# Patient Record
Sex: MALE | Race: WHITE | NOT HISPANIC OR LATINO | Employment: OTHER | URBAN - METROPOLITAN AREA
[De-identification: names, ages, dates, MRNs, and addresses within clinical notes are randomized per-mention and may not be internally consistent; named-entity substitution may affect disease eponyms.]

---

## 2017-03-15 ENCOUNTER — TELEPHONE (OUTPATIENT)
Dept: FAMILY MEDICINE | Facility: CLINIC | Age: 62
End: 2017-03-15

## 2017-03-15 DIAGNOSIS — J45.30 MILD PERSISTENT ASTHMA WITHOUT COMPLICATION: ICD-10-CM

## 2017-03-15 RX ORDER — ALBUTEROL SULFATE 90 UG/1
2 AEROSOL, METERED RESPIRATORY (INHALATION) EVERY 6 HOURS PRN
Qty: 1 INHALER | Refills: 0 | Status: SHIPPED | OUTPATIENT
Start: 2017-03-15 | End: 2017-08-14

## 2017-03-15 NOTE — TELEPHONE ENCOUNTER
Reason for Call:  Medication or medication refill:    Do you use a [facility]?  Name of the pharmacy and phone number for the current request:  [facility] [place] [phone]    Name of the medication requested: Albuterol inhaler - Pt asking for 1 inhaler refill to last him until he can get in for appt. soon.  No need to call patient back, unless there are questions or problems.    Albuterol Inhaler       Last Written Prescription Date: 03/10/16  Last Fill Quantity: 1, # refills: 11    Last Office Visit with [facility], [facility] or [facility] prescribing provider:  03/10/16   Future Office Visit:       Date of Last Asthma Action Plan Letter:   Asthma Action Plan Q1 Year    Topic Date Due     Asthma Action Plan - yearly  03/10/2017      Asthma Control Test:   ACT Total Scores 3/10/2016   ACT TOTAL SCORE -   ASTHMA ER VISITS -   ASTHMA HOSPITALIZATIONS -   ACT TOTAL SCORE (Goal Greater than or Equal to 20) 17   In the past 12 months, how many times did you visit the emergency room for your asthma without being admitted to the hospital? 0   In the past 12 months, how many times were you hospitalized overnight because of your asthma? 0       Date of Last Spirometry Test:   No results found for this or any previous visit.      Other request:     Can we leave a detailed message on this number? YES    Phone number patient can be reached at: Home number on file [phone] (home)    Best Time: any    Call taken on 3/15/2017 at 9:48 AM by Marci Schroeder    Reason for Call:  Medication or medication refill:

## 2017-03-15 NOTE — TELEPHONE ENCOUNTER
Prescription approved per [facility] Refill Protocol.  Patient notified.    Alicia Marcus RN

## 2017-04-27 DIAGNOSIS — J45.30 MILD PERSISTENT ASTHMA WITHOUT COMPLICATION: ICD-10-CM

## 2017-04-27 NOTE — TELEPHONE ENCOUNTER
VENTOLIN HFA INH W/DOS CTR [id number]      Last Written Prescription Date: 3/15/2017  Last Fill Quantity: 1 inhaler, # refills: 0    Last Office Visit with [facility], [facility] or [facility] prescribing provider:  3/10/2016   Future Office Visit:       Date of Last Asthma Action Plan Letter:   Asthma Action Plan Q1 Year    Topic Date Due     Asthma Action Plan - yearly  03/10/2017      Asthma Control Test:   ACT Total Scores 3/10/2016   ACT TOTAL SCORE -   ASTHMA ER VISITS -   ASTHMA HOSPITALIZATIONS -   ACT TOTAL SCORE (Goal Greater than or Equal to 20) 17   In the past 12 months, how many times did you visit the emergency room for your asthma without being admitted to the hospital? 0   In the past 12 months, how many times were you hospitalized overnight because of your asthma? 0       Date of Last Spirometry Test:   No results found for this or any previous visit.

## 2017-05-02 ENCOUNTER — ALLIED HEALTH/NURSE VISIT (OUTPATIENT)
Dept: FAMILY MEDICINE | Facility: CLINIC | Age: 62
End: 2017-05-02
Payer: COMMERCIAL

## 2017-05-02 DIAGNOSIS — J45.30 MILD PERSISTENT ASTHMA WITHOUT COMPLICATION: Primary | ICD-10-CM

## 2017-05-02 PROCEDURE — 99207 ZZC NO CHARGE NURSE ONLY: CPT

## 2017-05-02 RX ORDER — ALBUTEROL SULFATE 90 UG/1
2 AEROSOL, METERED RESPIRATORY (INHALATION) EVERY 4 HOURS PRN
Qty: 18 G | Refills: 0 | Status: SHIPPED | OUTPATIENT
Start: 2017-05-02 | End: 2017-08-14

## 2017-05-02 NOTE — TELEPHONE ENCOUNTER
"Patient stopped in today and was advised it is more than a year since last seen and he needs an appt.   \"well, I am going into to treatment in [place] this week and I need one. I used primatene mist until they took it off the market. \"    No current breathing problem.     He agreed to be seen today and was scheduled for later in the day today with Dr Ware, I am going to send this note to PCP, ADRIAN only,   He has an appt at 2 pm today with you,   Cuca Billingsley RNC    "

## 2017-05-02 NOTE — MR AVS SNAPSHOT
"              After Visit Summary   5/2/2017    Luigi Vieyra    MRN: [id number]           Patient Information     Date Of Birth          1955        Visit Information        Provider Department      5/2/2017 8:45 AM KEVIN PALOMINO FP/IM RN [facility]        Today's Diagnoses     Mild persistent asthma without complication    -  1       Follow-ups after your visit        Your next 10 appointments already scheduled     May 02, 2017  8:45 AM CDT   Nurse [facility] with [facility]    [address] [address]   [phone]            May 02, 2017  2:00 PM CDT   [facility]   [facility]    [address] [address] [phone]              Who to contact     If you have questions or need follow up information about today's clinic visit or your schedule please contact [facility] directly at [phone].  Normal or non-critical lab and imaging results will be communicated to you by Semadichart, letter or phone within 4 business days after the clinic has received the results. If you do not hear from us within 7 days, please contact the clinic through Semadichart or phone. If you have a critical or abnormal lab result, we will notify you by phone as soon as possible.  Submit refill requests through [employer] or call your pharmacy and they will forward the refill request to us. Please allow 3 business days for your refill to be completed.          Additional Information About Your Visit        MyChart Information     ActiveCloud lets you send messages to your doctor, view your test results, renew your prescriptions, schedule appointments and more. To sign up, go to [email or web address] . Click on \"Log in\" on the left side of the screen, which will take you to the Welcome page. Then click on \"Sign up Now\" on the right side of the page.     You " will be asked to enter the access code listed below, as well as some personal information. Please follow the directions to create your username and password.     Your access code is: [email or web address]  Expires: 2017  8:18 AM     Your access code will  in 90 days. If you need help or a new code, please call your [facility] or [phone].        Care EveryWhere ID     This is your Care EveryWhere ID. This could be used by other organizations to access your [place] medical records  [id number]         Blood Pressure from Last 3 Encounters:   16 131/78   03/10/16 155/90   14 152/90    Weight from Last 3 Encounters:   03/10/16 173 lb (78.5 kg)   14 175 lb (79.4 kg)   12 175 lb 3.2 oz (79.5 kg)              Today, you had the following     No orders found for display       Primary Care Provider Office Phone # Fax #    Gatitoderek Ware [phone]       [facility]        [address] [address]        Thank you!     Thank you for choosing [facility]  for your care. Our goal is always to provide you with excellent care. Hearing back from our patients is one way we can continue to improve our services. Please take a few minutes to complete the written survey that you may receive in the mail after your visit with us. Thank you!             Your Updated Medication List - Protect others around you: Learn how to safely use, store and throw away your medicines at www.disposemymeds.org.          This list is accurate as of: 17  8:18 AM.  Always use your most recent med list.                   Brand Name Dispense Instructions for use    albuterol 108 (90 BASE) MCG/ACT Inhaler    PROAIR HFA/PROVENTIL HFA/VENTOLIN HFA    1 Inhaler    Inhale 2 puffs into the lungs every 6 hours as needed for shortness of breath / dyspnea or wheezing       fluticasone 110 MCG/ACT Inhaler    FLOVENT HFA    1 Inhaler    Inhale 2  puffs into the lungs 2 times daily 2 puffs

## 2017-05-02 NOTE — NURSING NOTE
Here to request an inhaler, was advised he needs an appt and agreed, will come back in at 2 p today to see PCP.   ,Cuca Billingsley RNC

## 2017-08-14 ENCOUNTER — OFFICE VISIT (OUTPATIENT)
Dept: FAMILY MEDICINE | Facility: CLINIC | Age: 62
End: 2017-08-14
Payer: COMMERCIAL

## 2017-08-14 ENCOUNTER — TELEPHONE (OUTPATIENT)
Dept: FAMILY MEDICINE | Facility: CLINIC | Age: 62
End: 2017-08-14

## 2017-08-14 VITALS
SYSTOLIC BLOOD PRESSURE: 114 MMHG | DIASTOLIC BLOOD PRESSURE: 71 MMHG | HEART RATE: 68 BPM | WEIGHT: 176 LBS | BODY MASS INDEX: 25.2 KG/M2 | TEMPERATURE: 98.4 F | HEIGHT: 70 IN

## 2017-08-14 DIAGNOSIS — F10.21 ALCOHOL DEPENDENCE IN REMISSION (H): ICD-10-CM

## 2017-08-14 DIAGNOSIS — G47.00 INSOMNIA, UNSPECIFIED TYPE: ICD-10-CM

## 2017-08-14 DIAGNOSIS — Z13.6 CARDIOVASCULAR SCREENING; LDL GOAL LESS THAN 130: ICD-10-CM

## 2017-08-14 DIAGNOSIS — Z72.0 TOBACCO ABUSE: ICD-10-CM

## 2017-08-14 DIAGNOSIS — Z11.59 NEED FOR HEPATITIS C SCREENING TEST: ICD-10-CM

## 2017-08-14 DIAGNOSIS — Z12.11 SCREENING FOR MALIGNANT NEOPLASM OF COLON: ICD-10-CM

## 2017-08-14 DIAGNOSIS — J45.30 MILD PERSISTENT ASTHMA WITHOUT COMPLICATION: Primary | ICD-10-CM

## 2017-08-14 PROBLEM — J30.2 SEASONAL ALLERGIC RHINITIS: Status: ACTIVE | Noted: 2017-06-13

## 2017-08-14 PROCEDURE — 99214 OFFICE O/P EST MOD 30 MIN: CPT | Performed by: FAMILY MEDICINE

## 2017-08-14 RX ORDER — FLUTICASONE PROPIONATE 110 UG/1
1 AEROSOL, METERED RESPIRATORY (INHALATION) 2 TIMES DAILY
Qty: 1 INHALER | Refills: 11 | Status: SHIPPED | OUTPATIENT
Start: 2017-08-14 | End: 2018-12-27 | Stop reason: DRUGHIGH

## 2017-08-14 RX ORDER — ALBUTEROL SULFATE 90 UG/1
2 AEROSOL, METERED RESPIRATORY (INHALATION) EVERY 4 HOURS PRN
Qty: 18 G | Refills: 11 | Status: SHIPPED | OUTPATIENT
Start: 2017-08-14 | End: 2018-11-23

## 2017-08-14 NOTE — MR AVS SNAPSHOT
After Visit Summary   8/14/2017    Luigi Vieyra    MRN: [id number]           Patient Information     Date Of Birth          1955        Visit Information        Provider Department      8/14/2017 9:40 AM Gatito Ware MD [facility]        Today's Diagnoses     Mild persistent asthma without complication    -  1    Insomnia, unspecified type        CARDIOVASCULAR SCREENING; LDL GOAL LESS THAN 130        Need for hepatitis C screening test        Screening for malignant neoplasm of colon          Care Instructions    Call to schedule your lab tests; make sure you are fasting for more than 12 hrs.    Schedule colonoscopy for screening for colon cancer at your soonest convenience.    Restart Flovent inhaler as prescribed - use this every day.  This is your controller medication for asthma.  Albuterol inhaler 2 puffs every 4 hrs as needed for wheezing or coughing spells or tightness in breathing while active. Goal is to minimize use of this medication.  Get a flu shot in the fall.    Sleep difficulty can be from various causes.  No caffeine after 3 pm - earlier if able.  Sleep hygiene: do not watch TV, use anything with electronic screen 1-2 hrs before bedtime; do not exercise or do strenuous activities 1-2 hrs prior to set bedtime; dim all the lights in room; cover windows with heavy drapes to block sunlight; take sleep aide as directed only; upon waking up turn on all lights.   If with persistent insomnia or daytime sleepiness in spite of the above after 2 months, see provider again.  Controlling Your Asthma  You can do a lot to manage your asthma and improve your quality of life. You will need to work with your healthcare provider to develop a plan. But it s up to you to put this plan into action.  Why you need to take control  You need to control the inflammation in your lungs. Take all medicine as directed, especially controller medicines, even if you feel that your  asthma is under good control. You also need to relieve symptoms when you have them. These are long-term tasks. But the more you stay in control, the better you ll feel. If you don t stay in control:    Asthma symptoms may cause you to miss school, work, or activities that you enjoy.    Asthma flare-ups can be dangerous, even deadly.    Uncontrolled asthma makes it more likely that you will need emergency department and in-hospital care.    Uncontrolled asthma may cause permanent damage to your lungs.    Peak flow monitoring helps measure how open your airways are.   Taking medicine helps you control your asthma and relieve symptoms when they occur.     Using an Asthma Action Plan will help you keep track of and respond to asthma symptoms.   Avoiding triggers--the things that inflame your airways--will help prevent symptoms and flare-ups.   Your action plan  Your healthcare provider will help you prepare, and when needed, update your personal Asthma Action Plan. Your plan tells you what to do based on your current symptoms. If you don't have an Asthma Action Plan, or if yours isn't up-to-date, make sure you talk with your healthcare provider.  Date Last Reviewed: 1/1/2017 2000-2017 [employer]. [address] [place] [address]. All rights reserved. This information is not intended as a substitute for professional medical care. Always follow your healthcare professional's instructions.        Insomnia  Insomnia is repeated difficulty going to sleep or staying asleep, or both. Whether you have insomnia is not defined by a specific amount of sleep. Different people need different amounts of sleep, and you may need more or less sleep at different times of your life.  There are 3 major types of insomnia:  short-term, chronic, and  other.   Short-term, or acute insomnia lasts less than 3 months.  The symptoms are temporary and can be linked directly to a stressor, such as the death of a loved one,  financial problems, or a new physical problem.  Short-term insomnia stops when the stressor resolves or the person adapts to its presence.  Chronic insomnia occurs at least 3 times a week and lasts longer than 3 months.  Chronic insomnia can occur when either the cause of the sleeping problem is not clear, or the insomnia does not get better when the stressor is resolved. A number of other criteria are also used to make the diagnosis of chronic insomnia.    Other insomnia  is the third type of insomnia-related sleep disorders.  This description applies to people who have problems getting to sleep or staying asleep, but do not meet all of the factors that describe either short-term or chronic insomnia.    Many things cause insomnia. Different people may have different causes. It can be from an underlying medical or psychological condition, or lifestyle. It can also be primary insomnia, which means no cause can be found.  Causes of insomnia include:    Chronic medical problems- heart disease, gastrointestinal problems, hormonal changes, breathing problems    Anxiety    Stress    Depression    Pain    Work schedule    Sleep apnea    Illegal drugs    Certain medicines  Many different medidcines can affect your sleep, such as stimulants, caffeine, alcohol, some decongestants, and diet pills. Other medicines may include some types of blood pressure pills, steroids, asthma medicines, antihistamines, antidepressants, seizure medicines and statins. Not all of these will affect your sleep, and they shouldn t be stopped without talking to your doctor.  Symptoms of insomnia can include:    Lying awake for long periods at night before falling asleep    Waking up several times during the night    Waking up early in the morning and not being able to get back to sleep    Feeling tired and not refreshed by sleep    Not being able to function properly during the day and finding it hard to concentrate    Irritability    Tiredness and  fatigue during the day  Home care  1. Review your medicines with your doctor or pharmacist to find out if they can cause insomnia. Not all medicines will affect your sleep, but they shouldn't be stopped without reviewing them with your doctor. There may be serious side effects and consequences from suddenly stopping your medicines. Not taking them may cause strokes, heart attacks, and many other problems.  2. Caffeine, smoking and alcohol also affect sleep. Limit your daily use and do not use these before bedtime. Alcohol may make you sleepy at first, but as its effects wear off, you may awaken a few hours later and have trouble returning to sleep.  3. Do not exercise, eat or drink large amounts of liquid within 2 hours of your bedtime.  4. Improve your sleep habits. Have a fixed bed and wake-up time. Try to keep noise, light and heat in your bedroom at a comfortable level. Try using earplugs or eyeshades if needed.   5. Avoid watching TV in bed.  6. If you do not fall asleep within 30 minutes, try to relax by reading or listening to soft music.  7. Limit daytime napping to one 30 minute period, early in the day.  8. Get regular exercise. Find other ways to lessen your stress level.  9. If a medicine was prescribed to help reset your sleep patterns, take it as directed. Sleeping pills are intended for short-term use, only. If taken for too long, the effect wears off while the risk of physical addiction and psychological dependence increases.  Sleep diary  If the cause isn t obvious and it is not improving, try keeping a  sleep diary  for a couple of weeks. Include in it:    The time you go to bed    How long it takes to fall asleep    How many times you wake up    What time you wake up    Your meal times and what you eat    What time you drink alcohol    Your exercise habits and times  Follow-up care  Follow up with your healthcare provider, or as advised. If X-rays or CT scans were done, you will be notified if  there is a change in the reading, especially if it affects treatment.  Call 911  Call 911 if any of these occur:    Trouble breathing    Confusion or trouble waking    Fainting or loss of consciousness    Rapid heart rate    New chest, arm, shoulder, neck or upper back pain    Trouble with speech or vision, weakness of an arm or leg    Trouble walking or talking, loss of balance, numbness or weakness in one side of your body, facial droop  When to seek medical advice  Call your healthcare provider right away if any of these occur:    Extreme restlessness or irritability    Confusion or hallucinations (seeing or hearing things that are not there)    Anxiety, depression    Several days without sleeping  Date Last Reviewed: 11/19/2015 2000-2017 [employer]. [address] [place] [address], [place] [address]. All rights reserved. This information is not intended as a substitute for professional medical care. Always follow your healthcare professional's instructions.                Follow-ups after your visit        Additional Services     GASTROENTEROLOGY ADULT REF PROCEDURE ONLY       Last Lab Result: No results found for: CR  Body mass index is 25.25 kg/(m^2).     Needed:  No  Language:  English    Patient will be contacted to schedule procedure.     Please be aware that coverage of these services is subject to the terms and limitations of your health insurance plan.  Call member services at your health plan with any benefit or coverage questions.  Any procedures must be performed at a [place] facility OR coordinated by your clinic's referral office.    Please bring the following with you to your appointment:    (1) Any X-Rays, CTs or MRIs which have been performed.  Contact the facility where they were done to arrange for  prior to your scheduled appointment.    (2) List of current medications   (3) This referral request   (4) Any documents/labs given to you for this referral                 "  Future tests that were ordered for you today     Open Future Orders        Priority Expected Expires Ordered    **Lipid panel reflex to direct LDL FUTURE anytime Routine 2017    **Hepatitis C Screen Reflex to RNA FUTURE anytime Routine 2017            Who to contact     If you have questions or need follow up information about today's clinic visit or your schedule please contact [facility] directly at [phone].  Normal or non-critical lab and imaging results will be communicated to you by MyChart, letter or phone within 4 business days after the clinic has received the results. If you do not hear from us within 7 days, please contact the clinic through WinProbehart or phone. If you have a critical or abnormal lab result, we will notify you by phone as soon as possible.  Submit refill requests through [employer] or call your pharmacy and they will forward the refill request to us. Please allow 3 business days for your refill to be completed.          Additional Information About Your Visit        WinProbeharGymRealm Information     [employer] lets you send messages to your doctor, view your test results, renew your prescriptions, schedule appointments and more. To sign up, go to [email or web address] . Click on \"Log in\" on the left side of the screen, which will take you to the Welcome page. Then click on \"Sign up Now\" on the right side of the page.     You will be asked to enter the access code listed below, as well as some personal information. Please follow the directions to create your username and password.     Your access code is: [email or web address]  Expires: 2017 11:04 AM     Your access code will  in 90 days. If you need help or a new code, please call your [place] clinic or [phone].        Care EveryWhere ID     This is your Care EveryWhere ID. This could be used by other organizations to access your [place] medical records  [id number]      " "  Your Vitals Were     Pulse Temperature Height BMI (Body Mass Index)          68 98.4  F (36.9  C) (Tympanic) 5' 10\" (1.778 m) 25.25 kg/m2         Blood Pressure from Last 3 Encounters:   08/14/17 114/71   04/14/16 131/78   03/10/16 155/90    Weight from Last 3 Encounters:   08/14/17 176 lb (79.8 kg)   03/10/16 173 lb (78.5 kg)   03/25/14 175 lb (79.4 kg)              We Performed the Following     Asthma Action Plan (AAP)     GASTROENTEROLOGY ADULT REF PROCEDURE ONLY          Today's Medication Changes          These changes are accurate as of: 8/14/17 11:04 AM.  If you have any questions, ask your nurse or doctor.               These medicines have changed or have updated prescriptions.        Dose/Directions    fluticasone 110 MCG/ACT Inhaler   Commonly known as:  FLOVENT HFA   This may have changed:  how much to take   Used for:  Mild persistent asthma without complication   Changed by:  Gatito Ware MD        Dose:  1 puff   Inhale 1 puff into the lungs 2 times daily 2 puffs   Quantity:  1 Inhaler   Refills:  11            Where to get your medicines      These medications were sent to [facility] [address] [place] [address] AT [address] [address]     Phone:  [phone]     albuterol 108 (90 BASE) MCG/ACT Inhaler    fluticasone 110 MCG/ACT Inhaler                Primary Care Provider Office Phone # Fax #    Gatito Ware [phone] [address] [facility] [address]        Equal Access to Services     ALONSO ROBLEDO : Valarie Hines, mia cobian, maria elena ng. So [facility] [phone].    ATENCIÓN: Si habla español, tiene a cassidy disposición servicios gratuitos de asistencia lingüística. Llame al [phone].    We comply with applicable federal civil rights laws and [place] laws. We do not " discriminate on the basis of race, color, national origin, age, disability sex, sexual orientation or gender identity.            Thank you!     Thank you for choosing [facility]  for your care. Our goal is always to provide you with excellent care. Hearing back from our patients is one way we can continue to improve our services. Please take a few minutes to complete the written survey that you may receive in the mail after your visit with us. Thank you!             Your Updated Medication List - Protect others around you: Learn how to safely use, store and throw away your medicines at www.disposemymeds.org.          This list is accurate as of: 8/14/17 11:04 AM.  Always use your most recent med list.                   Brand Name Dispense Instructions for use Diagnosis    albuterol 108 (90 BASE) MCG/ACT Inhaler    VENTOLIN HFA    18 g    Inhale 2 puffs into the lungs every 4 hours as needed for shortness of breath / dyspnea or wheezing    Mild persistent asthma without complication       fluticasone 110 MCG/ACT Inhaler    FLOVENT HFA    1 Inhaler    Inhale 1 puff into the lungs 2 times daily 2 puffs    Mild persistent asthma without complication

## 2017-08-14 NOTE — NURSING NOTE
"Chief Complaint   Patient presents with     Asthma     Pt here for a f/u on asthma and needs med refilled.       Initial /71  Pulse 68  Temp 98.4  F (36.9  C) (Tympanic)  Ht 5' 10\" (1.778 m)  Wt 176 lb (79.8 kg)  BMI 25.25 kg/m2 Estimated body mass index is 25.25 kg/(m^2) as calculated from the following:    Height as of this encounter: 5' 10\" (1.778 m).    Weight as of this encounter: 176 lb (79.8 kg).  Medication Reconciliation: complete  Shantelle Ruiz CMA    "

## 2017-08-14 NOTE — LETTER
My Asthma Action Plan  Name: Luigi Vieyra   YOB: 1955  Date: 8/14/2017   My doctor: Gatito Ware MD   My clinic: [facility]        My Control Medicine: Fluticasone propionate (Flovent) -   mcg 1 puff inhaled twice a day  My Rescue Medicine: Albuterol (Proair/Ventolin/Proventil) inhaler 2 puffs inhaled every 4 hrs as needed for wheezing   My Asthma Severity: mild persistent  Avoid your asthma triggers: smoke and pollens  smoke  allergies            GREEN ZONE   Good Control    I feel good    No cough or wheeze    Can work, sleep and play without asthma symptoms       Take your asthma control medicine every day.     1. If exercise triggers your asthma, take your rescue medication    15 minutes before exercise or sports, and    During exercise if you have asthma symptoms  2. Spacer to use with inhaler: If you have a spacer, make sure to use it with your inhaler             YELLOW ZONE Getting Worse  I have ANY of these:    I do not feel good    Cough or wheeze    Chest feels tight    Wake up at night   1. Keep taking your Green Zone medications  2. Start taking your rescue medicine:    every 20 minutes for up to 1 hour. Then every 4 hours for 24-48 hours.  3. If you stay in the Yellow Zone for more than 12-24 hours, contact your doctor.  4. If you do not return to the Green Zone in 12-24 hours or you get worse, start taking your oral steroid medicine if prescribed by your provider.           RED ZONE Medical Alert - Get Help  I have ANY of these:    I feel awful    Medicine is not helping    Breathing getting harder    Trouble walking or talking    Nose opens wide to breathe       1. Take your rescue medicine NOW  2. If your provider has prescribed an oral steroid medicine, start taking it NOW  3. Call your doctor NOW  4. If you are still in the Red Zone after 20 minutes and you have not reached your doctor:    Take your rescue medicine again and    Call 911 or go to the  emergency room right away    See your regular doctor within 2 weeks of an Emergency Room or Urgent Care visit for follow-up treatment.        Electronically signed by: Gatito Ware, August 14, 2017    Annual Reminders:  Meet with Asthma Educator,  Flu Shot in the Fall, consider Pneumonia Vaccination for patients with asthma (aged 19 and older).    Pharmacy: [facility] [address] [place] [address] AVE AT [facility] & [address]                    Asthma Triggers  How To Control Things That Make Your Asthma Worse    Triggers are things that make your asthma worse.  Look at the list below to help you find your triggers and what you can do about them.  You can help prevent asthma flare-ups by staying away from your triggers.      Trigger                                                          What you can do   Cigarette Smoke  Tobacco smoke can make asthma worse. Do not allow smoking in your home, car or around you.  Be sure no one smokes at a child s day care or school.  If you smoke, ask your health care provider for ways to help you quit.  Ask family members to quit too.  Ask your health care provider for a referral to Quit Plan to help you quit smoking, or call 8-516-925-PLAN.     Colds, Flu, Bronchitis  These are common triggers of asthma. Wash your hands often.  Don t touch your eyes, nose or mouth.  Get a flu shot every year.     Dust Mites  These are tiny bugs that live in cloth or carpet. They are too small to see. Wash sheets and blankets in hot water every week.   Encase pillows and mattress in dust mite proof covers.  Avoid having carpet if you can. If you have carpet, vacuum weekly.   Use a dust mask and HEPA vacuum.   Pollen and Outdoor Mold  Some people are allergic to trees, grass, or weed pollen, or molds. Try to keep your windows closed.  Limit time out doors when pollen count is high.   Ask you health care provider about taking medicine during allergy season.     Animal  Dander  Some people are allergic to skin flakes, urine or saliva from pets with fur or feathers. Keep pets with fur or feathers out of your home.    If you can t keep the pet outdoors, then keep the pet out of your bedroom.  Keep the bedroom door closed.  Keep pets off cloth furniture and away from stuffed toys.     Mice, Rats, and Cockroaches  Some people are allergic to the waste from these pests.   Cover food and garbage.  Clean up spills and food crumbs.  Store grease in the refrigerator.   Keep food out of the bedroom.   Indoor Mold  This can be a trigger if your home has high moisture. Fix leaking faucets, pipes, or other sources of water.   Clean moldy surfaces.  Dehumidify basement if it is damp and smelly.   Smoke, Strong Odors, and Sprays  These can reduce air quality. Stay away from strong odors and sprays, such as perfume, powder, hair spray, paints, smoke incense, paint, cleaning products, candles and new carpet.   Exercise or Sports  Some people with asthma have this trigger. Be active!  Ask your doctor about taking medicine before sports or exercise to prevent symptoms.    Warm up for 5-10 minutes before and after sports or exercise.     Other Triggers of Asthma  Cold air:  Cover your nose and mouth with a scarf.  Sometimes laughing or crying can be a trigger.  Some medicines and food can trigger asthma.

## 2017-08-14 NOTE — TELEPHONE ENCOUNTER
Patient has left clinic without instructions for colon prep for colonoscopy.  Please call patient to be given instructions to schedule and prep - he may be given the handout when he comes in for his lab appointment in next few days.

## 2017-08-14 NOTE — TELEPHONE ENCOUNTER
I reached him, he asked me to mail the instructions, and I will do that today.   Cuac Billingsley RNC

## 2017-08-14 NOTE — PATIENT INSTRUCTIONS
Call to schedule your lab tests; make sure you are fasting for more than 12 hrs.    Schedule colonoscopy for screening for colon cancer at your soonest convenience.    Restart Flovent inhaler as prescribed - use this every day.  This is your controller medication for asthma.  Albuterol inhaler 2 puffs every 4 hrs as needed for wheezing or coughing spells or tightness in breathing while active. Goal is to minimize use of this medication.  Get a flu shot in the fall.    Sleep difficulty can be from various causes.  No caffeine after 3 pm - earlier if able.  Sleep hygiene: do not watch TV, use anything with electronic screen 1-2 hrs before bedtime; do not exercise or do strenuous activities 1-2 hrs prior to set bedtime; dim all the lights in room; cover windows with heavy drapes to block sunlight; take sleep aide as directed only; upon waking up turn on all lights.   If with persistent insomnia or daytime sleepiness in spite of the above after 2 months, see provider again.  Controlling Your Asthma  You can do a lot to manage your asthma and improve your quality of life. You will need to work with your healthcare provider to develop a plan. But it s up to you to put this plan into action.  Why you need to take control  You need to control the inflammation in your lungs. Take all medicine as directed, especially controller medicines, even if you feel that your asthma is under good control. You also need to relieve symptoms when you have them. These are long-term tasks. But the more you stay in control, the better you ll feel. If you don t stay in control:    Asthma symptoms may cause you to miss school, work, or activities that you enjoy.    Asthma flare-ups can be dangerous, even deadly.    Uncontrolled asthma makes it more likely that you will need emergency department and in-hospital care.    Uncontrolled asthma may cause permanent damage to your lungs.    Peak flow monitoring helps measure how open your airways are.    Taking medicine helps you control your asthma and relieve symptoms when they occur.     Using an Asthma Action Plan will help you keep track of and respond to asthma symptoms.   Avoiding triggers--the things that inflame your airways--will help prevent symptoms and flare-ups.   Your action plan  Your healthcare provider will help you prepare, and when needed, update your personal Asthma Action Plan. Your plan tells you what to do based on your current symptoms. If you don't have an Asthma Action Plan, or if yours isn't up-to-date, make sure you talk with your healthcare provider.  Date Last Reviewed: 1/1/2017 2000-2017 [employer]. [address] [place] [address], [place] [address]. All rights reserved. This information is not intended as a substitute for professional medical care. Always follow your healthcare professional's instructions.        Insomnia  Insomnia is repeated difficulty going to sleep or staying asleep, or both. Whether you have insomnia is not defined by a specific amount of sleep. Different people need different amounts of sleep, and you may need more or less sleep at different times of your life.  There are 3 major types of insomnia:  short-term, chronic, and  other.   Short-term, or acute insomnia lasts less than 3 months.  The symptoms are temporary and can be linked directly to a stressor, such as the death of a loved one, financial problems, or a new physical problem.  Short-term insomnia stops when the stressor resolves or the person adapts to its presence.  Chronic insomnia occurs at least 3 times a week and lasts longer than 3 months.  Chronic insomnia can occur when either the cause of the sleeping problem is not clear, or the insomnia does not get better when the stressor is resolved. A number of other criteria are also used to make the diagnosis of chronic insomnia.    Other insomnia  is the third type of insomnia-related sleep disorders.  This description applies to people  who have problems getting to sleep or staying asleep, but do not meet all of the factors that describe either short-term or chronic insomnia.    Many things cause insomnia. Different people may have different causes. It can be from an underlying medical or psychological condition, or lifestyle. It can also be primary insomnia, which means no cause can be found.  Causes of insomnia include:    Chronic medical problems- heart disease, gastrointestinal problems, hormonal changes, breathing problems    Anxiety    Stress    Depression    Pain    Work schedule    Sleep apnea    Illegal drugs    Certain medicines  Many different medidcines can affect your sleep, such as stimulants, caffeine, alcohol, some decongestants, and diet pills. Other medicines may include some types of blood pressure pills, steroids, asthma medicines, antihistamines, antidepressants, seizure medicines and statins. Not all of these will affect your sleep, and they shouldn t be stopped without talking to your doctor.  Symptoms of insomnia can include:    Lying awake for long periods at night before falling asleep    Waking up several times during the night    Waking up early in the morning and not being able to get back to sleep    Feeling tired and not refreshed by sleep    Not being able to function properly during the day and finding it hard to concentrate    Irritability    Tiredness and fatigue during the day  Home care  1. Review your medicines with your doctor or pharmacist to find out if they can cause insomnia. Not all medicines will affect your sleep, but they shouldn't be stopped without reviewing them with your doctor. There may be serious side effects and consequences from suddenly stopping your medicines. Not taking them may cause strokes, heart attacks, and many other problems.  2. Caffeine, smoking and alcohol also affect sleep. Limit your daily use and do not use these before bedtime. Alcohol may make you sleepy at first, but as its  effects wear off, you may awaken a few hours later and have trouble returning to sleep.  3. Do not exercise, eat or drink large amounts of liquid within 2 hours of your bedtime.  4. Improve your sleep habits. Have a fixed bed and wake-up time. Try to keep noise, light and heat in your bedroom at a comfortable level. Try using earplugs or eyeshades if needed.   5. Avoid watching TV in bed.  6. If you do not fall asleep within 30 minutes, try to relax by reading or listening to soft music.  7. Limit daytime napping to one 30 minute period, early in the day.  8. Get regular exercise. Find other ways to lessen your stress level.  9. If a medicine was prescribed to help reset your sleep patterns, take it as directed. Sleeping pills are intended for short-term use, only. If taken for too long, the effect wears off while the risk of physical addiction and psychological dependence increases.  Sleep diary  If the cause isn t obvious and it is not improving, try keeping a  sleep diary  for a couple of weeks. Include in it:    The time you go to bed    How long it takes to fall asleep    How many times you wake up    What time you wake up    Your meal times and what you eat    What time you drink alcohol    Your exercise habits and times  Follow-up care  Follow up with your healthcare provider, or as advised. If X-rays or CT scans were done, you will be notified if there is a change in the reading, especially if it affects treatment.  Call 911  Call 911 if any of these occur:    Trouble breathing    Confusion or trouble waking    Fainting or loss of consciousness    Rapid heart rate    New chest, arm, shoulder, neck or upper back pain    Trouble with speech or vision, weakness of an arm or leg    Trouble walking or talking, loss of balance, numbness or weakness in one side of your body, facial droop  When to seek medical advice  Call your healthcare provider right away if any of these occur:    Extreme restlessness or  irritability    Confusion or hallucinations (seeing or hearing things that are not there)    Anxiety, depression    Several days without sleeping  Date Last Reviewed: 11/19/2015 2000-2017 [employer]. [address] [place] [address], [place] [address]. All rights reserved. This information is not intended as a substitute for professional medical care. Always follow your healthcare professional's instructions.

## 2017-08-14 NOTE — PROGRESS NOTES
SUBJECTIVE:                                                    Luigi Vieyra is a 61 year old male who presents to clinic today for the following health issues:  Chief Complaint   Patient presents with     Asthma     Pt here for a f/u on asthma and needs med refilled.     Insomnia     Pt also having trouble with falling asleep.       Asthma Follow-Up    Was ACT completed today?    Yes    ACT Total Scores 8/14/2017   ACT TOTAL SCORE -   ASTHMA ER VISITS -   ASTHMA HOSPITALIZATIONS -   ACT TOTAL SCORE (Goal Greater than or Equal to 20) 12   In the past 12 months, how many times did you visit the emergency room for your asthma without being admitted to the hospital? 0   In the past 12 months, how many times were you hospitalized overnight because of your asthma? 0         Recent asthma triggers that patient is dealing with: smoke and allergies    Amount of exercise or physical activity: no exercise but is pretty active    Problems taking medications regularly: No    Medication side effects: none    Patient states he still uses albuterol daily, 2-3 x a day.  Patient reports he feels allergies trigger it. Still smokes tobacco.   Has Rx for lozenges to stop smoking but has not used yet. Patient was given that when he went to inpt treatment for alcohol abuse.  Patient used to be on Flovent but stopped it on his own months ago.    Patient reports he has been sober from ETOH for 4 months now.  Patient is going to outpt follow ups for this.  No support group set yet.    Insomnia  Onset: past 3-4 months    Description:   Time to fall asleep (sleep latency): 1-2 hrs  Middle of night awakening:  YES  Early morning awakening:  YES- normally gets up for work at 5 a.m.    Progression of Symptoms:  same and constant    Accompanying Signs & Symptoms:  Daytime sleepiness/napping: YES- does not nap  Excessive snoring/apnea: YES- snoring  Restless legs: no  Frequent urination: YES- once  Chronic pain:  no    History:  Prior Insomnia:  no    Precipitating factors:   New stressful situation: no  Caffeine intake: YES- coffee and pop - patient has cut back  OTC decongestants: no  Any new medications: no    Alleviating factors:  Self medicating (alcohol, etc.):  no    Therapies Tried and outcome: cutting back on caffeine - last intake usually by 6 pm.    Patient states when he can't sleep, he keeps watching TV flipping channels.  Patient states he just spontaneously wakes up and can't get back to sleep with no reason.        Problem list and histories reviewed & adjusted, as indicated.  Additional history: as documented    Patient Active Problem List   Diagnosis     Mild persistent asthma     Alcohol abuse     Tobacco abuse     CARDIOVASCULAR SCREENING; LDL GOAL LESS THAN 130     Alcohol dependence in remission (H)     Seasonal allergic rhinitis     History reviewed. No pertinent surgical history.    Social History   Substance Use Topics     Smoking status: Current Every Day Smoker     Packs/day: 1.00     Years: 35.00     Types: Cigarettes     Smokeless tobacco: Never Used     Alcohol use Yes      Comment: quit drinking 3/2017     Family History   Problem Relation Age of Onset     Asthma Mother      CEREBROVASCULAR DISEASE Mother      C.A.D. No family hx of      DIABETES No family hx of      Hypertension No family hx of      Breast Cancer No family hx of      Cancer - colorectal No family hx of      Prostate Cancer No family hx of          Current Outpatient Prescriptions   Medication Sig Dispense Refill     albuterol (VENTOLIN HFA) 108 (90 BASE) MCG/ACT Inhaler Inhale 2 puffs into the lungs every 4 hours as needed for shortness of breath / dyspnea or wheezing 18 g 11     fluticasone (FLOVENT HFA) 110 MCG/ACT Inhaler Inhale 1 puff into the lungs 2 times daily 2 puffs 1 Inhaler 11     aspirin 81 MG tablet Take 81 mg by mouth daily       nicotine polacrilex 4 MG lozenge Place 4 mg inside cheek       [DISCONTINUED] albuterol (VENTOLIN HFA) 108 (90 BASE)  "MCG/ACT Inhaler Inhale 2 puffs into the lungs every 4 hours as needed for shortness of breath / dyspnea or wheezing 18 g 0     [DISCONTINUED] albuterol (PROAIR HFA/PROVENTIL HFA/VENTOLIN HFA) 108 (90 BASE) MCG/ACT Inhaler Inhale 2 puffs into the lungs every 6 hours as needed for shortness of breath / dyspnea or wheezing (Patient not taking: Reported on 8/14/2017) 1 Inhaler 0     [DISCONTINUED] fluticasone (FLOVENT HFA) 110 MCG/ACT inhaler Inhale 2 puffs into the lungs 2 times daily 2 puffs 1 Inhaler 11     No Known Allergies      Reviewed and updated as needed this visit by clinical staffTobacco  Allergies  Meds  Problems  Med Hx  Surg Hx  Fam Hx  Soc Hx        Reviewed and updated as needed this visit by Provider  Allergies  Meds  Problems         ROS:  C: NEGATIVE for fever, chills, change in weight  I: NEGATIVE for worrisome rashes, moles or lesions  E: NEGATIVE for vision changes or irritation  E/M: NEGATIVE for ear, mouth and throat problems  R: see above  CV: NEGATIVE for chest pain, palpitations or peripheral edema  GI: NEGATIVE for nausea, abdominal pain, heartburn, or change in bowel habits  : NEGATIVE for frequency, dysuria, or hematuria  M: NEGATIVE for significant arthralgias or myalgia  N: NEGATIVE for weakness, dizziness or paresthesias  E: NEGATIVE for temperature intolerance, skin/hair changes  H: NEGATIVE for bleeding problems  P: NEGATIVE for changes in mood or affect    OBJECTIVE:                                                    /71  Pulse 68  Temp 98.4  F (36.9  C) (Tympanic)  Ht 5' 10\" (1.778 m)  Wt 176 lb (79.8 kg)  BMI 25.25 kg/m2  Body mass index is 25.25 kg/(m^2).  GENERAL: alert and no distress  EYES: no icterus, PERRLA  NECK: no tenderness, no adenopathy,  Thyroid not enlarged  RESP: lungs clear to auscultation - no rales, no rhonchi, no wheezes  CV: regular rates and rhythm, no murmur  MS: no edema  SKIN: no jaundice  NEURO: strength and tone- normal, sensory " exam- grossly normal, mentation- intact, speech- normal, reflexes- symmetric  PSYCH: well-kempt, linear thought process, normal speech, good insight/judgement, normal mood, appropriate affect, no suicidality, no aggression, no hallucination  ABD:  nontender    Diagnostic test results:  Diagnostic Test Results:  none        ASSESSMENT/PLAN:                                                      ICD-10-CM    1. Mild persistent asthma without complication J45.30 albuterol (VENTOLIN HFA) 108 (90 BASE) MCG/ACT Inhaler     fluticasone (FLOVENT HFA) 110 MCG/ACT Inhaler - restart  Discussed course and treatment of condition.  Observe for other possible triggers; avoid if possible.  Advised if with wrosening sx, see provider again.  Repeat ACT in 1-2 months.     2. Tobacco abuse Z72.0 Advised to stop smoking soon.  Patient states he is planning on when to start the lozenges.     3. Insomnia, unspecified type G47.00 Discussed with patient possible causes of insomnia, and treatment recommendations.  Discussed use of sedatives for sleep aides, including possible ADR to the medications.  Discussed sleep hygiene with patient.  Sleep consult: consider if no improvement or worsening after 6-8 weeks of behavior modificaiton  Return precautions discussed and given to patient.     4. CARDIOVASCULAR SCREENING; LDL GOAL LESS THAN 130 Z13.6 **Lipid panel reflex to direct LDL FUTURE anytime     5. Need for hepatitis C screening test Z11.59 **Hepatitis C Screen Reflex to RNA FUTURE anytime  Offered screening based on current recommendations. Patient concurred to screen.     6. Screening for malignant neoplasm of colon Z12.11 GASTROENTEROLOGY ADULT REF PROCEDURE ONLY     7. Alcohol dependence in remission (H) F10.21 Patient was reinforced to avoid any alcohol.  Patient to continue outpt treatments.         Follow up with Provider - 1-2 months if needed.   Patient Instructions     Call to schedule your lab tests; make sure you are fasting for  more than 12 hrs.    Schedule colonoscopy for screening for colon cancer at your soonest convenience.    Restart Flovent inhaler as prescribed - use this every day.  This is your controller medication for asthma.  Albuterol inhaler 2 puffs every 4 hrs as needed for wheezing or coughing spells or tightness in breathing while active. Goal is to minimize use of this medication.  Get a flu shot in the fall.    Sleep difficulty can be from various causes.  No caffeine after 3 pm - earlier if able.  Sleep hygiene: do not watch TV, use anything with electronic screen 1-2 hrs before bedtime; do not exercise or do strenuous activities 1-2 hrs prior to set bedtime; dim all the lights in room; cover windows with heavy drapes to block sunlight; take sleep aide as directed only; upon waking up turn on all lights.   If with persistent insomnia or daytime sleepiness in spite of the above after 2 months, see provider again.  Controlling Your Asthma  You can do a lot to manage your asthma and improve your quality of life. You will need to work with your healthcare provider to develop a plan. But it s up to you to put this plan into action.  Why you need to take control  You need to control the inflammation in your lungs. Take all medicine as directed, especially controller medicines, even if you feel that your asthma is under good control. You also need to relieve symptoms when you have them. These are long-term tasks. But the more you stay in control, the better you ll feel. If you don t stay in control:    Asthma symptoms may cause you to miss school, work, or activities that you enjoy.    Asthma flare-ups can be dangerous, even deadly.    Uncontrolled asthma makes it more likely that you will need emergency department and in-hospital care.    Uncontrolled asthma may cause permanent damage to your lungs.    Peak flow monitoring helps measure how open your airways are.   Taking medicine helps you control your asthma and relieve  symptoms when they occur.     Using an Asthma Action Plan will help you keep track of and respond to asthma symptoms.   Avoiding triggers--the things that inflame your airways--will help prevent symptoms and flare-ups.   Your action plan  Your healthcare provider will help you prepare, and when needed, update your personal Asthma Action Plan. Your plan tells you what to do based on your current symptoms. If you don't have an Asthma Action Plan, or if yours isn't up-to-date, make sure you talk with your healthcare provider.  Date Last Reviewed: 1/1/2017 2000-2017 [employer] [address] [place] [address], [place] [address]. All rights reserved. This information is not intended as a substitute for professional medical care. Always follow your healthcare professional's instructions.        Insomnia  Insomnia is repeated difficulty going to sleep or staying asleep, or both. Whether you have insomnia is not defined by a specific amount of sleep. Different people need different amounts of sleep, and you may need more or less sleep at different times of your life.  There are 3 major types of insomnia:  short-term, chronic, and  other.   Short-term, or acute insomnia lasts less than 3 months.  The symptoms are temporary and can be linked directly to a stressor, such as the death of a loved one, financial problems, or a new physical problem.  Short-term insomnia stops when the stressor resolves or the person adapts to its presence.  Chronic insomnia occurs at least 3 times a week and lasts longer than 3 months.  Chronic insomnia can occur when either the cause of the sleeping problem is not clear, or the insomnia does not get better when the stressor is resolved. A number of other criteria are also used to make the diagnosis of chronic insomnia.    Other insomnia  is the third type of insomnia-related sleep disorders.  This description applies to people who have problems getting to sleep or staying asleep, but do  not meet all of the factors that describe either short-term or chronic insomnia.    Many things cause insomnia. Different people may have different causes. It can be from an underlying medical or psychological condition, or lifestyle. It can also be primary insomnia, which means no cause can be found.  Causes of insomnia include:    Chronic medical problems- heart disease, gastrointestinal problems, hormonal changes, breathing problems    Anxiety    Stress    Depression    Pain    Work schedule    Sleep apnea    Illegal drugs    Certain medicines  Many different medidcines can affect your sleep, such as stimulants, caffeine, alcohol, some decongestants, and diet pills. Other medicines may include some types of blood pressure pills, steroids, asthma medicines, antihistamines, antidepressants, seizure medicines and statins. Not all of these will affect your sleep, and they shouldn t be stopped without talking to your doctor.  Symptoms of insomnia can include:    Lying awake for long periods at night before falling asleep    Waking up several times during the night    Waking up early in the morning and not being able to get back to sleep    Feeling tired and not refreshed by sleep    Not being able to function properly during the day and finding it hard to concentrate    Irritability    Tiredness and fatigue during the day  Home care  1. Review your medicines with your doctor or pharmacist to find out if they can cause insomnia. Not all medicines will affect your sleep, but they shouldn't be stopped without reviewing them with your doctor. There may be serious side effects and consequences from suddenly stopping your medicines. Not taking them may cause strokes, heart attacks, and many other problems.  2. Caffeine, smoking and alcohol also affect sleep. Limit your daily use and do not use these before bedtime. Alcohol may make you sleepy at first, but as its effects wear off, you may awaken a few hours later and have  trouble returning to sleep.  3. Do not exercise, eat or drink large amounts of liquid within 2 hours of your bedtime.  4. Improve your sleep habits. Have a fixed bed and wake-up time. Try to keep noise, light and heat in your bedroom at a comfortable level. Try using earplugs or eyeshades if needed.   5. Avoid watching TV in bed.  6. If you do not fall asleep within 30 minutes, try to relax by reading or listening to soft music.  7. Limit daytime napping to one 30 minute period, early in the day.  8. Get regular exercise. Find other ways to lessen your stress level.  9. If a medicine was prescribed to help reset your sleep patterns, take it as directed. Sleeping pills are intended for short-term use, only. If taken for too long, the effect wears off while the risk of physical addiction and psychological dependence increases.  Sleep diary  If the cause isn t obvious and it is not improving, try keeping a  sleep diary  for a couple of weeks. Include in it:    The time you go to bed    How long it takes to fall asleep    How many times you wake up    What time you wake up    Your meal times and what you eat    What time you drink alcohol    Your exercise habits and times  Follow-up care  Follow up with your healthcare provider, or as advised. If X-rays or CT scans were done, you will be notified if there is a change in the reading, especially if it affects treatment.  Call 911  Call 911 if any of these occur:    Trouble breathing    Confusion or trouble waking    Fainting or loss of consciousness    Rapid heart rate    New chest, arm, shoulder, neck or upper back pain    Trouble with speech or vision, weakness of an arm or leg    Trouble walking or talking, loss of balance, numbness or weakness in one side of your body, facial droop  When to seek medical advice  Call your healthcare provider right away if any of these occur:    Extreme restlessness or irritability    Confusion or hallucinations (seeing or hearing  things that are not there)    Anxiety, depression    Several days without sleeping  Date Last Reviewed: 11/19/2015 2000-2017 [employer]. [address] [place] [address], [place] [address]. All rights reserved. This information is not intended as a substitute for professional medical care. Always follow your healthcare professional's instructions.            Gatito Ware MD  [facility]

## 2017-08-15 ASSESSMENT — ASTHMA QUESTIONNAIRES: ACT_TOTALSCORE: 12

## 2017-08-18 DIAGNOSIS — Z11.59 NEED FOR HEPATITIS C SCREENING TEST: ICD-10-CM

## 2017-08-18 DIAGNOSIS — Z13.6 CARDIOVASCULAR SCREENING; LDL GOAL LESS THAN 130: ICD-10-CM

## 2017-08-18 LAB
CHOLEST SERPL-MCNC: 200 MG/DL
HDLC SERPL-MCNC: 58 MG/DL
LDLC SERPL CALC-MCNC: 120 MG/DL
NONHDLC SERPL-MCNC: 142 MG/DL
TRIGL SERPL-MCNC: 110 MG/DL

## 2017-08-18 PROCEDURE — 80061 LIPID PANEL: CPT | Performed by: FAMILY MEDICINE

## 2017-08-18 PROCEDURE — 86803 HEPATITIS C AB TEST: CPT | Performed by: FAMILY MEDICINE

## 2017-08-18 PROCEDURE — 36415 COLL VENOUS BLD VENIPUNCTURE: CPT | Performed by: FAMILY MEDICINE

## 2017-08-19 LAB — HCV AB SERPL QL IA: NONREACTIVE

## 2018-04-09 ENCOUNTER — TELEPHONE (OUTPATIENT)
Dept: FAMILY MEDICINE | Facility: CLINIC | Age: 63
End: 2018-04-09

## 2018-04-09 NOTE — LETTER
April 16, 2018      Luigi Vieyra  [address] [address] [address]        Dear Luigi,     We have been unable to reach you by phone Your [facility] Team works hard to make sure that you  receive exceptional care. Enclosed you will find a copy of the Asthma Control Test (ACT) that our clinic uses to monitor and manage your asthma. This test is an assessment tool that we can use to determine how well your asthma is controlled. Please fill out and mail back the enclosed ACT form. Enclosed is a stamped addressed envelope for you to mail the ACT back to the clinic in. Also, please  keep the ACT that was previously mailed to you  for your reference when we call you in the future  to complete this assessment.    Also, it appears the phone number we have on file to reach you is a non working number. Please call the clinic at [phone] to update your current contact information. Thank You           Sincerely,        Gatito Ware MD/akil

## 2018-04-09 NOTE — TELEPHONE ENCOUNTER
Panel Management Review    Composite cancer screening  Chart review shows that this patient is due/due soon for the following Colonoscopy  Summary:    Patient is due/failing the following:   ACT and COLONOSCOPY    Action needed:   Patient needs to do ACT. and Patient needs referral/order: colonoscopy  Type of outreach:    Sent letter. and Copy of ACT mailed to patient, will reach out in 5 days.    Questions for provider review:    None                                                                                                                                    Shantelle Ruiz CMA

## 2018-04-09 NOTE — LETTER
April 9, 2018      Luigi Vieyra  [address] [address] [place] [address]          At this time you are due for a Colon Cancer Screening. Here is some information regarding this testing.     Recommended every 5-10 years, depending on your history, in order to prevent and detect colon cancer at its earliest stages.  Colon cancer is now the second leading cause of death in the [place] for both men and women and there are over 130,000 new cases and 50,000 deaths per year from colon cancer.  Colonoscopies can prevent 90-95% of these deaths.  Problem lesions can be removed before they ever become cancer. This test is not only looking for cancer, but also getting rid of precancerious lesions. You are usually given some sedation which makes the test very comfortable for most people.      If you do not wish to do a colonoscopy or cannot afford to do one, at this time, there is another option. It is called a FIT test or Fecal Immunochemical Occult Blood Test (take home stool sample kit).  It does not replace the colonoscopy for colorectal cancer screening, but it can detect hidden bleeding in the lower colon.  It does need to be repeated every year and if a positive result is obtained, you would be referred for a colonoscopy. The FIT test is really easy to do and does not require any  diet or medication restrictions and involves only one collection sample.      If you have completed either one of these tests or had a flexible sigmoidoscopy in the past five years at another facility, please have the records sent to our clinic so that we can best coordinate your care and update your chart.  Please call us if you have questions or would like arrange either to do a colonoscopy or obtain the necessary test kit for the Fecal Occult Test.      Sincerely,        Gatito Ware MD

## 2018-04-16 NOTE — TELEPHONE ENCOUNTER
Panel Management Review      Patient has the following on his problem list:     Asthma review     ACT Total Scores 8/14/2017   ACT TOTAL SCORE -   ASTHMA ER VISITS -   ASTHMA HOSPITALIZATIONS -   ACT TOTAL SCORE (Goal Greater than or Equal to 20) 12   In the past 12 months, how many times did you visit the emergency room for your asthma without being admitted to the hospital? 0   In the past 12 months, how many times were you hospitalized overnight because of your asthma? 0      1. Is Asthma diagnosis on the Problem List? Yes    2. Is Asthma listed on Health Maintenance? Yes    3. Patient is due for:  ACT      Composite cancer screening  Chart review shows that this patient is due/due soon for the following Colonoscopy  Summary:    Patient is due/failing the following:   ACT and COLONOSCOPY    Action needed:   Patient needs to do ACT.    Type of outreach:    Sent letter. with act to fill out and mail back, colonoscopy letter was mailed 4/9. Patient's # listed is disconnected.     Questions for provider review:    None                                                                                                                                    Jaquan HEREDIA CMA

## 2018-11-23 DIAGNOSIS — J45.30 MILD PERSISTENT ASTHMA WITHOUT COMPLICATION: ICD-10-CM

## 2018-11-23 NOTE — LETTER
[facility]  [address] [address]  Phone: [phone]       November 26, 2018         Luigi Vieyra  [address] [place] [id number]            Dear Luigi:    We are concerned about your health care.  We recently provided you with medication refills.  Many medications require routine follow-up with your doctor.    Your prescription(s) have been refilled for 30 days so you may have time for the above noted follow-up. Please call to schedule soon so we can assure you have an appointment before your next refills are needed.    Thank you,      Gatito Ware MD / ramiro

## 2018-11-26 RX ORDER — ALBUTEROL SULFATE 90 UG/1
AEROSOL, METERED RESPIRATORY (INHALATION)
Qty: 1 INHALER | Refills: 0 | Status: SHIPPED | OUTPATIENT
Start: 2018-11-26 | End: 2018-12-27

## 2018-11-26 NOTE — TELEPHONE ENCOUNTER
"Due for clinic visit. 30 day romario fill given. Reminder letter sent.      Sandhya HOOKS RN    Requested Prescriptions   Pending Prescriptions Disp Refills     VENTOLIN  (90 Base) MCG/ACT inhaler [Pharmacy Med Name: VENTOLIN HFA INH W/DOS CTR [id number]] 18 g 0     Sig: INHALE 2 PUFFS INTO THE LUNGS EVERY 4 HOURS AS NEEDED FOR SHORTNESS OF BREATH OR DIFFICULT BREATHING OR WHEEZING    Asthma Maintenance Inhalers - Anticholinergics Failed    11/23/2018  9:42 AM       Failed - Asthma control assessment score within normal limits in last 6 months    Please review ACT score.          Failed - Recent (6 mo) or future (30 days) visit within the authorizing provider's specialty    Patient had office visit in the last 6 months or has a visit in the next 30 days with authorizing provider or within the authorizing provider's specialty.  See \"Patient Info\" tab in inbasket, or \"Choose Columns\" in Meds & Orders section of the refill encounter.           Passed - Patient is age 12 years or older        ACT Total Scores 3/25/2014 3/10/2016 8/14/2017   ACT TOTAL SCORE 13 - -   ASTHMA ER VISITS 0 = None - -   ASTHMA HOSPITALIZATIONS 0 = None - -   ACT TOTAL SCORE (Goal Greater than or Equal to 20) - 17 12   In the past 12 months, how many times did you visit the emergency room for your asthma without being admitted to the hospital? - 0 0   In the past 12 months, how many times were you hospitalized overnight because of your asthma? - 0 0       "

## 2018-12-27 ENCOUNTER — OFFICE VISIT (OUTPATIENT)
Dept: FAMILY MEDICINE | Facility: CLINIC | Age: 63
End: 2018-12-27
Payer: COMMERCIAL

## 2018-12-27 ENCOUNTER — ANCILLARY PROCEDURE (OUTPATIENT)
Dept: GENERAL RADIOLOGY | Facility: CLINIC | Age: 63
End: 2018-12-27
Attending: FAMILY MEDICINE
Payer: COMMERCIAL

## 2018-12-27 VITALS
OXYGEN SATURATION: 96 % | RESPIRATION RATE: 14 BRPM | HEIGHT: 70 IN | DIASTOLIC BLOOD PRESSURE: 90 MMHG | TEMPERATURE: 99.9 F | SYSTOLIC BLOOD PRESSURE: 172 MMHG | HEART RATE: 126 BPM | BODY MASS INDEX: 25.43 KG/M2 | WEIGHT: 177.6 LBS

## 2018-12-27 DIAGNOSIS — J45.30 MILD PERSISTENT ASTHMA WITHOUT COMPLICATION: Primary | ICD-10-CM

## 2018-12-27 DIAGNOSIS — I10 UNCONTROLLED HYPERTENSION: ICD-10-CM

## 2018-12-27 LAB
ALBUMIN UR-MCNC: ABNORMAL MG/DL
ANION GAP SERPL CALCULATED.3IONS-SCNC: 4 MMOL/L (ref 3–14)
APPEARANCE UR: CLEAR
BACTERIA #/AREA URNS HPF: ABNORMAL /HPF
BILIRUB UR QL STRIP: ABNORMAL
BUN SERPL-MCNC: 14 MG/DL (ref 7–30)
CALCIUM SERPL-MCNC: 9.5 MG/DL (ref 8.5–10.1)
CHLORIDE SERPL-SCNC: 103 MMOL/L (ref 94–109)
CO2 SERPL-SCNC: 29 MMOL/L (ref 20–32)
COLOR UR AUTO: YELLOW
CREAT SERPL-MCNC: 0.85 MG/DL (ref 0.66–1.25)
ERYTHROCYTE [DISTWIDTH] IN BLOOD BY AUTOMATED COUNT: 14.3 % (ref 10–15)
GFR SERPL CREATININE-BSD FRML MDRD: >90 ML/MIN/{1.73_M2}
GLUCOSE SERPL-MCNC: 122 MG/DL (ref 70–99)
GLUCOSE UR STRIP-MCNC: NEGATIVE MG/DL
HCT VFR BLD AUTO: 44.6 % (ref 40–53)
HGB BLD-MCNC: 15.1 G/DL (ref 13.3–17.7)
HGB UR QL STRIP: NEGATIVE
KETONES UR STRIP-MCNC: NEGATIVE MG/DL
LEUKOCYTE ESTERASE UR QL STRIP: NEGATIVE
MCH RBC QN AUTO: 32.5 PG (ref 26.5–33)
MCHC RBC AUTO-ENTMCNC: 33.9 G/DL (ref 31.5–36.5)
MCV RBC AUTO: 96 FL (ref 78–100)
MUCOUS THREADS #/AREA URNS LPF: PRESENT /LPF
NITRATE UR QL: NEGATIVE
PH UR STRIP: 5.5 PH (ref 5–7)
PLATELET # BLD AUTO: 365 10E9/L (ref 150–450)
POTASSIUM SERPL-SCNC: 4.7 MMOL/L (ref 3.4–5.3)
RBC # BLD AUTO: 4.65 10E12/L (ref 4.4–5.9)
RBC #/AREA URNS AUTO: ABNORMAL /HPF
SODIUM SERPL-SCNC: 136 MMOL/L (ref 133–144)
SOURCE: ABNORMAL
SP GR UR STRIP: >1.03 (ref 1–1.03)
TSH SERPL DL<=0.005 MIU/L-ACNC: 1.06 MU/L (ref 0.4–4)
UROBILINOGEN UR STRIP-ACNC: 1 EU/DL (ref 0.2–1)
WBC # BLD AUTO: 8.6 10E9/L (ref 4–11)
WBC #/AREA URNS AUTO: ABNORMAL /HPF

## 2018-12-27 PROCEDURE — 93000 ELECTROCARDIOGRAM COMPLETE: CPT | Performed by: FAMILY MEDICINE

## 2018-12-27 PROCEDURE — 99214 OFFICE O/P EST MOD 30 MIN: CPT | Performed by: FAMILY MEDICINE

## 2018-12-27 PROCEDURE — 80048 BASIC METABOLIC PNL TOTAL CA: CPT | Performed by: FAMILY MEDICINE

## 2018-12-27 PROCEDURE — 85027 COMPLETE CBC AUTOMATED: CPT | Performed by: FAMILY MEDICINE

## 2018-12-27 PROCEDURE — 71046 X-RAY EXAM CHEST 2 VIEWS: CPT | Mod: FY

## 2018-12-27 PROCEDURE — 84443 ASSAY THYROID STIM HORMONE: CPT | Performed by: FAMILY MEDICINE

## 2018-12-27 PROCEDURE — 36415 COLL VENOUS BLD VENIPUNCTURE: CPT | Performed by: FAMILY MEDICINE

## 2018-12-27 PROCEDURE — 81001 URINALYSIS AUTO W/SCOPE: CPT | Performed by: FAMILY MEDICINE

## 2018-12-27 RX ORDER — ALBUTEROL SULFATE 90 UG/1
2 AEROSOL, METERED RESPIRATORY (INHALATION) EVERY 4 HOURS PRN
Qty: 1 INHALER | Refills: 0 | Status: SHIPPED | OUTPATIENT
Start: 2018-12-27 | End: 2019-02-08

## 2018-12-27 RX ORDER — LISINOPRIL 5 MG/1
5 TABLET ORAL DAILY
Qty: 30 TABLET | Refills: 3 | Status: SHIPPED | OUTPATIENT
Start: 2018-12-27 | End: 2019-01-21 | Stop reason: SINTOL

## 2018-12-27 RX ORDER — FLUTICASONE PROPIONATE 220 UG/1
2 AEROSOL, METERED RESPIRATORY (INHALATION) 2 TIMES DAILY
Qty: 1 INHALER | Refills: 11 | Status: SHIPPED | OUTPATIENT
Start: 2018-12-27 | End: 2020-02-10

## 2018-12-27 RX ORDER — FLUTICASONE PROPIONATE 110 UG/1
1 AEROSOL, METERED RESPIRATORY (INHALATION) 2 TIMES DAILY
Qty: 1 INHALER | Refills: 11 | Status: CANCELLED | OUTPATIENT
Start: 2018-12-27

## 2018-12-27 ASSESSMENT — MIFFLIN-ST. JEOR: SCORE: 1611.84

## 2018-12-27 NOTE — PROGRESS NOTES
SUBJECTIVE:   Luigi Vieyra is a 63 year old male who presents to clinic today for the following health issues:  Chief Complaint   Patient presents with     Asthma     Pt here for a f/u on asthma meds.       Asthma Follow-Up    Was ACT completed today?    Yes    ACT Total Scores 12/27/2018   ACT TOTAL SCORE -   ASTHMA ER VISITS -   ASTHMA HOSPITALIZATIONS -   ACT TOTAL SCORE (Goal Greater than or Equal to 20) 16   In the past 12 months, how many times did you visit the emergency room for your asthma without being admitted to the hospital? 0   In the past 12 months, how many times were you hospitalized overnight because of your asthma? 0       Recent asthma triggers that patient is dealing with: dust mites, pollens and mold    Patient states he tends to use albuterol everyday due to feeling breathing is heavy - usually morning, but can happen anytime.  Patient states he does not have exertional symptoms consistently everytime he is active.      Amount of exercise or physical activity: None    Problems taking medications regularly: No    Medication side effects: none    Diet: regular (no restrictions)      Medication Followup of ventolin and flovent inhaler    Taking Medication as prescribed: yes    Side Effects:  None    Medication Helping Symptoms:  yes       Problem list and histories reviewed & adjusted, as indicated.  Additional history: as documented    Patient Active Problem List   Diagnosis     Mild persistent asthma     Alcohol abuse     Tobacco abuse     CARDIOVASCULAR SCREENING; LDL GOAL LESS THAN 130     Alcohol dependence in remission (H)     Seasonal allergic rhinitis     History reviewed. No pertinent surgical history.    Social History     Tobacco Use     Smoking status: Current Every Day Smoker     Packs/day: 1.00     Years: 35.00     Pack years: 35.00     Types: Cigarettes     Smokeless tobacco: Never Used   Substance Use Topics     Alcohol use: Yes     Comment: quit drinking 3/2017     Family  History   Problem Relation Age of Onset     Asthma Mother      Cerebrovascular Disease Mother      C.A.D. No family hx of      Diabetes No family hx of      Hypertension No family hx of      Breast Cancer No family hx of      Cancer - colorectal No family hx of      Prostate Cancer No family hx of          Current Outpatient Medications   Medication Sig Dispense Refill     albuterol (VENTOLIN HFA) 108 (90 Base) MCG/ACT inhaler Inhale 2 puffs into the lungs every 4 hours as needed for shortness of breath / dyspnea or wheezing 1 Inhaler 0     aspirin 81 MG tablet Take 81 mg by mouth daily       fluticasone (FLOVENT HFA) 220 MCG/ACT inhaler Inhale 2 puffs into the lungs 2 times daily 1 Inhaler 11     lisinopril (PRINIVIL/ZESTRIL) 5 MG tablet Take 1 tablet (5 mg) by mouth daily 30 tablet 3     No Known Allergies  BP Readings from Last 3 Encounters:   12/27/18 172/90   08/14/17 114/71   04/14/16 131/78    Wt Readings from Last 3 Encounters:   12/27/18 80.6 kg (177 lb 9.6 oz)   08/14/17 79.8 kg (176 lb)   03/10/16 78.5 kg (173 lb)                    Reviewed and updated as needed this visit by clinical staff  Tobacco  Allergies  Meds  Problems  Med Hx  Surg Hx  Fam Hx  Soc Hx        Reviewed and updated as needed this visit by Provider  Tobacco  Allergies  Meds  Problems  Med Hx  Surg Hx  Fam Hx         ROS:  CONSTITUTIONAL: NEGATIVE for fever, chills, change in weight  INTEGUMENTARY/SKIN: NEGATIVE for worrisome rashes, moles or lesions  EYES: NEGATIVE for vision changes or irritation  ENT/MOUTH: NEGATIVE for ear, mouth and throat problems  RESP: NEGATIVE for significant cough or SOB  CV: NEGATIVE for chest pain, palpitations or peripheral edema  GI: NEGATIVE for nausea, abdominal pain, heartburn, or change in bowel habits  : NEGATIVE for frequency, dysuria, or hematuria  MUSCULOSKELETAL: NEGATIVE for significant arthralgias or myalgia  NEURO: NEGATIVE for weakness, dizziness or paresthesias  ENDOCRINE:  "NEGATIVE for temperature intolerance, skin/hair changes  HEME: NEGATIVE for bleeding problems    OBJECTIVE:                                                    /90   Pulse 126   Temp 99.9  F (37.7  C) (Tympanic)   Resp 14   Ht 1.778 m (5' 10\")   Wt 80.6 kg (177 lb 9.6 oz)   SpO2 96%   BMI 25.48 kg/m    Body mass index is 25.48 kg/m .  GENERAL:  alert and no distress, ambulatory w/o assist  NECK: no tenderness, no adenopathy,  Thyroid not enlarged  RESP: lungs clear to auscultation - no rales, no rhonchi, no wheezes  CV: tachycardic, regular rhythm, no murmur  MS: no edema  SKIN: no suspicious lesions, no rashes  NEURO: strength and tone- normal, sensory exam- grossly normal, mentation- intact, speech- normal, reflexes- symmetric  ABD:  nontender    Diagnostic test results:  Diagnostic Test Results:  Results for orders placed or performed in visit on 12/27/18   CBC with platelets   Result Value Ref Range    WBC 8.6 4.0 - 11.0 10e9/L    RBC Count 4.65 4.4 - 5.9 10e12/L    Hemoglobin 15.1 13.3 - 17.7 g/dL    Hematocrit 44.6 40.0 - 53.0 %    MCV 96 78 - 100 fl    MCH 32.5 26.5 - 33.0 pg    MCHC 33.9 31.5 - 36.5 g/dL    RDW 14.3 10.0 - 15.0 %    Platelet Count 365 150 - 450 10e9/L   *UA reflex to Microscopic   Result Value Ref Range    Color Urine Yellow     Appearance Urine Clear     Glucose Urine Negative NEG^Negative mg/dL    Bilirubin Urine Small (A) NEG^Negative    Ketones Urine Negative NEG^Negative mg/dL    Specific Gravity Urine >1.030 1.003 - 1.035    Blood Urine Negative NEG^Negative    pH Urine 5.5 5.0 - 7.0 pH    Protein Albumin Urine Trace (A) NEG^Negative mg/dL    Urobilinogen Urine 1.0 0.2 - 1.0 EU/dL    Nitrite Urine Negative NEG^Negative    Leukocyte Esterase Urine Negative NEG^Negative    Source Midstream Urine    Urine Microscopic   Result Value Ref Range    WBC Urine 0 - 5 OTO5^0 - 5 /HPF    RBC Urine O - 2 OTO2^O - 2 /HPF    Bacteria Urine Few (A) NEG^Negative /HPF    Mucous Urine Present " (A) NEG^Negative /LPF        ASSESSMENT/PLAN:                                                        ICD-10-CM    1. Mild persistent asthma without complication J45.30 albuterol (VENTOLIN HFA) 108 (90 Base) MCG/ACT inhaler     fluticasone (FLOVENT HFA) 220 MCG/ACT inhaler  Uncontrolled.  Increased dose of maintaner inhaler.  Reinforced when to use albuterol.  Repeat ACT in 1 month or so.     2. Uncontrolled hypertension I10 lisinopril (PRINIVIL/ZESTRIL) 5 MG tablet     EKG 12-lead complete w/read - Clinics     CBC with platelets     Basic metabolic panel     TSH with free T4 reflex     XR Chest 2 Views     *UA reflex to Microscopic  Patient was advised BP uncontrolled today.  Reviewed meds with patient.  Start the above to optimize management.  Sodium restriction.  Exercise recommendations reviewed with patient.          Follow up with RN 3-4 weeks.   Patient Instructions   Your blood pressure today is uncontrolled.  Due to this medical treatment should be started to prevent complications.  Start lisinopril 5 mg orally once a day.  Blood, urine, chest xray and EKG tests are ordered as baseline.  Low salt, low fat diet. Exercise as tolerated 30 mins per day 3 days a week as a start.  Take meds as prescribed; call if with side effects.     Follow up in clinic in 3-4 weeks with a nurse visit for blood pressure recheck.    Increased Flovent dose to 220 mcg/spray - inhale 2 puffs twice a day everyday.  Albuterol inhaler 2 puffs every 4 hrs as needed for wheezing or coughing spells or tightness in breathing while active.      Patient Education     Low-Salt Diet  This diet removes foods that are high in salt. It also limits the amount of salt you use when cooking. It is most often used for people with high blood pressure, edema (fluid retention), and kidney, liver, or heart disease.  Table salt contains the mineral sodium. Your body needs sodium to work normally. But too much sodium can make your health problems worse.  Your healthcare provider is recommending a low-salt (also called low-sodium) diet for you. Your total daily allowance of salt is 1,500 to 2,300 milligrams (mg). It is less than 1 teaspoon of table salt. This means you can have only about 500 to 700 mg of sodium at each meal. People with certain health problems should limit salt intake to the lower end of the recommended range.    When you cook, don t add much salt. If you can cook without using salt, even better. Don t add salt to your food at the table.  When shopping, read food labels. Salt is often called sodium on the label. Choose foods that are salt-free, low salt, or very low salt. Note that foods with reduced salt may not lower your salt intake enough.    Beans, potatoes, and pasta  Ok: Dry beans, split peas, lentils, potatoes, rice, macaroni, pasta, spaghetti without added salt  Avoid: Potato chips, tortilla chips, and similar products  Breads and cereals  Ok: Low-sodium breads, rolls, cereals, and cakes; low-salt crackers, matzo crackers  Avoid: Salted crackers, pretzels, popcorn, Bengali toast, pancakes, muffins  Dairy  Ok: Milk, chocolate milk, hot chocolate mix, low-salt cheeses, and yogurt  Avoid: Processed cheese and cheese spreads; Roquefort, Camembert, and cottage cheese; buttermilk, instant breakfast drink  Desserts  Ok: Ice cream, frozen yogurt, juice bars, gelatin, cookies and pies, sugar, honey, jelly, hard candy  Avoid: Most pies, cakes and cookies prepared or processed with salt; instant pudding  Drinks  Ok: Tea, coffee, fizzy (carbonated) drinks, juices  Avoid: Flavored coffees, electrolyte replacement drinks, sports drinks  Meats  Ok: All fresh meat, fish, poultry, low-salt tuna, eggs, egg substitute  Avoid: Smoked, pickled, brine-cured, or salted meats and fish. This includes [place], chipped beef, corned beef, hot dogs, deli meats, ham, kosher meats, salt pork, sausage, canned tuna, salted codfish, smoked salmon, herring, sardines, or  anchovies.  Seasonings and spices  Ok: Most seasonings are okay. Good substitutes for salt include: fresh herb blends, hot sauce, lemon, garlic, cooper, vinegar, dry mustard, parsley, cilantro, horseradish, tomato paste, regular margarine, mayonnaise, unsalted butter, cream cheese, vegetable oil, cream, low-salt salad dressing and gravy.  Avoid: Regular ketchup, relishes, pickles, soy sauce, teriyaki sauce, Worcestershire sauce, BBQ sauce, tartar sauce, meat tenderizer, chili sauce, regular gravy, regular salad dressing, salted butter  Soups  Ok: Low-salt soups and broths made with allowed foods  Avoid: Bouillon cubes, soups with smoked or salted meats, regular soup and broth  Vegetables  Ok: Most vegetables are okay; also low-salt tomato and vegetable juices  Avoid: Sauerkraut and other brine-soaked vegetables; pickles and other pickled vegetables; tomato juice, olives  Date Last Reviewed: 8/1/2016 2000-2018 [email or web address]. [address] [place] [address]. All rights reserved. This information is not intended as a substitute for professional medical care. Always follow your healthcare professional's instructions.           Patient Education     High Blood Pressure, New, Begin Treatment  Your blood pressure was high enough today to start treatment with medicines. Often healthcare providers don t know what causes high blood pressure (hypertension). But it can be controlled with lifestyle changes and medicines. High blood pressure usually has no symptoms. But it can sometimes cause headache, dizziness, blurred vision, a rushing sound in your ears, chest pain, or shortness of breath. But even without symptoms, high blood pressure that s not treated raises your risk for heart attack, heart failure, and stroke. High blood pressure is a serious health risk and shouldn t be ignored.    Blood pressure measurements are given as 2 numbers. Systolic blood pressure is the upper number. This is the  pressure when the heart contracts. Diastolic blood pressure is the lower number. This is the pressure when the heart relaxes between beats. You will see your blood pressure readings written together. For example, a person with a systolic pressure of 118 and a diastolic pressure of 78 will have 118/78 written in the medical record.   Blood pressure is categorized as normal, elevated, or stage 1 or stage 2 high blood pressure:    Normal blood pressure is systolic of less than 120 and diastolic of less than 80 (120/80)    Elevated blood pressure is systolic of 120 to 129 and diastolic less than 80    Stage 1 high blood pressure is systolic is 130 to 139 or diastolic between 80 to 89    Stage 2 high blood pressure is when systolic is 140 or higher or the diastolic is 90 or higher  Home care  If you have high blood pressure, you should do what is listed below to lower your blood pressure. If you are taking medicines for high blood pressure, these methods may reduce or end your need for medicines in the future.    Begin a weight-loss program if you are overweight.    Cut back on how much salt you get in your diet. Here s how to do this:  ? Don t eat foods that have a lot of salt. These include olives, pickles, smoked meats, and salted potato chips.  ? Don t add salt to your food at the table.  ? Use only small amounts of salt when cooking.  ? Review food labels to track how much salt is in prepared foods.  ? When eating out, ask that no additional salt be added to your food order.    Begin an exercise program. Talk with your healthcare provider about the type of exercise program that would be best for you. It doesn't have to be hard. Even brisk walking for 20 minutes 3 times a week is a good form of exercise.    Don t take medicines that have heart stimulants. This includes many over-the-counter cold and sinus decongestant pills and sprays, as well as diet pills. Check the warnings about high blood pressure on the label.  Before purchasing any over-the-counter medicines or supplements, always ask the pharmacist about the product's potential interaction with your high blood pressure and your high blood pressure medicines.    Stimulants such as amphetamine or cocaine could be lethal for someone with high blood pressure. Never take these.    Limit how much caffeine you get in your diet. Switch to caffeine-free products.    Stop smoking. If you are a long-time smoker, this can be hard. Enroll in a stop-smoking program to make it more likely that you will quit for good.    Learn how to handle stress. This is an important part of any program to lower blood pressure. Learn about relaxation methods like meditation, yoga, or biofeedback.    If your provider prescribed medicines, take them exactly as directed. Missing doses may cause your blood pressure get out of control.    If you miss a dose or doses, check with your healthcare provider or pharmacist about what to do.    Consider buying an automatic blood pressure machine. Your provider can make a recommendation. You can get one of these at most pharmacies.  The American Heart Association recommends the following guidelines for home blood pressure monitoring:    Don't smoke or drink coffee or other caffeinated drinks for 30 minutes before taking your blood pressure.    Go to the bathroom before the test.    Relax for 5 minutes before taking the measurement.    Sit with your back supported (don't sit on a couch or soft chair); keep your feet on the floor uncrossed. Place your arm on a solid flat surface (like a table) with the upper part of the arm at heart level. Place the middle of the cuff directly above the bend of the elbow. Check the monitor's instruction manual for an illustration.    Take multiple readings. When you measure, take 2 to 3 readings one minute apart and record all of the results.    Take your blood pressure at the same time every day, or as your healthcare provider  recommends.    Record the date, time, and blood pressure reading.    Take the record with you to your next medical appointment. If your blood pressure monitor has a built-in memory, simply take the monitor with you to your next appointment.    Call your provider if you have several high readings. Don't be frightened by a single high blood pressure reading, but if you get several high readings, check in with your healthcare provider.    Note: When blood pressure reaches a systolic (top number) of 180 or higher OR diastolic (bottom number) of 110 or higher, seek emergency medical treatment.  Follow-up care  Because a new blood pressure medicine was started today, it s important that you have your blood pressure rechecked. This is to make sure that the medicine is working and that you have no serious side effects. Keep all your follow up appointments. Write down medicine and blood pressure questions and bring them to your next appointment. If you have pressing concerns about your new medicine or your blood pressure, call your provider. Unless told otherwise, follow up with your healthcare provider or this facility within the next 3 days.  When to seek medical advice  Call your healthcare provider right away if any of these occur:    Blood pressure reaches a systolic (top number) of 180 or higher, OR diastolic (bottom number) of 110 or higher, seek emergency medical treatment.    Chest pain or shortness of breath    Severe headache    Throbbing or rushing sound in the ears    Nosebleed    Sudden severe pain in your belly (abdomen)    Extreme drowsiness, confusion, or fainting    Dizziness or dizziness with a spinning sensation (vertigo)    Weakness of an arm or leg or one side of the face    You have problems speaking or seeing   Date Last Reviewed: 12/1/2016 2000-2018 [email or web address].. [address] [place] [address], [place] [address]. All rights reserved. This information is not intended as a substitute for  professional medical care. Always follow your healthcare professional's instructions.               Gatito Ware MD  [facility]

## 2018-12-27 NOTE — RESULT ENCOUNTER NOTE
Already addressed on encounter.  No acute findings.  Await further testing for further evaluation.  Consider echocardiogram if abnormal heart size on CXR, or if with exertional symptoms.    Optimize BP control.

## 2018-12-27 NOTE — PATIENT INSTRUCTIONS
Your blood pressure today is uncontrolled.  Due to this medical treatment should be started to prevent complications.  Start lisinopril 5 mg orally once a day.  Blood, urine, chest xray and EKG tests are ordered as baseline.  Low salt, low fat diet. Exercise as tolerated 30 mins per day 3 days a week as a start.  Take meds as prescribed; call if with side effects.     Follow up in clinic in 3-4 weeks with a nurse visit for blood pressure recheck.    Increased Flovent dose to 220 mcg/spray - inhale 2 puffs twice a day everyday.  Albuterol inhaler 2 puffs every 4 hrs as needed for wheezing or coughing spells or tightness in breathing while active.      Patient Education     Low-Salt Diet  This diet removes foods that are high in salt. It also limits the amount of salt you use when cooking. It is most often used for people with high blood pressure, edema (fluid retention), and kidney, liver, or heart disease.  Table salt contains the mineral sodium. Your body needs sodium to work normally. But too much sodium can make your health problems worse. Your healthcare provider is recommending a low-salt (also called low-sodium) diet for you. Your total daily allowance of salt is 1,500 to 2,300 milligrams (mg). It is less than 1 teaspoon of table salt. This means you can have only about 500 to 700 mg of sodium at each meal. People with certain health problems should limit salt intake to the lower end of the recommended range.    When you cook, don t add much salt. If you can cook without using salt, even better. Don t add salt to your food at the table.  When shopping, read food labels. Salt is often called sodium on the label. Choose foods that are salt-free, low salt, or very low salt. Note that foods with reduced salt may not lower your salt intake enough.    Beans, potatoes, and pasta  Ok: Dry beans, split peas, lentils, potatoes, rice, macaroni, pasta, spaghetti without added salt  Avoid: Potato chips, tortilla chips, and  similar products  Breads and cereals  Ok: Low-sodium breads, rolls, cereals, and cakes; low-salt crackers, matzo crackers  Avoid: Salted crackers, pretzels, popcorn, Serbian toast, pancakes, muffins  Dairy  Ok: Milk, chocolate milk, hot chocolate mix, low-salt cheeses, and yogurt  Avoid: Processed cheese and cheese spreads; Roquefort, Camembert, and cottage cheese; buttermilk, instant breakfast drink  Desserts  Ok: Ice cream, frozen yogurt, juice bars, gelatin, cookies and pies, sugar, honey, jelly, hard candy  Avoid: Most pies, cakes and cookies prepared or processed with salt; instant pudding  Drinks  Ok: Tea, coffee, fizzy (carbonated) drinks, juices  Avoid: Flavored coffees, electrolyte replacement drinks, sports drinks  Meats  Ok: All fresh meat, fish, poultry, low-salt tuna, eggs, egg substitute  Avoid: Smoked, pickled, brine-cured, or salted meats and fish. This includes hatfield, chipped beef, corned beef, hot dogs, deli meats, ham, kosher meats, salt pork, sausage, canned tuna, salted codfish, smoked salmon, herring, sardines, or anchovies.  Seasonings and spices  Ok: Most seasonings are okay. Good substitutes for salt include: fresh herb blends, hot sauce, lemon, garlic, cooper, vinegar, dry mustard, parsley, cilantro, horseradish, tomato paste, regular margarine, mayonnaise, unsalted butter, cream cheese, vegetable oil, cream, low-salt salad dressing and gravy.  Avoid: Regular ketchup, relishes, pickles, soy sauce, teriyaki sauce, Worcestershire sauce, BBQ sauce, tartar sauce, meat tenderizer, chili sauce, regular gravy, regular salad dressing, salted butter  Soups  Ok: Low-salt soups and broths made with allowed foods  Avoid: Bouillon cubes, soups with smoked or salted meats, regular soup and broth  Vegetables  Ok: Most vegetables are okay; also low-salt tomato and vegetable juices  Avoid: Sauerkraut and other brine-soaked vegetables; pickles and other pickled vegetables; tomato juice, olives  Date Last  Reviewed: 8/1/2016 2000-2018 [email or web address]. [address] [place] [address], [place] [address]. All rights reserved. This information is not intended as a substitute for professional medical care. Always follow your healthcare professional's instructions.           Patient Education     High Blood Pressure, New, Begin Treatment  Your blood pressure was high enough today to start treatment with medicines. Often healthcare providers don t know what causes high blood pressure (hypertension). But it can be controlled with lifestyle changes and medicines. High blood pressure usually has no symptoms. But it can sometimes cause headache, dizziness, blurred vision, a rushing sound in your ears, chest pain, or shortness of breath. But even without symptoms, high blood pressure that s not treated raises your risk for heart attack, heart failure, and stroke. High blood pressure is a serious health risk and shouldn t be ignored.    Blood pressure measurements are given as 2 numbers. Systolic blood pressure is the upper number. This is the pressure when the heart contracts. Diastolic blood pressure is the lower number. This is the pressure when the heart relaxes between beats. You will see your blood pressure readings written together. For example, a person with a systolic pressure of 118 and a diastolic pressure of 78 will have 118/78 written in the medical record.   Blood pressure is categorized as normal, elevated, or stage 1 or stage 2 high blood pressure:    Normal blood pressure is systolic of less than 120 and diastolic of less than 80 (120/80)    Elevated blood pressure is systolic of 120 to 129 and diastolic less than 80    Stage 1 high blood pressure is systolic is 130 to 139 or diastolic between 80 to 89    Stage 2 high blood pressure is when systolic is 140 or higher or the diastolic is 90 or higher  Home care  If you have high blood pressure, you should do what is listed below to lower your blood pressure. If  you are taking medicines for high blood pressure, these methods may reduce or end your need for medicines in the future.    Begin a weight-loss program if you are overweight.    Cut back on how much salt you get in your diet. Here s how to do this:  ? Don t eat foods that have a lot of salt. These include olives, pickles, smoked meats, and salted potato chips.  ? Don t add salt to your food at the table.  ? Use only small amounts of salt when cooking.  ? Review food labels to track how much salt is in prepared foods.  ? When eating out, ask that no additional salt be added to your food order.    Begin an exercise program. Talk with your healthcare provider about the type of exercise program that would be best for you. It doesn't have to be hard. Even brisk walking for 20 minutes 3 times a week is a good form of exercise.    Don t take medicines that have heart stimulants. This includes many over-the-counter cold and sinus decongestant pills and sprays, as well as diet pills. Check the warnings about high blood pressure on the label. Before purchasing any over-the-counter medicines or supplements, always ask the pharmacist about the product's potential interaction with your high blood pressure and your high blood pressure medicines.    Stimulants such as amphetamine or cocaine could be lethal for someone with high blood pressure. Never take these.    Limit how much caffeine you get in your diet. Switch to caffeine-free products.    Stop smoking. If you are a long-time smoker, this can be hard. Enroll in a stop-smoking program to make it more likely that you will quit for good.    Learn how to handle stress. This is an important part of any program to lower blood pressure. Learn about relaxation methods like meditation, yoga, or biofeedback.    If your provider prescribed medicines, take them exactly as directed. Missing doses may cause your blood pressure get out of control.    If you miss a dose or doses, check with  your healthcare provider or pharmacist about what to do.    Consider buying an automatic blood pressure machine. Your provider can make a recommendation. You can get one of these at most pharmacies.  The American Heart Association recommends the following guidelines for home blood pressure monitoring:    Don't smoke or drink coffee or other caffeinated drinks for 30 minutes before taking your blood pressure.    Go to the bathroom before the test.    Relax for 5 minutes before taking the measurement.    Sit with your back supported (don't sit on a couch or soft chair); keep your feet on the floor uncrossed. Place your arm on a solid flat surface (like a table) with the upper part of the arm at heart level. Place the middle of the cuff directly above the bend of the elbow. Check the monitor's instruction manual for an illustration.    Take multiple readings. When you measure, take 2 to 3 readings one minute apart and record all of the results.    Take your blood pressure at the same time every day, or as your healthcare provider recommends.    Record the date, time, and blood pressure reading.    Take the record with you to your next medical appointment. If your blood pressure monitor has a built-in memory, simply take the monitor with you to your next appointment.    Call your provider if you have several high readings. Don't be frightened by a single high blood pressure reading, but if you get several high readings, check in with your healthcare provider.    Note: When blood pressure reaches a systolic (top number) of 180 or higher OR diastolic (bottom number) of 110 or higher, seek emergency medical treatment.  Follow-up care  Because a new blood pressure medicine was started today, it s important that you have your blood pressure rechecked. This is to make sure that the medicine is working and that you have no serious side effects. Keep all your follow up appointments. Write down medicine and blood pressure  questions and bring them to your next appointment. If you have pressing concerns about your new medicine or your blood pressure, call your provider. Unless told otherwise, follow up with your healthcare provider or this facility within the next 3 days.  When to seek medical advice  Call your healthcare provider right away if any of these occur:    Blood pressure reaches a systolic (top number) of 180 or higher, OR diastolic (bottom number) of 110 or higher, seek emergency medical treatment.    Chest pain or shortness of breath    Severe headache    Throbbing or rushing sound in the ears    Nosebleed    Sudden severe pain in your belly (abdomen)    Extreme drowsiness, confusion, or fainting    Dizziness or dizziness with a spinning sensation (vertigo)    Weakness of an arm or leg or one side of the face    You have problems speaking or seeing   Date Last Reviewed: 12/1/2016 2000-2018 [employer]. [address] [place] [id number]. All rights reserved. This information is not intended as a substitute for professional medical care. Always follow your healthcare professional's instructions.

## 2018-12-29 ASSESSMENT — ASTHMA QUESTIONNAIRES: ACT_TOTALSCORE: 16

## 2019-01-02 PROBLEM — I10 UNCONTROLLED HYPERTENSION: Status: ACTIVE | Noted: 2019-01-02

## 2019-01-21 ENCOUNTER — TELEPHONE (OUTPATIENT)
Dept: FAMILY MEDICINE | Facility: CLINIC | Age: 64
End: 2019-01-21

## 2019-01-21 ENCOUNTER — ALLIED HEALTH/NURSE VISIT (OUTPATIENT)
Dept: FAMILY MEDICINE | Facility: CLINIC | Age: 64
End: 2019-01-21
Payer: COMMERCIAL

## 2019-01-21 VITALS — DIASTOLIC BLOOD PRESSURE: 81 MMHG | SYSTOLIC BLOOD PRESSURE: 156 MMHG | HEART RATE: 92 BPM

## 2019-01-21 DIAGNOSIS — I10 UNCONTROLLED HYPERTENSION: Primary | ICD-10-CM

## 2019-01-21 DIAGNOSIS — I10 BENIGN ESSENTIAL HYPERTENSION: Primary | ICD-10-CM

## 2019-01-21 PROCEDURE — 99207 ZZC NO CHARGE NURSE ONLY: CPT

## 2019-01-21 RX ORDER — LOSARTAN POTASSIUM 25 MG/1
25 TABLET ORAL DAILY
Qty: 30 TABLET | Refills: 1 | Status: SHIPPED | OUTPATIENT
Start: 2019-01-21 | End: 2019-02-20

## 2019-01-21 NOTE — NURSING NOTE
Luigi Vieyra is a 63 year old year old patient who comes in today for a Blood Pressure check because of new medication.  Pt in newly diagnosed with hypertension.  Pt last seen in the office on 12/27/18 by Dr Ware and started on lisinopril, 5 mg daily.    Vital Signs as repeated by RN:  160/76, pulse of 92  156/81, pulse of 92.    Patient is taking medication as prescribed  Patient is tolerating medications well.  Patient is not monitoring Blood Pressure at home.     Current complaints: cough, slight dry cough since starting lisinopril 3 weeks ago.  The cough is not getting better or worse.    Disposition:  Routed to provider in a telephone note.  Need further instructions please.    Jolie Adams RN    BP Readings from Last 6 Encounters:   01/21/19 156/81   12/27/18 172/90   08/14/17 114/71   04/14/16 131/78   03/10/16 155/90   03/25/14 152/90     Lab Results   Component Value Date    CR 0.85 12/27/2018     Lab Results   Component Value Date    POTASSIUM 4.7 12/27/2018

## 2019-01-21 NOTE — TELEPHONE ENCOUNTER
Pt calling back and given the message and understands, he will follow up in 2 weeks  Lena Velazquez on 1/21/2019 at 4:02 PM

## 2019-01-21 NOTE — TELEPHONE ENCOUNTER
Nursing Note   Jolie Adams RN (Registered Nurse)      Luigi Vieyra is a 63 year old year old patient who comes in today for a Blood Pressure check because of new medication.  Pt in newly diagnosed with hypertension.  Pt last seen in the office on 12/27/18 by Dr Ware and started on lisinopril, 5 mg daily.     Vital Signs as repeated by RN:  160/76, pulse of 92  156/81, pulse of 92.     Patient is taking medication as prescribed  Patient is tolerating medications well.  Patient is not monitoring Blood Pressure at home.      Current complaints: cough, slight dry cough since starting lisinopril 3 weeks ago.  The cough is not getting better or worse.     Disposition:  Routed to provider in a telephone note.  Need further instructions please.     Jolie Adams RN         BP Readings from Last 6 Encounters:   01/21/19 156/81   12/27/18 172/90   08/14/17 114/71   04/14/16 131/78   03/10/16 155/90   03/25/14 152/90            Lab Results   Component Value Date     CR 0.85 12/27/2018            Lab Results   Component Value Date     POTASSIUM 4.7 12/27/2018            Pt uses [facility] in [place] for pharmacy.

## 2019-01-21 NOTE — TELEPHONE ENCOUNTER
Stop the lisinopril due to cough.  Switch to losartan 25 mg daily - new script sent.  Follow up with  RN in 2 weeks for recheck.  Trina Goodwin, CNP

## 2019-01-30 ENCOUNTER — TELEPHONE (OUTPATIENT)
Dept: FAMILY MEDICINE | Facility: CLINIC | Age: 64
End: 2019-01-30

## 2019-01-30 NOTE — TELEPHONE ENCOUNTER
1/30/2019    Call Regarding Onboarding [facility] OTHER    Attempt 1    Message on voicemail     Comments: NO DEP      Outreach   LR

## 2019-02-08 ENCOUNTER — TELEPHONE (OUTPATIENT)
Dept: FAMILY MEDICINE | Facility: CLINIC | Age: 64
End: 2019-02-08

## 2019-02-08 DIAGNOSIS — J45.30 MILD PERSISTENT ASTHMA WITHOUT COMPLICATION: ICD-10-CM

## 2019-02-08 NOTE — TELEPHONE ENCOUNTER
Routing refill request to provider for review/approval because:  Due for ACT.     ACT Total Scores 3/10/2016 8/14/2017 12/27/2018   ACT TOTAL SCORE - - -   ASTHMA ER VISITS - - -   ASTHMA HOSPITALIZATIONS - - -   ACT TOTAL SCORE (Goal Greater than or Equal to 20) 17 12 16   In the past 12 months, how many times did you visit the emergency room for your asthma without being admitted to the hospital? 0 0 0   In the past 12 months, how many times were you hospitalized overnight because of your asthma? 0 0 0      JESÚS HoN, RN

## 2019-02-08 NOTE — TELEPHONE ENCOUNTER
"Requested Prescriptions   Pending Prescriptions Disp Refills     albuterol (PROAIR HFA/PROVENTIL HFA/VENTOLIN HFA) 108 (90 Base) MCG/ACT inhaler [Pharmacy Med Name: ALBUTEROL HFA INH (200 PUFFS) 18GM]  Last Written Prescription Date:  12/27/2018  Last Fill Quantity: 1 inhaler,  # refills: 0   Last office visit: 12/27/2018 with prescribing provider:  Jeanie   Future Office Visit:     18 g 0     Sig: INHALE 2 PUFFS INTO THE LUNGS EVERY 4 HOURS AS NEEDED FOR SHORTNESS OF BREATH OR DIFFICULT BREATHING OR WHEEZING    Asthma Maintenance Inhalers - Anticholinergics Failed - 2/8/2019  3:42 PM       Failed - Asthma control assessment score within normal limits in last 6 months    Please review ACT score.          Passed - Patient is age 12 years or older       Passed - Medication is active on med list       Passed - Recent (6 mo) or future (30 days) visit within the authorizing provider's specialty    Patient had office visit in the last 6 months or has a visit in the next 30 days with authorizing provider or within the authorizing provider's specialty.  See \"Patient Info\" tab in inbasket, or \"Choose Columns\" in Meds & Orders section of the refill encounter.              "

## 2019-02-11 RX ORDER — ALBUTEROL SULFATE 90 UG/1
AEROSOL, METERED RESPIRATORY (INHALATION)
Qty: 18 G | Refills: 3 | Status: SHIPPED | OUTPATIENT
Start: 2019-02-11 | End: 2019-11-22

## 2019-02-11 NOTE — TELEPHONE ENCOUNTER
2/11/2019    Call Regarding Onboarding: [facility] Other    Attempt 2    Message on voicemail     Comments: No Dep      Outreach   YOLANDA

## 2019-02-11 NOTE — TELEPHONE ENCOUNTER
Please contact patient for repeat ACT. If score is below goal, patient needs follow up appointment.

## 2019-02-14 NOTE — TELEPHONE ENCOUNTER
Patient did receive the form for ACT. The questions were answered and he did score below the goal. Patient was scheduled.    Lois JUNE RN

## 2019-02-14 NOTE — TELEPHONE ENCOUNTER
Patient is not able to answer questions at this time. Patient was driving. Patient will call back to go over the ACT.  Patient did get this in the mail.    Thank you  Lois JUNE RN

## 2019-02-15 ASSESSMENT — ASTHMA QUESTIONNAIRES: ACT_TOTALSCORE: 18

## 2019-02-15 NOTE — TELEPHONE ENCOUNTER
2/14/2019    Call Regarding Onboarding: [facility] Other    Attempt 3    Message on voicemail     Comments: No Dep      Outreach   YOLANDA

## 2019-02-19 ENCOUNTER — TELEPHONE (OUTPATIENT)
Dept: FAMILY MEDICINE | Facility: CLINIC | Age: 64
End: 2019-02-19

## 2019-02-19 NOTE — TELEPHONE ENCOUNTER
Pt is verifying that we received his ACT?  Yes, it was completed on 2/14/19.    Pt is being seen in clinic tomorrow for asthma and blood pressure follow up.  Will be seen as arranged.    Jolie Adams RN

## 2019-02-19 NOTE — TELEPHONE ENCOUNTER
ACT Total Scores 8/14/2017 12/27/2018 2/14/2019   ACT TOTAL SCORE - - -   ASTHMA ER VISITS - - -   ASTHMA HOSPITALIZATIONS - - -   ACT TOTAL SCORE (Goal Greater than or Equal to 20) 12 16 18   In the past 12 months, how many times did you visit the emergency room for your asthma without being admitted to the hospital? 0 0 0   In the past 12 months, how many times were you hospitalized overnight because of your asthma? 0 0 0

## 2019-02-20 ENCOUNTER — OFFICE VISIT (OUTPATIENT)
Dept: FAMILY MEDICINE | Facility: CLINIC | Age: 64
End: 2019-02-20
Payer: COMMERCIAL

## 2019-02-20 VITALS
HEIGHT: 70 IN | BODY MASS INDEX: 25 KG/M2 | DIASTOLIC BLOOD PRESSURE: 88 MMHG | SYSTOLIC BLOOD PRESSURE: 142 MMHG | RESPIRATION RATE: 14 BRPM | WEIGHT: 174.6 LBS | HEART RATE: 78 BPM | OXYGEN SATURATION: 98 % | TEMPERATURE: 99.2 F

## 2019-02-20 DIAGNOSIS — F10.21 ALCOHOL DEPENDENCE IN REMISSION (H): ICD-10-CM

## 2019-02-20 DIAGNOSIS — J45.30 MILD PERSISTENT ASTHMA WITHOUT COMPLICATION: ICD-10-CM

## 2019-02-20 DIAGNOSIS — I10 UNCONTROLLED HYPERTENSION: Primary | ICD-10-CM

## 2019-02-20 DIAGNOSIS — Z72.0 TOBACCO ABUSE: ICD-10-CM

## 2019-02-20 LAB
ANION GAP SERPL CALCULATED.3IONS-SCNC: 6 MMOL/L (ref 3–14)
BUN SERPL-MCNC: 14 MG/DL (ref 7–30)
CALCIUM SERPL-MCNC: 9.5 MG/DL (ref 8.5–10.1)
CHLORIDE SERPL-SCNC: 106 MMOL/L (ref 94–109)
CO2 SERPL-SCNC: 26 MMOL/L (ref 20–32)
CREAT SERPL-MCNC: 0.74 MG/DL (ref 0.66–1.25)
GFR SERPL CREATININE-BSD FRML MDRD: >90 ML/MIN/{1.73_M2}
GLUCOSE SERPL-MCNC: 82 MG/DL (ref 70–99)
POTASSIUM SERPL-SCNC: 4.4 MMOL/L (ref 3.4–5.3)
SODIUM SERPL-SCNC: 138 MMOL/L (ref 133–144)

## 2019-02-20 PROCEDURE — 36415 COLL VENOUS BLD VENIPUNCTURE: CPT | Performed by: FAMILY MEDICINE

## 2019-02-20 PROCEDURE — 80048 BASIC METABOLIC PNL TOTAL CA: CPT | Performed by: FAMILY MEDICINE

## 2019-02-20 PROCEDURE — 99214 OFFICE O/P EST MOD 30 MIN: CPT | Performed by: FAMILY MEDICINE

## 2019-02-20 RX ORDER — LOSARTAN POTASSIUM 50 MG/1
50 TABLET ORAL DAILY
Qty: 30 TABLET | Refills: 0 | Status: SHIPPED | OUTPATIENT
Start: 2019-02-20 | End: 2019-04-26

## 2019-02-20 ASSESSMENT — MIFFLIN-ST. JEOR: SCORE: 1598.23

## 2019-02-20 NOTE — LETTER
February 20, 2019      Luigi Vieyra  [id number] [facility] [id number]        Dear ,    We are writing to inform you of your test results.  BMP is within normal range    Resulted Orders   Basic metabolic panel   Result Value Ref Range    Sodium 138 133 - 144 mmol/L    Potassium 4.4 3.4 - 5.3 mmol/L    Chloride 106 94 - 109 mmol/L    Carbon Dioxide 26 20 - 32 mmol/L    Anion Gap 6 3 - 14 mmol/L    Glucose 82 70 - 99 mg/dL    Urea Nitrogen 14 7 - 30 mg/dL    Creatinine 0.74 0.66 - 1.25 mg/dL    GFR Estimate >90 >60 mL/min/[1.73_m2]      Comment:      Non  GFR Calc  Starting 12/18/2018, serum creatinine based estimated GFR (eGFR) will be   calculated using the Chronic Kidney Disease Epidemiology Collaboration   (CKD-EPI) equation.      GFR Estimate If Black >90 >60 mL/min/[1.73_m2]      Comment:       GFR Calc  Starting 12/18/2018, serum creatinine based estimated GFR (eGFR) will be   calculated using the Chronic Kidney Disease Epidemiology Collaboration   (CKD-EPI) equation.      Calcium 9.5 8.5 - 10.1 mg/dL       If you have any questions or concerns, please call the clinic at the number listed above.     Sincerely,    Gatito Ware MD

## 2019-02-20 NOTE — PROGRESS NOTES
SUBJECTIVE:   Luigi Vieyra is a 63 year old male who presents to clinic today for the following health issues:      Hypertension Follow-up      Outpatient blood pressures are not being checked.    Low Salt Diet: low salt    Denies chest pain, dyspnea, HA, BOV, dizziness or urinary changes.    Asthma Follow-Up    Was ACT completed today?  No  Was done 2/14/19  ACT Total Scores 8/14/2017 12/27/2018 2/14/2019   ACT TOTAL SCORE - - -   ASTHMA ER VISITS - - -   ASTHMA HOSPITALIZATIONS - - -   ACT TOTAL SCORE (Goal Greater than or Equal to 20) 12 16 18   In the past 12 months, how many times did you visit the emergency room for your asthma without being admitted to the hospital? 0 0 0   In the past 12 months, how many times were you hospitalized overnight because of your asthma? 0 0 0       Respiratory symptoms:   Cough: Yes-  Slight due to the bp meds   Wheezing: Yes-  daily   Shortness of breath: Yes-  daily    Use of short- acting(rescue) inhaler: using once daily, has not been using the Flovent correctly    Taking controlled (daily) meds as prescribed: No, has not been using    ER/UC visits or hospital admissions since last visit: none     Recent asthma triggers that patient is dealing with: smoke       Amount of exercise or physical activity: pretty active    Problems taking medications regularly: No    Medication side effects: slight cough with bp med    Diet: low salt, increase water intake    Still smoking 3/4 pack cigs per day. No quit date yet per patient but thinking of starting nicorette tablets.      Problem list and histories reviewed & adjusted, as indicated.  Additional history: as documented    Patient Active Problem List   Diagnosis     Mild persistent asthma     Alcohol abuse     Tobacco abuse     CARDIOVASCULAR SCREENING; LDL GOAL LESS THAN 130     Alcohol dependence in remission (H)     Seasonal allergic rhinitis     Uncontrolled hypertension     History reviewed. No pertinent surgical history.     Social History     Tobacco Use     Smoking status: Current Every Day Smoker     Packs/day: 1.00     Years: 35.00     Pack years: 35.00     Types: Cigarettes     Smokeless tobacco: Never Used   Substance Use Topics     Alcohol use: Yes     Comment: quit drinking 1 week ago, 2/20/19     Family History   Problem Relation Age of Onset     Asthma Mother      Cerebrovascular Disease Mother      C.A.D. No family hx of      Diabetes No family hx of      Hypertension No family hx of      Breast Cancer No family hx of      Cancer - colorectal No family hx of      Prostate Cancer No family hx of          Current Outpatient Medications   Medication Sig Dispense Refill     albuterol (PROAIR HFA/PROVENTIL HFA/VENTOLIN HFA) 108 (90 Base) MCG/ACT inhaler INHALE 2 PUFFS INTO THE LUNGS EVERY 4 HOURS AS NEEDED FOR SHORTNESS OF BREATH OR DIFFICULT BREATHING OR WHEEZING 18 g 3     aspirin 81 MG tablet Take 81 mg by mouth daily       fluticasone (FLOVENT HFA) 220 MCG/ACT inhaler Inhale 2 puffs into the lungs 2 times daily 1 Inhaler 11     losartan (COZAAR) 50 MG tablet Take 1 tablet (50 mg) by mouth daily 30 tablet 0     No Known Allergies  BP Readings from Last 3 Encounters:   02/20/19 142/88   01/21/19 156/81   12/27/18 172/90    Wt Readings from Last 3 Encounters:   02/20/19 79.2 kg (174 lb 9.6 oz)   12/27/18 80.6 kg (177 lb 9.6 oz)   08/14/17 79.8 kg (176 lb)                    Reviewed and updated as needed this visit by clinical staff  Tobacco  Allergies  Meds  Problems  Med Hx  Surg Hx  Fam Hx  Soc Hx        Reviewed and updated as needed this visit by Provider  Tobacco  Allergies  Meds  Problems  Med Hx  Surg Hx  Fam Hx         ROS:  C: NEGATIVE for fever, chills, change in weight  I: NEGATIVE for worrisome rashes, moles or lesions  E: NEGATIVE for vision changes or irritation  R: NEGATIVE for significant cough or SOB  CV: NEGATIVE for chest pain, palpitations or peripheral edema  GI: NEGATIVE for nausea,  "abdominal pain, heartburn, or change in bowel habits  : NEGATIVE for frequency, dysuria, or hematuria  M: NEGATIVE for significant arthralgias or myalgia  N: NEGATIVE for weakness, dizziness or paresthesias  E: NEGATIVE for temperature intolerance, skin/hair changes  H: NEGATIVE for bleeding problems    OBJECTIVE:                                                    /88   Pulse 78   Temp 99.2  F (37.3  C) (Tympanic)   Resp 14   Ht 1.778 m (5' 10\")   Wt 79.2 kg (174 lb 9.6 oz)   SpO2 98%   BMI 25.05 kg/m    Body mass index is 25.05 kg/m .  GENERAL:  alert and no distress, ambulatory w/o assist  NECK: no tenderness, no adenopathy,  Thyroid not enlarged  RESP: lungs clear to auscultation - no rales, no rhonchi, no wheezes  CV: regular rates and rhythm, no murmur  MS: no edema  SKIN: no suspicious lesions, no rashes  NEURO: no tremors  ABD:  nontender    Diagnostic test results:  Diagnostic Test Results:  Results for orders placed or performed in visit on 12/27/18   XR Chest 2 Views    Narrative    XR CHEST 2 VW 12/27/2018 10:19 AM    HISTORY: Hypertension.    COMPARISON: None.      Impression    IMPRESSION: 2 views of the chest show no acute cardiopulmonary  disease. Specifically, heart size is within normal limits.     PRIYA HARVEY MD   CBC with platelets   Result Value Ref Range    WBC 8.6 4.0 - 11.0 10e9/L    RBC Count 4.65 4.4 - 5.9 10e12/L    Hemoglobin 15.1 13.3 - 17.7 g/dL    Hematocrit 44.6 40.0 - 53.0 %    MCV 96 78 - 100 fl    MCH 32.5 26.5 - 33.0 pg    MCHC 33.9 31.5 - 36.5 g/dL    RDW 14.3 10.0 - 15.0 %    Platelet Count 365 150 - 450 10e9/L   Basic metabolic panel   Result Value Ref Range    Sodium 136 133 - 144 mmol/L    Potassium 4.7 3.4 - 5.3 mmol/L    Chloride 103 94 - 109 mmol/L    Carbon Dioxide 29 20 - 32 mmol/L    Anion Gap 4 3 - 14 mmol/L    Glucose 122 (H) 70 - 99 mg/dL    Urea Nitrogen 14 7 - 30 mg/dL    Creatinine 0.85 0.66 - 1.25 mg/dL    GFR Estimate >90 >60 mL/min/[1.73_m2]    " GFR Estimate If Black >90 >60 mL/min/[1.73_m2]    Calcium 9.5 8.5 - 10.1 mg/dL   TSH with free T4 reflex   Result Value Ref Range    TSH 1.06 0.40 - 4.00 mU/L   *UA reflex to Microscopic   Result Value Ref Range    Color Urine Yellow     Appearance Urine Clear     Glucose Urine Negative NEG^Negative mg/dL    Bilirubin Urine Small (A) NEG^Negative    Ketones Urine Negative NEG^Negative mg/dL    Specific Gravity Urine >1.030 1.003 - 1.035    Blood Urine Negative NEG^Negative    pH Urine 5.5 5.0 - 7.0 pH    Protein Albumin Urine Trace (A) NEG^Negative mg/dL    Urobilinogen Urine 1.0 0.2 - 1.0 EU/dL    Nitrite Urine Negative NEG^Negative    Leukocyte Esterase Urine Negative NEG^Negative    Source Midstream Urine    Urine Microscopic   Result Value Ref Range    WBC Urine 0 - 5 OTO5^0 - 5 /HPF    RBC Urine O - 2 OTO2^O - 2 /HPF    Bacteria Urine Few (A) NEG^Negative /HPF    Mucous Urine Present (A) NEG^Negative /LPF        ASSESSMENT/PLAN:                                                        ICD-10-CM    1. Uncontrolled hypertension I10 losartan (COZAAR) 50 MG tablet     Basic metabolic panel  Patient was advised BP uncontrolled today.  Reviewed meds with patient.  Increased losratan to 50 mg daily to optimize management.  Reinforced sodium restriction.  Exercise recommendations reviewed with patient.  Recheck with RN in 1 month      2. Mild persistent asthma without complication J45.30 Advised patient to use correct dose of Flovent 2 puffs BID.  Albuterol use reinforced.     3. Alcohol dependence in remission (H) F10.21 Patient states he is not consuming alcohol at all up to now.     4. Tobacco abuse Z72.0 Discussed risks of continuous smoking tobacco.  Patient is not interested in quitting completely at this time but is thinking of restarting nicotine replacement.  Will continue to  in the future.       Follow up with RN 1 month   Patient Instructions   Increase losartan 25 mg to 2 tablets a day.    New  prescription of losartan sent to pharmacy is 50 mg 1 tablet once a day.    Be consistent with sodium restrictions.  Recheck BP in 1 month with nurse visit.    You are strongly advised to stop smoking soon!  Continuing to smoke will increase your risk for heart disease, stroke, cancer, and  lung damage.  If you need more help with this and if you have set a quit date, you may also see a provider.    You will be contacted in 1-2 days for results of your lab tests.    Flovent to be continued at 2 puffs twice a day.  Albuterol inhaler 2 puffs every 4 hrs as needed for wheezing or coughing spells or tightness in breathing while active.      Thank you for choosing [facility].  You may be receiving a survey in the mail from [employer] regarding your visit today.  Please take a few minutes to complete and return the survey to let us know how we are doing.      If you have questions or concerns, please contact us via [email or web address] or you can contact your care team at [phone].    Our Clinic hours are:  Monday 6:40 am  to 7:00 pm  Tuesday -Friday 6:40 am to 5:00 pm    The [place] outpatient lab hours are:  Monday - Friday 6:10 am to 4:45 pm  Saturdays 7:00 am to 11:00 am  Appointments are required, call [phone]    If you have clinical questions after hours or would like to schedule an appointment,  call the clinic at [phone].        Gatito Ware MD  [facility]

## 2019-02-20 NOTE — PATIENT INSTRUCTIONS
Increase losartan 25 mg to 2 tablets a day.    New prescription of losartan sent to pharmacy is 50 mg 1 tablet once a day.    Be consistent with sodium restrictions.  Recheck BP in 1 month with nurse visit.    You are strongly advised to stop smoking soon!  Continuing to smoke will increase your risk for heart disease, stroke, cancer, and  lung damage.  If you need more help with this and if you have set a quit date, you may also see a provider.    You will be contacted in 1-2 days for results of your lab tests.    Flovent to be continued at 2 puffs twice a day.  Albuterol inhaler 2 puffs every 4 hrs as needed for wheezing or coughing spells or tightness in breathing while active.      Thank you for choosing [facility].  You may be receiving a survey in the mail from [employer] regarding your visit today.  Please take a few minutes to complete and return the survey to let us know how we are doing.      If you have questions or concerns, please contact us via [employer] or you can contact your care team at [phone].    Our Clinic hours are:  Monday 6:40 am  to 7:00 pm  Tuesday -Friday 6:40 am to 5:00 pm    The [place] outpatient lab hours are:  Monday - Friday 6:10 am to 4:45 pm  Saturdays 7:00 am to 11:00 am  Appointments are required, call [phone]    If you have clinical questions after hours or would like to schedule an appointment,  call the clinic at [phone].

## 2019-03-06 DIAGNOSIS — J45.30 MILD PERSISTENT ASTHMA WITHOUT COMPLICATION: ICD-10-CM

## 2019-03-06 NOTE — TELEPHONE ENCOUNTER
"Requested Prescriptions   Pending Prescriptions Disp Refills     fluticasone (FLOVENT HFA) 220 MCG/ACT inhaler 1 Inhaler 11     Sig: Inhale 2 puffs into the lungs 2 times daily    Inhaled Steroids Protocol Failed - 3/6/2019  1:58 PM       Failed - Asthma control assessment score within normal limits in last 6 months    Please review ACT score.          Passed - Patient is age 12 or older       Passed - Medication is active on med list       Passed - Recent (6 mo) or future (30 days) visit within the authorizing provider's specialty    Patient had office visit in the last 6 months or has a visit in the next 30 days with authorizing provider or within the authorizing provider's specialty.  See \"Patient Info\" tab in inbasket, or \"Choose Columns\" in Meds & Orders section of the refill encounter.            Last Written Prescription Date:  12/27/18  Last Fill Quantity: 12,  # refills: 0   Last office visit: 2/20/2019 with prescribing provider:  Jeanie   Future Office Visit:      "

## 2019-03-07 ENCOUNTER — TELEPHONE (OUTPATIENT)
Dept: FAMILY MEDICINE | Facility: CLINIC | Age: 64
End: 2019-03-07

## 2019-03-07 RX ORDER — FLUTICASONE PROPIONATE 220 UG/1
2 AEROSOL, METERED RESPIRATORY (INHALATION) 2 TIMES DAILY
Qty: 1 INHALER | Refills: 11 | OUTPATIENT
Start: 2019-03-07

## 2019-03-08 NOTE — TELEPHONE ENCOUNTER
Prior Authorization Retail Medication Request    Medication/Dose: flovent  ICD code (if different than what is on RX):    Previously Tried and Failed:  Proair; proventil; ventolin  Rationale:  Patient has used Flovent since 2012    Insurance Name:  [phone]  Insurance ID:  [id number]      Pharmacy Information (if different than what is on RX)  Name:    Phone:

## 2019-03-12 NOTE — TELEPHONE ENCOUNTER
PA Initiation    Medication: flovent  Insurance Company: [employer] - Phone [phone] Fax [phone]  Pharmacy Filling the Rx: [facility] [address] [place] [address] [address] AT [address]  Filling Pharmacy Phone: [phone]  Filling Pharmacy Fax:    Start Date: 3/12/2019    Central Prior Authorization Team   Phone: [phone]

## 2019-03-14 NOTE — TELEPHONE ENCOUNTER
PRIOR AUTHORIZATION DENIED    Medication: flovent    Denial Date: 3/13/2019    Denial Rational:  Patient must have a history of trial & failure to the formulary alternative(s) or have a contraindication or intolerance to the formulary alternatives: levalbuterol tartrate aerosol, proair HFA, Respiclick        Appeal Information:

## 2019-03-15 ENCOUNTER — TELEPHONE (OUTPATIENT)
Dept: FAMILY MEDICINE | Facility: CLINIC | Age: 64
End: 2019-03-15

## 2019-03-15 DIAGNOSIS — J45.30 MILD PERSISTENT ASTHMA WITHOUT COMPLICATION: Primary | ICD-10-CM

## 2019-03-15 NOTE — TELEPHONE ENCOUNTER
Dr. Ware  PA for Flovent has been denied.    Denial Rational:  Patient must have a history of trial & failure to the formulary alternative(s) or have a contraindication or intolerance to the formulary alternatives: levalbuterol tartrate aerosol, proair HFA, Respiclick     Lena Crookllor

## 2019-03-15 NOTE — TELEPHONE ENCOUNTER
Plan does not cover medication prescribed( FLOVENT  MCG ORAL INH), per RX benefit plan alternative medications include: KAYCEE ESCUDERO AER 80MCG Please fax back with approval along with strength, directions, quanity and refills. Thanks!

## 2019-04-16 DIAGNOSIS — I10 BENIGN ESSENTIAL HYPERTENSION: ICD-10-CM

## 2019-04-16 NOTE — LETTER
[facility]  [address] [address]  Phone: [phone]       April 17, 2019         Luigi Vieyra  [id number] [place] [id number]            Dear Luigi:    We are concerned about your health care.  We recently provided you with medication refills.  Many medications require routine follow-up with your doctor or routine labs.     At this time, you are due for a free Nurse visit for a recheck on your blood pressure. Please contact the clinic.    Your prescription(s) have been refilled for 30 days so you may have time for the above noted follow-up. Please call to schedule soon so we can assure you have an appointment before your next refills are needed.    Thank you,      Gatito Ware MD / [email or web address]

## 2019-04-16 NOTE — TELEPHONE ENCOUNTER
"Requested Prescriptions   Pending Prescriptions Disp Refills     losartan (COZAAR) 25 MG tablet [Pharmacy Med Name: LOSARTAN 25MG TABLETS] 30 tablet 0     Sig: TAKE 1 TABLET(25 MG) BY MOUTH DAILY  Last Written Prescription Date:  2/20/2019  Last Fill Quantity: 30,  # refills: 0   Last office visit: 2/20/2019 with prescribing provider:  Jeanie  Future Office Visit:           Angiotensin-II Receptors Failed - 4/16/2019 11:54 AM        Failed - Blood pressure under 140/90 in past 12 months     BP Readings from Last 3 Encounters:   02/20/19 142/88   01/21/19 156/81   12/27/18 172/90                 Passed - Recent (12 mo) or future (30 days) visit within the authorizing provider's specialty     Patient had office visit in the last 12 months or has a visit in the next 30 days with authorizing provider or within the authorizing provider's specialty.  See \"Patient Info\" tab in inbasket, or \"Choose Columns\" in Meds & Orders section of the refill encounter.              Passed - Medication is active on med list        Passed - Patient is age 18 or older        Passed - Normal serum creatinine on file in past 12 months     Recent Labs   Lab Test 02/20/19  0943   CR 0.74             Passed - Normal serum potassium on file in past 12 months     Recent Labs   Lab Test 02/20/19  0943   POTASSIUM 4.4                      "

## 2019-04-17 RX ORDER — LOSARTAN POTASSIUM 25 MG/1
TABLET ORAL
Qty: 30 TABLET | Refills: 0 | Status: SHIPPED | OUTPATIENT
Start: 2019-04-17 | End: 2019-04-26 | Stop reason: DRUGHIGH

## 2019-04-26 ENCOUNTER — TELEPHONE (OUTPATIENT)
Dept: FAMILY MEDICINE | Facility: CLINIC | Age: 64
End: 2019-04-26

## 2019-04-26 ENCOUNTER — ALLIED HEALTH/NURSE VISIT (OUTPATIENT)
Dept: FAMILY MEDICINE | Facility: CLINIC | Age: 64
End: 2019-04-26
Payer: COMMERCIAL

## 2019-04-26 VITALS — DIASTOLIC BLOOD PRESSURE: 90 MMHG | SYSTOLIC BLOOD PRESSURE: 159 MMHG | HEART RATE: 108 BPM

## 2019-04-26 DIAGNOSIS — Z01.30 BP CHECK: Primary | ICD-10-CM

## 2019-04-26 DIAGNOSIS — I10 UNCONTROLLED HYPERTENSION: ICD-10-CM

## 2019-04-26 PROCEDURE — 99207 ZZC NO CHARGE NURSE ONLY: CPT

## 2019-04-26 RX ORDER — LOSARTAN POTASSIUM 50 MG/1
50 TABLET ORAL DAILY
Qty: 90 TABLET | Refills: 3 | Status: SHIPPED | OUTPATIENT
Start: 2019-04-26 | End: 2020-02-10

## 2019-04-26 NOTE — TELEPHONE ENCOUNTER
S-(situation): BP Check    B-(background): 02/20/19 Ware:  Patient was advised BP uncontrolled today.  Reviewed meds with patient.  Increased losratan to 50 mg daily to optimize management.  Reinforced sodium restriction.  Exercise recommendations reviewed with patient.  Recheck with RN in 1 month  BP at /78 and 142/88    A-(assessment):   Vital Signs 4/26/2019 4/26/2019   Systolic 148 159   Diastolic 88 90   Pulse 107 108   Manual    Patient has been on 25 mg prior to this visit and was unaware of med change. No symptoms reported. Patient is a smoker. Has been following low sodium restrictions.    R-(recommendations): Routing to provider. Refill 50 mg and try dose as previously recommended?     JESÚS HoN, RN

## 2019-04-26 NOTE — TELEPHONE ENCOUNTER
Covering for primary/ordering provider    Agree with PCP previous plan to increase to 50 mg.  RN BP check in 2 to 4 weeks.

## 2019-04-26 NOTE — PROGRESS NOTES
S-(situation): BP Check    B-(background): 02/20/19 Ware:  Patient was advised BP uncontrolled today.  Reviewed meds with patient.  Increased losratan to 50 mg daily to optimize management.  Reinforced sodium restriction.  Exercise recommendations reviewed with patient.  Recheck with RN in 1 month    A-(assessment):   Vital Signs 4/26/2019 4/26/2019   Systolic 148 159   Diastolic 88 90   Pulse 107 108   Manual    Patient has been on 25 mg prior to this visit and was unaware of med change. No symptoms reported. Patient is a smoker. Has been following low sodium restrictions.    R-(recommendations): Routing to provider.      JESÚS HoN, RN

## 2019-04-26 NOTE — LETTER
April 30, 2019      Luigi Vieyra  [address] [address] [address]        Dear Luigi,     We have been trying to reach you by phone with further instructions from Dr Ware since your RN blood pressure check last week.  Since we have not heard back, I am sending these instructions to you per Dr Ware.    Increased losratan to 50 mg daily to optimize management.  Reinforce sodium restriction.  Exercise recommendations as reviewed with patient at our last office visit on 2/20/19.  Recheck blood pressure with RN in 1 month    Please make an RN appointment to recheck your blood pressure in 2-4 weeks by calling [phone].    Thank you.    Sincerely,         Gatito Ware MD/ Jolie Adams RN        lar

## 2019-04-30 NOTE — TELEPHONE ENCOUNTER
Left non-detailed message for patient to return a call to the clinic RN.   Pt had RN blood pressure visit last week and we have been trying to reach pt by phone to provide further instructions.    Letter sent with instructions.    Pt does not have a [employer] account.    Jolie Adams RN

## 2019-06-07 ENCOUNTER — TELEPHONE (OUTPATIENT)
Dept: FAMILY MEDICINE | Facility: CLINIC | Age: 64
End: 2019-06-07

## 2019-06-07 ENCOUNTER — ALLIED HEALTH/NURSE VISIT (OUTPATIENT)
Dept: FAMILY MEDICINE | Facility: CLINIC | Age: 64
End: 2019-06-07
Payer: COMMERCIAL

## 2019-06-07 VITALS — SYSTOLIC BLOOD PRESSURE: 128 MMHG | HEART RATE: 109 BPM | DIASTOLIC BLOOD PRESSURE: 72 MMHG

## 2019-06-07 DIAGNOSIS — Z01.30 BP CHECK: Primary | ICD-10-CM

## 2019-06-07 PROCEDURE — 99207 ZZC NO CHARGE NURSE ONLY: CPT

## 2019-06-07 NOTE — PROGRESS NOTES
S-(situation): BP Check    B-(background): Med change noted 04/26/19 Franklyn/Jeanie  Agree with PCP previous plan to increase to 50 mg.  RN BP check in 2 to 4 weeks.  BP at nurse visit 148/88 and 159/90    A-(assessment):   Vital Signs 6/7/2019   Systolic 128   Diastolic 72   Pulse 109   Manual    No complaints reported. Patient has been at the 50 mg. He has monitoring salt intake. He is a current smoker.     R-(recommendations): Continue medication as prescribed. Routing to provider for update.      JESÚS HoN, RN

## 2019-06-07 NOTE — TELEPHONE ENCOUNTER
S-(situation): BP Check    B-(background): Med change noted 04/26/19 Franklyn/Jeanie  Agree with PCP previous plan to increase to 50 mg.  RN BP check in 2 to 4 weeks.  BP at nurse visit 148/88 and 159/90    A-(assessment):   Vital Signs 6/7/2019   Systolic 128   Diastolic 72   Pulse 109   Manual    No complaints reported. Patient has been at the 50 mg. He has been monitoring salt intake. He is a current smoker.     R-(recommendations): Continue medication as prescribed. Routing to provider for update.      JESÚS HoN, RN

## 2019-06-10 NOTE — TELEPHONE ENCOUNTER
BP is at goal range now.  Continue losartan 50 mg daily.  Reinforce sodium restriction.  Will recheck BP when patient next comes in clinic for exam/visit.

## 2019-06-10 NOTE — TELEPHONE ENCOUNTER
CSS: ok to deliver message from Dr. Ware.     Left message for patient to return call to clinic.     Sandhya Mooney, BSN, RN

## 2019-11-22 DIAGNOSIS — J45.30 MILD PERSISTENT ASTHMA WITHOUT COMPLICATION: ICD-10-CM

## 2019-11-22 NOTE — TELEPHONE ENCOUNTER
"Requested Prescriptions   Pending Prescriptions Disp Refills     albuterol (PROAIR HFA/PROVENTIL HFA/VENTOLIN HFA) 108 (90 Base) MCG/ACT inhaler [Pharmacy Med Name: ALBUTEROL HFA INH (200 PUFFS) 8.5GM] 8.5 g 0     Sig: INHALE 2 PUFFS INTO THE LUNGS EVERY 4 HOURS AS NEEDED FOR SHORTNESS OF BREATH OR DIFFICULT BREATHING OR WHEEZING       Asthma Maintenance Inhalers - Anticholinergics Failed - 11/22/2019 10:36 AM        Failed - Asthma control assessment score within normal limits in last 6 months     Please review ACT score.           Passed - Patient is age 12 years or older        Passed - Medication is active on med list        Passed - Recent (6 mo) or future (30 days) visit within the authorizing provider's specialty     Patient had office visit in the last 6 months or has a visit in the next 30 days with authorizing provider or within the authorizing provider's specialty.  See \"Patient Info\" tab in inbasket, or \"Choose Columns\" in Meds & Orders section of the refill encounter.            Last Written Prescription Date:  2/11/19  Last Fill Quantity: 18,  # refills: 3   Last office visit: 6/7/2019 with prescribing provider:  Jeanie   Future Office Visit:      "

## 2019-11-25 RX ORDER — ALBUTEROL SULFATE 90 UG/1
AEROSOL, METERED RESPIRATORY (INHALATION)
Qty: 8.5 G | Refills: 0 | Status: SHIPPED | OUTPATIENT
Start: 2019-11-25 | End: 2020-01-13

## 2019-11-25 NOTE — TELEPHONE ENCOUNTER
"I reached him and advised he is due for asthma recheck in clinic and also preventative care visit, per HM, \"yah, ok, \"   Advised we can refill one inhaler to allow him time to be seen, per protocol.   Offered to transfer him to the appt desk and he declined. \"I will check my schedule and call back, \"  Encouraged him to do that asap, as providers are booking out.   \"ok, will do, thanks, \"    Cuca Billingsley RNC    "

## 2020-01-13 ENCOUNTER — TELEPHONE (OUTPATIENT)
Dept: FAMILY MEDICINE | Facility: CLINIC | Age: 65
End: 2020-01-13

## 2020-01-13 DIAGNOSIS — J45.30 MILD PERSISTENT ASTHMA WITHOUT COMPLICATION: ICD-10-CM

## 2020-01-13 RX ORDER — ALBUTEROL SULFATE 90 UG/1
AEROSOL, METERED RESPIRATORY (INHALATION)
Qty: 8.5 G | Refills: 0 | Status: SHIPPED | OUTPATIENT
Start: 2020-01-13 | End: 2020-02-10

## 2020-01-13 NOTE — TELEPHONE ENCOUNTER
Patient called for his medication, is almost out and needs it today. Patient made a physical appointment with Dr. Ware for 2/10/2020.Joan Gates on 1/13/2020 at 10:22 AM

## 2020-01-13 NOTE — TELEPHONE ENCOUNTER
Aidee fill given as patient has upcoming appointment. Left message that medication was sent as requested.       JESÚS HoN, RN

## 2020-02-10 ENCOUNTER — OFFICE VISIT (OUTPATIENT)
Dept: FAMILY MEDICINE | Facility: CLINIC | Age: 65
End: 2020-02-10
Payer: COMMERCIAL

## 2020-02-10 VITALS
BODY MASS INDEX: 26.34 KG/M2 | OXYGEN SATURATION: 97 % | DIASTOLIC BLOOD PRESSURE: 102 MMHG | WEIGHT: 184 LBS | TEMPERATURE: 98.9 F | SYSTOLIC BLOOD PRESSURE: 154 MMHG | RESPIRATION RATE: 14 BRPM | HEIGHT: 70 IN | HEART RATE: 104 BPM

## 2020-02-10 DIAGNOSIS — I10 UNCONTROLLED HYPERTENSION: ICD-10-CM

## 2020-02-10 DIAGNOSIS — Z72.0 TOBACCO ABUSE: ICD-10-CM

## 2020-02-10 DIAGNOSIS — Z13.220 LIPID SCREENING: ICD-10-CM

## 2020-02-10 DIAGNOSIS — J45.30 MILD PERSISTENT ASTHMA WITHOUT COMPLICATION: ICD-10-CM

## 2020-02-10 DIAGNOSIS — Z12.11 SCREENING FOR MALIGNANT NEOPLASM OF COLON: ICD-10-CM

## 2020-02-10 DIAGNOSIS — Z23 NEED FOR PROPHYLACTIC VACCINATION AND INOCULATION AGAINST INFLUENZA: ICD-10-CM

## 2020-02-10 DIAGNOSIS — Z00.00 ENCOUNTER FOR ROUTINE ADULT HEALTH EXAMINATION WITHOUT ABNORMAL FINDINGS: Primary | ICD-10-CM

## 2020-02-10 PROCEDURE — 90471 IMMUNIZATION ADMIN: CPT | Performed by: FAMILY MEDICINE

## 2020-02-10 PROCEDURE — 90682 RIV4 VACC RECOMBINANT DNA IM: CPT | Performed by: FAMILY MEDICINE

## 2020-02-10 PROCEDURE — 99214 OFFICE O/P EST MOD 30 MIN: CPT | Mod: 25 | Performed by: FAMILY MEDICINE

## 2020-02-10 PROCEDURE — 99396 PREV VISIT EST AGE 40-64: CPT | Mod: 25 | Performed by: FAMILY MEDICINE

## 2020-02-10 RX ORDER — LOSARTAN POTASSIUM 100 MG/1
100 TABLET ORAL DAILY
Qty: 90 TABLET | Refills: 3 | Status: SHIPPED | OUTPATIENT
Start: 2020-02-10 | End: 2021-06-04

## 2020-02-10 RX ORDER — ALBUTEROL SULFATE 90 UG/1
2 AEROSOL, METERED RESPIRATORY (INHALATION) EVERY 4 HOURS PRN
Qty: 8.5 G | Refills: 11 | Status: SHIPPED | OUTPATIENT
Start: 2020-02-10 | End: 2020-11-23

## 2020-02-10 ASSESSMENT — MIFFLIN-ST. JEOR: SCORE: 1635.87

## 2020-02-10 NOTE — PATIENT INSTRUCTIONS
Schedule colonoscopy for screening for colon cancer at your soonest convenience.    Be consistent with low salt, low trans fat and low saturated fat diet.  Eat food rich in omega-3-fatty acids as you tolerate. (salmon, olive oil)  Eat 5 cups of vegetables, fruits and whole grains per day.  Limit starchy food (white rice, white bread, white pasta, white potatoes) to less than a cup per meal.  Minimize sweets, junk food and fastfood. Limit soda beverages to one serving per day; best to avoid it altogether though.    Exercise: moderate intensity sustained for at least 30 mins per episode, goal of 150 mins per week at least  Combine cardiovascular and resistance exercises.  These exercise recommendations are in addition to your daily activity at work or home.  Work on losing weight.    You may get the Shingrix vaccine for shingles if you desire, and after you verify with insurance how they cover the vaccine.    You are strongly advised to stop smoking soon!  Continuing to smoke will increase your risk for heart disease, stroke, cancer, and  lung damage.    See dentist and optometrist soon for preventive exams.    Preventative Care Visits include: Yearly physicals, Well-child visits, Welcome to Medicare visits, & Medicare yearly wellness exams.    The purpose of these visits is to discuss your medical history and prevent health problems before you are sick.  You may need to pay a copay, coinsurance or deductible if your visit today includes testing or treating for a new or existing condition.    Additional charges may be incurred for today's visit. If you have questions about what your insurance plan covers, please contact your Insurance Company's member service department.  If you have questions specific to a bill you have already received from [facility], please contact the [facility] Billing Office at [phone].       Thank you for choosing [facility].  You may be receiving an email and/or telephone survey  request from [facility] regarding your visit today.  Please take a few minutes to respond to the survey to let us know how we are doing.      If you have questions or concerns, please contact us via [facility] or you can contact your care team at [phone].    Our Clinic hours are:  Monday 6:40 am  to 7:00 pm  Tuesday -Friday 6:40 am to 5:00 pm    The [place] outpatient lab hours are:  Monday - Friday 6:10 am to 4:45 pm  Saturdays 7:00 am to 11:00 am  Appointments are required, call [phone]    If you have clinical questions after hours or would like to schedule an appointment,  call the clinic at [phone].    Preventive Health Recommendations  Male Ages 50 - 64    Yearly exam:             See your health care provider every year in order to  o   Review health changes.   o   Discuss preventive care.    o   Review your medicines if your doctor has prescribed any.     Have a cholesterol test every 5 years, or more frequently if you are at risk for high cholesterol/heart disease.     Have a diabetes test (fasting glucose) every three years. If you are at risk for diabetes, you should have this test more often.     Have a colonoscopy at age 50, or have a yearly FIT test (stool test). These exams will check for colon cancer.      Talk with your health care provider about whether or not a prostate cancer screening test (PSA) is right for you.    You should be tested each year for STDs (sexually transmitted diseases), if you re at risk.     Shots: Get a flu shot each year. Get a tetanus shot every 10 years.     Nutrition:    Eat at least 5 servings of fruits and vegetables daily.     Eat whole-grain bread, whole-wheat pasta and brown rice instead of white grains and rice.     Get adequate Calcium and Vitamin D.     Lifestyle    Exercise for at least 150 minutes a week (30 minutes a day, 5 days a week). This will help you control your weight and prevent disease.     Limit alcohol to one drink per  day.     No smoking.     Wear sunscreen to prevent skin cancer.     See your dentist every six months for an exam and cleaning.     See your eye doctor every 1 to 2 years.

## 2020-02-10 NOTE — LETTER
My Asthma Action Plan    Name: Luigi Vieyra   YOB: 1955  Date: 2/10/2020   My doctor: Gatiot Ware MD   My clinic: [facility]        My Control Medicine: None  My Rescue Medicine: Albuterol (Proair/Ventolin/Proventil HFA) 2-4 puffs EVERY 4 HOURS as needed. Use a spacer if recommended by your provider.  My Oral Steroid Medicine: none My Asthma Severity:   Mild Persistent  Know your asthma triggers:   smoke  humidity  milk            GREEN ZONE   Good Control    I feel good    No cough or wheeze    Can work, sleep and play without asthma symptoms       Take your asthma control medicine every day.     1. If exercise triggers your asthma, take your rescue medication    15 minutes before exercise or sports, and    During exercise if you have asthma symptoms  2. Spacer to use with inhaler: If you have a spacer, make sure to use it with your inhaler             YELLOW ZONE Getting Worse  I have ANY of these:    I do not feel good    Cough or wheeze    Chest feels tight    Wake up at night   1. Keep taking your Green Zone medications  2. Start taking your rescue medicine:    every 20 minutes for up to 1 hour. Then every 4 hours for 24-48 hours.  3. If you stay in the Yellow Zone for more than 12-24 hours, contact your doctor.  4. If you do not return to the Green Zone in 12-24 hours or you get worse, start taking your oral steroid medicine if prescribed by your provider.           RED ZONE Medical Alert - Get Help  I have ANY of these:    I feel awful    Medicine is not helping    Breathing getting harder    Trouble walking or talking    Nose opens wide to breathe       1. Take your rescue medicine NOW  2. If your provider has prescribed an oral steroid medicine, start taking it NOW  3. Call your doctor NOW  4. If you are still in the Red Zone after 20 minutes and you have not reached your doctor:    Take your rescue medicine again and    Call 911 or go to the emergency room right  away    See your regular doctor within 2 weeks of an Emergency Room or Urgent Care visit for follow-up treatment.          Annual Reminders:  Meet with Asthma Educator,  Flu Shot in the Fall, consider Pneumonia Vaccination for patients with asthma (aged 19 and older).    Pharmacy: [id number] - [facility] [address] AT [address]    Electronically signed by Gatito Ware MD   Date: 02/10/20                      Asthma Triggers  How To Control Things That Make Your Asthma Worse    Triggers are things that make your asthma worse.  Look at the list below to help you find your triggers and what you can do about them.  You can help prevent asthma flare-ups by staying away from your triggers.      Trigger                                                          What you can do   Cigarette Smoke  Tobacco smoke can make asthma worse. Do not allow smoking in your home, car or around you.  Be sure no one smokes at a child s day care or school.  If you smoke, ask your health care provider for ways to help you quit.  Ask family members to quit too.  Ask your health care provider for a referral to Quit Plan to help you quit smoking, or call 7-108-113-PLAN.     Colds, Flu, Bronchitis  These are common triggers of asthma. Wash your hands often.  Don t touch your eyes, nose or mouth.  Get a flu shot every year.     Dust Mites  These are tiny bugs that live in cloth or carpet. They are too small to see. Wash sheets and blankets in hot water every week.   Encase pillows and mattress in dust mite proof covers.  Avoid having carpet if you can. If you have carpet, vacuum weekly.   Use a dust mask and HEPA vacuum.   Pollen and Outdoor Mold  Some people are allergic to trees, grass, or weed pollen, or molds. Try to keep your windows closed.  Limit time out doors when pollen count is high.   Ask you health care provider about taking medicine during allergy season.     Animal Dander  Some  people are allergic to skin flakes, urine or saliva from pets with fur or feathers. Keep pets with fur or feathers out of your home.    If you can t keep the pet outdoors, then keep the pet out of your bedroom.  Keep the bedroom door closed.  Keep pets off cloth furniture and away from stuffed toys.     Mice, Rats, and Cockroaches   Some people are allergic to the waste from these pests.   Cover food and garbage.  Clean up spills and food crumbs.  Store grease in the refrigerator.   Keep food out of the bedroom.   Indoor Mold  This can be a trigger if your home has high moisture. Fix leaking faucets, pipes, or other sources of water.   Clean moldy surfaces.  Dehumidify basement if it is damp and smelly.   Smoke, Strong Odors, and Sprays  These can reduce air quality. Stay away from strong odors and sprays, such as perfume, powder, hair spray, paints, smoke incense, paint, cleaning products, candles and new carpet.   Exercise or Sports  Some people with asthma have this trigger. Be active!  Ask your doctor about taking medicine before sports or exercise to prevent symptoms.    Warm up for 5-10 minutes before and after sports or exercise.     Other Triggers of Asthma  Cold air:  Cover your nose and mouth with a scarf.  Sometimes laughing or crying can be a trigger.  Some medicines and food can trigger asthma.

## 2020-02-11 ASSESSMENT — ASTHMA QUESTIONNAIRES: ACT_TOTALSCORE: 15

## 2020-02-24 ENCOUNTER — ALLIED HEALTH/NURSE VISIT (OUTPATIENT)
Dept: FAMILY MEDICINE | Facility: CLINIC | Age: 65
End: 2020-02-24
Payer: COMMERCIAL

## 2020-02-24 ENCOUNTER — TELEPHONE (OUTPATIENT)
Dept: FAMILY MEDICINE | Facility: CLINIC | Age: 65
End: 2020-02-24

## 2020-02-24 VITALS — SYSTOLIC BLOOD PRESSURE: 158 MMHG | DIASTOLIC BLOOD PRESSURE: 98 MMHG | HEART RATE: 126 BPM

## 2020-02-24 DIAGNOSIS — Z13.220 LIPID SCREENING: ICD-10-CM

## 2020-02-24 DIAGNOSIS — I10 UNCONTROLLED HYPERTENSION: Primary | ICD-10-CM

## 2020-02-24 DIAGNOSIS — I10 UNCONTROLLED HYPERTENSION: ICD-10-CM

## 2020-02-24 LAB
ANION GAP SERPL CALCULATED.3IONS-SCNC: 4 MMOL/L (ref 3–14)
BUN SERPL-MCNC: 14 MG/DL (ref 7–30)
CALCIUM SERPL-MCNC: 9.2 MG/DL (ref 8.5–10.1)
CHLORIDE SERPL-SCNC: 102 MMOL/L (ref 94–109)
CHOLEST SERPL-MCNC: 243 MG/DL
CO2 SERPL-SCNC: 29 MMOL/L (ref 20–32)
CREAT SERPL-MCNC: 0.81 MG/DL (ref 0.66–1.25)
GFR SERPL CREATININE-BSD FRML MDRD: >90 ML/MIN/{1.73_M2}
GLUCOSE SERPL-MCNC: 117 MG/DL (ref 70–99)
HDLC SERPL-MCNC: 83 MG/DL
LDLC SERPL CALC-MCNC: 129 MG/DL
NONHDLC SERPL-MCNC: 160 MG/DL
POTASSIUM SERPL-SCNC: 4.3 MMOL/L (ref 3.4–5.3)
SODIUM SERPL-SCNC: 135 MMOL/L (ref 133–144)
TRIGL SERPL-MCNC: 154 MG/DL

## 2020-02-24 PROCEDURE — 80061 LIPID PANEL: CPT | Performed by: FAMILY MEDICINE

## 2020-02-24 PROCEDURE — 36415 COLL VENOUS BLD VENIPUNCTURE: CPT | Performed by: FAMILY MEDICINE

## 2020-02-24 PROCEDURE — 80048 BASIC METABOLIC PNL TOTAL CA: CPT | Performed by: FAMILY MEDICINE

## 2020-02-24 PROCEDURE — 99207 ZZC NO CHARGE NURSE ONLY: CPT

## 2020-02-24 RX ORDER — METOPROLOL SUCCINATE 25 MG/1
12.5 TABLET, EXTENDED RELEASE ORAL DAILY
Qty: 90 TABLET | Refills: 0 | Status: SHIPPED | OUTPATIENT
Start: 2020-02-24 | End: 2020-03-17

## 2020-02-24 NOTE — TELEPHONE ENCOUNTER
Still uncontrolled blood pressure.  Losartan is maxed out at 100 mg daily.    Patient has had elevated HR also in the past.    Add metoprolol XL 12.5 mg daily. New Rx sent to pharmacy.  Reinforce sodium restriction.  Recheck BP with RN visit in 7-10 days.

## 2020-02-24 NOTE — NURSING NOTE
Luigi Vieyra is a 64 year old year old patient who comes in today for a Blood Pressure check because of ongoing blood pressure monitoring and medication increase of losartan to 100 mg daily.    Pt last seen by Dr Ware on 2/10/20 and losartan was increased to 100 mg daily.  Pt went to lab after this RN visit for fasting lab work.    Vital Signs as repeated by RN:  /96, pulse 126  /98, pulse 126, recheck a few min later.    Pulse is very high today.  Pt says that he had a cigarette before coming in for nurse visit today.  Pt appears pushed and in a hurry to complete BP check and labs.    Patient is taking medication as prescribed  Patient is tolerating medications well.  Patient is not monitoring Blood Pressure at home.      Current complaints: none  Disposition:  Routed to provider in a telephone note for further instructions.    Jolie Adams RN

## 2020-02-24 NOTE — TELEPHONE ENCOUNTER
Patient returns our call and he is told of the new Rx and to repeat in 7-10 days with a free bp ck with the RN. Rachel NICHOLAS RN

## 2020-03-02 ENCOUNTER — OFFICE VISIT (OUTPATIENT)
Dept: FAMILY MEDICINE | Facility: CLINIC | Age: 65
End: 2020-03-02
Payer: COMMERCIAL

## 2020-03-02 VITALS
HEART RATE: 128 BPM | WEIGHT: 188.8 LBS | RESPIRATION RATE: 14 BRPM | TEMPERATURE: 98.4 F | BODY MASS INDEX: 27.09 KG/M2 | DIASTOLIC BLOOD PRESSURE: 100 MMHG | OXYGEN SATURATION: 94 % | SYSTOLIC BLOOD PRESSURE: 130 MMHG

## 2020-03-02 DIAGNOSIS — F17.200 TOBACCO USE DISORDER: ICD-10-CM

## 2020-03-02 DIAGNOSIS — R73.01 IMPAIRED FASTING GLUCOSE: ICD-10-CM

## 2020-03-02 DIAGNOSIS — E78.2 MIXED HYPERLIPIDEMIA: Primary | ICD-10-CM

## 2020-03-02 PROCEDURE — 99214 OFFICE O/P EST MOD 30 MIN: CPT | Performed by: FAMILY MEDICINE

## 2020-03-02 RX ORDER — LOSARTAN POTASSIUM 100 MG/1
100 TABLET ORAL DAILY
Qty: 90 TABLET | Refills: 3 | Status: CANCELLED | OUTPATIENT
Start: 2020-03-02

## 2020-03-02 RX ORDER — METOPROLOL SUCCINATE 25 MG/1
12.5 TABLET, EXTENDED RELEASE ORAL DAILY
Qty: 90 TABLET | Refills: 0 | Status: CANCELLED | OUTPATIENT
Start: 2020-03-02

## 2020-03-02 NOTE — PROGRESS NOTES
Subjective     Luigi Vieyra is a 64 year old male who presents to clinic today for the following health issues:    HPI     Follow up from labs on 2/24/20. Patient states that he was instructed to see Primary today for follow up to discuss elevated cholesterol and impaired fasting glucose.    The 10-year ASCVD risk score (Priyanka MARTINES Jr., et al., 2013) is: 16.1%    Values used to calculate the score:      Age: 64 years      Sex: Male      Is Non- : No      Diabetic: No      Tobacco smoker: Yes      Systolic Blood Pressure: 130 mmHg      Is BP treated: Yes      HDL Cholesterol: 83 mg/dL      Total Cholesterol: 243 mg/dL    Patient denies chest pain, abd pain, dyspnea, jaundice, nausea, or BM changes.    Patient states he just bought nicotine lozenges to start smoking cessation.      Patient Active Problem List   Diagnosis     Mild persistent asthma     Alcohol abuse     Tobacco abuse     CARDIOVASCULAR SCREENING; LDL GOAL LESS THAN 130     Alcohol dependence in remission (H)     Seasonal allergic rhinitis     Uncontrolled hypertension     History reviewed. No pertinent surgical history.    Social History     Tobacco Use     Smoking status: Current Every Day Smoker     Packs/day: 1.00     Years: 35.00     Pack years: 35.00     Types: Cigarettes     Smokeless tobacco: Never Used   Substance Use Topics     Alcohol use: Yes     Comment: casual     Family History   Problem Relation Age of Onset     Asthma Mother      Cerebrovascular Disease Mother      C.A.D. No family hx of      Diabetes No family hx of      Hypertension No family hx of      Breast Cancer No family hx of      Cancer - colorectal No family hx of      Prostate Cancer No family hx of          Current Outpatient Medications   Medication Sig Dispense Refill     aspirin 81 MG tablet Take 81 mg by mouth daily       beclomethasone HFA (QVAR REDIHALER) 80 MCG/ACT inhaler Inhale 1 puff into the lungs 2 times daily 1 Inhaler 11     losartan  (COZAAR) 100 MG tablet Take 1 tablet (100 mg) by mouth daily 90 tablet 3     metoprolol succinate ER (TOPROL-XL) 25 MG 24 hr tablet Take 0.5 tablets (12.5 mg) by mouth daily 90 tablet 0     albuterol (PROAIR HFA/PROVENTIL HFA/VENTOLIN HFA) 108 (90 Base) MCG/ACT inhaler Inhale 2 puffs into the lungs every 4 hours as needed for shortness of breath / dyspnea or wheezing 8.5 g 11     No Known Allergies    Reviewed and updated as needed this visit by Provider         Review of Systems   ROS COMP: Constitutional, HEENT, cardiovascular, pulmonary, GI, , musculoskeletal, neuro, skin, endocrine and psych systems are negative, except as otherwise noted.      Objective    Vital Signs 3/2/2020   Systolic 130   Diastolic 100   Pulse 128   Temperature 98.4   Respirations 14   Weight (LB) 188 lb 12.8 oz   Height    BMI (Calculated)    O2 94    calculate BMI.  Physical Exam   GEN: alert, oriented x 3, NAD  EYES: no icterus  SKIN: no jaundice/rash.    Diagnostic Test Results:  Labs reviewed in Epic      Component      Latest Ref Rng & Units 2/24/2020   Sodium      133 - 144 mmol/L 135   Potassium      3.4 - 5.3 mmol/L 4.3   Chloride      94 - 109 mmol/L 102   Carbon Dioxide      20 - 32 mmol/L 29   Anion Gap      3 - 14 mmol/L 4   Glucose      70 - 99 mg/dL 117 (H)   Urea Nitrogen      7 - 30 mg/dL 14   Creatinine      0.66 - 1.25 mg/dL 0.81   GFR Estimate      >60 mL/min/1.73:m2 >90   GFR Estimate If Black      >60 mL/min/1.73:m2 >90   Calcium      8.5 - 10.1 mg/dL 9.2   Cholesterol      <200 mg/dL 243 (H)   Triglycerides      <150 mg/dL 154 (H)   HDL Cholesterol      >39 mg/dL 83   LDL Cholesterol Calculated      <100 mg/dL 129 (H)   Non HDL Cholesterol      <130 mg/dL 160 (H)       Assessment & Plan     Luigi was seen today for abnormal labs.    Diagnoses and all orders for this visit:    Mixed hyperlipidemia  -     Lipid panel reflex to direct LDL Fasting; Future  Discussed causes,course, possible complications, and  treatment of condition.    Advised low cholesterol diet.  Increase dietary fiber.  Advised exercise recommendations.  Return in 3 months for lab appointment  Consider statin if LDL greater than 160. Consider it also if ASCVD remains 10-20%; real consider CT coronary calcium screen that time to determine cardiovascular risk.  '  Impaired fasting glucose  -     **Glucose FUTURE 2mo; Future  Discussed with patient risk for DM.  Discussed lifestyle mods to improve this.    Tobacco use disorder  Reinforced importance of tobacco cessation in cardiovascular prevention.  Taper down and off nicotine replacement over next few months.             Patient Instructions   Be consistent with low salt, low trans fat and low saturated fat diet.  Eat food rich in omega-3-fatty acids as you tolerate. (salmon, olive oil)  Eat 5 cups of vegetables, fruits and whole grains per day.  Limit starchy food (white rice, white bread, white pasta, white potatoes) to less than a cup per meal.  Minimize sweets, junk food and fastfood. Limit soda beverages to one serving per day; best to avoid it altogether though.    Exercise: moderate intensity sustained for at least 30 mins per episode, goal of 150 mins per week at least  Combine cardiovascular and resistance exercises.  These exercise recommendations are in addition to your daily activity at work or home.    You planned to start nicotine lozenges 4 mg as needed to quit smoking.  Stop all tobacco use once you start the lozenges.  Reduce to 2 mg lozenges after 1-2 months, then gradually reduce use until you are off the lozenges.  See a provider at anytime if having difficulty stopping smoking.    You still need to take losartan together with metoprolol XL.    Call to schedule your lab tests in 3 months; make sure you are fasting for more than 12 hrs.    Patient Education     Low-Cholesterol Diet  Your body needs cholesterol to build new cells and create certain hormones. There are 2 kinds of  cholesterol in your blood:       HDL ( good ) cholesterol. This prevents fat deposits (plaque) from building up in your arteries. In this way it protects against heart disease and stroke.    LDL ( bad ) cholesterol. This stays in your body and sticks to artery walls. Over time it may block blood flow to the heart and brain. This can cause a heart attack or stroke.  The cholesterol in your blood comes from 2 sources: cholesterol in food that you eat and cholesterol that your liver makes. You should limit the amount of cholesterol in your diet. But the cholesterol that your body makes has the greatest disease risk. And your body makes more cholesterol when your diet is high in bad fats (saturated and trans fats). There are 2 kinds of fats you can eat:    Good fats, or unsaturated fats (mono-unsaturated and poly-unsaturated). They raise the level of good cholesterol and lower the level of bad cholesterol. Good fats are found in vegetable oils such as olive, sunflower, corn, and soybean oils, and in nuts and seeds.    Bad fats, or saturated fats (including foods high in cholesterol) and trans fats. These raise your risk of disease. They lower the good cholesterol and raise the level of bad cholesterol. Bad fats are found in animal products, including meat, whole-milk dairy products, and butter. Some plants are also high in bad fats (coconut and palm plants). Trans fats are found in hard (stick) margarines. They are also in many fast foods and commercially baked goods. Soft margarine sold in tubs has fewer trans fats and is safer to use.  High blood cholesterol is usually due to a diet high in saturated fat, along with not being physically active. In some cases, genetics plays a role in causing high cholesterol. The tips below will help you create healthy eating habits that will help lower your blood cholesterol level.  Create a diet high in good fats, low in bad fats (and low in cholesterol)  The following steps will  help you create a diet high in good fats and low in bad fats:    Talk with your doctor before starting a low cholesterol diet or weight loss program.    Learn to read nutrition labels and select appropriate portion sizes.    When cooking, use plant-based unsaturated vegetable oils (sunflower, corn, soybean, canola, peanut, and olive oils).    Avoid saturated fats found in animal products such as meat, dairy (whole-milk, cheese and ice cream), poultry skin, and egg yolks. Plants high in saturated oils include coconut oil, palm oil, and palm kernel oil.    If you eat meat, choose smaller portions and lean cuts, such as round, jan, sirloin, or loin. Eat more meatless meals.    Replace meat with fish at least 2 times a week. Fish is an important source of the unsaturated fat called omega-3 fatty acids. This fat has potential to lower the risk of heart disease.    Replace whole-milk dairy products with low-fat or nonfat products. Try soy products. Soy helps to reduce total cholesterol.    Supplement your diet with protective fibers. Eat nuts, seeds, and whole grains rather than white rice and bread. These foods lower both cholesterol and triglyceride levels. (Triglycerides are another fat found in the blood.) Walnuts are one of the best sources of omega-3 fatty acids.    Eat plenty of fresh fruits and vegetables daily.    Avoid fast foods and commercial baked goods. Assume they contain saturated fat unless labeled otherwise.  Date Last Reviewed: 8/1/2016 2000-2019 [employer] [address] [place] [address]. All rights reserved. This information is not intended as a substitute for professional medical care. Always follow your healthcare professional's instructions.           Patient Education     Low-Fat Diet    A low-fat diet will help you lose weight. It also can lower cholesterol and prevent symptoms of gallbladder disease. The average American diet contains up to 50% fat. This means that  50% of all calories come from fat (about 80 grams to 100 grams of fat per day). Choosing normal portions of foods from the list below can help lower your fat intake. Experts recommend that only 20% to 35% of your daily calories come from fat. The remaining 65% to 80% of calories will come from protein and carbohydrates. This is much healthier for you.  Breads  Ok: Whole-wheat or rye bread, bo or soda crackers, mariela toast, plain rolls, bagels, English muffins  Avoid: Rolls and breads containing whole milk or egg; waffles, pancakes, biscuits, corn bread; cheese crackers, other flavored crackers, pastries, doughnuts  Cereals  Ok: Oatmeal, whole-wheat, bran, multigrain, rice  Avoid: Granola or other cereals that have oil, coconut, or more than 2 grams of fat per serving  Cheese and eggs  Ok: Cheeses labeled low-fat; 3 whole eggs per week; egg whites and egg substitutes as desired  Avoid: All other cheeses  Desserts  Ok: Gelatin, slushy, rodrick food cake, meringues, nonfat yogurt, and puddings or sherbet made with nonfat milk  Avoid: Any other store-bought desserts, or desserts that have fat, whole milk, cream, chocolate, and coconut  Drinks  Ok: Nonfat milk, coffee, tea, fizzy (carbonated) drinks  Avoid: Whole and reduced-fat milk, evaporated and condensed milk, hot chocolate mixes, milk shakes, malts, eggnog  Fats  Ok: You may have up to 3 teaspoons of fat daily. This can be butter, margarine, mayonnaise, or healthy oils (canola or olive)  Avoid: Cream, nondairy creams, cream cheese, gravies, and cream sauces  Fruits  Ok: All fruits made without fat  Avoid: Coconut, olives  Meats, poultry, fish  Ok: Limit meat to 6 ounces daily (broiled, roasted, baked, grilled, or boiled). Buy lean cuts, and trim off the fat. Try beef, fish, lamb, pork, and canned fish packed in water; also chicken and turkey with the skin removed.  Avoid: Fried meats, fish, or poultry; fried eggs, and fish canned in oils; fatty meats such as  hatfield, sausage, corned beef, hot dogs, and lunch meats; meats with gravies and sauces  Potatoes, beans, pasta  Ok: Dried beans, split peas, lentils, potatoes, rice, pasta made without added fat  Avoid: French fries, potato chips, potatoes prepared with butter, refried beans  Soups  Ok: Clear broth soups without fat and with allowed vegetables  Avoid: Cream-based soups  Vegetables  Ok: Fresh, frozen, canned or dried vegetables, all made without added fat  Avoid: Fried vegetables and those prepared with butter, cream, sauces  Miscellaneous  Ok: Salt, sugar, jelly, hard candy, marshmallows, honey, syrup, spices and herbs, mustard, ketchup, lemon, and vinegar. Try to limit sweets and added sugars.  Avoid: Chocolate, nuts, coconut, and cream candies; sunflower, sesame, and other seeds; fried foods; cream sauces and gravies; pizza  Date Last Reviewed: 8/1/2016 2000-2019 [employer] [address] [place] [address]. All rights reserved. This information is not intended as a substitute for professional medical care. Always follow your healthcare professional's instructions.               No follow-ups on file.    Gatito Ware MD  [facility]

## 2020-03-02 NOTE — PATIENT INSTRUCTIONS
Be consistent with low salt, low trans fat and low saturated fat diet.  Eat food rich in omega-3-fatty acids as you tolerate. (salmon, olive oil)  Eat 5 cups of vegetables, fruits and whole grains per day.  Limit starchy food (white rice, white bread, white pasta, white potatoes) to less than a cup per meal.  Minimize sweets, junk food and fastfood. Limit soda beverages to one serving per day; best to avoid it altogether though.    Exercise: moderate intensity sustained for at least 30 mins per episode, goal of 150 mins per week at least  Combine cardiovascular and resistance exercises.  These exercise recommendations are in addition to your daily activity at work or home.    You planned to start nicotine lozenges 4 mg as needed to quit smoking.  Stop all tobacco use once you start the lozenges.  Reduce to 2 mg lozenges after 1-2 months, then gradually reduce use until you are off the lozenges.  See a provider at anytime if having difficulty stopping smoking.    You still need to take losartan together with metoprolol XL.    Call to schedule your lab tests in 3 months; make sure you are fasting for more than 12 hrs.    Patient Education     Low-Cholesterol Diet  Your body needs cholesterol to build new cells and create certain hormones. There are 2 kinds of cholesterol in your blood:       HDL ( good ) cholesterol. This prevents fat deposits (plaque) from building up in your arteries. In this way it protects against heart disease and stroke.    LDL ( bad ) cholesterol. This stays in your body and sticks to artery walls. Over time it may block blood flow to the heart and brain. This can cause a heart attack or stroke.  The cholesterol in your blood comes from 2 sources: cholesterol in food that you eat and cholesterol that your liver makes. You should limit the amount of cholesterol in your diet. But the cholesterol that your body makes has the greatest disease risk. And your body makes more cholesterol when your  diet is high in bad fats (saturated and trans fats). There are 2 kinds of fats you can eat:    Good fats, or unsaturated fats (mono-unsaturated and poly-unsaturated). They raise the level of good cholesterol and lower the level of bad cholesterol. Good fats are found in vegetable oils such as olive, sunflower, corn, and soybean oils, and in nuts and seeds.    Bad fats, or saturated fats (including foods high in cholesterol) and trans fats. These raise your risk of disease. They lower the good cholesterol and raise the level of bad cholesterol. Bad fats are found in animal products, including meat, whole-milk dairy products, and butter. Some plants are also high in bad fats (coconut and palm plants). Trans fats are found in hard (stick) margarines. They are also in many fast foods and commercially baked goods. Soft margarine sold in tubs has fewer trans fats and is safer to use.  High blood cholesterol is usually due to a diet high in saturated fat, along with not being physically active. In some cases, genetics plays a role in causing high cholesterol. The tips below will help you create healthy eating habits that will help lower your blood cholesterol level.  Create a diet high in good fats, low in bad fats (and low in cholesterol)  The following steps will help you create a diet high in good fats and low in bad fats:    Talk with your doctor before starting a low cholesterol diet or weight loss program.    Learn to read nutrition labels and select appropriate portion sizes.    When cooking, use plant-based unsaturated vegetable oils (sunflower, corn, soybean, canola, peanut, and olive oils).    Avoid saturated fats found in animal products such as meat, dairy (whole-milk, cheese and ice cream), poultry skin, and egg yolks. Plants high in saturated oils include coconut oil, palm oil, and palm kernel oil.    If you eat meat, choose smaller portions and lean cuts, such as round, jan, sirloin, or loin. Eat more  meatless meals.    Replace meat with fish at least 2 times a week. Fish is an important source of the unsaturated fat called omega-3 fatty acids. This fat has potential to lower the risk of heart disease.    Replace whole-milk dairy products with low-fat or nonfat products. Try soy products. Soy helps to reduce total cholesterol.    Supplement your diet with protective fibers. Eat nuts, seeds, and whole grains rather than white rice and bread. These foods lower both cholesterol and triglyceride levels. (Triglycerides are another fat found in the blood.) Walnuts are one of the best sources of omega-3 fatty acids.    Eat plenty of fresh fruits and vegetables daily.    Avoid fast foods and commercial baked goods. Assume they contain saturated fat unless labeled otherwise.  Date Last Reviewed: 8/1/2016 2000-2019 [employer]. [address] [place] [address]. All rights reserved. This information is not intended as a substitute for professional medical care. Always follow your healthcare professional's instructions.           Patient Education     Low-Fat Diet    A low-fat diet will help you lose weight. It also can lower cholesterol and prevent symptoms of gallbladder disease. The average American diet contains up to 50% fat. This means that 50% of all calories come from fat (about 80 grams to 100 grams of fat per day). Choosing normal portions of foods from the list below can help lower your fat intake. Experts recommend that only 20% to 35% of your daily calories come from fat. The remaining 65% to 80% of calories will come from protein and carbohydrates. This is much healthier for you.  Breads  Ok: Whole-wheat or rye bread, bo or soda crackers, mariela toast, plain rolls, bagels, English muffins  Avoid: Rolls and breads containing whole milk or egg; waffles, pancakes, biscuits, corn bread; cheese crackers, other flavored crackers, pastries, doughnuts  Cereals  Ok: Oatmeal, whole-wheat,  bran, multigrain, rice  Avoid: Granola or other cereals that have oil, coconut, or more than 2 grams of fat per serving  Cheese and eggs  Ok: Cheeses labeled low-fat; 3 whole eggs per week; egg whites and egg substitutes as desired  Avoid: All other cheeses  Desserts  Ok: Gelatin, slushy, rodrick food cake, meringues, nonfat yogurt, and puddings or sherbet made with nonfat milk  Avoid: Any other store-bought desserts, or desserts that have fat, whole milk, cream, chocolate, and coconut  Drinks  Ok: Nonfat milk, coffee, tea, fizzy (carbonated) drinks  Avoid: Whole and reduced-fat milk, evaporated and condensed milk, hot chocolate mixes, milk shakes, malts, eggnog  Fats  Ok: You may have up to 3 teaspoons of fat daily. This can be butter, margarine, mayonnaise, or healthy oils (canola or olive)  Avoid: Cream, nondairy creams, cream cheese, gravies, and cream sauces  Fruits  Ok: All fruits made without fat  Avoid: Coconut, olives  Meats, poultry, fish  Ok: Limit meat to 6 ounces daily (broiled, roasted, baked, grilled, or boiled). Buy lean cuts, and trim off the fat. Try beef, fish, lamb, pork, and canned fish packed in water; also chicken and turkey with the skin removed.  Avoid: Fried meats, fish, or poultry; fried eggs, and fish canned in oils; fatty meats such as hatfield, sausage, corned beef, hot dogs, and lunch meats; meats with gravies and sauces  Potatoes, beans, pasta  Ok: Dried beans, split peas, lentils, potatoes, rice, pasta made without added fat  Avoid: French fries, potato chips, potatoes prepared with butter, refried beans  Soups  Ok: Clear broth soups without fat and with allowed vegetables  Avoid: Cream-based soups  Vegetables  Ok: Fresh, frozen, canned or dried vegetables, all made without added fat  Avoid: Fried vegetables and those prepared with butter, cream, sauces  Miscellaneous  Ok: Salt, sugar, jelly, hard candy, marshmallows, honey, syrup, spices and herbs, mustard, ketchup, lemon, and vinegar.  Try to limit sweets and added sugars.  Avoid: Chocolate, nuts, coconut, and cream candies; sunflower, sesame, and other seeds; fried foods; cream sauces and gravies; pizza  Date Last Reviewed: 8/1/2016 2000-2019 [employer]. [address] [place] [address]. All rights reserved. This information is not intended as a substitute for professional medical care. Always follow your healthcare professional's instructions.

## 2020-03-09 PROBLEM — E78.2 MIXED HYPERLIPIDEMIA: Status: ACTIVE | Noted: 2020-03-09

## 2020-03-17 ENCOUNTER — TELEPHONE (OUTPATIENT)
Dept: FAMILY MEDICINE | Facility: CLINIC | Age: 65
End: 2020-03-17

## 2020-03-17 ENCOUNTER — ALLIED HEALTH/NURSE VISIT (OUTPATIENT)
Dept: FAMILY MEDICINE | Facility: CLINIC | Age: 65
End: 2020-03-17
Payer: COMMERCIAL

## 2020-03-17 VITALS — HEART RATE: 95 BPM | SYSTOLIC BLOOD PRESSURE: 142 MMHG | DIASTOLIC BLOOD PRESSURE: 96 MMHG

## 2020-03-17 DIAGNOSIS — I10 UNCONTROLLED HYPERTENSION: Primary | ICD-10-CM

## 2020-03-17 PROCEDURE — 99207 ZZC NO CHARGE NURSE ONLY: CPT

## 2020-03-17 RX ORDER — METOPROLOL SUCCINATE 25 MG/1
25 TABLET, EXTENDED RELEASE ORAL DAILY
Qty: 90 TABLET | Refills: 0
Start: 2020-03-17 | End: 2020-06-10

## 2020-03-17 NOTE — TELEPHONE ENCOUNTER
Patient notified, is requesting that the dme order be sent to pharmacy. Told him he may have to pay cash for rx. He was fine this this.     Please send rx to [facility].,      Cathy WHITLOCK  Station

## 2020-03-17 NOTE — TELEPHONE ENCOUNTER
Nurse only bp check today was 142/94 pulse 95 he is feeling more anxiety with covid 19 situation. Advised he may want to get a home bp machine.Razia Tuttle RN

## 2020-03-17 NOTE — TELEPHONE ENCOUNTER
increase metoprolol xl 25 mg to one tablet daily.  Reinforce low salt diet.  Rx for Home BP machine printed and signed and can be mailed to patient.  Patient to monitor BP at home daily when rested for at least 20 minutes.  Report BP measurement after one week of monitoring by calling clinic.

## 2020-03-17 NOTE — TELEPHONE ENCOUNTER
Prescription signed and faxed to [facility]. Patient notified. Joan Gates on 3/17/2020 at 2:58 PM

## 2020-04-15 ENCOUNTER — TELEPHONE (OUTPATIENT)
Dept: FAMILY MEDICINE | Facility: CLINIC | Age: 65
End: 2020-04-15

## 2020-04-15 NOTE — TELEPHONE ENCOUNTER
Reason for Call:  Other prescription and Appt    Detailed comments: Pt is requesting a medication for alcoholism.  Stating it has been getting out of control for the last few months.  His phar is [facility], and we let him know that we would check with Dr. Ware how he wants to schedule a visit.      Phone Number Patient can be reached at: Home number on file [phone] (home)    Best Time: any    Can we leave a detailed message on this number? YES    Call taken on 4/15/2020 at 8:30 AM by Rachel Andino

## 2020-04-15 NOTE — TELEPHONE ENCOUNTER
S-(situation): antabuse medication    B-(background): LOV 03/02/20 Ware    A-(assessment): patient would like a medication to help stop alcoholism. Has hx on problem list.    R-(recommendations): OK for telephone? Schedule Video or in clinic?     JESÚS HoN, RN

## 2020-04-16 ENCOUNTER — OFFICE VISIT (OUTPATIENT)
Dept: FAMILY MEDICINE | Facility: CLINIC | Age: 65
End: 2020-04-16
Payer: COMMERCIAL

## 2020-04-16 VITALS
HEART RATE: 94 BPM | OXYGEN SATURATION: 96 % | SYSTOLIC BLOOD PRESSURE: 136 MMHG | HEIGHT: 70 IN | BODY MASS INDEX: 27.06 KG/M2 | WEIGHT: 189 LBS | DIASTOLIC BLOOD PRESSURE: 84 MMHG | TEMPERATURE: 98.5 F | RESPIRATION RATE: 14 BRPM

## 2020-04-16 DIAGNOSIS — F10.21 ALCOHOL DEPENDENCE IN REMISSION (H): ICD-10-CM

## 2020-04-16 DIAGNOSIS — F10.10 ALCOHOL ABUSE: Primary | ICD-10-CM

## 2020-04-16 PROCEDURE — 99213 OFFICE O/P EST LOW 20 MIN: CPT | Performed by: FAMILY MEDICINE

## 2020-04-16 ASSESSMENT — MIFFLIN-ST. JEOR: SCORE: 1658.55

## 2020-04-16 NOTE — PROGRESS NOTES
SUBJECTIVE:                                                    Luigi Vieyra is 64 year old male   Chief Complaint   Patient presents with     Medication Request     Symptoms have been on and off for years. States that he would like to discuss treatment for drinking cessation. Has tried 30 days in patient treatment about 4 years ago worked for about 6 months after.    Problem list and histories reviewed & adjusted, as indicated.  Additional history: as documented    Patient Active Problem List   Diagnosis     Mild persistent asthma     Alcohol abuse     Tobacco abuse     CARDIOVASCULAR SCREENING; LDL GOAL LESS THAN 130     Alcohol dependence in remission (H)     Seasonal allergic rhinitis     Uncontrolled hypertension     Mixed hyperlipidemia     History reviewed. No pertinent surgical history.    Social History     Tobacco Use     Smoking status: Current Every Day Smoker     Packs/day: 0.75     Years: 35.00     Pack years: 26.25     Types: Cigarettes     Smokeless tobacco: Never Used   Substance Use Topics     Alcohol use: Yes     Comment: about 2 1.75 of vodka currently      Family History   Problem Relation Age of Onset     Asthma Mother      Cerebrovascular Disease Mother      C.A.D. No family hx of      Diabetes No family hx of      Hypertension No family hx of      Breast Cancer No family hx of      Cancer - colorectal No family hx of      Prostate Cancer No family hx of          Current Outpatient Medications   Medication Sig Dispense Refill     albuterol (PROAIR HFA/PROVENTIL HFA/VENTOLIN HFA) 108 (90 Base) MCG/ACT inhaler Inhale 2 puffs into the lungs every 4 hours as needed for shortness of breath / dyspnea or wheezing 8.5 g 11     aspirin 81 MG tablet Take 81 mg by mouth daily       beclomethasone HFA (QVAR REDIHALER) 80 MCG/ACT inhaler Inhale 1 puff into the lungs 2 times daily 1 Inhaler 11     losartan (COZAAR) 100 MG tablet Take 1 tablet (100 mg) by mouth daily 90 tablet 3     metoprolol succinate ER  "(TOPROL-XL) 25 MG 24 hr tablet Take 1 tablet (25 mg) by mouth daily 90 tablet 0     nicotine (NICORETTE) 4 MG lozenge Place 4 mg inside cheek as needed       order for DME Equipment being ordered: Digital home blood pressure monitor kit 1 each 0     No Known Allergies  Recent Labs   Lab Test 02/24/20  0901 02/20/19  0943 12/27/18  1017  08/18/17  0702   *  --   --   --  120*   HDL 83  --   --   --  58   TRIG 154*  --   --   --  110   CR 0.81 0.74 0.85   < >  --    GFRESTIMATED >90 >90 >90   < >  --    GFRESTBLACK >90 >90 >90   < >  --    POTASSIUM 4.3 4.4 4.7   < >  --    TSH  --   --  1.06  --   --     < > = values in this interval not displayed.      BP Readings from Last 3 Encounters:   04/16/20 136/84   03/17/20 (!) 142/96   03/02/20 (!) 130/100    Wt Readings from Last 3 Encounters:   04/16/20 85.7 kg (189 lb)   03/02/20 85.6 kg (188 lb 12.8 oz)   02/10/20 83.5 kg (184 lb)         ROS:  Constitutional, HEENT, cardiovascular, pulmonary, gi and gu systems are negative, except as otherwise noted.    OBJECTIVE:                                                    /84   Pulse 94   Temp 98.5  F (36.9  C) (Tympanic)   Resp 14   Ht 1.778 m (5' 10\")   Wt 85.7 kg (189 lb)   SpO2 96%   BMI 27.12 kg/m    GENERAL APPEARANCE ADULT: Alert, no acute distress  PSYCH: mentation appears normal., affect and mood normal  Diagnostic Test Results:  none      ASSESSMENT/PLAN:                                                    1. Alcohol abuse  2. Alcohol dependence in remission (H)  Unable to quit, wife thinks needs inpatient treatment again, he is agreeing but his father thought there was a pill for this.  Luigi will get more information about inpatient treatment, back to previous site and make an appointment with DR. Ware next week and not drink alcohol until then.  He has no withdrawal symptoms at this time, last drink 2 days ago.  Will go to ED for assistance with Detox if needed.       Marysol Herrera, " MD  [facility]

## 2020-04-21 ENCOUNTER — TELEPHONE (OUTPATIENT)
Dept: FAMILY MEDICINE | Facility: CLINIC | Age: 65
End: 2020-04-21

## 2020-04-21 NOTE — TELEPHONE ENCOUNTER
Reason for call:  Patient reporting a symptom    Symptom or request: Pt saw Dr. Herrera in clinic 4/16 and would like an Rx to help him quit drinking.  Please call patient and advise.    [facility]    Duration (how long have symptoms been present): [id number]    Have you been treated for this before? Yes    Additional comments:     Phone Number patient can be reached at:  Home number on file [phone] (home)    Best Time:  any    Can we leave a detailed message on this number:  YES    Call taken on 4/21/2020 at 11:15 AM by Marci Schroeder

## 2020-04-21 NOTE — TELEPHONE ENCOUNTER
"\"I didn't drink for about 5 days, and now I am again\"     We talked at length about options to get help/ call AA , and he understood and agreed,   He reports no withdrawal sx when he quit for 5 days, \"just went back to it\"   Gave him the phone number to call AA, and he also says there is a meeting near his home, he is going to contact them     He asked me to talk to his dad and gave his dad the phone. Dad asked about \"librium\", being the \"pill\" he was talking about to \"help him stop\"   We talked with Luigi's permission about how that is for withdrawal sx, and if he has them, needs to be in a detox setting, although Luigi says he doesn't have sx.   Invited Dad to bring him in to ED nearest if Sx occur.   Offered Dad Britany, and patient AA as an option, which he is familiar with, since he was in treatment before. \"ok, that sounds good, thanks, \"  Also gave them on line AA.org as a place to find virtual meetings and books/ resources as well.   \"ok, that sums it up, thanks, \"    Cuca Billingsley RNC    "

## 2020-06-09 DIAGNOSIS — I10 UNCONTROLLED HYPERTENSION: ICD-10-CM

## 2020-06-09 NOTE — TELEPHONE ENCOUNTER
"Requested Prescriptions   Pending Prescriptions Disp Refills     metoprolol succinate ER (TOPROL-XL) 25 MG 24 hr tablet 90 tablet 0     Sig: Take 1 tablet (25 mg) by mouth daily       Beta-Blockers Protocol Passed - 6/9/2020  1:18 PM        Passed - Blood pressure under 140/90 in past 12 months     BP Readings from Last 3 Encounters:   04/16/20 136/84   03/17/20 (!) 142/96   03/02/20 (!) 130/100                 Passed - Patient is age 6 or older        Passed - Recent (12 mo) or future (30 days) visit within the authorizing provider's specialty     Patient has had an office visit with the authorizing provider or a provider within the authorizing providers department within the previous 12 mos or has a future within next 30 days. See \"Patient Info\" tab in inbasket, or \"Choose Columns\" in Meds & Orders section of the refill encounter.              Passed - Medication is active on med list           Last Written Prescription Date:  3/17/2020  Last Fill Quantity: 90,  # refills: 0   Last office visit: 4/16/2020 with  provider:  Javier    Future Office Visit:            "

## 2020-06-10 RX ORDER — METOPROLOL SUCCINATE 25 MG/1
25 TABLET, EXTENDED RELEASE ORAL DAILY
Qty: 90 TABLET | Refills: 1 | Status: SHIPPED | OUTPATIENT
Start: 2020-06-10 | End: 2021-01-04

## 2020-11-21 ENCOUNTER — HOSPITAL ENCOUNTER (EMERGENCY)
Facility: CLINIC | Age: 65
Discharge: HOME | End: 2020-11-21
Attending: EMERGENCY MEDICINE | Admitting: EMERGENCY MEDICINE
Payer: COMMERCIAL

## 2020-11-21 ENCOUNTER — APPOINTMENT (OUTPATIENT)
Dept: GENERAL RADIOLOGY | Facility: CLINIC | Age: 65
End: 2020-11-21
Attending: EMERGENCY MEDICINE
Payer: COMMERCIAL

## 2020-11-21 VITALS
TEMPERATURE: 97.8 F | HEIGHT: 70 IN | BODY MASS INDEX: 25.77 KG/M2 | SYSTOLIC BLOOD PRESSURE: 164 MMHG | DIASTOLIC BLOOD PRESSURE: 101 MMHG | HEART RATE: 82 BPM | WEIGHT: 180 LBS | OXYGEN SATURATION: 96 % | RESPIRATION RATE: 14 BRPM

## 2020-11-21 DIAGNOSIS — R06.00 DYSPNEA, UNSPECIFIED TYPE: ICD-10-CM

## 2020-11-21 DIAGNOSIS — Z20.822 SUSPECTED COVID-19 VIRUS INFECTION: ICD-10-CM

## 2020-11-21 DIAGNOSIS — J45.30 MILD PERSISTENT ASTHMA WITHOUT COMPLICATION: ICD-10-CM

## 2020-11-21 DIAGNOSIS — R05.9 COUGH: ICD-10-CM

## 2020-11-21 LAB
ALBUMIN SERPL-MCNC: 4.1 G/DL (ref 3.4–5)
ALP SERPL-CCNC: 125 U/L (ref 40–150)
ALT SERPL W P-5'-P-CCNC: 20 U/L (ref 0–70)
ANION GAP SERPL CALCULATED.3IONS-SCNC: 5 MMOL/L (ref 3–14)
AST SERPL W P-5'-P-CCNC: 18 U/L (ref 0–45)
BASOPHILS # BLD AUTO: 0.2 10E9/L (ref 0–0.2)
BASOPHILS NFR BLD AUTO: 2 %
BILIRUB SERPL-MCNC: 0.8 MG/DL (ref 0.2–1.3)
BUN SERPL-MCNC: 15 MG/DL (ref 7–30)
CALCIUM SERPL-MCNC: 9.6 MG/DL (ref 8.5–10.1)
CHLORIDE SERPL-SCNC: 107 MMOL/L (ref 94–109)
CO2 SERPL-SCNC: 28 MMOL/L (ref 20–32)
CREAT SERPL-MCNC: 0.81 MG/DL (ref 0.66–1.25)
DIFFERENTIAL METHOD BLD: ABNORMAL
EOSINOPHIL # BLD AUTO: 0 10E9/L (ref 0–0.7)
EOSINOPHIL NFR BLD AUTO: 0 %
ERYTHROCYTE [DISTWIDTH] IN BLOOD BY AUTOMATED COUNT: 13.2 % (ref 10–15)
GFR SERPL CREATININE-BSD FRML MDRD: >90 ML/MIN/{1.73_M2}
GLUCOSE SERPL-MCNC: 112 MG/DL (ref 70–99)
HCT VFR BLD AUTO: 42.6 % (ref 40–53)
HGB BLD-MCNC: 14.6 G/DL (ref 13.3–17.7)
LYMPHOCYTES # BLD AUTO: 1.5 10E9/L (ref 0.8–5.3)
LYMPHOCYTES NFR BLD AUTO: 13 %
MCH RBC QN AUTO: 30.8 PG (ref 26.5–33)
MCHC RBC AUTO-ENTMCNC: 34.3 G/DL (ref 31.5–36.5)
MCV RBC AUTO: 90 FL (ref 78–100)
MONOCYTES # BLD AUTO: 1.1 10E9/L (ref 0–1.3)
MONOCYTES NFR BLD AUTO: 9 %
NEUTROPHILS # BLD AUTO: 8.9 10E9/L (ref 1.6–8.3)
NEUTROPHILS NFR BLD AUTO: 76 %
NRBC # BLD AUTO: 0.1 10*3/UL
NRBC BLD AUTO-RTO: 1 /100
PLATELET # BLD AUTO: 355 10E9/L (ref 150–450)
PLATELET # BLD EST: ABNORMAL 10*3/UL
POTASSIUM SERPL-SCNC: 4.1 MMOL/L (ref 3.4–5.3)
PROT SERPL-MCNC: 7.4 G/DL (ref 6.8–8.8)
RBC # BLD AUTO: 4.74 10E12/L (ref 4.4–5.9)
RBC MORPH BLD: NORMAL
SODIUM SERPL-SCNC: 140 MMOL/L (ref 133–144)
WBC # BLD AUTO: 11.7 10E9/L (ref 4–11)

## 2020-11-21 PROCEDURE — 93010 ELECTROCARDIOGRAM REPORT: CPT | Performed by: EMERGENCY MEDICINE

## 2020-11-21 PROCEDURE — 71045 X-RAY EXAM CHEST 1 VIEW: CPT

## 2020-11-21 PROCEDURE — 99285 EMERGENCY DEPT VISIT HI MDM: CPT | Mod: 25 | Performed by: EMERGENCY MEDICINE

## 2020-11-21 PROCEDURE — 93005 ELECTROCARDIOGRAM TRACING: CPT | Performed by: EMERGENCY MEDICINE

## 2020-11-21 PROCEDURE — 85025 COMPLETE CBC W/AUTO DIFF WBC: CPT | Performed by: EMERGENCY MEDICINE

## 2020-11-21 PROCEDURE — C9803 HOPD COVID-19 SPEC COLLECT: HCPCS | Performed by: EMERGENCY MEDICINE

## 2020-11-21 PROCEDURE — U0003 INFECTIOUS AGENT DETECTION BY NUCLEIC ACID (DNA OR RNA); SEVERE ACUTE RESPIRATORY SYNDROME CORONAVIRUS 2 (SARS-COV-2) (CORONAVIRUS DISEASE [COVID-19]), AMPLIFIED PROBE TECHNIQUE, MAKING USE OF HIGH THROUGHPUT TECHNOLOGIES AS DESCRIBED BY CMS-2020-01-R: HCPCS | Performed by: EMERGENCY MEDICINE

## 2020-11-21 PROCEDURE — 80053 COMPREHEN METABOLIC PANEL: CPT | Performed by: EMERGENCY MEDICINE

## 2020-11-21 ASSESSMENT — ENCOUNTER SYMPTOMS
DIFFICULTY URINATING: 0
VOMITING: 0
DIARRHEA: 0
NAUSEA: 0
NECK STIFFNESS: 0
FEVER: 0
NECK PAIN: 0
SHORTNESS OF BREATH: 1
CHILLS: 0
SORE THROAT: 0
ABDOMINAL PAIN: 0
COUGH: 1
HEADACHES: 0

## 2020-11-21 ASSESSMENT — MIFFLIN-ST. JEOR: SCORE: 1612.72

## 2020-11-21 NOTE — ED NOTES
Re vital pt in RM he is at 98% on RA and not having resp distress. Went back and checked pulse ox in triage and it seems there could be a light bulb out and the 7 reads as a 1. Pulled off floor and a ticked put in to be fixed.

## 2020-11-21 NOTE — ED PROVIDER NOTES
History     Chief Complaint   Patient presents with     Shortness of Breath     hx of asthma, no other covid symptoms     HPI  Luigi Vieyra is a 65 year old male with history of mild persistent asthma, alcohol abuse, tobacco abuse, hypertension, mixed hyperlipidemia presents for evaluation of shortness of breath.  Has had a productive cough which began yesterday and is felt dyspneic.  Has been taking his respiratory inhalers without any improvement.  Denies any prior hospitalizations for asthma exacerbation.  No chest pain, fevers, chills, nausea, vomiting, diarrhea or abdominal pain.  No sore throat, headache, change in sense of smell or taste.  No known COVID-19 exposures.  Shortness of breath with exertion and not limiting his daily activities.    The patient's PMHx, Surgical Hx, Allergies, and Medications were all reviewed with the patient.    Allergies:  No Known Allergies    Problem List:    Patient Active Problem List    Diagnosis Date Noted     Mixed hyperlipidemia 03/09/2020     Priority: Medium     Uncontrolled hypertension 01/02/2019     Priority: Medium     Alcohol dependence in remission (H) 08/14/2017     Priority: Medium     Seasonal allergic rhinitis 06/13/2017     Priority: Medium     CARDIOVASCULAR SCREENING; LDL GOAL LESS THAN 130 03/31/2014     Priority: Medium     Tobacco abuse 03/25/2014     Priority: Medium     Mild persistent asthma 05/18/2012     Priority: Medium     Alcohol abuse 05/18/2012     Priority: Medium        Past Medical History:    Past Medical History:   Diagnosis Date     Asthma        Past Surgical History:    No past surgical history on file.    Family History:    Family History   Problem Relation Age of Onset     Asthma Mother      Cerebrovascular Disease Mother      C.A.D. No family hx of      Diabetes No family hx of      Hypertension No family hx of      Breast Cancer No family hx of      Cancer - colorectal No family hx of      Prostate Cancer No family hx of   "      Social History:  Marital Status:   [2]  Social History     Tobacco Use     Smoking status: Current Every Day Smoker     Packs/day: 0.75     Years: 35.00     Pack years: 26.25     Types: Cigarettes     Smokeless tobacco: Never Used   Substance Use Topics     Alcohol use: Yes     Comment: about 2 1.75 of vodka currently      Drug use: No        Medications:         albuterol (PROAIR HFA/PROVENTIL HFA/VENTOLIN HFA) 108 (90 Base) MCG/ACT inhaler       aspirin 81 MG tablet       beclomethasone HFA (QVAR REDIHALER) 80 MCG/ACT inhaler       losartan (COZAAR) 100 MG tablet       metoprolol succinate ER (TOPROL-XL) 25 MG 24 hr tablet       nicotine (NICORETTE) 4 MG lozenge       order for DME          Review of Systems   Constitutional: Negative for chills and fever.   HENT: Negative for sore throat.    Eyes: Negative for visual disturbance.   Respiratory: Positive for cough and shortness of breath.    Cardiovascular: Negative for chest pain and leg swelling.   Gastrointestinal: Negative for abdominal pain, diarrhea, nausea and vomiting.   Genitourinary: Negative for difficulty urinating.   Musculoskeletal: Negative for neck pain and neck stiffness.   Skin: Negative for rash.   Neurological: Negative for headaches.       Physical Exam   BP: (!) 172/101  Pulse: 102  Temp: 97.8  F (36.6  C)  Resp: 16  Height: 177.8 cm (5' 10\")  Weight: 81.6 kg (180 lb)  SpO2: 91 %    Physical Exam  GEN: Awake, alert, and cooperative. No acute distress  HENT: MMM. External ears and nose normal bilaterally.  EYES: EOM intact. Conjunctiva clear.   NECK: Supple, symmetric.  CV : Extremities warm and well-perfused  PULM: Normal effort. No wheezes, rales, or rhonchi bilaterally.  No accessory muscle usage.  No prolongation of expiratory phase.  ABD: Soft, non-tender, non-distended. No rebound or guarding.   NEURO: Normal speech. Following commands. CN II-XII grossly intact. Answering questions and interacting appropriately.   EXT: No " gross deformity.  No lower extremity edema  INT: Warm. No diaphoresis. Normal color.        ED Course        Procedures             EKG Interpretation:      Interpreted by Lam Henley MD  Time reviewed: 1300  Symptoms at time of EKG: cough   Rhythm: normal sinus   Rate: 78  Axis: normal  Ectopy: none  Conduction: normal  ST Segments/ T Waves: No ST-T wave changes  Q Waves: none  Comparison to prior: no significant change compared to 12/27/2018    Clinical Impression: normal EKG        Critical Care time:  none               Results for orders placed or performed during the hospital encounter of 11/21/20 (from the past 24 hour(s))   CBC with platelets differential   Result Value Ref Range    WBC 11.7 (H) 4.0 - 11.0 10e9/L    RBC Count 4.74 4.4 - 5.9 10e12/L    Hemoglobin 14.6 13.3 - 17.7 g/dL    Hematocrit 42.6 40.0 - 53.0 %    MCV 90 78 - 100 fl    MCH 30.8 26.5 - 33.0 pg    MCHC 34.3 31.5 - 36.5 g/dL    RDW 13.2 10.0 - 15.0 %    Platelet Count 355 150 - 450 10e9/L    Diff Method Manual Differential     % Neutrophils 76.0 %    % Lymphocytes 13.0 %    % Monocytes 9.0 %    % Eosinophils 0.0 %    % Basophils 2.0 %    Nucleated RBCs 1 (H) 0 /100    Absolute Neutrophil 8.9 (H) 1.6 - 8.3 10e9/L    Absolute Lymphocytes 1.5 0.8 - 5.3 10e9/L    Absolute Monocytes 1.1 0.0 - 1.3 10e9/L    Absolute Eosinophils 0.0 0.0 - 0.7 10e9/L    Absolute Basophils 0.2 0.0 - 0.2 10e9/L    Absolute Nucleated RBC 0.1     RBC Morphology Normal     Platelet Estimate       Automated count confirmed.  Giant platelets are present.   Comprehensive metabolic panel   Result Value Ref Range    Sodium 140 133 - 144 mmol/L    Potassium 4.1 3.4 - 5.3 mmol/L    Chloride 107 94 - 109 mmol/L    Carbon Dioxide 28 20 - 32 mmol/L    Anion Gap 5 3 - 14 mmol/L    Glucose 112 (H) 70 - 99 mg/dL    Urea Nitrogen 15 7 - 30 mg/dL    Creatinine 0.81 0.66 - 1.25 mg/dL    GFR Estimate >90 >60 mL/min/[1.73_m2]    GFR Estimate If Black >90 >60 mL/min/[1.73_m2]     Calcium 9.6 8.5 - 10.1 mg/dL    Bilirubin Total 0.8 0.2 - 1.3 mg/dL    Albumin 4.1 3.4 - 5.0 g/dL    Protein Total 7.4 6.8 - 8.8 g/dL    Alkaline Phosphatase 125 40 - 150 U/L    ALT 20 0 - 70 U/L    AST 18 0 - 45 U/L   XR Chest Port 1 View    Narrative    XR CHEST PORT 1 VW 11/21/2020 12:41 PM    HISTORY: two days cough and dyspnea. hx of asthma, no wheezing on  exam.    COMPARISON: Chest x-ray 12/27/2018      Impression    IMPRESSION: The cardiac silhouette and pulmonary vasculature are  within normal limits. No focal pulmonary consolidations. No pleural  effusion or pneumothorax. Old rib fractures. Slight elevation of the  right hemidiaphragm.    MARYSE GAYLE MD       Medications - No data to display    Assessments & Plan (with Medical Decision Making)   65 year old male with past medical history of mild persistent asthma, alcohol abuse, hypertension, hyperlipidemia, tobacco use with 2 days of cough and dyspnea.  On arrival to Emergency Department, vital signs were blood pressure 172/101, temperature 97.8, pulse 102, respiratory rate 16, SPO2 98% on room air.  Initial oximetry in triage 91% however concern for machine is malfunctioning.  Machine is been taken out of service and repeat SPO2 98%.  Patient's oxygen saturations during my evaluation was 99% on room air.  He had no signs of acute respiratory distress.  Lungs were clear.  No wheezing or prolongation of expiratory phase. Speaking in full sentences.  No accessory muscle usage.  Has albuterol at home.  Denies any hospitalizations secondary to asthma.  Given no wheezing or tightness will not start steroids but do not feel there is underlying exacerbation of his asthma at this time.  Chest x-ray without acute pneumothorax.  Images reviewed personally as well as report from radiology which is noted above.  CBC with mild leukocytosis 11.7 with left shift.  This could be developing infectious problem with his cough and dyspnea.  Given clear lungs, normal  respiratory effort, clear chest x-ray, no reported fever or chills, no hypoxia, I have low suspicion for bacterial pneumonia will not start antibiotics at this time.  In the current pandemic SARS-CoV-2 testing obtained and results pending at time of disposition.  CBC grossly normal.  Patient advised to self isolate until symptoms resolve and testing returns negative.  Understands he will be contacted for positive results.  Follow-up and ED return precautions discussed.    I have reviewed the nursing notes.         Discharge Medication List as of 11/21/2020  2:50 PM          Final diagnoses:   Cough   Dyspnea, unspecified type   Suspected COVID-19 virus infection     Lam Henley MD    11/21/2020   [facility] EMERGENCY DEPT    Disclaimer: This note consists of words and symbols derived from keyboarding and dictation using voice recognition software.  As a result, there may be errors that have gone undetected.  Please consider this when interpreting information found in this note.             Lam Henley MD  11/23/20 1356

## 2020-11-21 NOTE — ED AVS SNAPSHOT
[facility] Emergency Dept  [address] [address]  Phone: [phone]  Fax: [phone]                                    Luigi Vieyra   MRN: [id number]    Department: [facility] Emergency Dept   Date of Visit: 11/21/2020           After Visit Summary Signature Page    I have received my discharge instructions, and my questions have been answered. I have discussed any challenges I see with this plan with the nurse or doctor.    ..........................................................................................................................................  Patient/Patient Representative Signature      ..........................................................................................................................................  Patient Representative Print Name and Relationship to Patient    ..................................................               ................................................  Date                                   Time    ..........................................................................................................................................  Reviewed by Signature/Title    ...................................................              ..............................................  Date                                               Time          22EPIC Rev 08/18

## 2020-11-21 NOTE — ED NOTES
Pt has a HX of asthma and started with cough and SOB yesterday used his INH yesterday but has not needed this today. He has productive cough and denies fever/chills or being exposed to COVID. Is eating and drinking well.

## 2020-11-21 NOTE — DISCHARGE INSTRUCTIONS
"Your evaluation emergency department is reassuring.  Your symptoms could be caused by COVID-19.  You will receive your test results in 1 to 2 days.  You will be notified of positive results only.  A get well loop referral was placed.  Please check your email for follow-up.  If you are not feeling improved have a negative Covid test in 2 to 3 days you should follow-up with your primary care provider.  This will likely be a virtual appointment.  If your symptoms worsen, you have trouble breathing, or develop other new or concerning symptoms, he should turn to the emergency department immediately for further evaluation and treatment.  Below is some general information regarding     Discharge Instructions for COVID-19 Patients  You have--or may have--COVID-19. Please follow the instructions listed below.   If you have a weakened immune system, discuss with your doctor any other actions you need to take.  How can I protect others?  If you have symptoms (fever, cough, body aches or trouble breathing):  Stay home and away from others (self-isolate) until:  At least 10 days have passed since your symptoms started, And   You've had no fever--and no medicine that reduces fever--for 1 full day (24 hours), And    Your other symptoms have resolved (gotten better).  If you don't show symptoms, but testing showed that you have COVID-19:  Stay home and away from others (self-isolate). Follow the tips under \"How do I self-isolate?\" below for 10 days (20 days if you have a weak immune system).  You don't need to be retested for COVID-19 before going back to school or work. As long as you're fever-free and feeling better, you can go back to school, work and other activities after waiting the 10 or 20 days.   How do I self-isolate?  Stay in your own room, even for meals. Use your own bathroom if you can.  Stay away from others in your home. No hugging, kissing or shaking hands. No visitors.  Don't go to work, school or anywhere " "else.  Clean \"high touch\" surfaces often (doorknobs, counters, handles). Use household cleaning spray or wipes. You'll find a full list of  on the EPA website: www.epa.gov/pesticide-registration/list-n-disinfectants-use-against-sars-cov-2.  Cover your mouth and nose with a mask or other face covering to avoid spreading germs.  Wash your hands and face often. Use soap and water.  Caregivers in these groups are at risk for severe illness due to COVID-19:  People 65 years and older  People who live in a nursing home or long-term care facility  People with chronic disease (lung, heart, cancer, diabetes, kidney, liver, immunologic)  People who have a weakened immune system, including those who:  Are in cancer treatment  Take medicine that weakens the immune system, such as corticosteroids  Had a bone marrow or organ transplant  Have an immune deficiency  Have poorly controlled HIV or AIDS  Are obese (body mass index of 40 or higher)  Smoke regularly  Caregivers should wear gloves while washing dishes, handling laundry and cleaning bedrooms and bathrooms.  Use caution when washing and drying laundry: Don't shake dirty laundry and use the warmest water setting that you can.  For more tips on managing your health at home, go to www.cdc.gov/coronavirus/2019-ncov/downloads/10Things.pdf.  How can I take care of myself at home?  Get lots of rest. Drink extra fluids (unless a doctor has told you not to).    Take Tylenol (acetaminophen) for fever or pain. If you have liver or kidney problems, ask your family doctor if it's okay to take Tylenol.     Adults can take either:  650 mg (two 325 mg pills) every 4 to 6 hours, or   1,000 mg (two 500 mg pills) every 8 hours as needed.  Note: Don't take more than 3,000 mg in one day. Acetaminophen is found in many medicines (both prescribed and over-the-counter medicines). Read all labels to be sure you don't take too much.   For children, check the Tylenol bottle for the right " dose. The dose is based on the child's age or weight.  If you have other health problems (like cancer, heart failure, an organ transplant or severe kidney disease): Call your specialty clinic if you don't feel better in the next 2 days.    Know when to call 911. Emergency warning signs include:  Trouble breathing or shortness of breath  Pain or pressure in the chest that doesn't go away  Feeling confused like you haven't felt before, or not being able to wake up  Bluish-colored lips or face    Your doctor may have prescribed a blood thinner medicine. Follow their instructions.  Where can I get more information?  [facility] - About COVID-19: [email or web address]  CDC - What to Do If You're Sick: www.cdc.gov/coronavirus/2019-ncov/about/steps-when-sick.html  CDC - Ending Home Isolation: www.cdc.gov/coronavirus/2019-ncov/hcp/disposition-in-home-patients.html  CDC - Caring for Someone: www.cdc.gov/coronavirus/2019-ncov/if-you-are-sick/care-for-someone.html  [facility] - Interim Guidance for Hospital Discharge to Home: www.health.[facility].[place]/diseases/coronavirus/hcp/hospdischarge.pdf  [facility] clinical trials (COVID-19 research studies): clinicalaffairs.[facility]-clinical-trials  Below are the COVID-19 hotlines at the [place] Department of Health ([facility]). Interpreters are available.  For health questions: Call 596-986-8369 or 1-998.881.2983 (7 a.m. to 7 p.m.)  For questions about schools and childcare: Call 308-922-9539 or 1-142.993.8169 (7 a.m. to 7 p.m.)    For informational purposes only. Not to replace the advice of your health care provider. Clinically reviewed by the Infection Prevention Team. Copyright   2020 [employer]. All rights reserved. [employer] [id number] - REV 08/04/20.

## 2020-11-23 LAB
SARS-COV-2 RNA SPEC QL NAA+PROBE: NOT DETECTED
SPECIMEN SOURCE: NORMAL

## 2020-11-23 RX ORDER — ALBUTEROL SULFATE 90 UG/1
2 AEROSOL, METERED RESPIRATORY (INHALATION) EVERY 4 HOURS PRN
Qty: 8.5 G | Refills: 1 | Status: SHIPPED | OUTPATIENT
Start: 2020-11-23 | End: 2021-09-15

## 2020-11-23 NOTE — TELEPHONE ENCOUNTER
"Requested Prescriptions   Pending Prescriptions Disp Refills     albuterol (PROAIR HFA/PROVENTIL HFA/VENTOLIN HFA) 108 (90 Base) MCG/ACT inhaler 8.5 g 11     Sig: Inhale 2 puffs into the lungs every 4 hours as needed for shortness of breath / dyspnea or wheezing       Asthma Maintenance Inhalers - Anticholinergics Failed - 11/21/2020  3:04 PM        Failed - Asthma control assessment score within normal limits in last 6 months     Please review ACT score.           Failed - Recent (6 mo) or future (30 days) visit within the authorizing provider's specialty     Patient had office visit in the last 6 months or has a visit in the next 30 days with authorizing provider or within the authorizing provider's specialty.  See \"Patient Info\" tab in inbasket, or \"Choose Columns\" in Meds & Orders section of the refill encounter.            Passed - Patient is age 12 years or older        Passed - Medication is active on med list       Short-Acting Beta Agonist Inhalers Protocol  Failed - 11/21/2020  3:04 PM        Failed - Asthma control assessment score within normal limits in last 6 months     Please review ACT score.           Failed - Recent (6 mo) or future (30 days) visit within the authorizing provider's specialty     Patient had office visit in the last 6 months or has a visit in the next 30 days with authorizing provider or within the authorizing provider's specialty.  See \"Patient Info\" tab in inbasket, or \"Choose Columns\" in Meds & Orders section of the refill encounter.            Passed - Patient is age 12 or older        Passed - Medication is active on med list             "

## 2020-11-24 ENCOUNTER — TELEPHONE (OUTPATIENT)
Dept: EMERGENCY MEDICINE | Facility: CLINIC | Age: 65
End: 2020-11-24

## 2020-11-24 NOTE — TELEPHONE ENCOUNTER
Coronavirus (COVID-19) Notification    Lab Result   Lab test 2019-nCoV rRt-PCR OR SARS-COV-2 PCR    Nasopharyngeal AND/OR Oropharyngeal swab is NEGATIVE for 2019-nCoV RNA [OR] SARS-COV-2 RNA (COVID-19) RNA    Your result was negative. This means that we didn't find the virus that causes COVID-19 in your sample. A test may show negative when you do actually have the virus. This can happen when the virus is in the early stages of infection, before you feel illness symptoms.    If you have symptoms   Stay home and away from others (self-isolate) until you meet ALL of the guidelines below:    You've had no fever--and no medicine that reduces fever--for 1 full day (24 hours). And      Your other symptoms have gotten better. For example, your cough or breathing has improved. And   ; At least 10 days have passed since your symptoms started. (If you've been told by a doctor that you have a weak immune system, wait 20 days.)         During this time:    Stay home. Don't go to work, school or anywhere else.     Stay in your own room, including for meals. Use your own bathroom if you can.    Stay away from others in your home. No hugging, kissing or shaking hands. No visitors.    Clean  high touch  surfaces often (doorknobs, counters, handles, etc.). Use a household cleaning spray or wipes. You can find a full list on the EPA website at www.epa.gov/pesticide-registration/list-n-disinfectants-use-against-sars-cov-2.    Cover your mouth and nose with a mask, tissue or other face covering to avoid spreading germs.    Wash your hands and face often with soap and water.    Going back to work  Check with your employer for any guidelines to follow for going back to work.  You are sent a letter for your Employer which will serve as formal document notice that you, the employee, tested negative for COVID-19, as of the testing date shown above.    If your symptoms worsen or other concerning symptoms, contact PCP, oncare or consider  returning to Emergency Dept.    Where can I get more information?    [facility]: [email or web address]    Coronavirus Basics: www.health.[facility].[place]/diseases/coronavirus/basics.html    [facility] Hotline (143-572-6215)    {Name]  Pati Stafford RN  [employer] RN  Lung Nodule and ED Lab Result RN  Epic pool (ED late result f/u RN): [id number]  [facility] INCIDENTAL RADIOLOGY F/U NURSES: [id number]  Ph# [phone]

## 2020-12-29 ENCOUNTER — TELEPHONE (OUTPATIENT)
Dept: FAMILY MEDICINE | Facility: CLINIC | Age: 65
End: 2020-12-29

## 2020-12-29 DIAGNOSIS — I10 UNCONTROLLED HYPERTENSION: ICD-10-CM

## 2020-12-29 NOTE — TELEPHONE ENCOUNTER
"Requested Prescriptions   Pending Prescriptions Disp Refills     metoprolol succinate ER (TOPROL-XL) 25 MG 24 hr tablet [Pharmacy Med Name: METOPROLOL SUCCINATE ER 25MG TB24] 90 tablet 0     Sig: TAKE ONE-HALF TABLET BY MOUTH ONCE DAILY       Beta-Blockers Protocol Failed - 12/29/2020 11:34 AM        Failed - Blood pressure under 140/90 in past 12 months     BP Readings from Last 3 Encounters:   11/21/20 (!) 164/101   04/16/20 136/84   03/17/20 (!) 142/96                 Passed - Patient is age 6 or older        Passed - Recent (12 mo) or future (30 days) visit within the authorizing provider's specialty     Patient has had an office visit with the authorizing provider or a provider within the authorizing providers department within the previous 12 mos or has a future within next 30 days. See \"Patient Info\" tab in inbasket, or \"Choose Columns\" in Meds & Orders section of the refill encounter.              Passed - Medication is active on med list             "

## 2021-01-04 RX ORDER — METOPROLOL SUCCINATE 25 MG/1
25 TABLET, EXTENDED RELEASE ORAL DAILY
Qty: 90 TABLET | Refills: 0 | Status: SHIPPED | OUTPATIENT
Start: 2021-01-04 | End: 2021-06-04

## 2021-01-05 NOTE — TELEPHONE ENCOUNTER
Refilled #90.  Please call patient to have him schedule RN BP recheck appointment since his BP in the ER was quite high.

## 2021-05-30 DIAGNOSIS — I10 UNCONTROLLED HYPERTENSION: ICD-10-CM

## 2021-06-02 NOTE — TELEPHONE ENCOUNTER
Routing refill request to provider for review/approval because:  Last seen 4/16/20.  Last B/P was 164/101 on 11/21/20.  Franco.  Catalino

## 2021-06-04 RX ORDER — METOPROLOL SUCCINATE 25 MG/1
TABLET, EXTENDED RELEASE ORAL
Qty: 15 TABLET | Refills: 0 | Status: SHIPPED | OUTPATIENT
Start: 2021-06-04 | End: 2021-08-24

## 2021-06-04 RX ORDER — LOSARTAN POTASSIUM 100 MG/1
TABLET ORAL
Qty: 15 TABLET | Refills: 0 | Status: SHIPPED | OUTPATIENT
Start: 2021-06-04 | End: 2021-08-24

## 2021-06-04 NOTE — TELEPHONE ENCOUNTER
Will refill #15. Patient needs to see provider for hypertension management due to uncontrolled BP, and for wellness visit. Please call patient regarding these.

## 2021-09-15 ENCOUNTER — OFFICE VISIT (OUTPATIENT)
Dept: FAMILY MEDICINE | Facility: CLINIC | Age: 66
End: 2021-09-15
Payer: COMMERCIAL

## 2021-09-15 VITALS
TEMPERATURE: 97.7 F | WEIGHT: 169.8 LBS | SYSTOLIC BLOOD PRESSURE: 130 MMHG | HEIGHT: 70 IN | BODY MASS INDEX: 24.31 KG/M2 | OXYGEN SATURATION: 96 % | RESPIRATION RATE: 24 BRPM | HEART RATE: 106 BPM | DIASTOLIC BLOOD PRESSURE: 78 MMHG

## 2021-09-15 DIAGNOSIS — E78.2 MIXED HYPERLIPIDEMIA: ICD-10-CM

## 2021-09-15 DIAGNOSIS — Z87.891 PERSONAL HISTORY OF TOBACCO USE: ICD-10-CM

## 2021-09-15 DIAGNOSIS — I10 BENIGN ESSENTIAL HYPERTENSION: Primary | ICD-10-CM

## 2021-09-15 DIAGNOSIS — Z12.5 SCREENING FOR PROSTATE CANCER: ICD-10-CM

## 2021-09-15 DIAGNOSIS — J45.30 MILD PERSISTENT ASTHMA WITHOUT COMPLICATION: ICD-10-CM

## 2021-09-15 DIAGNOSIS — Z12.11 SCREENING FOR MALIGNANT NEOPLASM OF COLON: ICD-10-CM

## 2021-09-15 DIAGNOSIS — F17.200 TOBACCO USE DISORDER: ICD-10-CM

## 2021-09-15 DIAGNOSIS — Z23 NEED FOR PROPHYLACTIC VACCINATION AND INOCULATION AGAINST INFLUENZA: ICD-10-CM

## 2021-09-15 DIAGNOSIS — F10.90 HEAVY ALCOHOL CONSUMPTION: ICD-10-CM

## 2021-09-15 PROBLEM — F10.21 ALCOHOL DEPENDENCE IN REMISSION (H): Status: RESOLVED | Noted: 2017-08-14 | Resolved: 2021-09-15

## 2021-09-15 PROCEDURE — 99214 OFFICE O/P EST MOD 30 MIN: CPT | Mod: 25 | Performed by: FAMILY MEDICINE

## 2021-09-15 PROCEDURE — 90682 RIV4 VACC RECOMBINANT DNA IM: CPT | Performed by: FAMILY MEDICINE

## 2021-09-15 PROCEDURE — 90471 IMMUNIZATION ADMIN: CPT | Performed by: FAMILY MEDICINE

## 2021-09-15 PROCEDURE — G0296 VISIT TO DETERM LDCT ELIG: HCPCS | Performed by: FAMILY MEDICINE

## 2021-09-15 RX ORDER — METOPROLOL SUCCINATE 25 MG/1
25 TABLET, EXTENDED RELEASE ORAL DAILY
Qty: 90 TABLET | Refills: 3 | Status: SHIPPED | OUTPATIENT
Start: 2021-09-15 | End: 2022-05-10

## 2021-09-15 RX ORDER — ALBUTEROL SULFATE 90 UG/1
2 AEROSOL, METERED RESPIRATORY (INHALATION) EVERY 4 HOURS PRN
Qty: 8.5 G | Refills: 3 | Status: SHIPPED | OUTPATIENT
Start: 2021-09-15 | End: 2022-08-16

## 2021-09-15 RX ORDER — LOSARTAN POTASSIUM 100 MG/1
100 TABLET ORAL DAILY
Qty: 90 TABLET | Refills: 3 | Status: ON HOLD | OUTPATIENT
Start: 2021-09-15 | End: 2022-03-28

## 2021-09-15 ASSESSMENT — ASTHMA QUESTIONNAIRES
QUESTION_4 LAST FOUR WEEKS HOW OFTEN HAVE YOU USED YOUR RESCUE INHALER OR NEBULIZER MEDICATION (SUCH AS ALBUTEROL): TWO OR THREE TIMES PER WEEK
ACT_TOTALSCORE: 20
QUESTION_5 LAST FOUR WEEKS HOW WOULD YOU RATE YOUR ASTHMA CONTROL: SOMEWHAT CONTROLLED
QUESTION_3 LAST FOUR WEEKS HOW OFTEN DID YOUR ASTHMA SYMPTOMS (WHEEZING, COUGHING, SHORTNESS OF BREATH, CHEST TIGHTNESS OR PAIN) WAKE YOU UP AT NIGHT OR EARLIER THAN USUAL IN THE MORNING: NOT AT ALL
QUESTION_1 LAST FOUR WEEKS HOW MUCH OF THE TIME DID YOUR ASTHMA KEEP YOU FROM GETTING AS MUCH DONE AT WORK, SCHOOL OR AT HOME: NONE OF THE TIME
QUESTION_2 LAST FOUR WEEKS HOW OFTEN HAVE YOU HAD SHORTNESS OF BREATH: ONCE OR TWICE A WEEK

## 2021-09-15 ASSESSMENT — MIFFLIN-ST. JEOR: SCORE: 1558.52

## 2021-09-15 NOTE — PROGRESS NOTES
Assessment & Plan   Routine general medical examination at a health care facility  Patient was advised on recommended screening and preventive health recommendations.  He verbalized understanding and agreed to the plans below.    Benign essential hypertension  Controlled.  Low salt, low fat diet.   Exercise as tolerated.  Take meds as prescribed; call if with side effects.   - metoprolol succinate ER (TOPROL-XL) 25 MG 24 hr tablet  Dispense: 90 tablet; Refill: 3  - losartan (COZAAR) 100 MG tablet  Dispense: 90 tablet; Refill: 3  - Comprehensive metabolic panel  - OFFICE/OUTPT VISIT,EST,LEVL IV    Mild persistent asthma without complication  Uncontrolled likely due to patient not taking beclamethasone, continued smoking, and possibly COPD as well.  Restart beclomethasone first.   Patient will go for low dose CT chest for lung cancer screening.  Pursue PFT if not better after a month.  Reviewed vaccinations - patient concurs.  - albuterol (PROAIR HFA/PROVENTIL HFA/VENTOLIN HFA) 108 (90 Base) MCG/ACT inhaler  Dispense: 8.5 g; Refill: 3  - beclomethasone HFA (QVAR REDIHALER) 80 MCG/ACT inhaler  Dispense: 10.6 g; Refill: 11  - OFFICE/OUTPT VISIT,EST,LEVL IV    Mixed hyperlipidemia  Reinforced heart healthy lifestyle.  - Lipid panel reflex to direct LDL Fasting  - OFFICE/OUTPT VISIT,EST,LEVL IV    Tobacco use disorder  Discussed risks of continuous smoking tobacco.  Patient is not interested in quitting completely at this time.  Will continue to  in the future.    Heavy alcohol consumption  Discussed with patient risks of heavy consumption.  Advised to decrease to <7 drinks per week.    Personal history of tobacco use  - Prof fee: Shared Decisionmaking for Lung Cancer Screening  - CT Chest Lung Cancer Scrn Low Dose wo      Screening for malignant neoplasm of colon  - Adult Gastro Ref - Procedure Only    Screening for prostate cancer  - Prostate Specific Antigen Screen    Need for prophylactic vaccination and  inoculation against influenza  - INFLUENZA QUAD, RECOMBINANT, P-FREE (RIV4) (FLUBLOK)    Tobacco Cessation:   reports that he has been smoking cigarettes. He started smoking about 45 years ago. He has a 33.75 pack-year smoking history. He has never used smokeless tobacco.  Tobacco Cessation Action Plan: Information offered: Patient not interested at this time    Patient Instructions   You may get the Shingrix vaccine for shingles if you desire, and after you verify with insurance how they cover the vaccine.    Schedule colonoscopy for screening for colon cancer at your soonest convenience.    To schedule the CT scan of the chest for lung cancer screening, call [phone].    Restart beclomethasone inhaler for everyday use - never let this medication run out.    Schedule fasting lab appointment - overnight fast.    Be consistent with low salt, low trans fat and low saturated fat diet.  Eat food rich in omega-3-fatty acids as you tolerate. (salmon, olive oil)  Eat 5 cups of vegetables, fruits and whole grains per day.  Limit starchy food (white rice, white bread, white pasta, white potatoes) to less than a cup per meal.  Minimize sweets, junk food and fastfood. Limit soda beverages to one serving per day; best to avoid it altogether though.    Exercise: moderate intensity sustained for at least 30 mins per episode, goal of 150 mins per week at least  Combine cardiovascular and resistance exercises.  These exercise recommendations are in addition to your daily activity at work or home.    Preventative Care Visits include: Yearly physicals, Well-child visits, Welcome to Medicare visits, & Medicare yearly wellness exams.    The purpose of these visits is to discuss your medical history and prevent health problems before you are sick.  You may need to pay a copay, coinsurance or deductible if your visit today includes testing or treating for a new or existing condition.    Additional charges may be incurred for today's  visit. If you have questions about what your insurance plan covers, please contact your Insurance Company's member service department.  If you have questions specific to a bill you have already received from [employer], please contact the [facility] Billing Office at [phone]. \    Lung Cancer Screening   Frequently Asked Questions  If you are at high-risk for lung cancer, getting screened with low-dose computed tomography (LDCT) every year can help save your life. This handout offers answers to some of the most common questions about lung cancer screening. If you have other questions, please call [phone] ([phone]).     What is it?  Lung cancer screening uses special X-ray technology to create an image of your lung tissue. The exam is quick and easy and takes less than 10 seconds. We don t give you any medicine or use any needles. You can eat before and after the exam. You don t need to change your clothes as long as the clothing on your chest doesn t contain metal. But, you do need to be able to hold your breath for at least 6 seconds during the exam.    What is the goal of lung cancer screening?  The goal of lung cancer screening is to save lives. Many times, lung cancer is not found until a person starts having physical symptoms. Lung cancer screening can help detect lung cancer in the earliest stages when it may be easier to treat.    Who should be screened for lung cancer?  We suggest lung cancer screening for anyone who is at high-risk for lung cancer. You are in the high-risk group if you:      are between the ages of 55 and 79, and    have smoked at least 1 pack of cigarettes a day for 30 or more years, and    still smoke or have quit within the past 15 years.    However, if you have a new cough or shortness of breath, you should talk to your doctor before being screened.    Some national lung health advocacy groups also recommend screening for people ages 50 to 79 who have smoked an  average of 1 pack of cigarettes a day for 20 years. They must also have at least 1 other risk factor for lung cancer, not including exposure to secondhand smoke. Other risk factors are having had cancer in the past, emphysema, pulmonary fibrosis, COPD, a family history of lung cancer, or exposure to certain materials such as arsenic, asbestos, beryllium, cadmium, chromium, diesel fumes, nickel, radon or silica. Your care team can help you know if you have one of these risk factors.     Why does it matter if I have symptoms?  Certain symptoms can be a sign that you have a condition in your lungs that should be checked and treated by your doctor. These symptoms include fever, chest pain, a new or changing cough, shortness of breath that you have never felt before, coughing up blood or unexplained weight loss. Having any of these symptoms can greatly affect the results of lung cancer screening.       Should all smokers get an LDCT lung cancer screening exam?  It depends. Lung cancer screening is for a very specific group of men and women who have a history of heavy smoking over a long period of time (see  Who should be screened for lung cancer  above).  I am in the high-risk group, but have been diagnosed with cancer in the past. Is LDCT lung cancer screening right for me?  In some cases, you should not have LDCT lung screening, such as when your doctor is already following your cancer with CT scan studies. Your doctor will help you decide if LDCT lung screening is right for you.  Do I need to have a screening exam every year?  Yes. If you are in the high-risk group described earlier, you should get an LDCT lung cancer screening exam every year until you are 79, or are no longer willing or able to undergo screening and possible procedures to diagnose and treat lung cancer.  How effective is LDCT at preventing death from lung cancer?  Studies have shown that LDCT lung cancer screening can lower the risk of death from  lung cancer by 20 percent in people who are at high-risk.  What are the risks?  There are some risks and limitations of LDCT lung cancer screening. We want to make sure you understand the risks and benefits, so please let us know if you have any questions. Your doctor may want to talk with you more about these risks.    Radiation exposure: As with any exam that uses radiation, there is a very small increased risk of cancer. The amount of radiation in LDCT is small--about the same amount a person would get from a mammogram. Your doctor orders the exam when he or she feels the potential benefits outweigh the risks.    False negatives: No test is perfect, including LDCT. It is possible that you may have a medical condition, including lung cancer, that is not found during your exam. This is called a false negative result.    False positives and more testing: LDCT very often finds something in the lung that could be cancer, but in fact is not. This is called a false positive result. False positive tests often cause anxiety. To make sure these findings are not cancer, you may need to have more tests. These tests will be done only if you give us permission. Sometimes patients need a treatment that can have side effects, such as a biopsy. For more information on false positives, see  What can I expect from the results?     Findings not related to lung cancer: Your LDCT exam also takes pictures of areas of your body next to your lungs. In a very small number of cases, the CT scan will show an abnormal finding in one of these areas, such as your kidneys, adrenal glands, liver or thyroid. This finding may not be serious, but you may need more tests. Your doctor can help you decide what other tests you may need, if any.  What can I expect from the results?  About 1 out of 4 LDCT exams will find something that may need more tests. Most of the time, these findings are lung nodules. Lung nodules are very small collections of  tissue in the lung. These nodules are very common, and the vast majority--more than 97 percent--are not cancer (benign). Most are normal lymph nodes or small areas of scarring from past infections.  But, if a small lung nodule is found to be cancer, the cancer can be cured more than 90 percent of the time. To know if the nodule is cancer, we may need to get more images before your next yearly screening exam. If the nodule has suspicious features (for example, it is large, has an odd shape or grows over time), we will refer you to a specialist for further testing.  Will my doctor also get the results?  Yes. Your doctor will get a copy of your results.  Is it okay to keep smoking now that there s a cancer screening exam?  No. Tobacco is one of the strongest cancer-causing agents. It causes not only lung cancer, but other cancers and cardiovascular (heart) diseases as well. The damage caused by smoking builds over time. This means that the longer you smoke, the higher your risk of disease. While it is never too late to quit, the sooner you quit, the better.  Where can I find help to quit smoking?  The best way to prevent lung cancer is to stop smoking. If you have already quit smoking, congratulations and keep it up! For help on quitting smoking, please call [employer] at 6-530-800-IPGZ (5779) or the American [employer] Society at 1-176.967.4150 to find local resources near you.  One-on-one health coaching:  If you d prefer to work individually with a health care provider on tobacco cessation, we offer:      Medication Therapy Management:  Our specially trained pharmacists work closely with you and your doctor to help you quit smoking.  Call [phone] or [phone] (toll free).     Can Do: Health coaching offered by [email or web address].  [email or web address]    Preventive Health Recommendations  Male Ages 50 - 64    Yearly exam:             See your health care provider every year in order to  o    Review health changes.   o   Discuss preventive care.    o   Review your medicines if your doctor has prescribed any.     Have a cholesterol test every 5 years, or more frequently if you are at risk for high cholesterol/heart disease.     Have a diabetes test (fasting glucose) every three years. If you are at risk for diabetes, you should have this test more often.     Have a colonoscopy at age 50, or have a yearly FIT test (stool test). These exams will check for colon cancer.      Talk with your health care provider about whether or not a prostate cancer screening test (PSA) is right for you.    You should be tested each year for STDs (sexually transmitted diseases), if you re at risk.     Shots: Get a flu shot each year. Get a tetanus shot every 10 years.     Nutrition:    Eat at least 5 servings of fruits and vegetables daily.     Eat whole-grain bread, whole-wheat pasta and brown rice instead of white grains and rice.     Get adequate Calcium and Vitamin D.     Lifestyle    Exercise for at least 150 minutes a week (30 minutes a day, 5 days a week). This will help you control your weight and prevent disease.     Limit alcohol to one drink per day.     No smoking.     Wear sunscreen to prevent skin cancer.     See your dentist every six months for an exam and cleaning.     See your eye doctor every 1 to 2 years.        Return in about 1 month (around 10/15/2021) for care team will recach out to you for a noelleo wup on asthma.    Gatito Ware MD  [facility]    Ryanne Meyers is a 65 year old who presents for the following health issues     HPI     Hyperlipidemia Follow-Up      Are you regularly taking any medication or supplement to lower your cholesterol?   Yes- Losartan 25 mg    Are you having muscle aches or other side effects that you think could be caused by your cholesterol lowering medication?  No    Hypertension Follow-up      Do you check your blood pressure regularly  outside of the clinic? No     Are you following a low salt diet? Yes    Are your blood pressures ever more than 140 on the top number (systolic) OR more   than 90 on the bottom number (diastolic), for example 140/90? Yes    Asthma Follow-Up    Was ACT completed today?    Yes    ACT Total Scores 9/15/2021   ACT TOTAL SCORE -   ASTHMA ER VISITS -   ASTHMA HOSPITALIZATIONS -   ACT TOTAL SCORE (Goal Greater than or Equal to 20) 20   In the past 12 months, how many times did you visit the emergency room for your asthma without being admitted to the hospital? 0   In the past 12 months, how many times were you hospitalized overnight because of your asthma? 0          How many days per week do you miss taking your asthma controller medication?  3    Please describe any recent triggers for your asthma: smoke    Have you had any Emergency Room Visits, Urgent Care Visits, or Hospital Admissions since your last office visit?  No    Still smokes 3/4 pack a day.    Patient Active Problem List   Diagnosis     Mild persistent asthma     Alcohol abuse     Tobacco abuse     CARDIOVASCULAR SCREENING; LDL GOAL LESS THAN 130     Alcohol dependence in remission (H)     Seasonal allergic rhinitis     Uncontrolled hypertension     Mixed hyperlipidemia     History reviewed. No pertinent surgical history.    Social History     Tobacco Use     Smoking status: Current Every Day Smoker     Packs/day: 0.75     Years: 35.00     Pack years: 26.25     Types: Cigarettes     Smokeless tobacco: Never Used   Substance Use Topics     Alcohol use: Yes     Comment: about 2 1.75 of vodka currently      Family History   Problem Relation Age of Onset     Asthma Mother      Cerebrovascular Disease Mother      C.A.D. No family hx of      Diabetes No family hx of      Hypertension No family hx of      Breast Cancer No family hx of      Cancer - colorectal No family hx of      Prostate Cancer No family hx of          Current Outpatient Medications   Medication  "Sig Dispense Refill     albuterol (PROAIR HFA/PROVENTIL HFA/VENTOLIN HFA) 108 (90 Base) MCG/ACT inhaler Inhale 2 puffs into the lungs every 4 hours as needed for shortness of breath / dyspnea or wheezing 8.5 g 1     aspirin 81 MG tablet Take 81 mg by mouth daily       beclomethasone HFA (QVAR REDIHALER) 80 MCG/ACT inhaler Inhale 1 puff into the lungs 2 times daily 1 Inhaler 11     losartan (COZAAR) 100 MG tablet Take 1 tablet (100 mg) by mouth daily Please make a clinic visit to be seen in person for medication refills. 15 tablet 0     metoprolol succinate ER (TOPROL-XL) 25 MG 24 hr tablet Take 1 tablet (25 mg) by mouth daily Please make a clinic visit to be seen in person for medication refills. 15 tablet 0     nicotine (NICORETTE) 4 MG lozenge Place 4 mg inside cheek as needed (Patient not taking: Reported on 9/15/2021)       order for DME Equipment being ordered: Digital home blood pressure monitor kit (Patient not taking: Reported on 9/15/2021) 1 each 0     No Known Allergies    Review of Systems   Constitutional, HEENT, cardiovascular, pulmonary, GI, , musculoskeletal, neuro, skin, endocrine and psych systems are negative, except as otherwise noted.      Objective    /78 (BP Location: Right arm, Patient Position: Sitting, Cuff Size: Adult Regular)   Pulse 106   Temp 97.7  F (36.5  C) (Tympanic)   Resp 24   Ht 1.765 m (5' 9.5\")   Wt 77 kg (169 lb 12.8 oz)   SpO2 96%   BMI 24.72 kg/m    Body mass index is 24.72 kg/m .  Physical Exam   GENERAL:  alert and no distress, ambulatory w/o assist  NECK: no tenderness, no adenopathy,  Thyroid not enlarged  RESP: lungs clear to auscultation - no rales, no rhonchi, no wheezes  CV: regular rates and rhythm, no murmur  MS: no edema  SKIN: no suspicious lesions, no rashes  NEURO: strength and tone- normal, sensory exam- grossly normal, mentation- intact, speech- normal, reflexes- symmetric  ABD:  nontender    No results found for any visits on 09/15/21.    "     Lung Cancer Screening Shared Decision Making Visit     Luigi Vieyra is eligible for lung cancer screening on the basis of the information provided in my signed lung cancer screening order.     I have discussed with patient the risks and benefits of screening for lung cancer with low-dose CT.     The risks include:  radiation exposure: one low dose chest CT has as much ionizing radiation as about 15 chest x-rays or 6 months of background radiation living in [place]    false positives: 96% of positive findings/nodules are NOT cancer, but some might still require additional diagnostic evaluation, including biopsy  over-diagnosis: some slow growing cancers that might never have been clinically significant will be detected and treated unnecessarily     The benefit of early detection of lung cancer is contingent upon adherence to annual screening or more frequent follow up if indicated.     Furthermore, reaping the benefits of screening requires Luigi Vieyra to be willing and physically able to undergo diagnostic procedures, if indicated. Although no specific guide is available for determining severity of comorbidities, it is reasonable to withhold screening in patients who have greater mortality risk from other diseases.     We did discuss that the only way to prevent lung cancer is to not smoke. Smoking cessation counseling was given, duration 3-10 minutes.      I did offer risk estimation using a calculator such as this one:    ShouldIScreen

## 2021-09-15 NOTE — PATIENT INSTRUCTIONS
You may get the Shingrix vaccine for shingles if you desire, and after you verify with insurance how they cover the vaccine.    Schedule colonoscopy for screening for colon cancer at your soonest convenience.    To schedule the CT scan of the chest for lung cancer screening, call [phone].    Restart beclomethasone inhaler for everyday use - never let this medication run out.    Schedule fasting lab appointment - overnight fast.    Be consistent with low salt, low trans fat and low saturated fat diet.  Eat food rich in omega-3-fatty acids as you tolerate. (salmon, olive oil)  Eat 5 cups of vegetables, fruits and whole grains per day.  Limit starchy food (white rice, white bread, white pasta, white potatoes) to less than a cup per meal.  Minimize sweets, junk food and fastfood. Limit soda beverages to one serving per day; best to avoid it altogether though.    Exercise: moderate intensity sustained for at least 30 mins per episode, goal of 150 mins per week at least  Combine cardiovascular and resistance exercises.  These exercise recommendations are in addition to your daily activity at work or home.    Preventative Care Visits include: Yearly physicals, Well-child visits, Welcome to Medicare visits, & Medicare yearly wellness exams.    The purpose of these visits is to discuss your medical history and prevent health problems before you are sick.  You may need to pay a copay, coinsurance or deductible if your visit today includes testing or treating for a new or existing condition.    Additional charges may be incurred for today's visit. If you have questions about what your insurance plan covers, please contact your Insurance Company's member service department.  If you have questions specific to a bill you have already received from [employer], please contact the [facility] at [phone]. \    Lung Cancer Screening   Frequently Asked Questions  If you are at high-risk for lung cancer, getting  screened with low-dose computed tomography (LDCT) every year can help save your life. This handout offers answers to some of the most common questions about lung cancer screening. If you have other questions, please call [phone] ([phone]).     What is it?  Lung cancer screening uses special X-ray technology to create an image of your lung tissue. The exam is quick and easy and takes less than 10 seconds. We don t give you any medicine or use any needles. You can eat before and after the exam. You don t need to change your clothes as long as the clothing on your chest doesn t contain metal. But, you do need to be able to hold your breath for at least 6 seconds during the exam.    What is the goal of lung cancer screening?  The goal of lung cancer screening is to save lives. Many times, lung cancer is not found until a person starts having physical symptoms. Lung cancer screening can help detect lung cancer in the earliest stages when it may be easier to treat.    Who should be screened for lung cancer?  We suggest lung cancer screening for anyone who is at high-risk for lung cancer. You are in the high-risk group if you:      are between the ages of 55 and 79, and    have smoked at least 1 pack of cigarettes a day for 30 or more years, and    still smoke or have quit within the past 15 years.    However, if you have a new cough or shortness of breath, you should talk to your doctor before being screened.    Some national lung health advocacy groups also recommend screening for people ages 50 to 79 who have smoked an average of 1 pack of cigarettes a day for 20 years. They must also have at least 1 other risk factor for lung cancer, not including exposure to secondhand smoke. Other risk factors are having had cancer in the past, emphysema, pulmonary fibrosis, COPD, a family history of lung cancer, or exposure to certain materials such as arsenic, asbestos, beryllium, cadmium, chromium, diesel fumes,  nickel, radon or silica. Your care team can help you know if you have one of these risk factors.     Why does it matter if I have symptoms?  Certain symptoms can be a sign that you have a condition in your lungs that should be checked and treated by your doctor. These symptoms include fever, chest pain, a new or changing cough, shortness of breath that you have never felt before, coughing up blood or unexplained weight loss. Having any of these symptoms can greatly affect the results of lung cancer screening.       Should all smokers get an LDCT lung cancer screening exam?  It depends. Lung cancer screening is for a very specific group of men and women who have a history of heavy smoking over a long period of time (see  Who should be screened for lung cancer  above).  I am in the high-risk group, but have been diagnosed with cancer in the past. Is LDCT lung cancer screening right for me?  In some cases, you should not have LDCT lung screening, such as when your doctor is already following your cancer with CT scan studies. Your doctor will help you decide if LDCT lung screening is right for you.  Do I need to have a screening exam every year?  Yes. If you are in the high-risk group described earlier, you should get an LDCT lung cancer screening exam every year until you are 79, or are no longer willing or able to undergo screening and possible procedures to diagnose and treat lung cancer.  How effective is LDCT at preventing death from lung cancer?  Studies have shown that LDCT lung cancer screening can lower the risk of death from lung cancer by 20 percent in people who are at high-risk.  What are the risks?  There are some risks and limitations of LDCT lung cancer screening. We want to make sure you understand the risks and benefits, so please let us know if you have any questions. Your doctor may want to talk with you more about these risks.    Radiation exposure: As with any exam that uses radiation, there is a  very small increased risk of cancer. The amount of radiation in LDCT is small--about the same amount a person would get from a mammogram. Your doctor orders the exam when he or she feels the potential benefits outweigh the risks.    False negatives: No test is perfect, including LDCT. It is possible that you may have a medical condition, including lung cancer, that is not found during your exam. This is called a false negative result.    False positives and more testing: LDCT very often finds something in the lung that could be cancer, but in fact is not. This is called a false positive result. False positive tests often cause anxiety. To make sure these findings are not cancer, you may need to have more tests. These tests will be done only if you give us permission. Sometimes patients need a treatment that can have side effects, such as a biopsy. For more information on false positives, see  What can I expect from the results?     Findings not related to lung cancer: Your LDCT exam also takes pictures of areas of your body next to your lungs. In a very small number of cases, the CT scan will show an abnormal finding in one of these areas, such as your kidneys, adrenal glands, liver or thyroid. This finding may not be serious, but you may need more tests. Your doctor can help you decide what other tests you may need, if any.  What can I expect from the results?  About 1 out of 4 LDCT exams will find something that may need more tests. Most of the time, these findings are lung nodules. Lung nodules are very small collections of tissue in the lung. These nodules are very common, and the vast majority--more than 97 percent--are not cancer (benign). Most are normal lymph nodes or small areas of scarring from past infections.  But, if a small lung nodule is found to be cancer, the cancer can be cured more than 90 percent of the time. To know if the nodule is cancer, we may need to get more images before your next yearly  screening exam. If the nodule has suspicious features (for example, it is large, has an odd shape or grows over time), we will refer you to a specialist for further testing.  Will my doctor also get the results?  Yes. Your doctor will get a copy of your results.  Is it okay to keep smoking now that there s a cancer screening exam?  No. Tobacco is one of the strongest cancer-causing agents. It causes not only lung cancer, but other cancers and cardiovascular (heart) diseases as well. The damage caused by smoking builds over time. This means that the longer you smoke, the higher your risk of disease. While it is never too late to quit, the sooner you quit, the better.  Where can I find help to quit smoking?  The best way to prevent lung cancer is to stop smoking. If you have already quit smoking, congratulations and keep it up! For help on quitting smoking, please call [employer] at 2-958-574-PSSO ([phone]) or the [employer] at 1-951.430.8172 to find local resources near you.  One-on-one health coaching:  If you d prefer to work individually with a health care provider on tobacco cessation, we offer:      Medication Therapy Management:  Our specially trained pharmacists work closely with you and your doctor to help you quit smoking.  Call [phone] or [phone] (toll free).     Can Do: Health coaching offered by [facility].  [email or web address]    Preventive Health Recommendations  Male Ages 50 - 64    Yearly exam:             See your health care provider every year in order to  o   Review health changes.   o   Discuss preventive care.    o   Review your medicines if your doctor has prescribed any.     Have a cholesterol test every 5 years, or more frequently if you are at risk for high cholesterol/heart disease.     Have a diabetes test (fasting glucose) every three years. If you are at risk for diabetes, you should have this test more often.     Have a colonoscopy at age  50, or have a yearly FIT test (stool test). These exams will check for colon cancer.      Talk with your health care provider about whether or not a prostate cancer screening test (PSA) is right for you.    You should be tested each year for STDs (sexually transmitted diseases), if you re at risk.     Shots: Get a flu shot each year. Get a tetanus shot every 10 years.     Nutrition:    Eat at least 5 servings of fruits and vegetables daily.     Eat whole-grain bread, whole-wheat pasta and brown rice instead of white grains and rice.     Get adequate Calcium and Vitamin D.     Lifestyle    Exercise for at least 150 minutes a week (30 minutes a day, 5 days a week). This will help you control your weight and prevent disease.     Limit alcohol to one drink per day.     No smoking.     Wear sunscreen to prevent skin cancer.     See your dentist every six months for an exam and cleaning.     See your eye doctor every 1 to 2 years.

## 2021-09-16 ASSESSMENT — ASTHMA QUESTIONNAIRES: ACT_TOTALSCORE: 20

## 2022-03-20 ENCOUNTER — APPOINTMENT (OUTPATIENT)
Dept: CT IMAGING | Facility: CLINIC | Age: 67
DRG: 368 | End: 2022-03-20
Attending: PHYSICIAN ASSISTANT
Payer: COMMERCIAL

## 2022-03-20 ENCOUNTER — APPOINTMENT (OUTPATIENT)
Dept: CT IMAGING | Facility: CLINIC | Age: 67
DRG: 368 | End: 2022-03-20
Attending: FAMILY MEDICINE
Payer: COMMERCIAL

## 2022-03-20 ENCOUNTER — HOSPITAL ENCOUNTER (INPATIENT)
Facility: CLINIC | Age: 67
LOS: 8 days | Discharge: NURSING, REHAB OR LONG-TERM CARE | DRG: 368 | End: 2022-03-28
Attending: FAMILY MEDICINE | Admitting: FAMILY MEDICINE
Payer: COMMERCIAL

## 2022-03-20 DIAGNOSIS — E86.1 HYPOTENSION DUE TO HYPOVOLEMIA: ICD-10-CM

## 2022-03-20 DIAGNOSIS — K21.01 GASTROESOPHAGEAL REFLUX DISEASE WITH ESOPHAGITIS AND HEMORRHAGE: ICD-10-CM

## 2022-03-20 DIAGNOSIS — F10.10 ALCOHOL ABUSE: ICD-10-CM

## 2022-03-20 DIAGNOSIS — I10 BENIGN ESSENTIAL HYPERTENSION: ICD-10-CM

## 2022-03-20 DIAGNOSIS — E87.1 HYPONATREMIA: ICD-10-CM

## 2022-03-20 DIAGNOSIS — K29.20 ALCOHOLIC GASTRITIS, PRESENCE OF BLEEDING UNSPECIFIED, UNSPECIFIED CHRONICITY: ICD-10-CM

## 2022-03-20 DIAGNOSIS — L03.114 CELLULITIS OF LEFT UPPER EXTREMITY: Primary | ICD-10-CM

## 2022-03-20 DIAGNOSIS — Z72.0 TOBACCO ABUSE: ICD-10-CM

## 2022-03-20 DIAGNOSIS — Z11.52 ENCOUNTER FOR SCREENING LABORATORY TESTING FOR SEVERE ACUTE RESPIRATORY SYNDROME CORONAVIRUS 2 (SARS-COV-2): ICD-10-CM

## 2022-03-20 DIAGNOSIS — D64.9 ANEMIA, UNSPECIFIED TYPE: ICD-10-CM

## 2022-03-20 PROBLEM — F10.21 ALCOHOL DEPENDENCE IN REMISSION (H): Status: ACTIVE | Noted: 2017-08-14

## 2022-03-20 PROBLEM — F10.20 ALCOHOL DEPENDENCE (H): Status: ACTIVE | Noted: 2017-08-14

## 2022-03-20 PROBLEM — D72.829 LEUKOCYTOSIS: Status: ACTIVE | Noted: 2022-03-20

## 2022-03-20 PROBLEM — R79.89 ELEVATED LACTIC ACID LEVEL: Status: ACTIVE | Noted: 2022-03-20

## 2022-03-20 PROBLEM — R55 SYNCOPE: Status: ACTIVE | Noted: 2022-03-20

## 2022-03-20 PROBLEM — N17.9 AKI (ACUTE KIDNEY INJURY) (H): Status: ACTIVE | Noted: 2022-03-20

## 2022-03-20 PROBLEM — D75.839 THROMBOCYTOSIS: Status: ACTIVE | Noted: 2022-03-20

## 2022-03-20 PROBLEM — R74.01 TRANSAMINITIS: Status: ACTIVE | Noted: 2022-03-20

## 2022-03-20 LAB
ALBUMIN SERPL-MCNC: 2.7 G/DL (ref 3.4–5)
ALBUMIN UR-MCNC: NEGATIVE MG/DL
ALP SERPL-CCNC: 155 U/L (ref 40–150)
ALT SERPL W P-5'-P-CCNC: 58 U/L (ref 0–70)
ANION GAP SERPL CALCULATED.3IONS-SCNC: 11 MMOL/L (ref 3–14)
ANION GAP SERPL CALCULATED.3IONS-SCNC: 20 MMOL/L (ref 3–14)
APPEARANCE UR: CLEAR
AST SERPL W P-5'-P-CCNC: 122 U/L (ref 0–45)
BASE EXCESS BLDV CALC-SCNC: -5.6 MMOL/L (ref -7.7–1.9)
BASOPHILS # BLD AUTO: 0.1 10E3/UL (ref 0–0.2)
BASOPHILS NFR BLD AUTO: 1 %
BILIRUB SERPL-MCNC: 1 MG/DL (ref 0.2–1.3)
BILIRUB UR QL STRIP: NEGATIVE
BUN SERPL-MCNC: 45 MG/DL (ref 7–30)
BUN SERPL-MCNC: 46 MG/DL (ref 7–30)
CALCIUM SERPL-MCNC: 8.8 MG/DL (ref 8.5–10.1)
CALCIUM SERPL-MCNC: 9.6 MG/DL (ref 8.5–10.1)
CHLORIDE BLD-SCNC: 90 MMOL/L (ref 94–109)
CHLORIDE BLD-SCNC: 95 MMOL/L (ref 94–109)
CO2 SERPL-SCNC: 18 MMOL/L (ref 20–32)
CO2 SERPL-SCNC: 22 MMOL/L (ref 20–32)
COLOR UR AUTO: YELLOW
CREAT SERPL-MCNC: 1.73 MG/DL (ref 0.66–1.25)
CREAT SERPL-MCNC: 1.9 MG/DL (ref 0.66–1.25)
EOSINOPHIL # BLD AUTO: 0 10E3/UL (ref 0–0.7)
EOSINOPHIL NFR BLD AUTO: 0 %
ERYTHROCYTE [DISTWIDTH] IN BLOOD BY AUTOMATED COUNT: 15.2 % (ref 10–15)
ETHANOL SERPL-MCNC: <0.01 G/DL
FERRITIN SERPL-MCNC: 740 NG/ML (ref 26–388)
GFR SERPL CREATININE-BSD FRML MDRD: 39 ML/MIN/1.73M2
GFR SERPL CREATININE-BSD FRML MDRD: 43 ML/MIN/1.73M2
GLUCOSE BLD-MCNC: 111 MG/DL (ref 70–99)
GLUCOSE BLD-MCNC: 115 MG/DL (ref 70–99)
GLUCOSE UR STRIP-MCNC: 50 MG/DL
HCO3 BLDV-SCNC: 19 MMOL/L (ref 21–28)
HCT VFR BLD AUTO: 28.4 % (ref 40–53)
HGB BLD-MCNC: 8.4 G/DL (ref 13.3–17.7)
HGB BLD-MCNC: 9.7 G/DL (ref 13.3–17.7)
HGB UR QL STRIP: ABNORMAL
HOLD SPECIMEN: NORMAL
IMM GRANULOCYTES # BLD: 0.3 10E3/UL
IMM GRANULOCYTES NFR BLD: 2 %
INR PPP: 1 (ref 0.85–1.15)
IRON SATN MFR SERPL: 24 % (ref 15–46)
IRON SERPL-MCNC: 83 UG/DL (ref 35–180)
KETONES UR STRIP-MCNC: 20 MG/DL
LACTATE SERPL-SCNC: 1.9 MMOL/L (ref 0.7–2)
LACTATE SERPL-SCNC: 8.1 MMOL/L (ref 0.7–2)
LEUKOCYTE ESTERASE UR QL STRIP: NEGATIVE
LIPASE SERPL-CCNC: 676 U/L (ref 73–393)
LYMPHOCYTES # BLD AUTO: 0.8 10E3/UL (ref 0.8–5.3)
LYMPHOCYTES NFR BLD AUTO: 6 %
MAGNESIUM SERPL-MCNC: 1.9 MG/DL (ref 1.6–2.3)
MCH RBC QN AUTO: 36.6 PG (ref 26.5–33)
MCHC RBC AUTO-ENTMCNC: 34.2 G/DL (ref 31.5–36.5)
MCV RBC AUTO: 107 FL (ref 78–100)
MONOCYTES # BLD AUTO: 1.1 10E3/UL (ref 0–1.3)
MONOCYTES NFR BLD AUTO: 8 %
MUCOUS THREADS #/AREA URNS LPF: PRESENT /LPF
NEUTROPHILS # BLD AUTO: 10.7 10E3/UL (ref 1.6–8.3)
NEUTROPHILS NFR BLD AUTO: 83 %
NITRATE UR QL: NEGATIVE
NRBC # BLD AUTO: 0 10E3/UL
NRBC BLD AUTO-RTO: 0 /100
O2/TOTAL GAS SETTING VFR VENT: 0 %
PCO2 BLDV: 35 MM HG (ref 40–50)
PH BLDV: 7.35 [PH] (ref 7.32–7.43)
PH UR STRIP: 5 [PH] (ref 5–7)
PLATELET # BLD AUTO: 607 10E3/UL (ref 150–450)
PO2 BLDV: 14 MM HG (ref 25–47)
POTASSIUM BLD-SCNC: 4.2 MMOL/L (ref 3.4–5.3)
POTASSIUM BLD-SCNC: 4.5 MMOL/L (ref 3.4–5.3)
PROT SERPL-MCNC: 7 G/DL (ref 6.8–8.8)
RBC # BLD AUTO: 2.65 10E6/UL (ref 4.4–5.9)
RBC URINE: <1 /HPF
SARS-COV-2 RNA RESP QL NAA+PROBE: NEGATIVE
SODIUM SERPL-SCNC: 128 MMOL/L (ref 133–144)
SODIUM SERPL-SCNC: 128 MMOL/L (ref 133–144)
SP GR UR STRIP: 1.03 (ref 1–1.03)
TIBC SERPL-MCNC: 340 UG/DL (ref 240–430)
TROPONIN I SERPL HS-MCNC: 7 NG/L
UROBILINOGEN UR STRIP-MCNC: 2 MG/DL
WBC # BLD AUTO: 13 10E3/UL (ref 4–11)
WBC URINE: 1 /HPF

## 2022-03-20 PROCEDURE — 82803 BLOOD GASES ANY COMBINATION: CPT | Performed by: FAMILY MEDICINE

## 2022-03-20 PROCEDURE — 96375 TX/PRO/DX INJ NEW DRUG ADDON: CPT | Performed by: FAMILY MEDICINE

## 2022-03-20 PROCEDURE — 99285 EMERGENCY DEPT VISIT HI MDM: CPT | Mod: 25 | Performed by: FAMILY MEDICINE

## 2022-03-20 PROCEDURE — 80053 COMPREHEN METABOLIC PANEL: CPT | Performed by: FAMILY MEDICINE

## 2022-03-20 PROCEDURE — 93010 ELECTROCARDIOGRAM REPORT: CPT | Performed by: FAMILY MEDICINE

## 2022-03-20 PROCEDURE — 82077 ASSAY SPEC XCP UR&BREATH IA: CPT | Performed by: FAMILY MEDICINE

## 2022-03-20 PROCEDURE — 85025 COMPLETE CBC W/AUTO DIFF WBC: CPT | Performed by: FAMILY MEDICINE

## 2022-03-20 PROCEDURE — 120N000001 HC R&B MED SURG/OB

## 2022-03-20 PROCEDURE — 99223 1ST HOSP IP/OBS HIGH 75: CPT | Mod: AI | Performed by: PHYSICIAN ASSISTANT

## 2022-03-20 PROCEDURE — 74177 CT ABD & PELVIS W/CONTRAST: CPT

## 2022-03-20 PROCEDURE — 81001 URINALYSIS AUTO W/SCOPE: CPT | Performed by: FAMILY MEDICINE

## 2022-03-20 PROCEDURE — 250N000009 HC RX 250: Performed by: FAMILY MEDICINE

## 2022-03-20 PROCEDURE — 87040 BLOOD CULTURE FOR BACTERIA: CPT | Performed by: FAMILY MEDICINE

## 2022-03-20 PROCEDURE — 258N000003 HC RX IP 258 OP 636: Performed by: FAMILY MEDICINE

## 2022-03-20 PROCEDURE — 85018 HEMOGLOBIN: CPT | Performed by: FAMILY MEDICINE

## 2022-03-20 PROCEDURE — 70450 CT HEAD/BRAIN W/O DYE: CPT

## 2022-03-20 PROCEDURE — 93005 ELECTROCARDIOGRAM TRACING: CPT | Performed by: FAMILY MEDICINE

## 2022-03-20 PROCEDURE — 83550 IRON BINDING TEST: CPT | Performed by: FAMILY MEDICINE

## 2022-03-20 PROCEDURE — 96361 HYDRATE IV INFUSION ADD-ON: CPT | Performed by: FAMILY MEDICINE

## 2022-03-20 PROCEDURE — 250N000013 HC RX MED GY IP 250 OP 250 PS 637: Performed by: PHYSICIAN ASSISTANT

## 2022-03-20 PROCEDURE — 99291 CRITICAL CARE FIRST HOUR: CPT | Mod: 25 | Performed by: FAMILY MEDICINE

## 2022-03-20 PROCEDURE — C9113 INJ PANTOPRAZOLE SODIUM, VIA: HCPCS | Performed by: FAMILY MEDICINE

## 2022-03-20 PROCEDURE — 83735 ASSAY OF MAGNESIUM: CPT | Performed by: FAMILY MEDICINE

## 2022-03-20 PROCEDURE — 250N000011 HC RX IP 250 OP 636: Performed by: FAMILY MEDICINE

## 2022-03-20 PROCEDURE — 87086 URINE CULTURE/COLONY COUNT: CPT | Performed by: FAMILY MEDICINE

## 2022-03-20 PROCEDURE — 250N000013 HC RX MED GY IP 250 OP 250 PS 637: Performed by: FAMILY MEDICINE

## 2022-03-20 PROCEDURE — 94640 AIRWAY INHALATION TREATMENT: CPT | Performed by: FAMILY MEDICINE

## 2022-03-20 PROCEDURE — C9803 HOPD COVID-19 SPEC COLLECT: HCPCS | Performed by: FAMILY MEDICINE

## 2022-03-20 PROCEDURE — 83605 ASSAY OF LACTIC ACID: CPT | Performed by: FAMILY MEDICINE

## 2022-03-20 PROCEDURE — 82728 ASSAY OF FERRITIN: CPT | Performed by: FAMILY MEDICINE

## 2022-03-20 PROCEDURE — 84484 ASSAY OF TROPONIN QUANT: CPT | Performed by: FAMILY MEDICINE

## 2022-03-20 PROCEDURE — 258N000003 HC RX IP 258 OP 636: Performed by: PHYSICIAN ASSISTANT

## 2022-03-20 PROCEDURE — 85610 PROTHROMBIN TIME: CPT | Performed by: FAMILY MEDICINE

## 2022-03-20 PROCEDURE — 83690 ASSAY OF LIPASE: CPT | Performed by: FAMILY MEDICINE

## 2022-03-20 PROCEDURE — 87635 SARS-COV-2 COVID-19 AMP PRB: CPT | Performed by: FAMILY MEDICINE

## 2022-03-20 PROCEDURE — 96365 THER/PROPH/DIAG IV INF INIT: CPT | Mod: 59 | Performed by: FAMILY MEDICINE

## 2022-03-20 PROCEDURE — 36415 COLL VENOUS BLD VENIPUNCTURE: CPT | Performed by: FAMILY MEDICINE

## 2022-03-20 PROCEDURE — HZ2ZZZZ DETOXIFICATION SERVICES FOR SUBSTANCE ABUSE TREATMENT: ICD-10-PCS | Performed by: FAMILY MEDICINE

## 2022-03-20 RX ORDER — ALBUTEROL SULFATE 0.83 MG/ML
2.5 SOLUTION RESPIRATORY (INHALATION) EVERY 6 HOURS PRN
Status: DISCONTINUED | OUTPATIENT
Start: 2022-03-20 | End: 2022-03-28 | Stop reason: HOSPADM

## 2022-03-20 RX ORDER — NALOXONE HYDROCHLORIDE 0.4 MG/ML
0.4 INJECTION, SOLUTION INTRAMUSCULAR; INTRAVENOUS; SUBCUTANEOUS
Status: DISCONTINUED | OUTPATIENT
Start: 2022-03-20 | End: 2022-03-26

## 2022-03-20 RX ORDER — METOPROLOL SUCCINATE 25 MG/1
25 TABLET, EXTENDED RELEASE ORAL DAILY
Status: DISCONTINUED | OUTPATIENT
Start: 2022-03-21 | End: 2022-03-28 | Stop reason: HOSPADM

## 2022-03-20 RX ORDER — NALOXONE HYDROCHLORIDE 0.4 MG/ML
0.2 INJECTION, SOLUTION INTRAMUSCULAR; INTRAVENOUS; SUBCUTANEOUS
Status: DISCONTINUED | OUTPATIENT
Start: 2022-03-20 | End: 2022-03-26

## 2022-03-20 RX ORDER — SODIUM CHLORIDE, SODIUM LACTATE, POTASSIUM CHLORIDE, CALCIUM CHLORIDE 600; 310; 30; 20 MG/100ML; MG/100ML; MG/100ML; MG/100ML
125 INJECTION, SOLUTION INTRAVENOUS CONTINUOUS
Status: DISCONTINUED | OUTPATIENT
Start: 2022-03-20 | End: 2022-03-20

## 2022-03-20 RX ORDER — GABAPENTIN 600 MG/1
1200 TABLET ORAL ONCE
Status: COMPLETED | OUTPATIENT
Start: 2022-03-20 | End: 2022-03-20

## 2022-03-20 RX ORDER — ONDANSETRON 2 MG/ML
4 INJECTION INTRAMUSCULAR; INTRAVENOUS EVERY 6 HOURS PRN
Status: DISCONTINUED | OUTPATIENT
Start: 2022-03-20 | End: 2022-03-26

## 2022-03-20 RX ORDER — NICOTINE 21 MG/24HR
1 PATCH, TRANSDERMAL 24 HOURS TRANSDERMAL DAILY
Status: DISCONTINUED | OUTPATIENT
Start: 2022-03-20 | End: 2022-03-28 | Stop reason: HOSPADM

## 2022-03-20 RX ORDER — LORAZEPAM 2 MG/ML
1-2 INJECTION INTRAMUSCULAR EVERY 30 MIN PRN
Status: DISCONTINUED | OUTPATIENT
Start: 2022-03-20 | End: 2022-03-26

## 2022-03-20 RX ORDER — IOPAMIDOL 755 MG/ML
83 INJECTION, SOLUTION INTRAVASCULAR ONCE
Status: COMPLETED | OUTPATIENT
Start: 2022-03-20 | End: 2022-03-20

## 2022-03-20 RX ORDER — LIDOCAINE 40 MG/G
CREAM TOPICAL
Status: DISCONTINUED | OUTPATIENT
Start: 2022-03-20 | End: 2022-03-28 | Stop reason: HOSPADM

## 2022-03-20 RX ORDER — GABAPENTIN 300 MG/1
900 CAPSULE ORAL EVERY 8 HOURS
Status: COMPLETED | OUTPATIENT
Start: 2022-03-21 | End: 2022-03-23

## 2022-03-20 RX ORDER — ASPIRIN 81 MG/1
81 TABLET ORAL DAILY
Status: DISCONTINUED | OUTPATIENT
Start: 2022-03-21 | End: 2022-03-28 | Stop reason: HOSPADM

## 2022-03-20 RX ORDER — HALOPERIDOL 5 MG/ML
2.5-5 INJECTION INTRAMUSCULAR EVERY 6 HOURS PRN
Status: DISCONTINUED | OUTPATIENT
Start: 2022-03-20 | End: 2022-03-26

## 2022-03-20 RX ORDER — ALBUTEROL SULFATE 90 UG/1
2 AEROSOL, METERED RESPIRATORY (INHALATION) EVERY 4 HOURS PRN
Status: DISCONTINUED | OUTPATIENT
Start: 2022-03-20 | End: 2022-03-20

## 2022-03-20 RX ORDER — SODIUM CHLORIDE, SODIUM LACTATE, POTASSIUM CHLORIDE, CALCIUM CHLORIDE 600; 310; 30; 20 MG/100ML; MG/100ML; MG/100ML; MG/100ML
INJECTION, SOLUTION INTRAVENOUS CONTINUOUS
Status: ACTIVE | OUTPATIENT
Start: 2022-03-20 | End: 2022-03-22

## 2022-03-20 RX ORDER — FLUMAZENIL 0.1 MG/ML
0.2 INJECTION, SOLUTION INTRAVENOUS
Status: DISCONTINUED | OUTPATIENT
Start: 2022-03-20 | End: 2022-03-28 | Stop reason: HOSPADM

## 2022-03-20 RX ORDER — GABAPENTIN 100 MG/1
100 CAPSULE ORAL EVERY 8 HOURS
Status: DISCONTINUED | OUTPATIENT
Start: 2022-03-28 | End: 2022-03-28 | Stop reason: HOSPADM

## 2022-03-20 RX ORDER — PROCHLORPERAZINE 25 MG
12.5 SUPPOSITORY, RECTAL RECTAL EVERY 12 HOURS PRN
Status: DISCONTINUED | OUTPATIENT
Start: 2022-03-20 | End: 2022-03-28 | Stop reason: HOSPADM

## 2022-03-20 RX ORDER — LOSARTAN POTASSIUM 50 MG/1
100 TABLET ORAL DAILY
Status: DISCONTINUED | OUTPATIENT
Start: 2022-03-20 | End: 2022-03-26

## 2022-03-20 RX ORDER — OLANZAPINE 5 MG/1
5-10 TABLET, ORALLY DISINTEGRATING ORAL EVERY 6 HOURS PRN
Status: DISCONTINUED | OUTPATIENT
Start: 2022-03-20 | End: 2022-03-28 | Stop reason: HOSPADM

## 2022-03-20 RX ORDER — LORAZEPAM 1 MG/1
1-2 TABLET ORAL EVERY 30 MIN PRN
Status: DISCONTINUED | OUTPATIENT
Start: 2022-03-20 | End: 2022-03-26

## 2022-03-20 RX ORDER — PROCHLORPERAZINE MALEATE 5 MG
5 TABLET ORAL EVERY 6 HOURS PRN
Status: DISCONTINUED | OUTPATIENT
Start: 2022-03-20 | End: 2022-03-28 | Stop reason: HOSPADM

## 2022-03-20 RX ORDER — GABAPENTIN 300 MG/1
600 CAPSULE ORAL EVERY 8 HOURS
Status: COMPLETED | OUTPATIENT
Start: 2022-03-24 | End: 2022-03-25

## 2022-03-20 RX ORDER — GABAPENTIN 300 MG/1
300 CAPSULE ORAL EVERY 8 HOURS
Status: COMPLETED | OUTPATIENT
Start: 2022-03-26 | End: 2022-03-27

## 2022-03-20 RX ORDER — ONDANSETRON 4 MG/1
4 TABLET, ORALLY DISINTEGRATING ORAL EVERY 6 HOURS PRN
Status: DISCONTINUED | OUTPATIENT
Start: 2022-03-20 | End: 2022-03-28 | Stop reason: HOSPADM

## 2022-03-20 RX ADMIN — SODIUM CHLORIDE 100 ML: 9 INJECTION, SOLUTION INTRAVENOUS at 16:35

## 2022-03-20 RX ADMIN — OXYCODONE HYDROCHLORIDE 2.5 MG: 5 TABLET ORAL at 23:49

## 2022-03-20 RX ADMIN — SODIUM CHLORIDE, POTASSIUM CHLORIDE, SODIUM LACTATE AND CALCIUM CHLORIDE 1000 ML: 600; 310; 30; 20 INJECTION, SOLUTION INTRAVENOUS at 20:16

## 2022-03-20 RX ADMIN — GABAPENTIN 1200 MG: 600 TABLET, FILM COATED ORAL at 20:15

## 2022-03-20 RX ADMIN — Medication 1 PATCH: at 20:15

## 2022-03-20 RX ADMIN — SODIUM CHLORIDE, POTASSIUM CHLORIDE, SODIUM LACTATE AND CALCIUM CHLORIDE 1000 ML: 600; 310; 30; 20 INJECTION, SOLUTION INTRAVENOUS at 15:16

## 2022-03-20 RX ADMIN — ALBUTEROL SULFATE 2 PUFF: 90 AEROSOL, METERED RESPIRATORY (INHALATION) at 17:17

## 2022-03-20 RX ADMIN — THIAMINE HYDROCHLORIDE 500 MG: 100 INJECTION, SOLUTION INTRAMUSCULAR; INTRAVENOUS at 17:35

## 2022-03-20 RX ADMIN — SODIUM CHLORIDE, POTASSIUM CHLORIDE, SODIUM LACTATE AND CALCIUM CHLORIDE 1000 ML: 600; 310; 30; 20 INJECTION, SOLUTION INTRAVENOUS at 14:36

## 2022-03-20 RX ADMIN — IOPAMIDOL 83 ML: 755 INJECTION, SOLUTION INTRAVENOUS at 16:35

## 2022-03-20 RX ADMIN — PANTOPRAZOLE SODIUM 40 MG: 40 INJECTION, POWDER, FOR SOLUTION INTRAVENOUS at 17:20

## 2022-03-20 RX ADMIN — SODIUM CHLORIDE, POTASSIUM CHLORIDE, SODIUM LACTATE AND CALCIUM CHLORIDE 1000 ML: 600; 310; 30; 20 INJECTION, SOLUTION INTRAVENOUS at 22:30

## 2022-03-20 ASSESSMENT — ACTIVITIES OF DAILY LIVING (ADL)
DIFFICULTY_EATING/SWALLOWING: NO
WALKING_OR_CLIMBING_STAIRS_DIFFICULTY: NO
ADLS_ACUITY_SCORE: 5
ADLS_ACUITY_SCORE: 7
CONCENTRATING,_REMEMBERING_OR_MAKING_DECISIONS_DIFFICULTY: NO
WEAR_GLASSES_OR_BLIND: NO
DOING_ERRANDS_INDEPENDENTLY_DIFFICULTY: NO
TOILETING_ISSUES: NO
ADLS_ACUITY_SCORE: 7
ADLS_ACUITY_SCORE: 7
CHANGE_IN_FUNCTIONAL_STATUS_SINCE_ONSET_OF_CURRENT_ILLNESS/INJURY: NO
NUMBER_OF_TIMES_PATIENT_HAS_FALLEN_WITHIN_LAST_SIX_MONTHS: 6
FALL_HISTORY_WITHIN_LAST_SIX_MONTHS: YES
ADLS_ACUITY_SCORE: 7
DRESSING/BATHING_DIFFICULTY: NO
ADLS_ACUITY_SCORE: 7

## 2022-03-20 NOTE — ED PROVIDER NOTES
History     Chief Complaint   Patient presents with     Fall     Alcohol Intoxication     HPI    Luigi Vieyra is a 66 year old male who presents via EMS from home.  He says he spent the last week in a motel because he and his wife are fighting over his alcohol abuse.  She apparently found him in the garage on the ground.  He said he passed out.  He said he felt like he was going to faint and then did so.  He denies having sustained any injuries.  He has a long history of alcohol abuse.  Paramedics say his vital signs were normal when they found him.  They said his blood pressure was 100/60.  He said he is not had much to eat or drink over the last week because he is not able to get food in the motel.  Nevertheless he is able to get alcohol.  He says his last drink was last night at 10 PM.  He also says he fell a week ago and struck his right flank and has mild pain there.  He has no low back pain or neck pain.  He does not have a headache.  He has no extremity pain.  He denies symptoms of recent illness including no fever, cough, sore throat.  He is a heavy smoker.  He said he had some diarrhea about 10 days ago but has not had any black or tarry stools and has not been vomiting any blood.  He has not had epistaxis.  He says he has been taking his medications but is uncertain if he took his metoprolol today.    Allergies:  No Known Allergies    Problem List:    Patient Active Problem List    Diagnosis Date Noted     Hyponatremia 03/20/2022     Priority: Medium     Anemia, unspecified type 03/20/2022     Priority: Medium     Alcoholic gastritis, presence of bleeding unspecified, unspecified chronicity 03/20/2022     Priority: Medium     Hypotension due to hypovolemia 03/20/2022     Priority: Medium     Mixed hyperlipidemia 03/09/2020     Priority: Medium     Uncontrolled hypertension 01/02/2019     Priority: Medium     Seasonal allergic rhinitis 06/13/2017     Priority: Medium     CARDIOVASCULAR SCREENING; LDL  GOAL LESS THAN 130 03/31/2014     Priority: Medium     Tobacco abuse 03/25/2014     Priority: Medium     Mild persistent asthma 05/18/2012     Priority: Medium     Alcohol abuse 05/18/2012     Priority: Medium        Past Medical History:    Past Medical History:   Diagnosis Date     Asthma        Past Surgical History:    No past surgical history on file.    Family History:    Family History   Problem Relation Age of Onset     Asthma Mother      Cerebrovascular Disease Mother      C.A.D. No family hx of      Diabetes No family hx of      Hypertension No family hx of      Breast Cancer No family hx of      Cancer - colorectal No family hx of      Prostate Cancer No family hx of        Social History:  Marital Status:   [2]  Social History     Tobacco Use     Smoking status: Current Every Day Smoker     Packs/day: 0.75     Years: 45.00     Pack years: 33.75     Types: Cigarettes     Start date: 1976     Smokeless tobacco: Never Used   Vaping Use     Vaping Use: Never used   Substance Use Topics     Alcohol use: Yes     Comment: about 2 1.75 of vodka currently      Drug use: No        Medications:    albuterol (PROAIR HFA/PROVENTIL HFA/VENTOLIN HFA) 108 (90 Base) MCG/ACT inhaler  aspirin 81 MG tablet  beclomethasone HFA (QVAR REDIHALER) 80 MCG/ACT inhaler  losartan (COZAAR) 100 MG tablet  metoprolol succinate ER (TOPROL-XL) 25 MG 24 hr tablet  nicotine (NICORETTE) 4 MG lozenge  order for DME          Review of Systems  All other systems are reviewed and are negative    Physical Exam   BP: (S) (!) 78/55 (MD aware)  Pulse: 71  Temp: 97.6  F (36.4  C)  Resp: 16  Weight: 77.1 kg (170 lb)  SpO2: 99 %      Physical Exam  Nursing note and vitals were reviewed.  Constitutional: Awake and alert, poorly nourished, chronically ill, disheveled appearing 66-year-old in no apparent discomfort, who does not appear intoxicated, and who answers questions slowly but appropriately and cooperates with examination.  HEENT: There is  an abrasion on the forehead between the eyebrows but no other signs of head trauma.  EACs are clear.  TMs are normal.  No hemotympanum.  No CSF otorrhea or rhinorrhea.  PERRL EOMI.   Neck: Freely mobile.  No pain with range of motion of the neck.  Cardiovascular: Cardiac examination reveals normal heart rate and regular rhythm without murmur.  Pulmonary/Chest: Breathing is unlabored.  Breath sounds are clear and equal bilaterally.  There no retractions, tachypnea, rales, wheezes, or rhonchi.  Abdomen: Soft, nontender, no HSM or masses rebound or guarding.  Musculoskeletal: Extremities are cool but noncyanotic and without edema.  No tenderness to palpation or percussion of the length of the spine.  No pain with range of motion of the spine.  No chest wall tenderness.  Moves all extremities freely.  I can move his lower extremities through full range of motion of the hips knees and ankles without any discomfort.  Neurological: Alert, oriented, thought content logical, coherent.  No facial droop.  Normal motor tone.  No tremor.  Skin: Cool, dry, dirt encrusted, no rashes.  Psychiatric: Affect blunted and restricted..      ED Course                 Procedures              EKG Interpretation:      Interpreted by Steve Lama MD  Time reviewed: 14:50  Symptoms at time of EKG: none   Rhythm: normal sinus   Rate: normal  Axis: normal  Ectopy: none  Conduction: normal  ST Segments/ T Waves: No ST-T wave changes  Q Waves: none  Comparison to prior: Unchanged from 11/21/20    Clinical Impression: normal EKG    Critical Care time:  was 30 minutes for this patient excluding procedures.               Results for orders placed or performed during the hospital encounter of 03/20/22 (from the past 24 hour(s))   Extra Tube    Narrative    The following orders were created for panel order Extra Tube.  Procedure                               Abnormality         Status                     ---------                                -----------         ------                     Extra Green Top (Lithium...[[id number]]                      Final result                 Please view results for these tests on the individual orders.   Extra Green Top (Lithium Heparin) Tube   Result Value Ref Range    Hold Specimen [facility]    CBC with platelets differential    Narrative    The following orders were created for panel order CBC with platelets differential.  Procedure                               Abnormality         Status                     ---------                               -----------         ------                     CBC with platelets and d...[[id number]]  Abnormal            Final result                 Please view results for these tests on the individual orders.   INR   Result Value Ref Range    INR 1.00 0.85 - 1.15   Comprehensive metabolic panel   Result Value Ref Range    Sodium 128 (L) 133 - 144 mmol/L    Potassium 4.5 3.4 - 5.3 mmol/L    Chloride 90 (L) 94 - 109 mmol/L    Carbon Dioxide (CO2) 18 (L) 20 - 32 mmol/L    Anion Gap 20 (H) 3 - 14 mmol/L    Urea Nitrogen 46 (H) 7 - 30 mg/dL    Creatinine 1.90 (H) 0.66 - 1.25 mg/dL    Calcium 9.6 8.5 - 10.1 mg/dL    Glucose 115 (H) 70 - 99 mg/dL    Alkaline Phosphatase 155 (H) 40 - 150 U/L     (H) 0 - 45 U/L    ALT 58 0 - 70 U/L    Protein Total 7.0 6.8 - 8.8 g/dL    Albumin 2.7 (L) 3.4 - 5.0 g/dL    Bilirubin Total 1.0 0.2 - 1.3 mg/dL    GFR Estimate 39 (L) >60 mL/min/1.73m2   Lipase   Result Value Ref Range    Lipase 676 (H) 73 - 393 U/L   Ethyl Alcohol Level   Result Value Ref Range    Alcohol ethyl <0.01 <=0.01 g/dL   Blood gas venous   Result Value Ref Range    pH Venous 7.35 7.32 - 7.43    pCO2 Venous 35 (L) 40 - 50 mm Hg    pO2 Venous 14 (L) 25 - 47 mm Hg    Bicarbonate Venous 19 (L) 21 - 28 mmol/L    Base Excess/Deficit (+/-) -5.6 -7.7 - 1.9 mmol/L    FIO2 0    Lactic acid whole blood   Result Value Ref Range    Lactic Acid 8.1 (HH) 0.7 - 2.0 mmol/L   Troponin I   Result Value Ref  Range    Troponin I High Sensitivity 7 <79 ng/L   CBC with platelets and differential   Result Value Ref Range    WBC Count 13.0 (H) 4.0 - 11.0 10e3/uL    RBC Count 2.65 (L) 4.40 - 5.90 10e6/uL    Hemoglobin 9.7 (L) 13.3 - 17.7 g/dL    Hematocrit 28.4 (L) 40.0 - 53.0 %     (H) 78 - 100 fL    MCH 36.6 (H) 26.5 - 33.0 pg    MCHC 34.2 31.5 - 36.5 g/dL    RDW 15.2 (H) 10.0 - 15.0 %    Platelet Count 607 (H) 150 - 450 10e3/uL    % Neutrophils 83 %    % Lymphocytes 6 %    % Monocytes 8 %    % Eosinophils 0 %    % Basophils 1 %    % Immature Granulocytes 2 %    NRBCs per 100 WBC 0 <1 /100    Absolute Neutrophils 10.7 (H) 1.6 - 8.3 10e3/uL    Absolute Lymphocytes 0.8 0.8 - 5.3 10e3/uL    Absolute Monocytes 1.1 0.0 - 1.3 10e3/uL    Absolute Eosinophils 0.0 0.0 - 0.7 10e3/uL    Absolute Basophils 0.1 0.0 - 0.2 10e3/uL    Absolute Immature Granulocytes 0.3 <=0.4 10e3/uL    Absolute NRBCs 0.0 10e3/uL   CT Chest/Abdomen/Pelvis w Contrast    Narrative    EXAM: CT CHEST/ABDOMEN/PELVIS W CONTRAST  LOCATION: [facility]  DATE/TIME: 3/20/2022 4:35 PM    INDICATION: Sepsis.  COMPARISON: None.  TECHNIQUE: CT scan of the chest, abdomen, and pelvis was performed following injection of IV contrast. Multiplanar reformats were obtained. Dose reduction techniques were used.   CONTRAST: 83 mL Isovue 370.    FINDINGS:   LUNGS AND PLEURA: No effusions. No acute airspace disease.    MEDIASTINUM/AXILLAE: No acute thoracic aortic abnormality. There is diffuse esophageal wall thickening. No central pulmonary embolism. The exam is not tailored for specific assessment of pulmonary embolism. No suspicious lymph node. Mediastinal lymph   nodes appears small.    CORONARY ARTERY CALCIFICATION: None.    HEPATOBILIARY: Hepatic steatosis. There is mild gallbladder wall thickening.    PANCREAS: Normal.    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 A few splenules at the left upper quadrant near the stomach.    ADRENAL GLANDS:  Normal.    KIDNEYS/BLADDER: No significant mass, stones, or hydronephrosis. There are simple or benign cysts. No follow up is needed.    BOWEL: No obstruction. There is wall thickening of the gastric antrum with suggestion of mild adjacent edema. No evidence for abscess or free air. No evidence for appendicitis. Appendix not seen.    LYMPH NODES: Normal.    VASCULATURE: Scattered vascular calcifications.    PELVIC ORGANS: Normal.    MUSCULOSKELETAL: Normal.      Impression    IMPRESSION:  1.  Some wall thickening of the gastric antrum with mild adjacent edema may be a distal gastritis. No abscess or free air.  2.  Mild wall thickening of the gallbladder. Correlate clinically with any evidence of cholecystitis.  3.  Fatty liver.   Asymptomatic COVID-19 Virus (Coronavirus) by PCR Nasopharyngeal    Specimen: Nasopharyngeal; Swab   Result Value Ref Range    SARS CoV2 PCR Negative Negative    Narrative    Testing was performed using the john  SARS-CoV-2 & Influenza A/B Assay on the john  [employer]  System.  This test should be ordered for the detection of SARS-COV-2 in individuals who meet SARS-CoV-2 clinical and/or epidemiological criteria. Test performance is unknown in asymptomatic patients.  This test is for in vitro diagnostic use under the FDA EUA for laboratories certified under CLIA to perform moderate and/or high complexity testing. This test has not been FDA cleared or approved.  A negative test does not rule out the presence of PCR inhibitors in the specimen or target RNA in concentration below the limit of detection for the assay. The possibility of a false negative should be considered if the patient's recent exposure or clinical presentation suggests COVID-19.  [facility] are certified under the Clinical Laboratory Improvement Amendments of 1988 (CLIA-88) as qualified to perform moderate and/or high complexity laboratory testing.   Hemaglobin   Result Value Ref Range    Hemoglobin 8.4 (L)  13.3 - 17.7 g/dL   Lactic acid whole blood   Result Value Ref Range    Lactic Acid 1.9 0.7 - 2.0 mmol/L       Medications   lactated ringers BOLUS 1,000 mL (0 mLs Intravenous Stopped 3/20/22 1516)     Followed by   lactated ringers infusion (has no administration in time range)   albuterol (PROVENTIL HFA/VENTOLIN HFA) inhaler (2 puffs Inhalation Given 3/20/22 1717)   thiamine (B-1) 500 mg in sodium chloride 0.9 % 50 mL intermittent infusion (has no administration in time range)   lactated ringers BOLUS 1,000 mL (1,000 mLs Intravenous New Bag 3/20/22 1516)   iopamidol (ISOVUE-370) solution 83 mL (83 mLs Intravenous Given 3/20/22 1635)   sodium chloride 0.9 % bag 500mL for CT scan flush use (100 mLs Intravenous Given 3/20/22 1635)   pantoprazole (PROTONIX) IV push injection 40 mg (40 mg Intravenous Given 3/20/22 1720)     1421: Patient seen on arrival.  2 IVs established.  Aggressive hydration initiated.  Hypotensive but not tachycardic but taking a beta-blocker.    Assessments & Plan (with Medical Decision Making)     66-year-old male with a history of alcohol abuse presented via EMS after a syncopal episode as described above.  On arrival he was hypotensive but not tachycardic and afebrile.  He was however mentating fairly well and able to be conversant and answer questions appropriately.  2 IVs were established and he received fluid resuscitation with warm Ringer's lactate.  After 2 L his blood pressure had normalized and most recently was 108/87.  A search was undertaken for the cause of this.  Laboratory studies showed anemia worse from his last reading but no history are exam evidence of current bleeding.  CT scan of the chest abdomen and pelvis showed wall thickening of the gastric antrum with surrounding edema suggestive of either gastritis or ulcer.  This is likely alcoholic gastritis or ulcer and its likely he has had at least some occult blood loss causing this anemia likely from this source.  There may  well also be a component of anemia of chronic disease.  He has multiple other stigmata of alcohol abuse and his laboratory studies and his hyponatremic likely due to dehydration.  His hypotension is likely primarily due to dehydration from poor oral intake and excessive alcohol use and aggravated by his beta-blocker therapy preventing him from mounting a compensatory tachycardia.  I do not have a strong suspicion for cardiac cause for his hypotension given his normal EKG but an alcoholic cardiomyopathy cannot be completely excluded.  With his good response to fluid resuscitation I do not think we need to look further unless hypotension recurs.  Magnesium level and iron studies are pending.  Given the CT findings, proton pump inhibitor therapy was initiated.  At this time I have a low suspicion for sepsis despite his markedly elevated lactate.  This is likely due to his dehydration.  His lactate normalized after 2 L of Ringer's lactate.  His hemoglobin dropped further from 9.7-8.4 with rehydration, as expected.  I gave him a dose of thiamine at doses to cover Warnicke's encephalopathy.  He will probably require additional observation.  He did not show evidence of encephalopathy or nystagmus on I was not able to test his gait but his MCV is quite high at 107 so I erred on the side of caution.  He did not show evidence of alcohol withdrawal during his time in the emergency department but remains at risk for this.  He does express a desire to quit drinking but does not have plans to do so.  We will get social service consultation during hospitalization.  He was in treatment many years ago but is not currently attending any groups.  His hyponatremia is likely due to dehydration.  He denies any abdominal pain but does have some mild gallbladder wall thickening.  My suspicion for cholecystitis is low but he should be monitored for development of pain, fever, nausea, or other worrisome symptoms.  His bilirubin is normal.   His alkaline phosphatase is elevated of uncertain significance but only mildly so.  His AST is elevated likely due to alcohol and fatty liver.  He will require hospitalization for rehydration and monitoring for evidence of bleeding and of sepsis.  I have made him n.p.o. after midnight in case he needs upper GI endoscopy tomorrow.  He is agreeable to the plan for hospitalization.  I discussed his case with Tamar Jean Baptiste of the hospital service and they will assume care and admission.    I have reviewed the nursing notes.    I have reviewed the findings, diagnosis, plan and need for follow up with the patient.       New Prescriptions    No medications on file       Final diagnoses:   Hypotension due to hypovolemia   Anemia, unspecified type   Alcohol abuse   Alcoholic gastritis, presence of bleeding unspecified, unspecified chronicity   Hyponatremia       3/20/2022   [facility] EMERGENCY DEPT     Steve Lama MD  03/20/22 1731       Steve Lama MD  03/20/22 [id number]

## 2022-03-20 NOTE — H&P
[facility]    History and Physical - Hospitalist Service       Date of Admission:  3/20/2022    Assessment & Plan      Luigi Vieyra is a 66 year old male admitted on 3/20/2022. He presented to the emergency department for evaluation of a syncopal episode in the setting of recent alcohol binge and alcohol dependence and was found to have hypotension, several metabolic abnormalities, acute kidney injury, and suspicion for GI bleed for which he is being admitted for further evaluation and treatment.    Alcoholic gastritis, presumed GI bleed  Anemia, likely multifactorial due to chronic alcohol use and recent GI losses  Admit hemoglobin 9.7 (recent baseline unavailable, was last hemoglobin was 14.6 in 2020), initially hemodynamically unstable with hypotension which resolved with fluid resuscitation. Takes aspirin 81 mg daily preadmission but not anticoagulation. Admits to bloody stools about 1 week prior to admission which had since resolved. CT abdomen & pelvis demonstrates thickened gastric antrum with adjacent edema, suggestive of gastritis. Suspect patient has GI bleed due to alcohol use.  Hemoglobin trend 9.7 ? 8.4  - IV Protonix 40 mg daily initiated 3/20/2022, continue  - Trend hemoglobin q6h x4, transfuse for hemoglobin < 7.0 (patient agreeable to transfusion if indicated (has not yet been formally consented))  - GI consult for EGD in am   - NPO  - Holding aspirin   - Guaiac pending     MAIA (acute kidney injury)  Admit creatinine 1.90 (baseline 0.7 -0.8), GFR 39 (baseline > 90). Trending toward improvement after IV fluid administration. Likely prerenal in the setting of poor oral intake and hypotension.  - Daily CMP  - Received 1L LR in the emergency department, continue with maintenance @ 125 ml/hr  - Hold nephrotoxins (losartan)    Syncope  Hypotension due to hypovolemia, resolved  Presented after witnessed syncopal episode, initially hypotensive with blood pressure 78/55. Blood  pressure normalized after 1L LR bolus. Presume syncope was due to hypovolemia / hypotension from excessive alcohol intake and poor hydration.   - Continue with fluid resuscitation  - Monitor blood pressure   - Head CT pending     Alcohol abuse / dependence  Transaminitis / alcoholic hepatitis  Known history of dependence, completed rehab a few years ago. Denies prior history of withdrawal or seizures, but feels shaky today. Has been drinking daily for the past 2 months, but significantly more on recent binge x 1 week; drinking about 1/2 bottle of 1.75L vodka daily. Last reported drink on 3/18. Admit hepatic labs with elevated alk phos (155) and alcoholic hepatitis pattern (ALT 58 / ).   - CIWA protocol with Ativan available  - IV vitamins for now, transition to oral after EGD  - Daily CMP    Elevated lactic acid level, resolved  Initial lactic acid 8.1, normalized to 1.9 after 1L LR. Etiology of elevated lactic acid seems to be due to hypovolemia / hypotension rather than sepsis.   - Resolved 3/20/2022     Hyponatremia  Initial Na = 128. Likely due to poor oral intake and possible beer potomania. Degree of hyponatremia is mild, low risk for ODS.   - Repeat Na q6h x4  - IV fluids as noted above    Leukocytosis with left shift  Initial WBC 13.0, ANC 10.7. Afebrile. No evidence of infection on imaging or by history or exam.   - No indication for antibiotics at this time; continue to observe off antibiotics for now  - Blood cultures x 2 pending  - UA/UC pending    Thrombocytosis  Initial platelets 607, occurs in the setting of extreme dehydration. May possibly improve as fluid status normalizes.  - CBC with platelets in am    Fall  Chest wall pain, right posterior near scapula  New posterior rib / chest wall pain after fall, says he hit his back on a table when he fell. CT chest, abdomen, & pelvis did not reveal any evidence of musculoskeletal injury.   - Prn ice and oxycodone for pain (unable to have  "acetaminophen due to LFT elevation, or NSAIDs due to GI bleed)    Uncontrolled hypertension  Has known hypertension diagnosis prior to admission, but initially hypotensive in the emergency department. Managed prior to admission with losartan 100 mg daily and metoprolol XL 25 mg daily.   - Losartan held due to renal function, see above  - Continue metoprolol with holding parameters    Mixed hyperlipidemia  Noted on problem list, does not appear to be on statin or lipid lowering medication prior to admission.  - Defer to PCP management    Mild persistent asthma  Noted on problem list, no prior PFT's on record. Managed prior to admission with albuterol prn. No evidence of asthma exacerbation on admission.   - Prn albuterol nebs available    Tobacco abuse  Encourage cessation. Nicotine patch available.          Diet: Clear Liquid Diet, then NPO per Anesthesia Guidelines for Procedure/Surgery Except for: Meds  DVT Prophylaxis: Pneumatic Compression Devices  [place] Catheter: Not present  Central Lines: None  Cardiac Monitoring: None  Code Status: Full Code  - discussed with patient at time of admission       Disposition Plan   Expected Discharge: 2-3 days   Anticipated discharge location:  Awaiting care coordination huddle        The patient's care was discussed with the Attending Physician, Dr. Quincy Chance and Patient.    Tamar Jean Baptiste PA-C  Hospitalist Service  [facility]  Securely message with the [employer] Web Console (learn more here)  Text page via [employer] Paging/Directory     ______________________________________________________________________    Chief Complaint   \"I fainted today. I've been drinking lately.\"    History is obtained from the patient, review of EMR, and emergency department sign out from Dr. Tyrese Lama.    History of Present Illness   Luigi Vieyra is a 66 year old male who presented to the emergency department for evaluation of a syncopal episode in the setting of " a recent alcohol binge.     Patient admits to drinking daily for about the past 2 months, but significantly more in the past week.  He estimates that he drinks a 1/2 bottle of 1.75L vodka daily, last drink 2 days ago (3/18).   He has been through treatment in the past about 7 years ago, denies history of withdrawal or seizure.     This past week he has apparently been living in a hotel after fighting with his wife about drinking.   He has not been eating anything, drinking only vodka but nothing else for hydration.   He has been dizzy for the past week and has fallen and / or fainted 3 or 4 times in the past week, denies any significant injuries and did not seek evaluation with prior falls.    Today his wife was present and he had another syncopal episode, hitting his back against a table as he fell. He also thinks he hit his head lightly but denies current headache.   He thinks he was out for a few minutes, was unable to get up on his own after he came to.   His wife called EMS and he was brought to the emergency department; EMS reported a soft but normotensive blood pressure, but he was hypotensive at the time of emergency department arrival (78/55).   He was given IV fluid resuscitation with improvement in blood pressure.    Patient's initial hemoglobin was low, and there was evidence of gastritis on his CT abdomen & pelvis.   He denies any prior history of GI bleeding, does not think he's ever had a scope before.   He had some bloody stool about 1 week ago along with diarrhea, although stool has since returned to normal color in the past few days.   Denies any epigastric / abdominal pain.     Currently he denies any lightheadedness / dizziness (although he is laying down).   Denies any pain at rest, but has some pain in his back near right scapula when he coughs or moves.   He broke his glasses during his fall but denies any vision changes.   He denies prior history of withdrawal or seizures but says that today  he feels shaky, although no witnessed seizure-like activity.   Denies any dysuria, has been making normal amounts of urine.  Has scattered scrapes from his falls.   Is feeling generally weak with some balance issues lately.   The remainder review of systems is negative.         Review of Systems    The 10 point Review of Systems is negative other than noted in the HPI or here.     Past Medical History    I have reviewed this patient's medical history and updated it with pertinent information if needed.   Past Medical History:   Diagnosis Date     Asthma        Past Surgical History   I have reviewed this patient's surgical history and updated it with pertinent information if needed.  No past surgical history on file.    Social History   I have reviewed this patient's social history and updated it with pertinent information if needed.  Social History     Tobacco Use     Smoking status: Current Every Day Smoker     Packs/day: 0.75     Years: 45.00     Pack years: 33.75     Types: Cigarettes     Start date: 1976     Smokeless tobacco: Never Used   Vaping Use     Vaping Use: Never used   Substance Use Topics     Alcohol use: Yes     Comment: about 2 1.75 of vodka currently      Drug use: No       Family History   I have reviewed this patient's family history and updated it with pertinent information if needed.  Family History   Problem Relation Age of Onset     Asthma Mother      Cerebrovascular Disease Mother      C.A.D. No family hx of      Diabetes No family hx of      Hypertension No family hx of      Breast Cancer No family hx of      Cancer - colorectal No family hx of      Prostate Cancer No family hx of        Prior to Admission Medications   Prior to Admission Medications   Prescriptions Last Dose Informant Patient Reported? Taking?   albuterol (PROAIR HFA/PROVENTIL HFA/VENTOLIN HFA) 108 (90 Base) MCG/ACT inhaler   No No   Sig: Inhale 2 puffs into the lungs every 4 hours as needed for shortness of breath /  dyspnea or wheezing   aspirin 81 MG tablet   Yes No   Sig: Take 81 mg by mouth daily   beclomethasone HFA (QVAR REDIHALER) 80 MCG/ACT inhaler   No No   Sig: Inhale 1 puff into the lungs 2 times daily   losartan (COZAAR) 100 MG tablet   No No   Sig: Take 1 tablet (100 mg) by mouth daily Please make a clinic visit to be seen in person for medication refills.   metoprolol succinate ER (TOPROL-XL) 25 MG 24 hr tablet   No No   Sig: Take 1 tablet (25 mg) by mouth daily Please make a clinic visit to be seen in person for medication refills.   nicotine (NICORETTE) 4 MG lozenge   Yes No   Sig: Place 4 mg inside cheek as needed   Patient not taking: Reported on 9/15/2021   order for DME   No No   Sig: Equipment being ordered: Digital home blood pressure monitor kit   Patient not taking: Reported on 9/15/2021      Facility-Administered Medications: None     Allergies   No Known Allergies    Physical Exam   Vital Signs: Temp: 98.1  F (36.7  C) Temp src: Oral BP: (!) 87/51 Pulse: 91   Resp: 18 SpO2: 98 % O2 Device: None (Room air)    Weight: 153 lbs 14.1 oz    Constitutional: Alert, oriented, cooperative. Disheveled appearing but no apparent distress. Speaking in full coherent sentences.     Eyes: Eyes are clear, pupils are reactive. No scleral icterus. Extraocular movements intact.     HEENT: Oropharynx is clear and moist, no lesions. Normocephalic, no evidence of cranial trauma other than some superficial abrasions on the nasal bridge.    Cardiovascular: Regular rhythm and rate, normal S1 and S2. No murmur, rubs, or gallops. Peripheral pulses intact bilaterally. No lower extremity edema.    Respiratory: Non-labored breathing on room air. Lung sounds are clear to auscultation bilaterally without wheezes, rhonchi, or crackles.    GI: Soft, non-distended. Non-tender, no rebound or guarding. No hepatosplenomegaly or masses appreciated. Normal bowel sounds.     Musculoskeletal: Without obvious deformity, normal range of motion.  Normal muscle bulk and tone. Distal CMS intact. Right posterior ribs near the scapula are tender to palpation. Spine is non-tender to palpation.      Skin: Warm and dry, no rashes or ecchymoses. No mottling of skin. Scattered superficial abrasions present on nasal bridge, knuckles of right hand, and bilateral arms.      Neurologic: Patient moves all extremities. Cranial nerves are grossly intact. Gross strength and sensation are equal bilaterally.    Genitourinary: Deferred      Data   Data reviewed today: I reviewed all medications, new labs and imaging results over the last 24 hours. I personally reviewed the chest CT image(s) showing clear lungs and the abdominal CT image(s) showing gastric wall thickening, enlarged bladder.    Recent Labs   Lab 03/20/22  1717 03/20/22  1434   WBC  --  13.0*   HGB 8.4* 9.7*   MCV  --  107*   PLT  --  607*   INR  --  1.00   * 128*   POTASSIUM 4.2 4.5   CHLORIDE 95 90*   CO2 22 18*   BUN 45* 46*   CR 1.73* 1.90*   ANIONGAP 11 20*   PINO 8.8 9.6   * 115*   ALBUMIN  --  2.7*   PROTTOTAL  --  7.0   BILITOTAL  --  1.0   ALKPHOS  --  155*   ALT  --  58   AST  --  122*   LIPASE  --  676*     Recent Results (from the past 24 hour(s))   CT Chest/Abdomen/Pelvis w Contrast    Narrative    EXAM: CT CHEST/ABDOMEN/PELVIS W CONTRAST  LOCATION: [facility]  DATE/TIME: 3/20/2022 4:35 PM    INDICATION: Sepsis.  COMPARISON: None.  TECHNIQUE: CT scan of the chest, abdomen, and pelvis was performed following injection of IV contrast. Multiplanar reformats were obtained. Dose reduction techniques were used.   CONTRAST: 83 mL Isovue 370.    FINDINGS:   LUNGS AND PLEURA: No effusions. No acute airspace disease.    MEDIASTINUM/AXILLAE: No acute thoracic aortic abnormality. There is diffuse esophageal wall thickening. No central pulmonary embolism. The exam is not tailored for specific assessment of pulmonary embolism. No suspicious lymph node. Mediastinal lymph    nodes appears small.    CORONARY ARTERY CALCIFICATION: None.    HEPATOBILIARY: Hepatic steatosis. There is mild gallbladder wall thickening.    PANCREAS: Normal.    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 A few splenules at the left upper quadrant near the stomach.    ADRENAL GLANDS: Normal.    KIDNEYS/BLADDER: No significant mass, stones, or hydronephrosis. There are simple or benign cysts. No follow up is needed.    BOWEL: No obstruction. There is wall thickening of the gastric antrum with suggestion of mild adjacent edema. No evidence for abscess or free air. No evidence for appendicitis. Appendix not seen.    LYMPH NODES: Normal.    VASCULATURE: Scattered vascular calcifications.    PELVIC ORGANS: Normal.    MUSCULOSKELETAL: Normal.      Impression    IMPRESSION:  1.  Some wall thickening of the gastric antrum with mild adjacent edema may be a distal gastritis. No abscess or free air.  2.  Mild wall thickening of the gallbladder. Correlate clinically with any evidence of cholecystitis.  3.  Fatty liver.

## 2022-03-20 NOTE — LETTER
Luigi Vieyra  [id number] [facility] [address]  April 3, 2022    Dear Luigi,   This letter is to inform you of the results of your pathology report on your upper endoscopy (EGD).    Your pathology report was:  Addendum   Immunostains for HSV and CMV are negative.  GMS is negative for fungal organisms.  The diagnoses are unchanged.  All immunohistochemical and special stains were performed with adequate controls.    Addendum electronically signed by Jeff Sandy MD on 3/28/2022 at 10:46 AM   Final Diagnosis   A.  Esophagus, distal: Biopsy:  - Necroinflammatory ulcer debris only  - No viable tissue present  B.  Duodenum: Biopsy:  - Peptic duodenitis with ulceration and reactive epithelial changes  C.  Esophagus, mid, 25 cm: Biopsy:  - Ulcer and granulation tissue, no intact epithelium identified  - Immunostains for HSV and CMV are in process, with the results to be reported in an addendum       Pathology showed findings consistent with inflammation of the esophagus and duodenum.  Testing for various organisms were done which were all negative.  I recommend abstaining from alcohol.  Continue taking your medication that was subscribed to you during the hospital stay.  I also recommend that you see a gastroenterologist for further work-up.  You will likely need another upper endoscopy in a few months to see if there is any improvement or worsening of your esophagus and duodenum.  If you have any questions or concerns please do not hesitate to call my office at [phone].  Sincerely,     [facility], DO  [facility]

## 2022-03-20 NOTE — ED NOTES
Pt presents to ED via EMS.  Pt got dizzy and felt lightheaded, then passed out.  Pt states he felt it before it happened.  Pt has not been eating or drinking water.  Pt drinks daily.  Last drink was 2200 last NOC, per pt.  Pt has been staying at a motel d/t arguing with wife about drinking.  Pt is A&Ox4.  Hypotensive in department.

## 2022-03-21 ENCOUNTER — ANESTHESIA (OUTPATIENT)
Dept: GASTROENTEROLOGY | Facility: CLINIC | Age: 67
DRG: 368 | End: 2022-03-21
Payer: COMMERCIAL

## 2022-03-21 ENCOUNTER — ANESTHESIA EVENT (OUTPATIENT)
Dept: GASTROENTEROLOGY | Facility: CLINIC | Age: 67
DRG: 368 | End: 2022-03-21
Payer: COMMERCIAL

## 2022-03-21 LAB
ALBUMIN SERPL-MCNC: 2.3 G/DL (ref 3.4–5)
ALP SERPL-CCNC: 102 U/L (ref 40–150)
ALT SERPL W P-5'-P-CCNC: 32 U/L (ref 0–70)
ANION GAP SERPL CALCULATED.3IONS-SCNC: 7 MMOL/L (ref 3–14)
AST SERPL W P-5'-P-CCNC: 45 U/L (ref 0–45)
BILIRUB SERPL-MCNC: 0.5 MG/DL (ref 0.2–1.3)
BUN SERPL-MCNC: 40 MG/DL (ref 7–30)
CALCIUM SERPL-MCNC: 8.5 MG/DL (ref 8.5–10.1)
CHLORIDE BLD-SCNC: 100 MMOL/L (ref 94–109)
CO2 SERPL-SCNC: 26 MMOL/L (ref 20–32)
CREAT SERPL-MCNC: 1.57 MG/DL (ref 0.66–1.25)
ERYTHROCYTE [DISTWIDTH] IN BLOOD BY AUTOMATED COUNT: 14.9 % (ref 10–15)
GFR SERPL CREATININE-BSD FRML MDRD: 49 ML/MIN/1.73M2
GLUCOSE BLD-MCNC: 89 MG/DL (ref 70–99)
HCT VFR BLD AUTO: 22.1 % (ref 40–53)
HGB BLD-MCNC: 7.5 G/DL (ref 13.3–17.7)
HGB BLD-MCNC: 7.5 G/DL (ref 13.3–17.7)
HGB BLD-MCNC: 8 G/DL (ref 13.3–17.7)
HGB BLD-MCNC: 8 G/DL (ref 13.3–17.7)
HGB BLD-MCNC: 8.3 G/DL (ref 13.3–17.7)
MCH RBC QN AUTO: 36.4 PG (ref 26.5–33)
MCHC RBC AUTO-ENTMCNC: 33.9 G/DL (ref 31.5–36.5)
MCV RBC AUTO: 107 FL (ref 78–100)
PLATELET # BLD AUTO: 483 10E3/UL (ref 150–450)
POTASSIUM BLD-SCNC: 3.5 MMOL/L (ref 3.4–5.3)
PROT SERPL-MCNC: 5.3 G/DL (ref 6.8–8.8)
RBC # BLD AUTO: 2.06 10E6/UL (ref 4.4–5.9)
SODIUM SERPL-SCNC: 130 MMOL/L (ref 133–144)
SODIUM SERPL-SCNC: 133 MMOL/L (ref 133–144)
SODIUM SERPL-SCNC: 133 MMOL/L (ref 133–144)
UPPER GI ENDOSCOPY: NORMAL
WBC # BLD AUTO: 11 10E3/UL (ref 4–11)

## 2022-03-21 PROCEDURE — 99221 1ST HOSP IP/OBS SF/LOW 40: CPT | Mod: 25 | Performed by: SURGERY

## 2022-03-21 PROCEDURE — 85018 HEMOGLOBIN: CPT | Performed by: PHYSICIAN ASSISTANT

## 2022-03-21 PROCEDURE — 250N000011 HC RX IP 250 OP 636: Performed by: HOSPITALIST

## 2022-03-21 PROCEDURE — 99232 SBSQ HOSP IP/OBS MODERATE 35: CPT | Performed by: HOSPITALIST

## 2022-03-21 PROCEDURE — 80053 COMPREHEN METABOLIC PANEL: CPT | Performed by: PHYSICIAN ASSISTANT

## 2022-03-21 PROCEDURE — 0DB98ZX EXCISION OF DUODENUM, VIA NATURAL OR ARTIFICIAL OPENING ENDOSCOPIC, DIAGNOSTIC: ICD-10-PCS | Performed by: SURGERY

## 2022-03-21 PROCEDURE — C9113 INJ PANTOPRAZOLE SODIUM, VIA: HCPCS | Performed by: HOSPITALIST

## 2022-03-21 PROCEDURE — 84295 ASSAY OF SERUM SODIUM: CPT | Performed by: PHYSICIAN ASSISTANT

## 2022-03-21 PROCEDURE — 250N000011 HC RX IP 250 OP 636: Performed by: PHYSICIAN ASSISTANT

## 2022-03-21 PROCEDURE — 250N000009 HC RX 250: Performed by: PHYSICIAN ASSISTANT

## 2022-03-21 PROCEDURE — 120N000001 HC R&B MED SURG/OB

## 2022-03-21 PROCEDURE — 85027 COMPLETE CBC AUTOMATED: CPT | Performed by: PHYSICIAN ASSISTANT

## 2022-03-21 PROCEDURE — 250N000009 HC RX 250: Performed by: NURSE ANESTHETIST, CERTIFIED REGISTERED

## 2022-03-21 PROCEDURE — 43239 EGD BIOPSY SINGLE/MULTIPLE: CPT | Performed by: SURGERY

## 2022-03-21 PROCEDURE — C9113 INJ PANTOPRAZOLE SODIUM, VIA: HCPCS | Performed by: PHYSICIAN ASSISTANT

## 2022-03-21 PROCEDURE — 88312 SPECIAL STAINS GROUP 1: CPT | Mod: TC | Performed by: SURGERY

## 2022-03-21 PROCEDURE — 0DB58ZX EXCISION OF ESOPHAGUS, VIA NATURAL OR ARTIFICIAL OPENING ENDOSCOPIC, DIAGNOSTIC: ICD-10-PCS | Performed by: SURGERY

## 2022-03-21 PROCEDURE — 36415 COLL VENOUS BLD VENIPUNCTURE: CPT | Performed by: PHYSICIAN ASSISTANT

## 2022-03-21 PROCEDURE — 250N000011 HC RX IP 250 OP 636: Performed by: NURSE ANESTHETIST, CERTIFIED REGISTERED

## 2022-03-21 PROCEDURE — 999N000156 HC STATISTIC RCP CONSULT EA 30 MIN

## 2022-03-21 PROCEDURE — 370N000017 HC ANESTHESIA TECHNICAL FEE, PER MIN: Performed by: SURGERY

## 2022-03-21 PROCEDURE — 250N000013 HC RX MED GY IP 250 OP 250 PS 637: Performed by: PHYSICIAN ASSISTANT

## 2022-03-21 PROCEDURE — 258N000003 HC RX IP 258 OP 636: Performed by: PHYSICIAN ASSISTANT

## 2022-03-21 RX ORDER — PROPOFOL 10 MG/ML
INJECTION, EMULSION INTRAVENOUS PRN
Status: DISCONTINUED | OUTPATIENT
Start: 2022-03-21 | End: 2022-03-21

## 2022-03-21 RX ORDER — HYDROMORPHONE HCL IN WATER/PF 6 MG/30 ML
.2-.4 PATIENT CONTROLLED ANALGESIA SYRINGE INTRAVENOUS
Status: DISCONTINUED | OUTPATIENT
Start: 2022-03-21 | End: 2022-03-26

## 2022-03-21 RX ORDER — EPHEDRINE SULFATE 50 MG/ML
INJECTION, SOLUTION INTRAMUSCULAR; INTRAVENOUS; SUBCUTANEOUS PRN
Status: DISCONTINUED | OUTPATIENT
Start: 2022-03-21 | End: 2022-03-21

## 2022-03-21 RX ORDER — EPINEPHRINE INHALATION 125 UG/1
1-2 AEROSOL RESPIRATORY (INHALATION) DAILY
Status: ON HOLD | COMMUNITY
End: 2022-03-28

## 2022-03-21 RX ORDER — MULTIVITAMIN,THERAPEUTIC
1 TABLET ORAL DAILY
Status: COMPLETED | OUTPATIENT
Start: 2022-03-22 | End: 2022-03-25

## 2022-03-21 RX ORDER — LIDOCAINE 40 MG/G
CREAM TOPICAL
Status: DISCONTINUED | OUTPATIENT
Start: 2022-03-21 | End: 2022-03-21 | Stop reason: HOSPADM

## 2022-03-21 RX ORDER — FOLIC ACID 1 MG/1
1 TABLET ORAL DAILY
Status: COMPLETED | OUTPATIENT
Start: 2022-03-22 | End: 2022-03-25

## 2022-03-21 RX ORDER — SODIUM CHLORIDE, SODIUM LACTATE, POTASSIUM CHLORIDE, CALCIUM CHLORIDE 600; 310; 30; 20 MG/100ML; MG/100ML; MG/100ML; MG/100ML
INJECTION, SOLUTION INTRAVENOUS CONTINUOUS
Status: DISCONTINUED | OUTPATIENT
Start: 2022-03-21 | End: 2022-03-21 | Stop reason: HOSPADM

## 2022-03-21 RX ADMIN — PANTOPRAZOLE SODIUM 40 MG: 40 INJECTION, POWDER, FOR SOLUTION INTRAVENOUS at 20:24

## 2022-03-21 RX ADMIN — GABAPENTIN 900 MG: 300 CAPSULE ORAL at 03:43

## 2022-03-21 RX ADMIN — PANTOPRAZOLE SODIUM 40 MG: 40 INJECTION, POWDER, FOR SOLUTION INTRAVENOUS at 09:01

## 2022-03-21 RX ADMIN — GABAPENTIN 900 MG: 300 CAPSULE ORAL at 10:38

## 2022-03-21 RX ADMIN — METOPROLOL SUCCINATE 25 MG: 25 TABLET, FILM COATED, EXTENDED RELEASE ORAL at 09:16

## 2022-03-21 RX ADMIN — FOLIC ACID: 5 INJECTION, SOLUTION INTRAMUSCULAR; INTRAVENOUS; SUBCUTANEOUS at 09:05

## 2022-03-21 RX ADMIN — Medication 15 MG: at 13:22

## 2022-03-21 RX ADMIN — SODIUM CHLORIDE, POTASSIUM CHLORIDE, SODIUM LACTATE AND CALCIUM CHLORIDE: 600; 310; 30; 20 INJECTION, SOLUTION INTRAVENOUS at 06:15

## 2022-03-21 RX ADMIN — Medication 10 MG: at 13:24

## 2022-03-21 RX ADMIN — GABAPENTIN 900 MG: 300 CAPSULE ORAL at 20:06

## 2022-03-21 RX ADMIN — PROPOFOL 150 MG: 10 INJECTION, EMULSION INTRAVENOUS at 13:07

## 2022-03-21 RX ADMIN — TOPICAL ANESTHETIC 2 SPRAY: 200 SPRAY DENTAL; PERIODONTAL at 13:01

## 2022-03-21 RX ADMIN — Medication 1 PATCH: at 09:15

## 2022-03-21 ASSESSMENT — ACTIVITIES OF DAILY LIVING (ADL)
ADLS_ACUITY_SCORE: 5

## 2022-03-21 ASSESSMENT — LIFESTYLE VARIABLES: TOBACCO_USE: 1

## 2022-03-21 NOTE — ANESTHESIA POSTPROCEDURE EVALUATION
Patient: Luigi Vieyra    Procedure: Procedure(s):  ESOPHAGOGASTRODUODENOSCOPY, WITH BIOPSY       Anesthesia Type:  General    Note:  Disposition: Outpatient   Postop Pain Control: Uneventful            Sign Out: Well controlled pain   PONV: No   Neuro/Psych: Uneventful            Sign Out: Acceptable/Baseline neuro status   Airway/Respiratory: Uneventful            Sign Out: Acceptable/Baseline resp. status   CV/Hemodynamics: Uneventful            Sign Out: Acceptable CV status; No obvious hypovolemia; No obvious fluid overload   Other NRE: NONE   DID A NON-ROUTINE EVENT OCCUR? No           Last vitals:  Vitals Value Taken Time   BP     Temp     Pulse     Resp     SpO2         Electronically Signed By: DEYSI Mojica CRNA  March 21, 2022  1:27 PM

## 2022-03-21 NOTE — PLAN OF CARE
Goal Outcome Evaluation:  Has been NPO (except for meds) since midnight for EGD in AM.  VSS.  Complains of generalized back pain and PRN oxycodone given, able to sleep well after.  CIWA scores have been 4.  Up to edge of bed  and voiding in urinal.

## 2022-03-21 NOTE — ANESTHESIA PREPROCEDURE EVALUATION
Anesthesia Pre-Procedure Evaluation    Patient: Luigi Vieyra   MRN: [id number] : 1955        Procedure : Procedure(s):  ESOPHAGOGASTRODUODENOSCOPY, WITH BIOPSY          Past Medical History:   Diagnosis Date     Asthma       No past surgical history on file.   No Known Allergies   Social History     Tobacco Use     Smoking status: Current Every Day Smoker     Packs/day: 0.75     Years: 45.00     Pack years: 33.75     Types: Cigarettes     Start date:      Smokeless tobacco: Never Used   Substance Use Topics     Alcohol use: Yes     Comment: about 2 1.75 of vodka currently       Wt Readings from Last 1 Encounters:   22 69.8 kg (153 lb 14.1 oz)        Anesthesia Evaluation            ROS/MED HX  ENT/Pulmonary:     (+) allergic rhinitis, tobacco use, 34  Pack-Year Hx,  Mild Persistent, asthma Treatment: Inhaler prn, Inhaler daily and Inhaled steroids,      Neurologic:       Cardiovascular:     (+) Dyslipidemia hypertension-----fainting (syncope). Previous cardiac testing   Echo: Date: Results:    Stress Test: Date: Results:    ECG Reviewed: Date: 3-2022 Results:  Sinus Rhythm   Low voltage in limb leads.     ABNORMAL   Cath: Date: Results:      METS/Exercise Tolerance:     Hematologic:     (+) anemia,     Musculoskeletal:       GI/Hepatic:     (+) GERD, Symptomatic,     Renal/Genitourinary:     (+) renal disease,     Endo:       Psychiatric/Substance Use:     (+) alcohol abuse     Infectious Disease:       Malignancy:       Other:               OUTSIDE LABS:  CBC:   Lab Results   Component Value Date    WBC 11.0 2022    WBC 13.0 (H) 2022    HGB 7.5 (L) 2022    HGB 7.5 (L) 2022    HCT 22.1 (L) 2022    HCT 28.4 (L) 2022     (H) 2022     (H) 2022     BMP:   Lab Results   Component Value Date     2022     2022    POTASSIUM 3.5 2022    POTASSIUM 4.2 2022    CHLORIDE 100 2022    CHLORIDE 95 2022     CO2 26 03/21/2022    CO2 22 03/20/2022    BUN 40 (H) 03/21/2022    BUN 45 (H) 03/20/2022    CR 1.57 (H) 03/21/2022    CR 1.73 (H) 03/20/2022    GLC 89 03/21/2022     (H) 03/20/2022     COAGS:   Lab Results   Component Value Date    INR 1.00 03/20/2022     POC: No results found for: BGM, HCG, HCGS  HEPATIC:   Lab Results   Component Value Date    ALBUMIN 2.3 (L) 03/21/2022    PROTTOTAL 5.3 (L) 03/21/2022    ALT 32 03/21/2022    AST 45 03/21/2022    ALKPHOS 102 03/21/2022    BILITOTAL 0.5 03/21/2022     OTHER:   Lab Results   Component Value Date    LACT 1.9 03/20/2022    PINO 8.5 03/21/2022    MAG 1.9 03/20/2022    LIPASE 676 (H) 03/20/2022    TSH 1.06 12/27/2018       Anesthesia Plan    ASA Status:  3   NPO Status:  NPO Appropriate    Anesthesia Type: General.     - Airway: Native airway      Maintenance: Balanced.        Consents    Anesthesia Plan(s) and associated risks, benefits, and realistic alternatives discussed. Questions answered and patient/representative(s) expressed understanding.    - Discussed:     - Discussed with:  Patient         Postoperative Care            Comments:                DEYSI Herrera CRNA

## 2022-03-21 NOTE — PROGRESS NOTES
"[facility] ADMISSION NOTE    Patient admitted to room 2303 at approximately 1844 via wheel chair from emergency room. Patient was accompanied by transport tech.     Verbal SBAR report received from Rina JIMENES RN prior to patient arrival.     Patient ambulated to bed with stand-by assist. Patient alert and oriented X 3. The patient is not having any pain.  . Admission vital signs: Blood pressure (!) 87/51, pulse 91, temperature 98.1  F (36.7  C), temperature source Oral, resp. rate 18, height 1.778 m (5' 10\"), weight 69.8 kg (153 lb 14.1 oz), SpO2 98 %. Patient were oriented to plan of care, call light, bed controls, tv, telephone, bathroom and visiting hours.     Risk Assessment    The following safety risks were identified during admission: fall and skin. Yellow risk band applied: YES.     Skin Initial Assessment    This writer admitted this patient and completed a full skin assessment and Kmaar score in the Adult PCS flowsheet. Appropriate interventions initiated as needed.     Secondary skin check completed by: Not completed at this time, patient declined ALL skin checks.          Education    Patient has a [place] to Observation order: No  Observation education completed and documented: N/A      Cheyenne Sneed RN    "

## 2022-03-21 NOTE — PLAN OF CARE
[facility] TRANSPORT NOTE  Data:   Reason for Transport:  EGD    Luigi Vieyra was transported from 2303 via wheel chair at 1120.  Patient was accompanied by Nursing Assistant. Equipment used for transport: IV pump. Family was aware of reason for transport: yes    Action:  Report: given to CADEN Mejia    Response:  Patient's condition when transferred off unit was stable.    Joanna Jo RNGoal Outcome Evaluation:

## 2022-03-21 NOTE — CONSULTS
[facility]    Pre-Endoscopy History and Physical     Luigi Vieyra MRN# [id number]   YOB: 1955 Age: 66 year old     Date of Procedure: 3/20/2022  Primary care provider: Gatito Ware  Type of Endoscopy: Esophagogastroduodenoscopy with possible biopsy, possible dilation, possible foreign body removal  Reason for Procedure: EtoH gastritis; abnl CT  Type of Anesthesia Anticipated: MAC    HPI:    Luigi is a 66 year old male who will be undergoing the above procedure.  He presented to the emergency department for evaluation of a syncopal episode in the setting of recent alcohol binge and alcohol dependence and was found to have hypotension, several metabolic abnormalities, acute kidney injury, and suspicion for GI bleed for which he is being admitted for further evaluation and treatment.    Admit hemoglobin 9.7 (recent baseline unavailable, was last hemoglobin was 14.6 in 2020), initially hemodynamically unstable with hypotension which resolved with fluid resuscitation. Takes aspirin 81 mg daily preadmission but not anticoagulation. Admits to bloody stools about 1 week prior to admission which had since resolved. CT abdomen & pelvis demonstrates thickened gastric antrum with adjacent edema, suggestive of gastritis. Suspect patient has GI bleed due to alcohol use.  Hemoglobin trend 9.7 ? 8.4.     Per pt - no abdominal pain; no black stool; no hematoemesis.      A history and physical has been performed. The patient's medications and allergies have been reviewed. The risks and benefits of the procedure and the sedation options and risks were discussed with the patient.  All questions were answered and informed consent was obtained.      He denies a personal or family history of anesthesia complications or bleeding disorders.     Patient Active Problem List   Diagnosis     Mild persistent asthma     Alcohol abuse     Tobacco abuse     CARDIOVASCULAR SCREENING; LDL GOAL  LESS THAN 130     Alcohol dependence (H)     Seasonal allergic rhinitis     Uncontrolled hypertension     Mixed hyperlipidemia     Hyponatremia     Anemia, unspecified type     Alcoholic gastritis, presence of bleeding unspecified, unspecified chronicity     Hypotension due to hypovolemia     MAIA (acute kidney injury) (H)     Elevated lactic acid level     Transaminitis     Syncope     Thrombocytosis     Leukocytosis        Past Medical History:   Diagnosis Date     Asthma         History reviewed. No pertinent surgical history.    Social History     Tobacco Use     Smoking status: Current Every Day Smoker     Packs/day: 0.75     Years: 45.00     Pack years: 33.75     Types: Cigarettes     Start date: 1976     Smokeless tobacco: Never Used   Substance Use Topics     Alcohol use: Yes     Comment: about 2 1.75 of vodka currently        Family History   Problem Relation Age of Onset     Asthma Mother      Cerebrovascular Disease Mother      C.A.D. No family hx of      Diabetes No family hx of      Hypertension No family hx of      Breast Cancer No family hx of      Cancer - colorectal No family hx of      Prostate Cancer No family hx of        Prior to Admission medications    Medication Sig Start Date End Date Taking? Authorizing Provider   albuterol (PROAIR HFA/PROVENTIL HFA/VENTOLIN HFA) 108 (90 Base) MCG/ACT inhaler Inhale 2 puffs into the lungs every 4 hours as needed for shortness of breath / dyspnea or wheezing 9/15/21   Gatito Ware MD   aspirin 81 MG tablet Take 81 mg by mouth daily    Reported, Patient   beclomethasone HFA (QVAR REDIHALER) 80 MCG/ACT inhaler Inhale 1 puff into the lungs 2 times daily 9/15/21   Gatito Ware MD   losartan (COZAAR) 100 MG tablet Take 1 tablet (100 mg) by mouth daily Please make a clinic visit to be seen in person for medication refills. 9/15/21   Gatito Ware MD   metoprolol succinate ER (TOPROL-XL) 25 MG 24 hr tablet Take 1 tablet (25  "mg) by mouth daily Please make a clinic visit to be seen in person for medication refills. 9/15/21   Gatito Ware MD   nicotine (NICORETTE) 4 MG lozenge Place 4 mg inside cheek as needed  Patient not taking: Reported on 9/15/2021    Reported, Patient   order for DME Equipment being ordered: Digital home blood pressure monitor kit  Patient not taking: Reported on 9/15/2021 3/17/20   Gatito Ware MD       No Known Allergies     REVIEW OF SYSTEMS:   5 point ROS negative except as noted above in HPI, including Gen., Resp., CV, GI &  system review.    PHYSICAL EXAM:   /77 (BP Location: Right arm)   Pulse 79   Temp 98.6  F (37  C) (Oral)   Resp 16   Ht 1.778 m (5' 10\")   Wt 69.4 kg (153 lb)   SpO2 100%   BMI 21.95 kg/m   Estimated body mass index is 21.95 kg/m  as calculated from the following:    Height as of this encounter: 1.778 m (5' 10\").    Weight as of this encounter: 69.4 kg (153 lb).   Constitutional: Awake, alert, no acute distress.  Eyes: No scleral icterus.  Conjunctiva are without injection.  ENMT: Mucous membranes moist, dentition and gums are intact.   Neck: Soft, supple, trachea midline.    Endocrine: n/a   Lymphatic: There is no cervical, submandibularadenopathy.  Respiratory: normal effortgs   Cardiovascular: S1, S2  Abdomen: Non-distended, non-tender,  No masses,  Musculoskeletal: No spinal or CVA tenderness. Full range of motion in the upper and lower extremities.    Skin: No skin rashes or lesions to inspection.  No petechia.    Neurologic: alerted and oriented 3x  Psychiatric: The patient's affect is not blunted and mood is appropriate.  DIAGNOSTICS:    Not indicated    IMPRESSION   ASA Class 3 - Severe systemic disease, but not incapacitating    PLAN:   Plan for Esophagogastroduodenoscopy with possible biopsy, possible dilation, possible foreign body removal. We discussed the risks, benefits and alternatives and the patient wished to proceed.  Patient is " cleared for the above procedure.    The above has been forwarded to the consulting provider.    [facility], [facility]

## 2022-03-21 NOTE — PROGRESS NOTES
[facility] Medicine Progress Note  Date of Service: 03/21/2022    Assessment & Plan                 Luigi Vieyra is a 66 year old male admitted on 3/20/2022. He presented to the emergency department for evaluation of a syncopal episode in the setting of recent alcohol binge and alcohol dependence and was found to have hypotension, several metabolic abnormalities, acute kidney injury, and suspicion for GI bleed for which he is being admitted for further evaluation and treatment.    Alcoholic gastritis, presumed GI bleed  Anemia, likely multifactorial due to chronic alcohol use and recent GI losses  Admit hemoglobin 9.7 (recent baseline unavailable, was last hemoglobin was 14.6 in 2020), initially hemodynamically unstable with hypotension which resolved with fluid resuscitation. Takes aspirin 81 mg daily preadmission but not anticoagulation. Admits to bloody stools about 1 week prior to admission which had since resolved. CT abdomen & pelvis demonstrates thickened gastric antrum with adjacent edema, suggestive of gastritis. Suspect patient has GI bleed due to alcohol use.  Hemoglobin trend 9.7 ? 8.4 ? 7.5. EGD 2/20 with diffuse esophagitis and duodenitis.  - IV Protonix 40 mg daily initiated 3/20/2022, continue given marginal acceptable hemoglobin  -Repeat hemoglobin tomorrow morning.  - Holding aspirin     MAIA (acute kidney injury)  Admit creatinine 1.90 (baseline 0.7 -0.8), GFR 39 (baseline > 90). Trending toward improvement after IV fluid administration. Likely prerenal in the setting of poor oral intake and hypotension.  - Daily CMP  - Received 1L LR in the emergency department, continue with maintenance @ 125 ml/hr  - Hold nephrotoxins (losartan)    Syncope  Hypotension due to hypovolemia, resolved  Presented after witnessed syncopal episode, initially hypotensive with blood pressure 78/55. Blood pressure normalized after 1L LR bolus. Presume syncope was due to  hypovolemia / hypotension from excessive alcohol intake and poor hydration.   - Continue with fluid resuscitation  - Monitor blood pressure   - Head CT WNL.      Alcohol abuse / dependence  Transaminitis / alcoholic hepatitis  Known history of dependence, completed rehab a few years ago. Denies prior history of withdrawal or seizures, but feels shaky today. Has been drinking daily for the past 2 months, but significantly more on recent binge x 1 week; drinking about 1/2 bottle of 1.75L vodka daily. Last reported drink on 3/18. Admit hepatic labs with elevated alk phos (155) and alcoholic hepatitis pattern (ALT 58 / ).   - CIWA protocol with Ativan available  - s/p IV vitamins, now oral   - Daily CMP  - declined help     Elevated lactic acid level, resolved  Initial lactic acid 8.1, normalized to 1.9 after 1L LR. Etiology of elevated lactic acid seems to be due to hypovolemia / hypotension rather than sepsis.   - Resolved 3/20/2022     Hyponatremia  Initial Na = 128. Likely due to poor oral intake and possible beer potomania. Degree of hyponatremia is mild, low risk for ODS. Improved  - repeat Na in the AM    Leukocytosis with left shift  Initial WBC 13.0, ANC 10.7. Afebrile. No evidence of infection on imaging or by history or exam.   - No indication for antibiotics at this time; continue to observe off antibiotics for now  - Blood cultures x 2 pending  - UA/UC pending    Thrombocytosis  Initial platelets 607, occurs in the setting of extreme dehydration. May possibly improve as fluid status normalizes.  - CBC with platelets in am    Fall  Chest wall pain, right posterior near scapula  New posterior rib / chest wall pain after fall, says he hit his back on a table when he fell. CT chest, abdomen, & pelvis did not reveal any evidence of musculoskeletal injury.   - Prn ice and oxycodone for pain (unable to have acetaminophen due to LFT elevation, or NSAIDs due to GI bleed)    Uncontrolled hypertension  Has  known hypertension diagnosis prior to admission, but initially hypotensive in the emergency department. Managed prior to admission with losartan 100 mg daily and metoprolol XL 25 mg daily.   - Losartan held due to renal function, see above  - Continue metoprolol with holding parameters    Mixed hyperlipidemia  Noted on problem list, does not appear to be on statin or lipid lowering medication prior to admission.  - Defer to PCP management    Mild persistent asthma  Noted on problem list, no prior PFT's on record. Managed prior to admission with albuterol prn. No evidence of asthma exacerbation on admission.   - Prn albuterol nebs available    Tobacco abuse  Encourage cessation. Nicotine patch available.         Diet: Snacks/Supplements Adult: Ensure Clear; With Meals  Regular Diet Adult    DVT Prophylaxis: VTE Prophylaxis contraindicated due to bleed  Frederick Catheter: Not present  Central Lines: None  Code Status: Full Code           Discussion: hopefully can discharge tomorrow if hemoglobin stable    Disposition: Anticipate discharge tomorrow     Attestation:  I have reviewed today's vital signs, notes, medications, labs and imaging.    Herrera Cobb MD       Interval History   EGD with esophagitis and duodentis  Says he is done drinking.     Physical Exam   Temp:  [97.3  F (36.3  C)-98.6  F (37  C)] 97.5  F (36.4  C)  Pulse:  [] 102  Resp:  [14-18] 15  BP: ()/(54-79) 92/54  SpO2:  [94 %-100 %] 94 %    Weights:   Vitals:    03/20/22 1700 03/20/22 1849 03/21/22 1131   Weight: 77.1 kg (170 lb) 69.8 kg (153 lb 14.1 oz) 69.4 kg (153 lb)    Body mass index is 21.95 kg/m .    Constitutional: well appearing older than stated age  CV: Regular  Respiratory: CTA bilaterally  GI: Soft, nontender  Skin: Warm and dry  Musculoskeletal:    Data   Recent Labs   Lab 03/21/22  1831 03/21/22  1500 03/21/22  0545 03/21/22  0016 03/20/22  1717 03/20/22  1434   WBC  --   --  11.0  --   --  13.0*   HGB 8.3* 8.0* 7.5*   7.5* 8.0* 8.4* 9.7*   MCV  --   --  107*  --   --  107*   PLT  --   --  483*  --   --  607*   INR  --   --   --   --   --  1.00   NA  --   --  133  133 130* 128* 128*   POTASSIUM  --   --  3.5  --  4.2 4.5   CHLORIDE  --   --  100  --  95 90*   CO2  --   --  26  --  22 18*   BUN  --   --  40*  --  45* 46*   CR  --   --  1.57*  --  1.73* 1.90*   ANIONGAP  --   --  7  --  11 20*   PINO  --   --  8.5  --  8.8 9.6   GLC  --   --  89  --  111* 115*   ALBUMIN  --   --  2.3*  --   --  2.7*   PROTTOTAL  --   --  5.3*  --   --  7.0   BILITOTAL  --   --  0.5  --   --  1.0   ALKPHOS  --   --  102  --   --  155*   ALT  --   --  32  --   --  58   AST  --   --  45  --   --  122*   LIPASE  --   --   --   --   --  676*       Recent Labs   Lab 03/21/22  0545 03/20/22  1717 03/20/22  1434   GLC 89 111* 115*        Unresulted Labs Ordered in the Past 30 Days of this Admission     Date and Time Order Name Status Description    3/21/2022  1:09 PM Surgical Pathology Exam In process     3/20/2022  2:56 PM Urine Culture In process     3/20/2022  2:33 PM Blood Culture Peripheral Blood Preliminary     3/20/2022  2:33 PM Blood Culture Peripheral Blood Preliminary            Imaging  No results found for this or any previous visit (from the past 24 hour(s)).     I reviewed all new labs and imaging results over the last 24 hours. I personally reviewed no images or EKG's today.    Medications     lactated ringers Stopped (03/21/22 1540)       [Held by provider] aspirin  81 mg Oral Daily     [START ON 3/22/2022] folic acid  1 mg Oral Daily     [START ON 3/28/2022] gabapentin  100 mg Oral Q8H     [START ON 3/26/2022] gabapentin  300 mg Oral Q8H     [START ON 3/24/2022] gabapentin  600 mg Oral Q8H     gabapentin  900 mg Oral Q8H     [Held by provider] losartan  100 mg Oral Daily     metoprolol succinate ER  25 mg Oral Daily     [START ON 3/22/2022] multivitamin, therapeutic  1 tablet Oral Daily     nicotine  1 patch Transdermal Daily     nicotine    Transdermal Q8H     pantoprazole (PROTONIX) IV  40 mg Intravenous Daily with breakfast     sodium chloride (PF)  3 mL Intracatheter Q8H     [START ON 3/22/2022] thiamine  100 mg Oral Daily       Herrera Cobb MD

## 2022-03-21 NOTE — ANESTHESIA CARE TRANSFER NOTE
Patient: Luigi Vieyra    Procedure: Procedure(s):  ESOPHAGOGASTRODUODENOSCOPY, WITH BIOPSY       Diagnosis: Alcoholic gastritis, presence of bleeding unspecified, unspecified chronicity [K29.20]  Diagnosis Additional Information: No value filed.    Anesthesia Type:   General     Note:    Oropharynx: oropharynx clear of all foreign objects and spontaneously breathing  Level of Consciousness: awake  Oxygen Supplementation: room air    Independent Airway: airway patency satisfactory and stable  Dentition: dentition unchanged  Vital Signs Stable: post-procedure vital signs reviewed and stable  Report to RN Given: handoff report given  Patient transferred to: Phase II    Handoff Report: Identifed the Patient, Identified the Reponsible Provider, Reviewed the pertinent medical history, Discussed the surgical course, Reviewed Intra-OP anesthesia mangement and issues during anesthesia, Set expectations for post-procedure period and Allowed opportunity for questions and acknowledgement of understanding      Vitals:  Vitals Value Taken Time   BP     Temp     Pulse     Resp     SpO2         Electronically Signed By: DEYSI Mojica CRNA  March 21, 2022  1:27 PM

## 2022-03-21 NOTE — PROGRESS NOTES
"CLINICAL NUTRITION SERVICES - ASSESSMENT NOTE     Nutrition Prescription    RECOMMENDATIONS FOR MDs/PROVIDERS TO ORDER:  None at this time    Malnutrition Status:    Moderate malnutrition in the context of acute on chronic illness    Recommendations already ordered by Registered Dietitian (RD):  Please send ensure clear; berry     Future/Additional Recommendations:  Monitor patient weights, intakes and tolerance to supplement     REASON FOR ASSESSMENT  Luigi Vieyra is a/an 66 year old male assessed by the dietitian for Admission Nutrition Risk Screen for positive    NUTRITION HISTORY  Per chart review  -Patient presents with alcoholic gastritis; presumed GI bleed, anemia, MAIA, alcohol abuse, hyponatremia, uncontrolled hypertension, mixed hyperlipidemia, tobacco abuse  Per patient interveiw  -Patient stated his appetite has been decreased for about one week  -Patient reports no nausea, vomiting, or diarrhea  -Patient does not report any weight loss  -RD went over the importance of calories and protein; patient was agreeable to trying a supplement     CURRENT NUTRITION ORDERS  Diet: Orders Placed This Encounter      Advance Diet as Tolerated: Clear Liquid Diet      Intake/Tolerance: No recorded intakes; patient NPO    LABS  Labs reviewed    MEDICATIONS  Medications reviewed  Scheduled: Nicoderm, Nicotine patch, Protonix  Continuous: LR infusion 125 mL/hr  PRN: none pertinent 3/21    ANTHROPOMETRICS  Height: 177.8 cm (5' 10\")  Most Recent Weight: 69.8 kg (153 lb 14.1 oz)    IBW: 75.5 kg (166 lb)  BMI: Normal BMI  Weight History:   Wt Readings from Last 15 Encounters:   03/20/22 69.8 kg (153 lb 14.1 oz)   09/15/21 77 kg (169 lb 12.8 oz)   11/21/20 81.6 kg (180 lb)   04/16/20 85.7 kg (189 lb)   03/02/20 85.6 kg (188 lb 12.8 oz)   02/10/20 83.5 kg (184 lb)   02/20/19 79.2 kg (174 lb 9.6 oz)   12/27/18 80.6 kg (177 lb 9.6 oz)   08/14/17 79.8 kg (176 lb)   03/10/16 78.5 kg (173 lb)   03/25/14 79.4 kg (175 lb)   05/18/12 " 79.5 kg (175 lb 3.2 oz)   weight appears to be trending down. 9% non significant weight loss noted within one year    Dosing Weight: 69.8 kg-actual BW used    ASSESSED NUTRITION NEEDS  Estimated Energy Needs: 1,745-2,094 kcals/day (25 - 30 kcals/kg)  Justification: Maintenance  Estimated Protein Needs: 69-83 grams protein/day (1 - 1.2 grams of pro/kg)  Justification: Increased needs  Estimated Fluid Needs: 1,745-2,094 mL/day (1 mL/kcal)   Justification: Per provider pending fluid status    PHYSICAL FINDINGS  See malnutrition section below.  No abnormal nutrition-related physical findings observed.     MALNUTRITION  % Intake: < 75% for > 7 days (moderate)  % Weight Loss: Weight loss does not meet criteria  Subcutaneous Fat Loss: None observed  Muscle Loss: Upper arm (bicep, tricep):  mild and Patellar region:  mild  Fluid Accumulation/Edema: None noted  Malnutrition Diagnosis: Moderate malnutrition in the context of acute on chronic illness    NUTRITION DIAGNOSIS  Malnutrition related to decreased appetite as evidenced by mild muscle loss and  < 75% for > 7 days (moderate)    INTERVENTIONS  Implementation  Collaboration with other providers  Medical food supplement therapy     Goals  Patient to consume % of nutritionally adequate meal trays TID, or the equivalent with supplements/snacks.     Monitoring/Evaluation  Progress toward goals will be monitored and evaluated per protocol.    Hamida Butler RDN, MERVIN  Clinical Dietitian  Office: [phone]  Weekend pager: [phone]

## 2022-03-21 NOTE — PLAN OF CARE
"Goal Outcome Evaluation:  A&O x4. PIV infusing LR at 125 mL/hr. 1,000 mL bolus given this shift for soft Blood pressure's.   CIWAs 3 and 4. Last drink was yesterday at 10 pm, drinks a half of a 1.75 of vodka daily.   Multiple scabs, lacerations and rashes to body, patient refused skin assessments.   NPO at midnight for EGD in the morning. UA collected, awaiting Urgent Care. Occult blood stool sample needed still.       /72 (BP Location: Right arm)   Pulse 101   Temp 98  F (36.7  C) (Oral)   Resp 18   Ht 1.778 m (5' 10\")   Wt 69.8 kg (153 lb 14.1 oz)   SpO2 99%   BMI 22.08 kg/m                        "

## 2022-03-22 LAB
ALBUMIN SERPL-MCNC: 2.2 G/DL (ref 3.4–5)
ALP SERPL-CCNC: 94 U/L (ref 40–150)
ALT SERPL W P-5'-P-CCNC: 26 U/L (ref 0–70)
ANION GAP SERPL CALCULATED.3IONS-SCNC: 6 MMOL/L (ref 3–14)
AST SERPL W P-5'-P-CCNC: 29 U/L (ref 0–45)
BACTERIA UR CULT: NO GROWTH
BILIRUB SERPL-MCNC: 0.4 MG/DL (ref 0.2–1.3)
BUN SERPL-MCNC: 24 MG/DL (ref 7–30)
CALCIUM SERPL-MCNC: 8.5 MG/DL (ref 8.5–10.1)
CHLORIDE BLD-SCNC: 103 MMOL/L (ref 94–109)
CO2 SERPL-SCNC: 26 MMOL/L (ref 20–32)
CREAT SERPL-MCNC: 1.24 MG/DL (ref 0.66–1.25)
ERYTHROCYTE [DISTWIDTH] IN BLOOD BY AUTOMATED COUNT: 14.9 % (ref 10–15)
GFR SERPL CREATININE-BSD FRML MDRD: 65 ML/MIN/1.73M2
GLUCOSE BLD-MCNC: 102 MG/DL (ref 70–99)
HCT VFR BLD AUTO: 22.6 % (ref 40–53)
HGB BLD-MCNC: 7.4 G/DL (ref 13.3–17.7)
MCH RBC QN AUTO: 35.9 PG (ref 26.5–33)
MCHC RBC AUTO-ENTMCNC: 32.7 G/DL (ref 31.5–36.5)
MCV RBC AUTO: 110 FL (ref 78–100)
PLATELET # BLD AUTO: 428 10E3/UL (ref 150–450)
POTASSIUM BLD-SCNC: 3.6 MMOL/L (ref 3.4–5.3)
PROT SERPL-MCNC: 5.1 G/DL (ref 6.8–8.8)
RBC # BLD AUTO: 2.06 10E6/UL (ref 4.4–5.9)
SODIUM SERPL-SCNC: 135 MMOL/L (ref 133–144)
WBC # BLD AUTO: 11 10E3/UL (ref 4–11)

## 2022-03-22 PROCEDURE — 36415 COLL VENOUS BLD VENIPUNCTURE: CPT | Performed by: HOSPITALIST

## 2022-03-22 PROCEDURE — 250N000013 HC RX MED GY IP 250 OP 250 PS 637: Performed by: PHYSICIAN ASSISTANT

## 2022-03-22 PROCEDURE — 120N000001 HC R&B MED SURG/OB

## 2022-03-22 PROCEDURE — 999N000157 HC STATISTIC RCP TIME EA 10 MIN

## 2022-03-22 PROCEDURE — C9113 INJ PANTOPRAZOLE SODIUM, VIA: HCPCS | Performed by: HOSPITALIST

## 2022-03-22 PROCEDURE — 258N000003 HC RX IP 258 OP 636: Performed by: HOSPITALIST

## 2022-03-22 PROCEDURE — 258N000003 HC RX IP 258 OP 636: Performed by: PHYSICIAN ASSISTANT

## 2022-03-22 PROCEDURE — 250N000011 HC RX IP 250 OP 636: Performed by: HOSPITALIST

## 2022-03-22 PROCEDURE — 85027 COMPLETE CBC AUTOMATED: CPT | Performed by: HOSPITALIST

## 2022-03-22 PROCEDURE — 99232 SBSQ HOSP IP/OBS MODERATE 35: CPT | Performed by: HOSPITALIST

## 2022-03-22 PROCEDURE — 80053 COMPREHEN METABOLIC PANEL: CPT | Performed by: HOSPITALIST

## 2022-03-22 PROCEDURE — 250N000013 HC RX MED GY IP 250 OP 250 PS 637: Performed by: HOSPITALIST

## 2022-03-22 RX ADMIN — SODIUM CHLORIDE, POTASSIUM CHLORIDE, SODIUM LACTATE AND CALCIUM CHLORIDE 1000 ML: 600; 310; 30; 20 INJECTION, SOLUTION INTRAVENOUS at 08:12

## 2022-03-22 RX ADMIN — GABAPENTIN 900 MG: 300 CAPSULE ORAL at 20:03

## 2022-03-22 RX ADMIN — PANTOPRAZOLE SODIUM 40 MG: 40 INJECTION, POWDER, FOR SOLUTION INTRAVENOUS at 20:03

## 2022-03-22 RX ADMIN — FOLIC ACID 1 MG: 1 TABLET ORAL at 08:16

## 2022-03-22 RX ADMIN — GABAPENTIN 900 MG: 300 CAPSULE ORAL at 11:18

## 2022-03-22 RX ADMIN — PANTOPRAZOLE SODIUM 40 MG: 40 INJECTION, POWDER, FOR SOLUTION INTRAVENOUS at 08:11

## 2022-03-22 RX ADMIN — THIAMINE HCL TAB 100 MG 100 MG: 100 TAB at 08:15

## 2022-03-22 RX ADMIN — GABAPENTIN 900 MG: 300 CAPSULE ORAL at 03:03

## 2022-03-22 RX ADMIN — OXYCODONE HYDROCHLORIDE 2.5 MG: 5 TABLET ORAL at 21:58

## 2022-03-22 RX ADMIN — SODIUM CHLORIDE, POTASSIUM CHLORIDE, SODIUM LACTATE AND CALCIUM CHLORIDE: 600; 310; 30; 20 INJECTION, SOLUTION INTRAVENOUS at 13:43

## 2022-03-22 RX ADMIN — OXYCODONE HYDROCHLORIDE 2.5 MG: 5 TABLET ORAL at 15:51

## 2022-03-22 RX ADMIN — Medication 1 PATCH: at 08:19

## 2022-03-22 RX ADMIN — MULTIVITAMIN TABLET 1 TABLET: TABLET at 08:15

## 2022-03-22 ASSESSMENT — ACTIVITIES OF DAILY LIVING (ADL)
ADLS_ACUITY_SCORE: 5
ADLS_ACUITY_SCORE: 7
ADLS_ACUITY_SCORE: 5
ADLS_ACUITY_SCORE: 5
ADLS_ACUITY_SCORE: 7
ADLS_ACUITY_SCORE: 5
ADLS_ACUITY_SCORE: 7
ADLS_ACUITY_SCORE: 5
ADLS_ACUITY_SCORE: 7
ADLS_ACUITY_SCORE: 5
ADLS_ACUITY_SCORE: 7
ADLS_ACUITY_SCORE: 5
ADLS_ACUITY_SCORE: 7
ADLS_ACUITY_SCORE: 5
ADLS_ACUITY_SCORE: 5
ADLS_ACUITY_SCORE: 7
ADLS_ACUITY_SCORE: 5
ADLS_ACUITY_SCORE: 7

## 2022-03-22 NOTE — PROGRESS NOTES
3/22 NOC    A&Ox4.   Mobility: SBA, tremors while ambulating.   Reg diet.   Voiding adequately.   Last BM 3/21.   PIV infusing LR@125.   Denies pain.     VSS on RA. Afebrile.  CIWAS: 2, 2, 5. No ativan needed this shift

## 2022-03-22 NOTE — PROGRESS NOTES
[facility] Medicine Progress Note  Date of Service: 03/22/2022    Assessment & Plan          Luigi Vieyra is a 66 year old male admitted on 3/20/2022. He presented to the emergency department for evaluation of a syncopal episode in the setting of recent alcohol binge and alcohol dependence and was found to have hypotension, several metabolic abnormalities, acute kidney injury, and suspicion for GI bleed for which he is being admitted for further evaluation and treatment.    Alcoholic gastritis, presumed GI bleed  Anemia, likely multifactorial due to chronic alcohol use and recent GI losses  Admit hemoglobin 9.7 (recent baseline unavailable, was last hemoglobin was 14.6 in 2020), initially hemodynamically unstable with hypotension which resolved with fluid resuscitation. Takes aspirin 81 mg daily preadmission but not anticoagulation. Admits to bloody stools about 1 week prior to admission which had since resolved. CT abdomen & pelvis demonstrates thickened gastric antrum with adjacent edema, suggestive of gastritis. Suspect patient has GI bleed due to alcohol use.  Blood pressure low today at 79/52 this morning, was given 1 L LR bolus with adequate results.  Given his low blood pressure we elected to not discharge patient today.  Hemoglobin trend 9.7 ? 8.4 ? 7.5 ? 7.4.  EGD 2/20 with diffuse esophagitis and duodenitis.  - IV Protonix 40 mg BID started 3/20/2022, continue given marginal acceptable hemoglobin  - Repeat hemoglobin tomorrow morning  - Holding aspirin  - we will stop IV fluids after this afternoon    MAIA (acute kidney injury)  Admit creatinine 1.90 (baseline 0.7 -0.8), GFR 39 (baseline > 90). Trending toward improvement after IV fluid administration. Likely prerenal in the setting of poor oral intake and hypotension.  - Daily CMP  - Received 1L LR in the emergency department, continue with maintenance @ 125 ml/hr  - Hold nephrotoxins  (losartan)    Syncope  Hypotension due to hypovolemia, resolved  Presented after witnessed syncopal episode, initially hypotensive with blood pressure 78/55. Blood pressure normalized after 1L LR bolus. Presume syncope was due to hypovolemia / hypotension from excessive alcohol intake and poor hydration.   - Continue with fluid resuscitation  - Monitor blood pressure   - Head CT WNL.      Alcohol abuse / dependence  Transaminitis / alcoholic hepatitis  Known history of dependence, completed rehab a few years ago. Denies prior history of withdrawal or seizures, but feels shaky today. Has been drinking daily for the past 2 months, but significantly more on recent binge x 1 week; drinking about 1/2 bottle of 1.75L vodka daily. Last reported drink on 3/18. Admit hepatic labs with elevated alk phos (155) and alcoholic hepatitis pattern (ALT 58 / ).   - CIWA protocol with Ativan available  - s/p IV vitamins, now oral   - Daily CMP  - declined help     Elevated lactic acid level, resolved  Initial lactic acid 8.1, normalized to 1.9 after 1L LR. Etiology of elevated lactic acid seems to be due to hypovolemia / hypotension rather than sepsis.   - Resolved 3/20/2022     Hyponatremia  Initial Na = 128. Likely due to poor oral intake and possible beer potomania. Degree of hyponatremia is mild, low risk for ODS. Improved  - repeat Na in the AM    Leukocytosis with left shift  Initial WBC 13.0, ANC 10.7. Afebrile. No evidence of infection on imaging or by history or exam.   - No indication for antibiotics at this time; continue to observe off antibiotics for now  - Blood cultures x 2 pending  - UA/UC pending    Thrombocytosis  Initial platelets 607, occurs in the setting of extreme dehydration. May possibly improve as fluid status normalizes.  - CBC with platelets in am    Fall  Chest wall pain, right posterior near scapula  New posterior rib / chest wall pain after fall, says he hit his back on a table when he fell. CT  chest, abdomen, & pelvis did not reveal any evidence of musculoskeletal injury.   - Prn ice and oxycodone for pain (unable to have acetaminophen due to LFT elevation, or NSAIDs due to GI bleed)    Uncontrolled hypertension  Has known hypertension diagnosis prior to admission, but initially hypotensive in the emergency department. Managed prior to admission with losartan 100 mg daily and metoprolol XL 25 mg daily.   - Losartan held due to renal function, see above  - Continue metoprolol with holding parameters    Mixed hyperlipidemia  Noted on problem list, does not appear to be on statin or lipid lowering medication prior to admission.  - Defer to PCP management    Mild persistent asthma  Noted on problem list, no prior PFT's on record. Managed prior to admission with albuterol prn. No evidence of asthma exacerbation on admission.   - Prn albuterol nebs available    Tobacco abuse  Encourage cessation. Nicotine patch available.         Diet: Snacks/Supplements Adult: Ensure Clear; With Meals  Regular Diet Adult    DVT Prophylaxis: VTE Prophylaxis contraindicated due to bleed  [place] Catheter: Not present  Central Lines: None  Code Status: Full Code           Discussion: hopefully can discharge tomorrow if hemoglobin stable    Disposition: Anticipate discharge tomorrow     Attestation:  I have reviewed today's vital signs, notes, medications, labs and imaging.    Herrera Cobb MD       Interval History   Denies any withdrawal did have some low blood pressure this morning.  He is asymptomatic.    Physical Exam   Temp:  [97.5  F (36.4  C)-98.4  F (36.9  C)] 98.3  F (36.8  C)  Pulse:  [] 103  Resp:  [15-16] 16  BP: ()/(52-69) 103/69  SpO2:  [94 %-100 %] 100 %    Weights:   Vitals:    03/20/22 1700 03/20/22 1849 03/21/22 1131   Weight: 77.1 kg (170 lb) 69.8 kg (153 lb 14.1 oz) 69.4 kg (153 lb)    Body mass index is 21.95 kg/m .    Constitutional: well appearing older than stated age  CV:  Regular  Respiratory: CTA bilaterally  GI: Soft, nontender  Skin: Warm and dry  Musculoskeletal:    Data   Recent Labs   Lab 03/22/22  0500 03/21/22  1831 03/21/22  1500 03/21/22  0545 03/21/22  0016 03/20/22 1717 03/20/22  1434   WBC 11.0  --   --  11.0  --   --  13.0*   HGB 7.4* 8.3* 8.0* 7.5*  7.5* 8.0* 8.4* 9.7*   *  --   --  107*  --   --  107*     --   --  483*  --   --  607*   INR  --   --   --   --   --   --  1.00     --   --  133  133 130* 128* 128*   POTASSIUM 3.6  --   --  3.5  --  4.2 4.5   CHLORIDE 103  --   --  100  --  95 90*   CO2 26  --   --  26  --  22 18*   BUN 24  --   --  40*  --  45* 46*   CR 1.24  --   --  1.57*  --  1.73* 1.90*   ANIONGAP 6  --   --  7  --  11 20*   PINO 8.5  --   --  8.5  --  8.8 9.6   *  --   --  89  --  111* 115*   ALBUMIN 2.2*  --   --  2.3*  --   --  2.7*   PROTTOTAL 5.1*  --   --  5.3*  --   --  7.0   BILITOTAL 0.4  --   --  0.5  --   --  1.0   ALKPHOS 94  --   --  102  --   --  155*   ALT 26  --   --  32  --   --  58   AST 29  --   --  45  --   --  122*   LIPASE  --   --   --   --   --   --  676*       Recent Labs   Lab 03/22/22  0500 03/21/22  0545 03/20/22 1717 03/20/22  1434   * 89 111* 115*        Unresulted Labs Ordered in the Past 30 Days of this Admission     Date and Time Order Name Status Description    3/21/2022  1:09 PM Surgical Pathology Exam In process     3/20/2022  2:33 PM Blood Culture Peripheral Blood Preliminary     3/20/2022  2:33 PM Blood Culture Peripheral Blood Preliminary            Imaging  No results found for this or any previous visit (from the past 24 hour(s)).     I reviewed all new labs and imaging results over the last 24 hours. I personally reviewed no images or EKG's today.    Medications     lactated ringers 125 mL/hr at 03/22/22 1343       [Held by provider] aspirin  81 mg Oral Daily     folic acid  1 mg Oral Daily     [START ON 3/28/2022] gabapentin  100 mg Oral Q8H     [START ON 3/26/2022]  gabapentin  300 mg Oral Q8H     [START ON 3/24/2022] gabapentin  600 mg Oral Q8H     gabapentin  900 mg Oral Q8H     [Held by provider] losartan  100 mg Oral Daily     metoprolol succinate ER  25 mg Oral Daily     multivitamin, therapeutic  1 tablet Oral Daily     nicotine  1 patch Transdermal Daily     nicotine   Transdermal Q8H     pantoprazole (PROTONIX) IV  40 mg Intravenous BID     sodium chloride (PF)  3 mL Intracatheter Q8H     thiamine  100 mg Oral Daily       Herrera Cobb MD

## 2022-03-22 NOTE — PLAN OF CARE
"Goal Outcome Evaluation:    Patient BP 79/52, doctor notified bolus 1,0000 LR ordered, effective BP 95/54./h resumed.     Tremors when up to use the urinal. Denies hallucinations, headache, or anxiety.  CIWA scores 3 and 2    No pain, A/O X3     /69 (BP Location: Right arm)   Pulse 103   Temp 98.3  F (36.8  C) (Oral)   Resp 16   Ht 1.778 m (5' 10\")   Wt 69.4 kg (153 lb)   SpO2 100%   BMI 21.95 kg/m                  "

## 2022-03-23 ENCOUNTER — APPOINTMENT (OUTPATIENT)
Dept: PHYSICAL THERAPY | Facility: CLINIC | Age: 67
DRG: 368 | End: 2022-03-23
Payer: COMMERCIAL

## 2022-03-23 ENCOUNTER — APPOINTMENT (OUTPATIENT)
Dept: OCCUPATIONAL THERAPY | Facility: CLINIC | Age: 67
DRG: 368 | End: 2022-03-23
Payer: COMMERCIAL

## 2022-03-23 LAB
ABO/RH(D): NORMAL
ANION GAP SERPL CALCULATED.3IONS-SCNC: 4 MMOL/L (ref 3–14)
ANTIBODY SCREEN: NEGATIVE
BLD PROD TYP BPU: NORMAL
BLOOD COMPONENT TYPE: NORMAL
BUN SERPL-MCNC: 16 MG/DL (ref 7–30)
CALCIUM SERPL-MCNC: 8.2 MG/DL (ref 8.5–10.1)
CHLORIDE BLD-SCNC: 106 MMOL/L (ref 94–109)
CO2 SERPL-SCNC: 27 MMOL/L (ref 20–32)
CODING SYSTEM: NORMAL
CREAT SERPL-MCNC: 1.29 MG/DL (ref 0.66–1.25)
CROSSMATCH: NORMAL
ERYTHROCYTE [DISTWIDTH] IN BLOOD BY AUTOMATED COUNT: 14.9 % (ref 10–15)
GFR SERPL CREATININE-BSD FRML MDRD: 62 ML/MIN/1.73M2
GLUCOSE BLD-MCNC: 127 MG/DL (ref 70–99)
HCT VFR BLD AUTO: 20.4 % (ref 40–53)
HGB BLD-MCNC: 6.7 G/DL (ref 13.3–17.7)
ISSUE DATE AND TIME: NORMAL
MCH RBC QN AUTO: 36 PG (ref 26.5–33)
MCHC RBC AUTO-ENTMCNC: 32.8 G/DL (ref 31.5–36.5)
MCV RBC AUTO: 110 FL (ref 78–100)
PLAT MORPH BLD: NORMAL
PLATELET # BLD AUTO: 377 10E3/UL (ref 150–450)
POTASSIUM BLD-SCNC: 3.6 MMOL/L (ref 3.4–5.3)
RBC # BLD AUTO: 1.86 10E6/UL (ref 4.4–5.9)
RBC MORPH BLD: NORMAL
SODIUM SERPL-SCNC: 137 MMOL/L (ref 133–144)
SPECIMEN EXPIRATION DATE: NORMAL
UNIT ABO/RH: NORMAL
UNIT NUMBER: NORMAL
UNIT STATUS: NORMAL
UNIT TYPE ISBT: 5100
WBC # BLD AUTO: 9.4 10E3/UL (ref 4–11)

## 2022-03-23 PROCEDURE — 97161 PT EVAL LOW COMPLEX 20 MIN: CPT | Mod: GP | Performed by: PHYSICAL THERAPIST

## 2022-03-23 PROCEDURE — 120N000001 HC R&B MED SURG/OB

## 2022-03-23 PROCEDURE — 88305 TISSUE EXAM BY PATHOLOGIST: CPT | Mod: 26 | Performed by: PATHOLOGY

## 2022-03-23 PROCEDURE — 97110 THERAPEUTIC EXERCISES: CPT | Mod: GP | Performed by: PHYSICAL THERAPIST

## 2022-03-23 PROCEDURE — 36415 COLL VENOUS BLD VENIPUNCTURE: CPT | Performed by: HOSPITALIST

## 2022-03-23 PROCEDURE — 85027 COMPLETE CBC AUTOMATED: CPT | Performed by: HOSPITALIST

## 2022-03-23 PROCEDURE — 86901 BLOOD TYPING SEROLOGIC RH(D): CPT | Performed by: INTERNAL MEDICINE

## 2022-03-23 PROCEDURE — 250N000013 HC RX MED GY IP 250 OP 250 PS 637: Performed by: HOSPITALIST

## 2022-03-23 PROCEDURE — 88342 IMHCHEM/IMCYTCHM 1ST ANTB: CPT | Mod: 26 | Performed by: PATHOLOGY

## 2022-03-23 PROCEDURE — 97535 SELF CARE MNGMENT TRAINING: CPT | Mod: GO

## 2022-03-23 PROCEDURE — 250N000011 HC RX IP 250 OP 636: Performed by: HOSPITALIST

## 2022-03-23 PROCEDURE — 88312 SPECIAL STAINS GROUP 1: CPT | Mod: 26 | Performed by: PATHOLOGY

## 2022-03-23 PROCEDURE — 82310 ASSAY OF CALCIUM: CPT | Performed by: HOSPITALIST

## 2022-03-23 PROCEDURE — 258N000003 HC RX IP 258 OP 636: Performed by: HOSPITALIST

## 2022-03-23 PROCEDURE — 88341 IMHCHEM/IMCYTCHM EA ADD ANTB: CPT | Mod: 26 | Performed by: PATHOLOGY

## 2022-03-23 PROCEDURE — C9113 INJ PANTOPRAZOLE SODIUM, VIA: HCPCS | Performed by: HOSPITALIST

## 2022-03-23 PROCEDURE — 250N000013 HC RX MED GY IP 250 OP 250 PS 637: Performed by: PHYSICIAN ASSISTANT

## 2022-03-23 PROCEDURE — 97165 OT EVAL LOW COMPLEX 30 MIN: CPT | Mod: GO

## 2022-03-23 PROCEDURE — 86850 RBC ANTIBODY SCREEN: CPT | Performed by: INTERNAL MEDICINE

## 2022-03-23 PROCEDURE — 97116 GAIT TRAINING THERAPY: CPT | Mod: GP | Performed by: PHYSICAL THERAPIST

## 2022-03-23 PROCEDURE — 99232 SBSQ HOSP IP/OBS MODERATE 35: CPT | Performed by: HOSPITALIST

## 2022-03-23 PROCEDURE — 86923 COMPATIBILITY TEST ELECTRIC: CPT | Performed by: HOSPITALIST

## 2022-03-23 RX ADMIN — GABAPENTIN 900 MG: 300 CAPSULE ORAL at 13:29

## 2022-03-23 RX ADMIN — GABAPENTIN 900 MG: 300 CAPSULE ORAL at 03:04

## 2022-03-23 RX ADMIN — THIAMINE HCL TAB 100 MG 100 MG: 100 TAB at 08:47

## 2022-03-23 RX ADMIN — PANTOPRAZOLE SODIUM 40 MG: 40 INJECTION, POWDER, FOR SOLUTION INTRAVENOUS at 08:51

## 2022-03-23 RX ADMIN — IRON SUCROSE 200 MG: 20 INJECTION, SOLUTION INTRAVENOUS at 15:16

## 2022-03-23 RX ADMIN — GABAPENTIN 900 MG: 300 CAPSULE ORAL at 20:22

## 2022-03-23 RX ADMIN — MULTIVITAMIN TABLET 1 TABLET: TABLET at 08:48

## 2022-03-23 RX ADMIN — METOPROLOL SUCCINATE 25 MG: 25 TABLET, FILM COATED, EXTENDED RELEASE ORAL at 08:47

## 2022-03-23 RX ADMIN — PANTOPRAZOLE SODIUM 40 MG: 40 INJECTION, POWDER, FOR SOLUTION INTRAVENOUS at 20:22

## 2022-03-23 RX ADMIN — Medication 1 PATCH: at 08:58

## 2022-03-23 RX ADMIN — FOLIC ACID 1 MG: 1 TABLET ORAL at 08:47

## 2022-03-23 ASSESSMENT — ACTIVITIES OF DAILY LIVING (ADL)
ADLS_ACUITY_SCORE: 8
ADLS_ACUITY_SCORE: 10
ADLS_ACUITY_SCORE: 7
ADLS_ACUITY_SCORE: 10
ADLS_ACUITY_SCORE: 8
DEPENDENT_IADLS:: INDEPENDENT
ADLS_ACUITY_SCORE: 7
ADLS_ACUITY_SCORE: 8
ADLS_ACUITY_SCORE: 10
ADLS_ACUITY_SCORE: 8
ADLS_ACUITY_SCORE: 10
ADLS_ACUITY_SCORE: 10
ADLS_ACUITY_SCORE: 8
ADLS_ACUITY_SCORE: 10
ADLS_ACUITY_SCORE: 8
ADLS_ACUITY_SCORE: 10
ADLS_ACUITY_SCORE: 7
ADLS_ACUITY_SCORE: 8
ADLS_ACUITY_SCORE: 10
ADLS_ACUITY_SCORE: 7
ADLS_ACUITY_SCORE: 10

## 2022-03-23 NOTE — PROGRESS NOTES
"DATE & TIME: 7P-7A             3/22-3/23     Cognitive Concerns/ Orientation : Alert and oriented x4  BEHAVIOR & AGGRESSION TOOL COLOR: No behaviors, did request a lot of food during the night, and didn't sleep very much  ABNL VS/O2: vitals stable   MOBILITY: SBA with walker.  Does stand at bedside to use the urinal  PAIN MANAGEMENT: No expression of pain, no PRN medications given  DIET: Regular diet  BOWEL/BLADDER: Continent of bowels and bladder   ABNL LAB/BG: HGB 6.7 (telemed notified)  DRAIN/DEVICES:  PIV saline locked   TELEMETRY RHYTHM: n/a  TESTS/PROCEDURES: none  D/C DATE: Anticipated discharge today  OTHER IMPORTANT INFO: Hoping to get a PT/OT eval for discharge because he is still unsure of his steadiness when he goes home.    /77 (BP Location: Right arm)   Pulse 99   Temp 97.4  F (36.3  C) (Oral)   Resp 18   Ht 1.778 m (5' 10\")   Wt 69.4 kg (153 lb)   SpO2 97%   BMI 21.95 kg/m        Mckenzie Roth RN on 3/23/2022 at 5:24 AM      "

## 2022-03-23 NOTE — PLAN OF CARE
Goal Outcome Evaluation:    Plan of Care Reviewed With: patient     Patient's hemoglobin was 6.7 this morning.  Iron Sucrose 200 mg infused without difficulty per orders.  Blood pressure remained stable throughout infusion.  Last /81 pulse 95.    Patient is alert and oriented x 4.  Denies pain, nausea or SHORTENSS OF AIR throughout shift.  Patient is up with assist of 1, gait belt and walker to the BATHROOM and to chair in room.  Patient is unsteady when up.  Bed/chair pad alarms in use.  Patient reminded to call for assistance.  Patient was incontinent of urine x 1; informed Dr. Cobb.  MD requested bladder scan to be done.  Pre-void bladder scan was 412 ml; then patient voided 350 ml at bedside in urinal.  Post void residual was 139 ml.    Patient is eating and drinking well.

## 2022-03-23 NOTE — PROGRESS NOTES
03/23/22 1300   Living Environment   People in Home spouse   Current Living Arrangements house   Home Accessibility stairs to enter home;stairs within home   Number of Stairs, Main Entrance 2   Stair Railings, Main Entrance railing on left side (ascending)   Number of Stairs, Within Home, Primary nine   Stair Railings, Within Home, Primary railing on left side (ascending)   Transportation Anticipated family or friend will provide   Self-Care   Usual Activity Tolerance fair   Current Activity Tolerance poor   Equipment Currently Used at Home none   Fall history within last six months yes   Number of times patient has fallen within last six months 6   General Information   Onset of Illness/Injury or Date of Surgery 03/20/22   Referring Physician Herrera Cobb MD    Patient/Family Therapy Goals Statement (PT) None reported    Pertinent History of Current Problem (include personal factors and/or comorbidities that impact the POC) 66 year old male admitted on 3/20/2022. He presented to the emergency department for evaluation of a syncopal episode in the setting of recent alcohol binge and alcohol dependence and was found to have hypotension, several metabolic abnormalities, acute kidney injury, and suspicion for GI bleed for which he is being admitted for further evaluation and treatment.   Cognition   Orientation Status (Cognition) oriented x 4   Pain Assessment   Patient Currently in Pain No   Posture    Posture Protracted shoulders   Range of Motion (ROM)   Range of Motion ROM is WFL   Strength (Manual Muscle Testing)   Strength Comments LE Strength grossly 4/5 for all motions.    Bed Mobility   Bed Mobility supine-sit;sit-supine   Supine-Sit [place] (Bed Mobility) contact guard   Sit-Supine [place] (Bed Mobility) contact guard   Transfers   Transfers sit-stand transfer   Transfer Safety Concerns Noted decreased balance during turns   Impairments Contributing to Impaired Transfers impaired balance;impaired  coordination   Sit-Stand Transfer   Sit-Stand [place] (Transfers) minimum assist (75% patient effort)   Comment, (Sit-Stand Transfer) Assistance needed due to unsteadiness    Gait/Stairs (Locomotion)   [place] Level (Gait) minimum assist (75% patient effort)   Assistive Device (Gait) walker, front-wheeled   Distance in Feet (Required for LE Total Joints) 30   Balance   Balance other (describe)   Sitting Balance: Static good balance   Sitting Balance: Dynamic fair balance   Sit-to-Stand Balance poor balance   Standing Balance: Static fair balance   Standing Balance: Dynamic poor balance   Balance Quick Add Sitting balance: Static;Sitting balance: dynamic;Sit to stand balance;Standing balance: static;Standing balance: dynamic   Clinical Impression   Criteria for Skilled Therapeutic Intervention Yes, treatment indicated   PT Diagnosis (PT) unsteady gait    Influenced by the following impairments impaired coordination, balance, strength, gait mechanics    Functional limitations due to impairments safe and independent bed mobility, transfers, gait    Clinical Presentation (PT Evaluation Complexity) Stable/Uncomplicated   Clinical Presentation Rationale clinical reasoning    Clinical Decision Making (Complexity) low complexity   Planned Therapy Interventions (PT) balance training;bed mobility training;gait training;ROM (range of motion);strengthening;transfer training;stair training   Anticipated Equipment Needs at Discharge (PT) walker, rolling   Risk & Benefits of therapy have been explained evaluation/treatment results reviewed;participants voiced agreement with care plan;participants included;patient   PT Discharge Planning   PT Discharge Recommendation (DC Rec) Transitional Care Facility   PT Rationale for DC Rec At this time the patient requires 24/7 supervision due to impaired insight into deficits, balance, history of falls, and gait mechanics. Patient had several LOB during therapy session, requiring  therapist assistance to remain standing.    PT Brief overview of current status Supervision bed mobility, Min A sit<>stand, ambulation with fww x30 ft.    Total Evaluation Time   Total Evaluation Time (Minutes) 5   Physical Therapy Goals   PT Frequency 5x/week   PT Predicated Duration/Target Date for Goal Attainment 03/30/22   PT Goals Bed Mobility;Transfers;Gait;Stairs   PT: Bed Mobility Independent   PT: Transfers Supervision/stand-by assist;Sit to/from stand;Bed to/from chair;Assistive device   PT: Gait Supervision/stand-by assist;150 feet;Rolling walker   PT: Stairs 9 stairs;Supervision/stand-by assist;Rail on both sides   Lola Mendoza, PT on 3/23/2022 at 1:27 PM

## 2022-03-23 NOTE — PROGRESS NOTES
"Care Management Follow Up    Length of Stay (days): 3    Expected Discharge Date: 03/23/2022     Concerns to be Addressed:     Discharge Planning  Patient plan of care discussed at interdisciplinary rounds: Yes    Anticipated Discharge Disposition: Home vs TCU cares  Disposition Comments: TBD  Anticipated Discharge Services: [facility]  Anticipated Discharge DME: Walker    Patient/family educated on Medicare website which has current facility and service quality ratings: yes  Education Provided on the Discharge Plan:  yes  Patient/Family in Agreement with the Plan: no    Referrals Placed by CM/SW: [facility]  Private pay costs discussed: transportation costs    Additional Information:  Per IDT rounds today, MD team states that pt is not medically stable for discharge today, but hoping for discharge tomorrow.    PT/OT recommends TCU cares upon discharge.      ORMMEL met with pt at bedside (RN was in the room during conversation) and discussed TCU cares and what this would entail.  Pt has [facility] Medicare & will require a PA.  SW educated which TCU facilities accept his insurance and explained that with his permission, I would send referrals for care.  Pt initially agreed to TCU cares, but then declined the need for cares.  SW and RN educated pt on his inability to walk without assistance, his loss of balance, and his inability to return home safely.  Pt continued to decline the need for TCU cares and stated he will remain here until he is able to go home.  ROMMEL educated that one the pt is medically stable, he will need to either go to a TCU or another safe plan of care needs to be established.     ROMMEL does not feel that pt was able to comprehend our conversation & questions pt's cognitive abilities or if pt is going into alcohol withdrawal.  Per discussion, pt shared that he has drank 2- 1.75 L per day for, \"as long as I can remember.\"  When asked for his longest time of sobriety, pt " reported 1 week, a few years back.  (However, per EMR, in 7/8/2020, pt at [facility] ED, pt reports 28 days & 5 months of sobriety in lifetime, after a treatment stay about 4 years ago.)    SW requested to call & speak with pt's wife, Larisa, to discuss creating a safe discharge plan.  Pt stated they are having marital problems and stated not to call her at this time.      SW notified MD that pt is declining the need or willingness to allow SW to send TCU referrals at this time.    Care Management team to follow for discharge plans.    BIRGIT Campbell

## 2022-03-23 NOTE — PROGRESS NOTES
Patient is alert and orientated x4. Up with SBA at the side of bed to use urinal. Patient ambulated in the hallway with writer and LISHA and patient was unsteady without using a walker. Patient expressed that he does not feel safe returning home d/t his unsteadiness. Sticky note placed for possible PT/OT eval.

## 2022-03-24 LAB
ANION GAP SERPL CALCULATED.3IONS-SCNC: 4 MMOL/L (ref 3–14)
BUN SERPL-MCNC: 14 MG/DL (ref 7–30)
CALCIUM SERPL-MCNC: 8.2 MG/DL (ref 8.5–10.1)
CHLORIDE BLD-SCNC: 106 MMOL/L (ref 94–109)
CO2 SERPL-SCNC: 27 MMOL/L (ref 20–32)
CREAT SERPL-MCNC: 1.24 MG/DL (ref 0.66–1.25)
ERYTHROCYTE [DISTWIDTH] IN BLOOD BY AUTOMATED COUNT: 15.7 % (ref 10–15)
GFR SERPL CREATININE-BSD FRML MDRD: 65 ML/MIN/1.73M2
GLUCOSE BLD-MCNC: 145 MG/DL (ref 70–99)
GLUCOSE BLDC GLUCOMTR-MCNC: 140 MG/DL (ref 70–99)
HCT VFR BLD AUTO: 20.8 % (ref 40–53)
HGB BLD-MCNC: 6.6 G/DL (ref 13.3–17.7)
HGB BLD-MCNC: 8.2 G/DL (ref 13.3–17.7)
MCH RBC QN AUTO: 35.9 PG (ref 26.5–33)
MCHC RBC AUTO-ENTMCNC: 31.7 G/DL (ref 31.5–36.5)
MCV RBC AUTO: 113 FL (ref 78–100)
PLATELET # BLD AUTO: 349 10E3/UL (ref 150–450)
POTASSIUM BLD-SCNC: 3.2 MMOL/L (ref 3.4–5.3)
RBC # BLD AUTO: 1.84 10E6/UL (ref 4.4–5.9)
SODIUM SERPL-SCNC: 137 MMOL/L (ref 133–144)
WBC # BLD AUTO: 9.3 10E3/UL (ref 4–11)

## 2022-03-24 PROCEDURE — P9016 RBC LEUKOCYTES REDUCED: HCPCS | Performed by: HOSPITALIST

## 2022-03-24 PROCEDURE — 85027 COMPLETE CBC AUTOMATED: CPT | Performed by: HOSPITALIST

## 2022-03-24 PROCEDURE — 250N000011 HC RX IP 250 OP 636: Performed by: HOSPITALIST

## 2022-03-24 PROCEDURE — 36415 COLL VENOUS BLD VENIPUNCTURE: CPT | Performed by: HOSPITALIST

## 2022-03-24 PROCEDURE — 120N000001 HC R&B MED SURG/OB

## 2022-03-24 PROCEDURE — 80048 BASIC METABOLIC PNL TOTAL CA: CPT | Performed by: HOSPITALIST

## 2022-03-24 PROCEDURE — 258N000003 HC RX IP 258 OP 636: Performed by: HOSPITALIST

## 2022-03-24 PROCEDURE — 250N000013 HC RX MED GY IP 250 OP 250 PS 637: Performed by: PHYSICIAN ASSISTANT

## 2022-03-24 PROCEDURE — 99233 SBSQ HOSP IP/OBS HIGH 50: CPT | Performed by: HOSPITALIST

## 2022-03-24 PROCEDURE — 250N000013 HC RX MED GY IP 250 OP 250 PS 637: Performed by: HOSPITALIST

## 2022-03-24 PROCEDURE — 85018 HEMOGLOBIN: CPT | Performed by: HOSPITALIST

## 2022-03-24 PROCEDURE — 250N000009 HC RX 250: Performed by: PHYSICIAN ASSISTANT

## 2022-03-24 RX ORDER — FLUCONAZOLE 100 MG/1
200 TABLET ORAL DAILY
Status: DISCONTINUED | OUTPATIENT
Start: 2022-03-25 | End: 2022-03-28 | Stop reason: HOSPADM

## 2022-03-24 RX ORDER — POTASSIUM CHLORIDE 1500 MG/1
40 TABLET, EXTENDED RELEASE ORAL ONCE
Status: COMPLETED | OUTPATIENT
Start: 2022-03-24 | End: 2022-03-24

## 2022-03-24 RX ORDER — PANTOPRAZOLE SODIUM 40 MG/1
40 TABLET, DELAYED RELEASE ORAL
Status: DISCONTINUED | OUTPATIENT
Start: 2022-03-24 | End: 2022-03-28 | Stop reason: HOSPADM

## 2022-03-24 RX ADMIN — ALBUTEROL SULFATE 2.5 MG: 2.5 SOLUTION RESPIRATORY (INHALATION) at 13:33

## 2022-03-24 RX ADMIN — FLUCONAZOLE 400 MG: 150 TABLET ORAL at 09:14

## 2022-03-24 RX ADMIN — METOPROLOL SUCCINATE 25 MG: 25 TABLET, FILM COATED, EXTENDED RELEASE ORAL at 09:14

## 2022-03-24 RX ADMIN — Medication 1 PATCH: at 09:14

## 2022-03-24 RX ADMIN — GABAPENTIN 600 MG: 300 CAPSULE ORAL at 03:02

## 2022-03-24 RX ADMIN — IRON SUCROSE 200 MG: 20 INJECTION, SOLUTION INTRAVENOUS at 12:32

## 2022-03-24 RX ADMIN — MULTIVITAMIN TABLET 1 TABLET: TABLET at 09:13

## 2022-03-24 RX ADMIN — GABAPENTIN 600 MG: 300 CAPSULE ORAL at 12:24

## 2022-03-24 RX ADMIN — PANTOPRAZOLE SODIUM 40 MG: 40 TABLET, DELAYED RELEASE ORAL at 09:14

## 2022-03-24 RX ADMIN — POTASSIUM CHLORIDE 40 MEQ: 20 TABLET, EXTENDED RELEASE ORAL at 09:14

## 2022-03-24 RX ADMIN — GABAPENTIN 600 MG: 300 CAPSULE ORAL at 20:15

## 2022-03-24 RX ADMIN — PANTOPRAZOLE SODIUM 40 MG: 40 TABLET, DELAYED RELEASE ORAL at 18:41

## 2022-03-24 RX ADMIN — THIAMINE HCL TAB 100 MG 100 MG: 100 TAB at 09:14

## 2022-03-24 RX ADMIN — FOLIC ACID 1 MG: 1 TABLET ORAL at 09:14

## 2022-03-24 ASSESSMENT — ACTIVITIES OF DAILY LIVING (ADL)
ADLS_ACUITY_SCORE: 9
ADLS_ACUITY_SCORE: 10
ADLS_ACUITY_SCORE: 9

## 2022-03-24 NOTE — PLAN OF CARE
Goal Outcome Evaluation:  CIWA scores overnight were 2.  VSS.    Up to bathroom with SBA. At times impulsive and does not call for help with bathroom needs and bed alarm sounds.  Reminded to call with needs.  Unsteady when up, using gait belt when cooperative.

## 2022-03-24 NOTE — PROGRESS NOTES
Late entry: Eval complete 3/23/22       03/23/22 1048   Quick Adds   Type of Visit Initial Occupational Therapy Evaluation   Living Environment   People in Home spouse   Current Living Arrangements house   Home Accessibility stairs to enter home;stairs within home   Number of Stairs, Main Entrance 2   Stair Railings, Main Entrance railing on left side (ascending)   Number of Stairs, Within Home, Primary nine   Stair Railings, Within Home, Primary railing on left side (ascending)   Transportation Anticipated family or friend will provide   Living Environment Comments pt does not drive, Pt states wife works during day and would need to be alone with no assist   Self-Care   Usual Activity Tolerance fair   Current Activity Tolerance poor   Equipment Currently Used at Home none   Instrumental Activities of Daily Living (IADL)   Previous Responsibilities medication management;finances;housekeeping;meal prep   General Information   Onset of Illness/Injury or Date of Surgery 03/20/22   Referring Physician Dr Cobb   Patient/Family Therapy Goal Statement (OT) to become steadier on feet   Additional Occupational Profile Info/Pertinent History of Current Problem Pt is a 66 yr old male admitted with Alcoholic gastritis and presence of possible GI bleed   Cognitive Status Examination   Orientation Status orientation to person, place and time   Affect/Mental Status (Cognitive) flat/blunted affect   Follows Commands follows one-step commands;75-90% accuracy   Safety Deficit impulsivity;at risk behavior observed   Cognitive Status Comments slow processing noted, pt slightly impulsive quickly transfering onto bed with poor balance    Pain Assessment   Patient Currently in Pain No   Posture   Posture not impaired   Range of Motion Comprehensive   General Range of Motion no range of motion deficits identified   Strength Comprehensive (MMT)   General Manual Muscle Testing (MMT) Assessment no strength deficits identified   Bed Mobility    Bed Mobility supine-sit;sit-supine   Transfers   Transfers toilet transfer   Toilet Transfer   Type (Toilet Transfer) sit-stand   [place] Level (Toilet Transfer) contact guard   Activities of Daily Living   BADL Assessment/Intervention lower body dressing;grooming;toileting   Lower Body Dressing Assessment/Training   [place] Level (Lower Body Dressing) contact guard assist   Comment, (Lower Body Dressing) Pt appears unsteady on feet   Grooming Assessment/Training   [place] Level (Grooming) contact guard assist   Comment, (Grooming) pt fatigues quickly when standing   Toileting   [place] Level (Toileting) contact guard assist   Assistive Devices (Toileting) grab bar, toilet   Comment, (Toileting) LOB when pulling up pants   Clinical Impression   Criteria for Skilled Therapeutic Interventions Met (OT) Yes, treatment indicated   OT Diagnosis weakness   Influenced by the following impairments slightly impulsive, unsteady on feet with FWW   OT Problem List-Impairments impacting ADL balance;cognition;activity tolerance impaired;mobility   ADL comments/analysis LB dressing CGA to pull up pants   Assessment of Occupational Performance 1-3 Performance Deficits   Identified Performance Deficits LB dressing, toileting room amb   Planned Therapy Interventions (OT) ADL retraining;transfer training;progressive activity/exercise   Clinical Decision Making Complexity (OT) low complexity   Risk & Benefits of therapy have been explained evaluation/treatment results reviewed;care plan/treatment goals reviewed;risks/benefits reviewed;current/potential barriers reviewed;participants voiced agreement with care plan;participants included;patient   OT Discharge Planning   OT Discharge Recommendation (DC Rec) Transitional Care Facility;home with assist   OT Rationale for DC Rec Pt appears unsteady when amb with LOB using FWW. for amb and for transfers. Pt states he would be alone at home for wife works during day w/o  assist. Pt would benefit from short TCU stay unsafe home alone at this time   OT Brief overview of current status CGA LB dressing, Limited stand cj of approx 1 min, impulsive at times, CGA for toilet transfers   Total Evaluation Time (Minutes)   Total Evaluation Time (Minutes) 10   OT Goals   Therapy Frequency (OT) Daily   OT Predicated Duration/Target Date for Goal Attainment 03/30/22   OT Goals Hygiene/Grooming;Lower Body Dressing;Toilet Transfer/Toileting   OT: Hygiene/Grooming modified independent;using adaptive equipment;while standing   OT: Lower Body Dressing Modified independent   OT: Toilet Transfer/Toileting Modified independent

## 2022-03-24 NOTE — PROGRESS NOTES
[facility] Medicine Progress Note  Date of Service: 03/24/2022    Assessment & Plan          Luigi Vieyra is a 66 year old male admitted on 3/20/2022. He presented to the emergency department for evaluation of a syncopal episode in the setting of recent alcohol binge and alcohol dependence and was found to have hypotension, several metabolic abnormalities, acute kidney injury, and suspicion for GI bleed for which he is being admitted for further evaluation and treatment.    Alcoholic esophagitis and duodenitis  Presented with syncopal episode and some bloody stool a week previously.  Initial hemoglobin was 9.7.  Please see below regarding anemia.  Takes aspirin 81 mg daily preadmission but not on anticoagulation. Admits to red bloody stools about 1 week prior to admission which had since resolved and has not had any here in the hospital. CT abdomen & pelvis demonstrates thickened gastric antrum with adjacent edema, suggestive of gastritis.  Underwent EGD 3/21:  Impression:            - LA Grade A reflux, candidiasis and chronic                          esophagitis with no bleeding. Rule out Leon's                          esophagus. Biopsied.                          - Small hiatal hernia.                          - Normal stomach.                          - Chronic alcoholic duodenitis. Biopsied.   Recommendation:        - Return patient to hospital ngo for ongoing care.                          - Resume previous diet.                          - Use Protonix (pantoprazole) 40 mg PO BID.   -  Proximal esophageal biopsy with ulcer and granulation tissue.  HSV and CMV pending. The distal esophagus with only necrotic inflammatory debris.  Duodenal biopsies showing some peptic duodenitis with ulceration.    -I discussed with surgeon 3/24; she was not convinced there was actually candidiasis present but given his diffuse esophagitis she thought a course of antifungal  treatment would be warranted.  Will initiate fluconazole x2 weeks, first dose 3/24.  400 mg p.o. today then 200 mg daily  - IV Protonix 40 mg BID 3/20/2022 - 3/23, changed to p.o. 40 mg BID effective 3/24 and will continue at discharge as per surgery recommendation  - Holding aspirin  - Patient should have a colonoscopy as an outpatient given his bright red blood prior to admission, but I do not think this needs to be done during this hospitalization unless he were to have ongoing bleeding.      Anemia, likely multifactorial due to chronic alcohol use and recent GI losses   Hemoglobins:  9.7 ? 8.4 ? 7.5 ? 7.4 ? 6.7 (declined transfusion) ? 6.6 (agreed to 1 unit prbc).    Admit hemoglobin 9.7 (recent baseline unavailable, was last hemoglobin was 14.6 in 2020), initially hemodynamically unstable with hypotension which resolved with fluid resuscitation. Continued to decline during first few days of hospitalization likely due to dilution.  Did not have any evidence of ongoing bleeding (suspect hemoglobin downtrending due to dilution in face of ongoing LR infusion).  When hemoglobin reached 6.7 on 3/23, I advised transfusion.  However patient adamantly declined it.  He stated that he would not want someone else's blood unless he absolutely needed it.  I explained that he would be more likely to heal and have less shortness of breath with the blood.  He still wanted to decline it.  Said after discussion with family today 3/23 he agreed to transfusion.  - Transfuse 1 unit PRBCs and recheck hemoglobin this afternoon, goal greater than 7  - Started IV iron 200 mg daily 3/23 given patient's initial refusal of transfusion, will continue for the time being    MAIA (acute kidney injury)  Admit creatinine 1.90 (baseline 0.7 -0.8), GFR 39 (baseline > 90). Trending toward improvement after IV fluid administration but still slightly above baseline. Likely prerenal in the setting of poor oral intake and hypotension.  -Trend daily  -  Hold nephrotoxins (losartan)    Syncope  Hypotension due to hypovolemia, resolved  Presented after witnessed syncopal episode, initially hypotensive with blood pressure 78/55. Blood pressure normalized after 1L LR bolus. Presume syncope was due to hypovolemia / hypotension from excessive alcohol intake and poor hydration. Head CT WNL..  Patient then had recurrent hypotensive episode although asymptomatic 2/22 which was treated with 1 L LR bolus with resolution of hypotension.  - Monitor blood pressure     Weakness  Patient remains somewhat unsteady on his feet.  I have consulted PT and OT.  They are recommending TCU.  Initially the patient declined this but after further discussion with patient's sister by care management he is in agreement.  Apparently patient and his wife have been in a bit of an argument related to his drinking.     Alcohol abuse / dependence  Transaminitis / alcoholic hepatitis  Known history of dependence, completed rehab a few years ago. Denies prior history of withdrawal or seizures, but feels shaky today. Has been drinking daily for the past 2 months, but significantly more on recent binge x 1 week; drinking about 1/2 bottle of 1.75L vodka daily. Last reported drink on 3/18. Admit hepatic labs with elevated alk phos (155) and alcoholic hepatitis pattern (ALT 58 / ).  These labs have subsequently normalized by 3/22   - CIWA protocol with Ativan available but has not been requiring.  - s/p IV vitamins, now oral   - Daily CMP  - declined help     Elevated lactic acid level, resolved  Initial lactic acid 8.1, normalized to 1.9 after 1L LR. Etiology of elevated lactic acid seems to be due to hypovolemia / hypotension rather than sepsis.   - Resolved 3/20/2022     Hyponatremia  Initial Na = 128. Likely due to poor oral intake and possible beer potomania. Degree of hyponatremia is mild, low risk for ODS.  Sodium improved to 137.  - trend    Hypokalemia  Potassium down to 3.23/24  -Replace  with 40 mEq x 1.  Additionally received some potassium with his blood transfusion and then recheck potassium tomorrow.    Leukocytosis with left shift  Initial WBC 13.0, ANC 10.7. Afebrile. No evidence of infection on imaging or by history or exam.  Normalized to 9.3.  Urinalysis without evidence of infection  - No indication for antibiotics at this time; continue to observe off antibiotics for now  - Blood cultures x 2 no growth to date    Thrombocytosis  Initial platelets 607, occurs in the setting of extreme dehydration. Improving. Likely from dehydration  - CBC with platelets in am    Fall  Chest wall pain, right posterior near scapula  New posterior rib / chest wall pain after fall, says he hit his back on a table when he fell. CT chest, abdomen, & pelvis did not reveal any evidence of musculoskeletal injury.   - Prn ice and oxycodone for pain (unable to have acetaminophen due to LFT elevation, or NSAIDs due to GI bleed)    Uncontrolled hypertension  Has known hypertension diagnosis prior to admission, but initially hypotensive in the emergency department. Managed prior to admission with losartan 100 mg daily and metoprolol XL 25 mg daily.  Blood pressures have been normal since then  -Continue to hold losartan given mild MAIA persisting  - Continue metoprolol with holding parameters    Mixed hyperlipidemia  Noted on problem list, does not appear to be on statin or lipid lowering medication prior to admission.  - Defer to PCP management    Mild persistent asthma  Noted on problem list, no prior PFT's on record. Managed prior to admission with albuterol prn. No evidence of asthma exacerbation on admission.   - Prn albuterol nebs available    Tobacco abuse  Encourage cessation. Nicotine patch available.     Moderate malnutrition in the context of acute on chronic illness  -- RD following, supplements    Diet: Snacks/Supplements Adult: Ensure Clear; With Meals  Regular Diet Adult    DVT Prophylaxis: VTE Prophylaxis  contraindicated due to bleed  [place] Catheter: Not present  Central Lines: None  Code Status: Full Code           Discussion: hopefully can discharge tomorrow if hemoglobin stable after transfusion    Disposition: Anticipate discharge 1 to 2 days.    Attestation:  I have reviewed today's vital signs, notes, medications, labs and imaging.    Total time: 40 minutes with 30 minutes spent with coordination of care and counseling reviewing plan of care with patient given need for transfusion. Will talk to wife and also talk with surgery      Herrera Cobb MD     Interval History   He is agreeable with blood transfusion today.    Physical Exam   Temp:  [97.6  F (36.4  C)-99  F (37.2  C)] 98.3  F (36.8  C)  Pulse:  [] 102  Resp:  [16-18] 18  BP: (110-139)/(71-88) 128/76  SpO2:  [98 %] 98 %    Weights:   Vitals:    03/20/22 1849 03/21/22 1131 03/24/22 0717   Weight: 69.8 kg (153 lb 14.1 oz) 69.4 kg (153 lb) 76.8 kg (169 lb 5 oz)    Body mass index is 24.29 kg/m .    Constitutional: well appearing older than stated age  CV: Regular  Respiratory: CTA bilaterally  GI: Soft, nontender  Skin: Warm and dry    Data   Recent Labs   Lab 03/24/22  0445 03/23/22  0443 03/22/22  0500 03/21/22  1500 03/21/22  0545 03/20/22  1717 03/20/22  1434   WBC 9.3 9.4 11.0  --  11.0  --  13.0*   HGB 6.6* 6.7* 7.4*   < > 7.5*  7.5*   < > 9.7*   * 110* 110*  --  107*  --  107*    377 428  --  483*  --  607*   INR  --   --   --   --   --   --  1.00    137 135  --  133  133   < > 128*   POTASSIUM 3.2* 3.6 3.6  --  3.5   < > 4.5   CHLORIDE 106 106 103  --  100   < > 90*   CO2 27 27 26  --  26   < > 18*   BUN 14 16 24  --  40*   < > 46*   CR 1.24 1.29* 1.24  --  1.57*   < > 1.90*   ANIONGAP 4 4 6  --  7   < > 20*   PINO 8.2* 8.2* 8.5  --  8.5   < > 9.6   * 127* 102*  --  89   < > 115*   ALBUMIN  --   --  2.2*  --  2.3*  --  2.7*   PROTTOTAL  --   --  5.1*  --  5.3*  --  7.0   BILITOTAL  --   --  0.4  --  0.5  --   1.0   ALKPHOS  --   --  94  --  102  --  155*   ALT  --   --  26  --  32  --  58   AST  --   --  29  --  45  --  122*   LIPASE  --   --   --   --   --   --  676*    < > = values in this interval not displayed.       Recent Labs   Lab 03/24/22  0445 03/23/22  0443 03/22/22  0500 03/21/22  0545 03/20/22  1717 03/20/22  1434   * 127* 102* 89 111* 115*        Unresulted Labs Ordered in the Past 30 Days of this Admission     Date and Time Order Name Status Description    3/23/2022 11:45 AM Prepare red blood cells (unit) Preliminary     3/20/2022  2:33 PM Blood Culture Peripheral Blood Preliminary     3/20/2022  2:33 PM Blood Culture Peripheral Blood Preliminary            Imaging  No results found for this or any previous visit (from the past 24 hour(s)).     I reviewed all new labs and imaging results over the last 24 hours. I personally reviewed no images or EKG's today.    Medications       [Held by provider] aspirin  81 mg Oral Daily     [START ON 3/25/2022] fluconazole  200 mg Oral Daily     fluconazole  400 mg Oral Once     folic acid  1 mg Oral Daily     [START ON 3/28/2022] gabapentin  100 mg Oral Q8H     [START ON 3/26/2022] gabapentin  300 mg Oral Q8H     gabapentin  600 mg Oral Q8H     iron sucrose  200 mg Intravenous Daily     [Held by provider] losartan  100 mg Oral Daily     metoprolol succinate ER  25 mg Oral Daily     multivitamin, therapeutic  1 tablet Oral Daily     nicotine  1 patch Transdermal Daily     nicotine   Transdermal Q8H     pantoprazole  40 mg Oral BID AC     potassium chloride  40 mEq Oral Once     sodium chloride (PF)  3 mL Intracatheter Q8H     thiamine  100 mg Oral Daily       Herrera Cobb MD

## 2022-03-24 NOTE — PLAN OF CARE
Goal Outcome Evaluation:    Plan of Care Reviewed With: patient     Patient alert and oriented x 4.  Up with assist of 1, gait belt and walker to the BATHROOM.  Voiding well; eating and drinking good.      Patient's hemoglobin was 6.6 this morning.  Transfused 1 unit PACKED RED BLOOD CELLS' without incidence.  VITAL SIGNS STABLE.  Iron Sucrose infusing at this time as ordered.    Bed alarm on for safety; patient can be impulsive when needing to go to the BATHROOM.  Instructed to call for assistance when up OUT OF BED.    Hemoglobin check was 8.2 at 1416.

## 2022-03-24 NOTE — PROGRESS NOTES
Addendum:  ROMMEL notified by [facility], Admissions for [facility] TCU facilities that the only bed offered will be at [facility] (Phone: [phone] Fax: [phone]) for the male CD program, which is 30- day SUDS program with TCU cares.  If pt agrees, [place] would require a transfer agreement.      SW informed that unable to speak with writer about this today due to time, but staff can address tomorrow and get back to her.  BIRGIT Campbell on 3/24/2022 at 7:02 PM          Care Management Follow Up    Length of Stay (days): 4    Expected Discharge Date: 03/24/2022     Concerns to be Addressed:   Discharge planning  Patient plan of care discussed at interdisciplinary rounds: Yes    Anticipated Discharge Disposition: TCU   Anticipated Discharge Services: TCU and CD resources  Anticipated Discharge DME: [place]    Patient/family educated on Medicare website which has current facility and service quality ratings: yes  Education Provided on the Discharge Plan:  Yes  Patient/Family in Agreement with the Plan: no    Referrals Placed by CM/ROMMEL: Internal Clinic Care Coordination  Private pay costs discussed: Transportation    Additional Information:  Per IDT rounds today, MD team states that pt is not medically stable for discharge today, but hoping for discharge in another day.  PT/OT recommends TCU upon discharge & however, pt did agree to going to a TCU today and worked with therapies.    ROMMEL received phone call from pt's sister, Dianne Marsh, [phone].  Pt signed PITO for sister on 3/23/22.  ROMMEL did explain that pt initially declined TCU cares, but that this writer will re-visit the topic with pt today.      ROMMEL met with pt at bedside.  Pt alert, polite, respectful and we discussed what a TCU is and what would be expected of him during the stay.  Once pt better educated on the TCU, pt is now agreeable to going as he recognizes he needs to get his strength back.    ROMMEL made the following referrals to TCU  facilities within the [facility] network:  --[facility]- Global referral to Brendan Jaramillo Patient Transition Liaison, (phone: [phone]/Fax: [phone]) - serves as referral for 15 TCU facilites in the [facility] area.   --[facility]- global referral made for a TCU bed with [facility] Admission team (phone: [phone]/Fax: [phone])  - serves as a referral for 11 TCU facilities.    --[facility] (p: [phone]/ Vkjqj-[phone]/F: [phone])  --[facility] (Phone: [phone]/Fax: [phone])  --[facility] (Admissions phone: [phone] Main Phone: [phone] Fax: [phone])  --[facility] TCU Phone: (Admissions: [phone] RN Report: [phone] Fax: [phone])   --[facility] [place] [place] (Main Phone: [phone] Admissions: [phone]: [phone])  --[place] (Main Phone: [phone] Admissions Phone: [phone] Fax: [phone])  --[facility] (Admission phone: [phone] Main Phone: [phone] Fax: [phone])  --[facility] (Admissions phone: [phone] Main phone: [phone] Fax: [phone])- Declined for cares- ETOH, impulsive  --[facility] Admissions Phone: [phone] Main Phone: [phone] Fax: [phone]  --[facility] (Main Phone: [phone] Admissions phone: [phone] Fax: [phone])  --[facility] (Admissions phone: [phone] Main phone: [phone] Fax: [phone])  --[facility] (Admissions Phone: [phone] Main Phone: [phone] Fax: [phone])  --[facility]--    Transportation discussed and [facility] wheelchair may have to be used to transport at discharge as pt does not have family to transport.  Pt is aware of costs associated  with [facility] transportation services & that a bill will be sent to his home.    SW called pt's sister and updated on status above.    Care Management team to follow for discharge plans.     BIRGIT Campbell

## 2022-03-24 NOTE — PROGRESS NOTES
[facility] Medicine Progress Note  Date of Service: 03/23/2022        Assessment & Plan             Luigi Vieyra is a 66 year old male admitted on 3/20/2022. He presented to the emergency department for evaluation of a syncopal episode in the setting of recent alcohol binge and alcohol dependence and was found to have hypotension, several metabolic abnormalities, acute kidney injury, and suspicion for GI bleed for which he is being admitted for further evaluation and treatment.     Alcoholic esophagitis and duodenitis  Anemia, likely multifactorial due to chronic alcohol use and recent GI losses  Admit hemoglobin 9.7 (recent baseline unavailable, was last hemoglobin was 14.6 in 2020), initially hemodynamically unstable with hypotension which resolved with fluid resuscitation. Takes aspirin 81 mg daily preadmission but not anticoagulation. Admits to bloody stools about 1 week prior to admission which had since resolved. CT abdomen & pelvis demonstrates thickened gastric antrum with adjacent edema, suggestive of gastritis.  EGD 3/21:  Impression:            - LA Grade A reflux, candidiasis and chronic                          esophagitis with no bleeding. Rule out Leon's                          esophagus. Biopsied.                          - Small hiatal hernia.                          - Normal stomach.                          - Chronic alcoholic duodenitis. Biopsied.   Recommendation:        - Return patient to hospital ngo for ongoing care.                          - Resume previous diet.                          - Use Protonix (pantoprazole) 40 mg PO BID.   N.B. Per my discussion with surgeon 3/21, she did NOT think this was candidiasis     Hemoglobin trend 9.7 ? 8.4 ? 7.5 ? 7.4 ? 6.7.  When hemoglobin reached 6.73/23, I advised transfusion.  However patient adamantly declined it.  He stated that he would not want someone else's blood unless he absolutely needed  it.  I explained that he would be more likely to heal and have less shortness of breath with the blood.  He still wanted to decline it.  - IV Protonix 40 mg BID started 3/20/2022, continue given marginal acceptable hemoglobin  - Repeat hemoglobin tomorrow morning  - Holding aspirin  -Start IV iron 200 mg daily given patient's refusal of transfusion  -We will readdress transfusion with patient tomorrow.  Given his refusal of blood today I was not able to obtain consent.  I assume he will be low in the morning again.  Transfusion can wait until I arrive tomorrow and can consent him in person.     MAIA (acute kidney injury)  Admit creatinine 1.90 (baseline 0.7 -0.8), GFR 39 (baseline > 90). Trending toward improvement after IV fluid administration. Likely prerenal in the setting of poor oral intake and hypotension.  -Trend  - Hold nephrotoxins (losartan)     Syncope  Hypotension due to hypovolemia, resolved  Presented after witnessed syncopal episode, initially hypotensive with blood pressure 78/55. Blood pressure normalized after 1L LR bolus. Presume syncope was due to hypovolemia / hypotension from excessive alcohol intake and poor hydration. Head CT WNL..  Patient then had recurrent hypotensive episode although asymptomatic 2/22 which was treated with 1 L LR bolus with resolution of hypotension.  - Monitor blood pressure   -Patient remains somewhat unsteady on his feet.  I have consulted PT and OT.  They are recommending TCU however patient declined to go this afternoon.  We will have to readdress this tomorrow.      Alcohol abuse / dependence  Transaminitis / alcoholic hepatitis  Known history of dependence, completed rehab a few years ago. Denies prior history of withdrawal or seizures, but feels shaky today. Has been drinking daily for the past 2 months, but significantly more on recent binge x 1 week; drinking about 1/2 bottle of 1.75L vodka daily. Last reported drink on 3/18. Admit hepatic labs with elevated alk  phos (155) and alcoholic hepatitis pattern (ALT 58 / ).   - CIWA protocol with Ativan available  - s/p IV vitamins, now oral   - Daily CMP  - declined help   -      Elevated lactic acid level, resolved  Initial lactic acid 8.1, normalized to 1.9 after 1L LR. Etiology of elevated lactic acid seems to be due to hypovolemia / hypotension rather than sepsis.   - Resolved 3/20/2022      Hyponatremia  Initial Na = 128. Likely due to poor oral intake and possible beer potomania. Degree of hyponatremia is mild, low risk for ODS. Improved.  - trend     Leukocytosis with left shift  Initial WBC 13.0, ANC 10.7. Afebrile. No evidence of infection on imaging or by history or exam.   - No indication for antibiotics at this time; continue to observe off antibiotics for now  - Blood cultures x 2 pending  - UA/UC pending     Thrombocytosis  Initial platelets 607, occurs in the setting of extreme dehydration. May possibly improve as fluid status normalizes.  - CBC with platelets in am     Fall  Chest wall pain, right posterior near scapula  New posterior rib / chest wall pain after fall, says he hit his back on a table when he fell. CT chest, abdomen, & pelvis did not reveal any evidence of musculoskeletal injury.   - Prn ice and oxycodone for pain (unable to have acetaminophen due to LFT elevation, or NSAIDs due to GI bleed)     Uncontrolled hypertension  Has known hypertension diagnosis prior to admission, but initially hypotensive in the emergency department. Managed prior to admission with losartan 100 mg daily and metoprolol XL 25 mg daily.   - Losartan held due to renal function, see above  - Continue metoprolol with holding parameters     Mixed hyperlipidemia  Noted on problem list, does not appear to be on statin or lipid lowering medication prior to admission.  - Defer to PCP management     Mild persistent asthma  Noted on problem list, no prior PFT's on record. Managed prior to admission with albuterol prn. No  evidence of asthma exacerbation on admission.   - Prn albuterol nebs available     Tobacco abuse  Encourage cessation. Nicotine patch available.            Diet: Snacks/Supplements Adult: Ensure Clear; With Meals  Regular Diet Adult    DVT Prophylaxis: VTE Prophylaxis contraindicated due to bleed  Frederick Catheter: Not present  Central Lines: None  Code Status: Full Code                Discussion: hopefully can discharge tomorrow if hemoglobin stable     Disposition: Anticipate discharge 1 to 2 days.     Attestation:  I have reviewed today's vital signs, notes, medications, labs and imaging.     Herrera Cobb MD            Interval History     Adamant that he does not want to receive any blood today.           Physical Exam     Temp:  [97.4  F (36.3  C)-99  F (37.2  C)] 98.6  F (37  C)  Pulse:  [] 90  Resp:  [16-18] 18  BP: (105-123)/(70-81) 118/80  SpO2:  [95 %-98 %] 98 %     Weights:         Vitals:     03/20/22 1700 03/20/22 1849 03/21/22 1131   Weight: 77.1 kg (170 lb) 69.8 kg (153 lb 14.1 oz) 69.4 kg (153 lb)    Body mass index is 21.95 kg/m .     Constitutional: well appearing older than stated age  CV: Regular  Respiratory: CTA bilaterally  GI: Soft, nontender  Skin: Warm and dry  Musculoskeletal:           Data                Recent Labs   Lab 03/23/22  0443 03/22/22  0500 03/21/22  1831 03/21/22  1500 03/21/22  0545 03/20/22  1717 03/20/22  1434   WBC 9.4 11.0  --   --  11.0  --  13.0*   HGB 6.7* 7.4* 8.3*   < > 7.5*  7.5*   < > 9.7*   * 110*  --   --  107*  --  107*    428  --   --  483*  --  607*   INR  --   --   --   --   --   --  1.00    135  --   --  133  133   < > 128*   POTASSIUM 3.6 3.6  --   --  3.5   < > 4.5   CHLORIDE 106 103  --   --  100   < > 90*   CO2 27 26  --   --  26   < > 18*   BUN 16 24  --   --  40*   < > 46*   CR 1.29* 1.24  --   --  1.57*   < > 1.90*   ANIONGAP 4 6  --   --  7   < > 20*   PINO 8.2* 8.5  --   --  8.5   < > 9.6   * 102*  --   --  89    < > 115*   ALBUMIN  --  2.2*  --   --  2.3*  --  2.7*   PROTTOTAL  --  5.1*  --   --  5.3*  --  7.0   BILITOTAL  --  0.4  --   --  0.5  --  1.0   ALKPHOS  --  94  --   --  102  --  155*   ALT  --  26  --   --  32  --  58   AST  --  29  --   --  45  --  122*   LIPASE  --   --   --   --   --   --  676*    < > = values in this interval not displayed.                 Recent Labs   Lab 03/23/22  0443 03/22/22  0500 03/21/22  0545 03/20/22  1717 03/20/22  1434   * 102* 89 111* 115*                 Unresulted Labs Ordered in the Past 30 Days of this Admission      Date and Time Order Name Status Description     3/23/2022 11:45 AM Prepare red blood cells (unit) Preliminary       3/20/2022  2:33 PM Blood Culture Peripheral Blood Preliminary       3/20/2022  2:33 PM Blood Culture Peripheral Blood Preliminary               Imaging  No results found for this or any previous visit (from the past 24 hour(s)).      I reviewed all new labs and imaging results over the last 24 hours. I personally reviewed no images or EKG's today.           Medications        [Held by provider] aspirin  81 mg Oral Daily     folic acid  1 mg Oral Daily     [START ON 3/28/2022] gabapentin  100 mg Oral Q8H     [START ON 3/26/2022] gabapentin  300 mg Oral Q8H     [START ON 3/24/2022] gabapentin  600 mg Oral Q8H     iron sucrose  200 mg Intravenous Daily     [Held by provider] losartan  100 mg Oral Daily     metoprolol succinate ER  25 mg Oral Daily     multivitamin, therapeutic  1 tablet Oral Daily     nicotine  1 patch Transdermal Daily     nicotine   Transdermal Q8H     pantoprazole (PROTONIX) IV  40 mg Intravenous BID     sodium chloride (PF)  3 mL Intracatheter Q8H     thiamine  100 mg Oral Daily         Herrera Cobb MD

## 2022-03-25 LAB
ANION GAP SERPL CALCULATED.3IONS-SCNC: 5 MMOL/L (ref 3–14)
BACTERIA BLD CULT: NO GROWTH
BACTERIA BLD CULT: NO GROWTH
BUN SERPL-MCNC: 13 MG/DL (ref 7–30)
CALCIUM SERPL-MCNC: 8.2 MG/DL (ref 8.5–10.1)
CHLORIDE BLD-SCNC: 107 MMOL/L (ref 94–109)
CO2 SERPL-SCNC: 25 MMOL/L (ref 20–32)
CREAT SERPL-MCNC: 1.27 MG/DL (ref 0.66–1.25)
ERYTHROCYTE [DISTWIDTH] IN BLOOD BY AUTOMATED COUNT: 19.9 % (ref 10–15)
GFR SERPL CREATININE-BSD FRML MDRD: 63 ML/MIN/1.73M2
GLUCOSE BLD-MCNC: 105 MG/DL (ref 70–99)
HCT VFR BLD AUTO: 24 % (ref 40–53)
HGB BLD-MCNC: 7.9 G/DL (ref 13.3–17.7)
MCH RBC QN AUTO: 34.6 PG (ref 26.5–33)
MCHC RBC AUTO-ENTMCNC: 32.9 G/DL (ref 31.5–36.5)
MCV RBC AUTO: 105 FL (ref 78–100)
PLATELET # BLD AUTO: 351 10E3/UL (ref 150–450)
POTASSIUM BLD-SCNC: 3.6 MMOL/L (ref 3.4–5.3)
RBC # BLD AUTO: 2.28 10E6/UL (ref 4.4–5.9)
SODIUM SERPL-SCNC: 137 MMOL/L (ref 133–144)
WBC # BLD AUTO: 10.3 10E3/UL (ref 4–11)

## 2022-03-25 PROCEDURE — 120N000001 HC R&B MED SURG/OB

## 2022-03-25 PROCEDURE — 99232 SBSQ HOSP IP/OBS MODERATE 35: CPT | Mod: FS | Performed by: INTERNAL MEDICINE

## 2022-03-25 PROCEDURE — 250N000013 HC RX MED GY IP 250 OP 250 PS 637: Performed by: PHYSICIAN ASSISTANT

## 2022-03-25 PROCEDURE — 258N000003 HC RX IP 258 OP 636: Performed by: HOSPITALIST

## 2022-03-25 PROCEDURE — 250N000013 HC RX MED GY IP 250 OP 250 PS 637: Performed by: HOSPITALIST

## 2022-03-25 PROCEDURE — 85027 COMPLETE CBC AUTOMATED: CPT | Performed by: HOSPITALIST

## 2022-03-25 PROCEDURE — 250N000011 HC RX IP 250 OP 636: Performed by: HOSPITALIST

## 2022-03-25 PROCEDURE — 36415 COLL VENOUS BLD VENIPUNCTURE: CPT | Performed by: HOSPITALIST

## 2022-03-25 PROCEDURE — 82310 ASSAY OF CALCIUM: CPT | Performed by: HOSPITALIST

## 2022-03-25 PROCEDURE — 99207 PR APP CREDIT; MD BILLING SHARED VISIT: CPT | Performed by: PHYSICIAN ASSISTANT

## 2022-03-25 RX ADMIN — GABAPENTIN 600 MG: 300 CAPSULE ORAL at 20:20

## 2022-03-25 RX ADMIN — METOPROLOL SUCCINATE 25 MG: 25 TABLET, FILM COATED, EXTENDED RELEASE ORAL at 08:34

## 2022-03-25 RX ADMIN — PANTOPRAZOLE SODIUM 40 MG: 40 TABLET, DELAYED RELEASE ORAL at 17:19

## 2022-03-25 RX ADMIN — FLUCONAZOLE 200 MG: 100 TABLET ORAL at 08:34

## 2022-03-25 RX ADMIN — GABAPENTIN 600 MG: 300 CAPSULE ORAL at 03:40

## 2022-03-25 RX ADMIN — IRON SUCROSE 200 MG: 20 INJECTION, SOLUTION INTRAVENOUS at 08:34

## 2022-03-25 RX ADMIN — PANTOPRAZOLE SODIUM 40 MG: 40 TABLET, DELAYED RELEASE ORAL at 07:06

## 2022-03-25 RX ADMIN — FOLIC ACID 1 MG: 1 TABLET ORAL at 09:00

## 2022-03-25 RX ADMIN — THIAMINE HCL TAB 100 MG 100 MG: 100 TAB at 08:34

## 2022-03-25 RX ADMIN — Medication 1 PATCH: at 08:33

## 2022-03-25 RX ADMIN — GABAPENTIN 600 MG: 300 CAPSULE ORAL at 11:38

## 2022-03-25 RX ADMIN — MULTIVITAMIN TABLET 1 TABLET: TABLET at 08:34

## 2022-03-25 ASSESSMENT — ACTIVITIES OF DAILY LIVING (ADL)
ADLS_ACUITY_SCORE: 9

## 2022-03-25 NOTE — PROGRESS NOTES
[facility]    Medicine Progress Note - Hospitalist Service    Date of Admission:  3/20/2022    Assessment & Plan        Luigi Vieyra is a 66 year old male admitted on 3/20/2022. He presented to the emergency department for evaluation of a syncopal episode in the setting of recent alcohol binge and alcohol dependence and was found to have hypotension, several metabolic abnormalities, acute kidney injury, and suspicion for GI bleed for which he is being admitted for further evaluation and treatment.    Alcoholic esophagitis and duodenitis  Presented with syncopal episode and some bloody stool a week previously.  Initial hemoglobin was 9.7.  Please see below regarding anemia.  Takes aspirin 81 mg daily preadmission but not on anticoagulation. Admits to red bloody stools about 1 week prior to admission which had since resolved and has not had any here in the hospital. CT abdomen & pelvis demonstrates thickened gastric antrum with adjacent edema, suggestive of gastritis.  Underwent EGD 3/21:  Impression:    - LA Grade A reflux, candidiasis and chronic esophagitis with no bleeding. Rule out Leon's esophagus. Biopsied.                          - Small hiatal hernia.                          - Normal stomach.                          - Chronic alcoholic duodenitis. Biopsied.   Recommendation:           - Return patient to hospital ngo for ongoing care.                          - Resume previous diet.                          - Use Protonix (pantoprazole) 40 mg PO BID.   Proximal esophageal biopsy with ulcer and granulation tissue.  HSV and CMV pending. The distal esophagus with only necrotic inflammatory debris.  Duodenal biopsies showing some peptic duodenitis with ulceration.    EGD findings discussed with surgeon 3/24; she was not convinced there was actually candidiasis present but given his diffuse esophagitis she thought a course of antifungal treatment would be warranted, see  below.    - IV Protonix 40 mg BID 3/20/2022 - 3/23, changed to p.o. 40 mg BID effective 3/24 and will continue at discharge as per surgery recommendation  - Holding aspirin  - Patient should have a colonoscopy as an outpatient given his bright red blood prior to admission, but can be deferred to outpatient if no further bleeding  - HSV and CMV from esophageal biopsy pending  - Fluconazole initiated 3/24, intend for 2 week course; 400 mg po on 3/24, then 200 mg daily x 2 weeks      Anemia, likely multifactorial due to chronic alcohol use and recent GI losses  Hemoglobins:  9.7 ? 8.4 ? 7.5 ? 7.4 ? 6.7 (declined transfusion) ? 6.6 (agreed to 1 unit PRBC) ? 8.2 ? 7.9    Admit hemoglobin 9.7 (recent baseline unavailable, was last hemoglobin was 14.6 in 2020), initially hemodynamically unstable with hypotension which resolved with fluid resuscitation. Continued to decline during first few days of hospitalization likely due to dilution.  Did not have any evidence of ongoing bleeding (suspect hemoglobin down trending due to dilution in face of ongoing LR infusion).  When hemoglobin reached 6.7 on 3/23, transfusion was advised but patient declined stating he would not want someone else's blood unless he absolutely needed it. Continued to decline after it was explained that he would be more likely to heal and have less shortness of breath with the blood. Agreed to transfusion on 3/23 after he discussed with family.   - Received 1 unit PRBC on 3/24  - Started IV iron 200 mg daily 3/23 given patient's initial refusal of transfusion, will continue for the time being    MAIA (acute kidney injury)  Admit creatinine 1.90 (baseline 0.7 -0.8), GFR 39 (baseline > 90). Trending toward improvement after IV fluid administration but still slightly above baseline. Likely prerenal in the setting of poor oral intake and hypotension.  - Trend daily  - Hold nephrotoxins (losartan)    Syncope  Hypotension due to hypovolemia, resolved  Presented  after witnessed syncopal episode, initially hypotensive with blood pressure 78/55. Blood pressure normalized after 1L LR bolus. Presume syncope was due to hypovolemia / hypotension from excessive alcohol intake and poor hydration. Head CT WNL. Patient then had recurrent hypotensive episode although asymptomatic 2/22 which was treated with 1 L LR bolus with resolution of hypotension.  - Monitor blood pressure     Weakness  Patient remains somewhat unsteady on his feet. PT and OT consulted, recommending TCU.  Initially the patient declined this but after further discussion with patient's sister by care management he is in agreement.  - Care Transitions following  - Awaiting TCU bed    Alcohol abuse / dependence  Transaminitis / alcoholic hepatitis  Known history of dependence, completed rehab a few years ago. Denies prior history of withdrawal or seizures, but feels shaky today. Has been drinking daily for the past 2 months, but significantly more on recent binge x 1 week; drinking about 1/2 bottle of 1.75L vodka daily. Last reported drink on 3/18. Admit hepatic labs with elevated alk phos (155) and alcoholic hepatitis pattern (ALT 58 / ).  These labs have subsequently normalized by 3/22   Apparently patient and his wife have been in a bit of an argument related to his drinking.  - CIWA protocol with Ativan available but has not been requiring.  - s/p IV vitamins, now oral   - Daily CMP  - declined help / inpatient treatment    Elevated lactic acid level, resolved  Initial lactic acid 8.1, normalized to 1.9 after 1L LR. Etiology of elevated lactic acid seems to be due to hypovolemia / hypotension rather than sepsis.   - Resolved 3/20/2022     Hyponatremia, resolved  Initial Na = 128. Likely due to poor oral intake and possible beer potomania. Degree of hyponatremia is mild, low risk for ODS.  Sodium improved to 137.  - trend    Hypokalemia  Potassium down to 3.2 on 3/24  -Replace with 40 mEq x 1.  Additionally  received some potassium with his blood transfusion.  - BMP in am    Leukocytosis with left shift  Initial WBC 13.0, ANC 10.7. Afebrile. No evidence of infection on imaging or by history or exam.  Normalized to 9.3.  Urinalysis without evidence of infection.  - No indication for antibiotics at this time; continue to observe off antibiotics for now  - Blood cultures x 2 no growth to date    Thrombocytosis  Initial platelets 607, occurs in the setting of extreme dehydration. Improving. Likely from dehydration  - CBC with platelets in am    Fall  Chest wall pain, right posterior near scapula  New posterior rib / chest wall pain after fall, says he hit his back on a table when he fell. CT chest, abdomen, & pelvis did not reveal any evidence of musculoskeletal injury.   - Prn ice and oxycodone for pain (unable to have acetaminophen due to LFT elevation, or NSAIDs due to GI bleed)    Uncontrolled hypertension  Has known hypertension diagnosis prior to admission, but initially hypotensive in the emergency department. Managed prior to admission with losartan 100 mg daily and metoprolol XL 25 mg daily.  Blood pressures have been normal since then.  - Continue to hold losartan given mild MAIA persisting  - Continue metoprolol with holding parameters    Mixed hyperlipidemia  Noted on problem list, does not appear to be on statin or lipid lowering medication prior to admission.  - Defer to PCP management    Mild persistent asthma  Noted on problem list, no prior PFT's on record. Managed prior to admission with albuterol prn. No evidence of asthma exacerbation on admission.   - Prn albuterol nebs available    Tobacco abuse  Encourage cessation. Nicotine patch available.     Moderate malnutrition in the context of acute on chronic illness  -- RD following, supplements    Diet: Snacks/Supplements Adult: Ensure Clear; With Meals  Regular Diet Adult    DVT Prophylaxis: VTE Prophylaxis contraindicated due to bleed  Frederick Catheter: Not  present  Central Lines: None  Code Status: Full Code           Diet: Snacks/Supplements Adult: Ensure Clear; With Meals  Regular Diet Adult    DVT Prophylaxis: Pneumatic Compression Devices  Frederick Catheter: Not present  Central Lines: None  Cardiac Monitoring: None  Code Status: Full Code      Disposition Plan   Expected Discharge: 03/28/2022     Anticipated discharge location: TCU, inpatient rehabilitation facility    Delays:     Insurance Authorization needed  Placement - TCU            The patient's care was discussed with the Attending Physician, Dr. Francesco Hernandez and Patient.    Tamar Jean Baptiste PA-C  Hospitalist Service  [facility]  Securely message with the Vocera Web Console (learn more here)  Text page via [employer] Paging/Directory         Clinically Significant Risk Factors Present on Admission                    ______________________________________________________________________    Interval History     No melena, hematochezia.  No abdominal / epigastric pain.  Good appetite.  Sister called, he discussed proposed plan with her.   No dizziness / lightheadedness. Feels unsteady when he first stands up.   No weakness.    Data reviewed today: I reviewed all medications, new labs and imaging results over the last 24 hours. I personally reviewed no images or EKG's today.    Physical Exam   Vital Signs: Temp: 98.6  F (37  C) Temp src: Oral BP: 137/82 Pulse: 97   Resp: 17 SpO2: 98 % O2 Device: None (Room air)    Weight: 170 lbs 3.12 oz    General appearance: Awake, alert, and in no apparent distress. Pleasant and conversational, speaking in full coherent sentences.  CV: Regular rhythm & rate, no murmurs. No edema. Peripheral pulses intact.  Respiratory: Moving air well bilaterally, no wheezing, crackles, or rhonchi.  GI: Non-distended, soft, nontender to palpation. No rebound or guarding. Normoactive bowel sounds.  Skin: Warm, dry, no rashes or ecchymoses. No mottling of  skin.  Musculoskeletal / extremities: Moves all extremities equally, no obvious abnormalities. Distal CMS intact.  Neurologic: No focal deficits.      Data   Recent Labs   Lab 03/25/22  0508 03/24/22  2146 03/24/22  1416 03/24/22  0445 03/23/22  0443 03/22/22  0500 03/21/22  1500 03/21/22  0545 03/20/22  1717 03/20/22  1434   WBC 10.3  --   --  9.3 9.4 11.0  --  11.0  --  13.0*   HGB 7.9*  --  8.2* 6.6* 6.7* 7.4*   < > 7.5*  7.5*   < > 9.7*   *  --   --  113* 110* 110*  --  107*  --  107*     --   --  349 377 428  --  483*  --  607*   INR  --   --   --   --   --   --   --   --   --  1.00     --   --  137 137 135  --  133  133   < > 128*   POTASSIUM 3.6  --   --  3.2* 3.6 3.6  --  3.5   < > 4.5   CHLORIDE 107  --   --  106 106 103  --  100   < > 90*   CO2 25  --   --  27 27 26  --  26   < > 18*   BUN 13  --   --  14 16 24  --  40*   < > 46*   CR 1.27*  --   --  1.24 1.29* 1.24  --  1.57*   < > 1.90*   ANIONGAP 5  --   --  4 4 6  --  7   < > 20*   PINO 8.2*  --   --  8.2* 8.2* 8.5  --  8.5   < > 9.6   * 140*  --  145* 127* 102*  --  89   < > 115*   ALBUMIN  --   --   --   --   --  2.2*  --  2.3*  --  2.7*   PROTTOTAL  --   --   --   --   --  5.1*  --  5.3*  --  7.0   BILITOTAL  --   --   --   --   --  0.4  --  0.5  --  1.0   ALKPHOS  --   --   --   --   --  94  --  102  --  155*   ALT  --   --   --   --   --  26  --  32  --  58   AST  --   --   --   --   --  29  --  45  --  122*   LIPASE  --   --   --   --   --   --   --   --   --  676*    < > = values in this interval not displayed.     Medications       [Held by provider] aspirin  81 mg Oral Daily     fluconazole  200 mg Oral Daily     [START ON 3/28/2022] gabapentin  100 mg Oral Q8H     [START ON 3/26/2022] gabapentin  300 mg Oral Q8H     gabapentin  600 mg Oral Q8H     [Held by provider] losartan  100 mg Oral Daily     metoprolol succinate ER  25 mg Oral Daily     nicotine  1 patch Transdermal Daily     nicotine   Transdermal Q8H      pantoprazole  40 mg Oral BID AC     sodium chloride (PF)  3 mL Intracatheter Q8H     thiamine  100 mg Oral Daily

## 2022-03-25 NOTE — PROGRESS NOTES
Care Management Follow Up    Length of Stay (days): 5    Expected Discharge Date: 03/25/2022     Concerns to be Addressed:       Patient plan of care discussed at interdisciplinary rounds: Yes    Anticipated Discharge Disposition: Home/TCU   Disposition Comments: TBD  Anticipated Discharge Services: [facility]  Anticipated Discharge DME: [place]    Patient/family educated on Medicare website which has current facility and service quality ratings: yes  Education Provided on the Discharge Plan:  Yes, patient   Patient/Family in Agreement with the Plan: no    Referrals Placed by CM/SW: [facility] Care Coordination  Private pay costs discussed: transportation costs    Additional Information:    [facility] (Phone: [phone] Fax: [phone]) does have a bed opening for TCU cares AND a 30 day inpatient SUDS program for Monday, 3/28. Patient does need to complete an assessment via phone w/ a chemical health counselor which will likely happen on Monday to determine if he is appropriate for the program. Writer spoke w/ patient and sister, Dianne via phone at bedside to discuss options. He is agreeable and would like to move forward w/ admission at the [facility].     Writer did send updated information to [facility] (Admissions Phone: [phone] Main Phone: [phone] Fax: [phone]) per their request. Facility is reviewing for TCU. Addendum: Pt was accepted for TCU but prefers to go to [facility].     PLAN: [facility] (Phone: [phone] Fax: [phone]) pending chemical health assessment via phone.       BIRGIT Mcgregor  Care Management, [facility]  [phone]

## 2022-03-25 NOTE — PLAN OF CARE
Goal Outcome Evaluation:  Alert and oriented.  CIWA scores have been 2.  VSS.  Up to bathroom with assist of 1 gait belt and walker, impulsive at times and gets up independently, unsteady when up.  Encouraged to always call when getting up.    Denies pain.

## 2022-03-26 ENCOUNTER — APPOINTMENT (OUTPATIENT)
Dept: OCCUPATIONAL THERAPY | Facility: CLINIC | Age: 67
DRG: 368 | End: 2022-03-26
Payer: COMMERCIAL

## 2022-03-26 ENCOUNTER — APPOINTMENT (OUTPATIENT)
Dept: PHYSICAL THERAPY | Facility: CLINIC | Age: 67
DRG: 368 | End: 2022-03-26
Payer: COMMERCIAL

## 2022-03-26 LAB
ANION GAP SERPL CALCULATED.3IONS-SCNC: 6 MMOL/L (ref 3–14)
BUN SERPL-MCNC: 13 MG/DL (ref 7–30)
CALCIUM SERPL-MCNC: 8.1 MG/DL (ref 8.5–10.1)
CHLORIDE BLD-SCNC: 108 MMOL/L (ref 94–109)
CO2 SERPL-SCNC: 25 MMOL/L (ref 20–32)
CREAT SERPL-MCNC: 1.26 MG/DL (ref 0.66–1.25)
ERYTHROCYTE [DISTWIDTH] IN BLOOD BY AUTOMATED COUNT: 19 % (ref 10–15)
GFR SERPL CREATININE-BSD FRML MDRD: 63 ML/MIN/1.73M2
GLUCOSE BLD-MCNC: 97 MG/DL (ref 70–99)
HCT VFR BLD AUTO: 24.7 % (ref 40–53)
HGB BLD-MCNC: 8 G/DL (ref 13.3–17.7)
MCH RBC QN AUTO: 34 PG (ref 26.5–33)
MCHC RBC AUTO-ENTMCNC: 32.4 G/DL (ref 31.5–36.5)
MCV RBC AUTO: 105 FL (ref 78–100)
PLATELET # BLD AUTO: 383 10E3/UL (ref 150–450)
POTASSIUM BLD-SCNC: 3.5 MMOL/L (ref 3.4–5.3)
RBC # BLD AUTO: 2.35 10E6/UL (ref 4.4–5.9)
SODIUM SERPL-SCNC: 139 MMOL/L (ref 133–144)
WBC # BLD AUTO: 9.8 10E3/UL (ref 4–11)

## 2022-03-26 PROCEDURE — 97535 SELF CARE MNGMENT TRAINING: CPT | Mod: GO | Performed by: OCCUPATIONAL THERAPIST

## 2022-03-26 PROCEDURE — 250N000013 HC RX MED GY IP 250 OP 250 PS 637: Performed by: PHYSICIAN ASSISTANT

## 2022-03-26 PROCEDURE — 999N000157 HC STATISTIC RCP TIME EA 10 MIN

## 2022-03-26 PROCEDURE — 97110 THERAPEUTIC EXERCISES: CPT | Mod: GP

## 2022-03-26 PROCEDURE — 85027 COMPLETE CBC AUTOMATED: CPT | Performed by: PHYSICIAN ASSISTANT

## 2022-03-26 PROCEDURE — 99232 SBSQ HOSP IP/OBS MODERATE 35: CPT | Performed by: INTERNAL MEDICINE

## 2022-03-26 PROCEDURE — 97116 GAIT TRAINING THERAPY: CPT | Mod: GP

## 2022-03-26 PROCEDURE — 80048 BASIC METABOLIC PNL TOTAL CA: CPT | Performed by: PHYSICIAN ASSISTANT

## 2022-03-26 PROCEDURE — 36415 COLL VENOUS BLD VENIPUNCTURE: CPT | Performed by: PHYSICIAN ASSISTANT

## 2022-03-26 PROCEDURE — 250N000013 HC RX MED GY IP 250 OP 250 PS 637: Performed by: HOSPITALIST

## 2022-03-26 PROCEDURE — 120N000001 HC R&B MED SURG/OB

## 2022-03-26 RX ORDER — LOSARTAN POTASSIUM 25 MG/1
25 TABLET ORAL DAILY
Status: DISCONTINUED | OUTPATIENT
Start: 2022-03-27 | End: 2022-03-28 | Stop reason: HOSPADM

## 2022-03-26 RX ADMIN — PANTOPRAZOLE SODIUM 40 MG: 40 TABLET, DELAYED RELEASE ORAL at 16:15

## 2022-03-26 RX ADMIN — GABAPENTIN 300 MG: 300 CAPSULE ORAL at 19:15

## 2022-03-26 RX ADMIN — GABAPENTIN 300 MG: 300 CAPSULE ORAL at 03:24

## 2022-03-26 RX ADMIN — THIAMINE HCL TAB 100 MG 100 MG: 100 TAB at 08:03

## 2022-03-26 RX ADMIN — Medication 1 PATCH: at 08:03

## 2022-03-26 RX ADMIN — FLUCONAZOLE 200 MG: 100 TABLET ORAL at 08:02

## 2022-03-26 RX ADMIN — METOPROLOL SUCCINATE 25 MG: 25 TABLET, FILM COATED, EXTENDED RELEASE ORAL at 08:03

## 2022-03-26 RX ADMIN — GABAPENTIN 300 MG: 300 CAPSULE ORAL at 11:42

## 2022-03-26 RX ADMIN — PANTOPRAZOLE SODIUM 40 MG: 40 TABLET, DELAYED RELEASE ORAL at 06:46

## 2022-03-26 ASSESSMENT — ACTIVITIES OF DAILY LIVING (ADL)
ADLS_ACUITY_SCORE: 9
ADLS_ACUITY_SCORE: 7
ADLS_ACUITY_SCORE: 9
ADLS_ACUITY_SCORE: 7
ADLS_ACUITY_SCORE: 7
ADLS_ACUITY_SCORE: 9
ADLS_ACUITY_SCORE: 7
ADLS_ACUITY_SCORE: 9
ADLS_ACUITY_SCORE: 7
ADLS_ACUITY_SCORE: 9
ADLS_ACUITY_SCORE: 7
ADLS_ACUITY_SCORE: 9
ADLS_ACUITY_SCORE: 7
ADLS_ACUITY_SCORE: 7
ADLS_ACUITY_SCORE: 9
ADLS_ACUITY_SCORE: 7

## 2022-03-26 NOTE — PLAN OF CARE
"Goal Outcome Evaluation:  Pt is A/O, able to make needs known.   Blood pressure 133/85, pulse 94, temperature 98  F (36.7  C), temperature source Oral, resp. rate 18, height 1.778 m (5' 10\"), weight 77.2 kg (170 lb 3.1 oz), SpO2 97 %.  Stable on room air. Denies pain.   Up with SBA and walker.   Calling for snacks frequently this shift. Not sleeping.   Continue to assess per plan of care.                      "

## 2022-03-26 NOTE — PLAN OF CARE
"Goal Outcome Evaluation:        [id number]:  Pt offers no complaints. Scheduled gabapentin given. Mild LGT, but room is warm. Tachycardic.   BP (!) 140/74   Pulse 103   Temp 99  F (37.2  C) (Oral)   Resp 18   Ht 1.778 m (5' 10\")   Wt 77.2 kg (170 lb 3.1 oz)   SpO2 97%   BMI 24.42 kg/m        Micah Grossman RN on 3/25/2022 at 10:44 PM                "

## 2022-03-26 NOTE — PLAN OF CARE
Goal Outcome Evaluation:        No signs of withdraw.  Up with assist of one and staff.  Incontinent of urine this am.  Sister updated on status.  IV removed today due to redness and drainage-Md approved, ok for no IV.

## 2022-03-26 NOTE — PROGRESS NOTES
[facility] Medicine Progress Note  Date of Service: 03/26/2022     Assessment & Plan        Luigi Vieyra is a 66 year old male admitted on 3/20/2022. He presented to the emergency department for evaluation of a syncopal episode in the setting of recent alcohol binge and alcohol dependence and was found to have hypotension, several metabolic abnormalities, acute kidney injury, and suspicion for GI bleed for which he is being admitted for further evaluation and treatment.    Syncope  Hypotension due to hypovolemia, resolved    Presented after witnessed syncopal episode, initially hypotensive with blood pressure 78/55. Blood pressure normalized after 1L LR bolus. Presume syncope was due to hypovolemia / hypotension from excessive alcohol intake and poor hydration. Head CT WNL. Patient then had recurrent hypotensive episode although asymptomatic 2/22 which was treated with 1 L LR bolus with resolution of hypotension.    BP normalizing. Have resumed PTA metoprolol but not losartan. 's.    - holding PTA losartan until BP begins to elevate  - Monitor blood pressure     Alcoholic esophagitis and duodenitis    Presented with syncopal episode and some bloody stool a week previously.  Initial hemoglobin was 9.7.  Please see below regarding anemia.  Takes aspirin 81 mg daily preadmission but not on anticoagulation. Admits to red bloody stools about 1 week prior to admission which had since resolved and has not had any here in the hospital. CT abdomen & pelvis demonstrates thickened gastric antrum with adjacent edema, suggestive of gastritis.  Underwent EGD 3/21:  Impression:    - LA Grade A reflux, candidiasis and chronic esophagitis with no bleeding. Rule out Leon's esophagus. Biopsied.                          - Small hiatal hernia.                          - Normal stomach.                          - Chronic alcoholic duodenitis. Biopsied.   Recommendation:           -  Return patient to hospital ngo for ongoing care.                          - Resume previous diet.                          - Use Protonix (pantoprazole) 40 mg PO BID.     Proximal esophageal biopsy with ulcer and granulation tissue.  HSV and CMV pending. The distal esophagus with only necrotic inflammatory debris.  Duodenal biopsies showing some peptic duodenitis with ulceration.    EGD findings discussed with surgeon 3/24; she was not convinced there was actually candidiasis present but given his diffuse esophagitis she thought a course of antifungal treatment would be warranted, see below.    - IV Protonix 40 mg BID 3/20/2022 - 3/23, changed to p.o. 40 mg BID effective 3/24 and will continue at discharge as per surgery recommendation  - Holding aspirin  - Patient should have a colonoscopy as an outpatient given his bright red blood prior to admission, but can be deferred to outpatient if no further bleeding  - HSV and CMV from esophageal biopsy pending  - Fluconazole initiated 3/24, intend for 2 week course; 400 mg po on 3/24, then 200 mg daily x 2 weeks      Anemia, likely multifactorial due to chronic alcohol use and recent GI losses    Hemoglobins:  9.7 ? 8.4 ? 7.5 ? 7.4 ? 6.7 (declined transfusion) ? 6.6 (agreed to 1 unit PRBC) ? 8.2 ? 7.9 ? 8.0      Admit hemoglobin 9.7 (recent baseline unavailable, was last hemoglobin was 14.6 in 2020), initially hemodynamically unstable with hypotension which resolved with fluid resuscitation. Continued to decline during first few days of hospitalization likely due to dilution.  Did not otherwise have any evidence of ongoing bleeding (suspect hemoglobin down trending due to dilution in face of ongoing LR infusion).  When hemoglobin reached 6.7 on 3/23, transfusion was advised but patient declined stating he would not want someone else's blood unless he absolutely needed it. Continued to decline after it was explained that he would be more likely to heal and have less  shortness of breath with the blood. Agreed to transfusion on 3/23 after he discussed with family. Received 1 unit PRBC on 3/24    Iron studies on admission suggest normal iron stores.    Hemoglobin stable post transfusion 3/26.  Does not need ongoing daily follow-up.  Would follow-up as outpatient in a few weeks.    MAIA (acute kidney injury)  Chronic kidney disease stage II    Admit creatinine 1.90 (baseline 0.7 -0.8 in Nov 2020).  With volume resuscitation this admission, creatinine has stabilized at 1.24-1.29, eGFR 63, with normal BUN over the the last several days.  Suspect this is his new baseline.   -Follow-up as outpatient    Weakness, generalized    Patient remains somewhat unsteady on his feet. PT and OT consulted, recommending TCU.  Initially the patient declined this but after further discussion with patient's sister by care management he is in agreement.  - Care Transitions following  - Awaiting TCU bed    Alcohol abuse / dependence  Transaminitis / alcoholic hepatitis    Known history of dependence, completed rehab a few years ago. Denies prior history of withdrawal or seizures, but feels shaky today. Has been drinking daily for the past 2 months, but significantly more on recent binge x 1 week; drinking about 1/2 bottle of 1.75L vodka daily. Last reported drink on 3/18. Admit hepatic labs with elevated alk phos (155) and alcoholic hepatitis pattern (ALT 58 / ).  These labs have subsequently normalized by 3/22.     No significant withdrawal symptoms this admission   - discontinue CIWA protocol    - continue gabapentin taper per protocol   - s/p IV vitamins, now oral    - agreeable to inpatient treatment following TCU stay, but declines to do this at [facility] after seeing reviewed with the place    Elevated lactic acid level, resolved    Initial lactic acid 8.1, normalized to 1.9 after 1L LR. Etiology of elevated lactic acid seems to be due to hypovolemia and alcohol.  - Resolved 3/20/2022      Hyponatremia, mild, resolved    Initial Na = 128. Likely due to poor oral intake and possible beer potomania. Degree of hyponatremia is mild, low risk for ODS.  Sodium improved to 137.    Leukocytosis with left shift  Initial WBC 13.0, ANC 10.7. Afebrile. No evidence of infection on imaging or by history or exam.  Normalized to 9.3.  Urinalysis without evidence of infection.  - No indication for antibiotics at this time; continue to observe off antibiotics for now  - Blood cultures x 2 no growth to date    Thrombocytosis  Initial platelets 607, occurs in the setting of extreme dehydration. Improving. Likely from dehydration.  Platelets normalized to 349-383 following hydration.  Resolved.    Fall  Chest wall pain, right posterior near scapula    New posterior rib / chest wall pain after fall, says he hit his back on a table when he fell. CT chest, abdomen, & pelvis did not reveal any evidence of musculoskeletal injury.     Pain improved    Hypertension    Has known hypertension diagnosis prior to admission, but initially hypotensive in the emergency department. Managed prior to admission with losartan 100 mg daily and metoprolol XL 25 mg daily.  Blood pressures have normalized though not elevated on metoprolol which has been resumed and off losartan which is currently being held.  Likely his hypertension is exacerbated by his alcohol use, and if he stays off the alcohol he may need less medication for management.   -Continue metoprolol as prior to admission   - Resume losartan tomorrow 3/27 at reduced dose 25 mg    Tobacco abuse  Encourage cessation. Nicotine patch available.     Moderate malnutrition in the context of acute on chronic illness  -- RD following, supplements    Diet: Snacks/Supplements Adult: Ensure Clear; With Meals  Regular Diet Adult    DVT Prophylaxis: VTE Prophylaxis contraindicated due to bleed  [place] Catheter: Not present  Central Lines: None  Code Status: Full Code      Discussion: Appears  stable.  Strength is improving.    Disposition Plan   Awaiting TCU bed.  Patient will pursue inpatient CD treatment following his TCU placement    Attestation:  Total time: 30 minutes    Francesco Hernandez MD  [facility] Medicine      Interval History   Does not want [facility] due to poor reviews and wants TCU and separate treatment center.     Feels a little wobbly yet. Eating ok. No chest pain or shortness of breath. No melena. Normal BMs.     Physical Exam   Temp:  [98  F (36.7  C)-99  F (37.2  C)] 98  F (36.7  C)  Pulse:  [] 93  Resp:  [17-18] 18  BP: (124-140)/(74-85) 124/83  SpO2:  [95 %-98 %] 95 %    Weights:   Vitals:    03/21/22 1131 03/24/22 0717 03/25/22 0711   Weight: 69.4 kg (153 lb) 76.8 kg (169 lb 5 oz) 77.2 kg (170 lb 3.1 oz)    Body mass index is 24.42 kg/m .    Constitutional: Alert and oriented x4, smiles easily, no acute distress  CV: Regular, no murmur, no edema  Respiratory: CTA bilaterally  GI: Soft, non-tender, bowel sounds normal  Skin: Warm and dry  Neurologic: No significant tremors, no asterixis    Data   Recent Labs   Lab 03/26/22  0447 03/25/22  0508 03/24/22  2146 03/24/22  1416 03/24/22  0445 03/23/22  0443 03/22/22  0500 03/21/22  1500 03/21/22  0545 03/20/22  1717 03/20/22  1434   WBC 9.8 10.3  --   --  9.3   < > 11.0  --  11.0  --  13.0*   HGB 8.0* 7.9*  --  8.2* 6.6*   < > 7.4*   < > 7.5*  7.5*   < > 9.7*   * 105*  --   --  113*   < > 110*  --  107*  --  107*    351  --   --  349   < > 428  --  483*  --  607*   INR  --   --   --   --   --   --   --   --   --   --  1.00    137  --   --  137   < > 135  --  133  133   < > 128*   POTASSIUM 3.5 3.6  --   --  3.2*   < > 3.6  --  3.5   < > 4.5   CHLORIDE 108 107  --   --  106   < > 103  --  100   < > 90*   CO2 25 25  --   --  27   < > 26  --  26   < > 18*   BUN 13 13  --   --  14   < > 24  --  40*   < > 46*   CR 1.26* 1.27*  --   --  1.24   < > 1.24  --  1.57*   < > 1.90*   ANIONGAP 6 5  --   --  4   < > 6  --  7    < > 20*   PINO 8.1* 8.2*  --   --  8.2*   < > 8.5  --  8.5   < > 9.6   GLC 97 105* 140*  --  145*   < > 102*  --  89   < > 115*   ALBUMIN  --   --   --   --   --   --  2.2*  --  2.3*  --  2.7*   PROTTOTAL  --   --   --   --   --   --  5.1*  --  5.3*  --  7.0   BILITOTAL  --   --   --   --   --   --  0.4  --  0.5  --  1.0   ALKPHOS  --   --   --   --   --   --  94  --  102  --  155*   ALT  --   --   --   --   --   --  26  --  32  --  58   AST  --   --   --   --   --   --  29  --  45  --  122*   LIPASE  --   --   --   --   --   --   --   --   --   --  676*    < > = values in this interval not displayed.       Recent Labs   Lab 03/26/22  0447 03/25/22  0508 03/24/22  2146 03/24/22  0445 03/23/22  0443 03/22/22  0500   GLC 97 105* 140* 145* 127* 102*        Unresulted Labs Ordered in the Past 30 Days of this Admission     No orders found from 2/18/2022 to 3/21/2022.           Imaging: No results found for this or any previous visit (from the past 24 hour(s)).     I reviewed all new labs and imaging results over the last 24 hours. I personally reviewed no images or EKG's today.    Medications       [Held by provider] aspirin  81 mg Oral Daily     fluconazole  200 mg Oral Daily     [START ON 3/28/2022] gabapentin  100 mg Oral Q8H     gabapentin  300 mg Oral Q8H     [Held by provider] losartan  100 mg Oral Daily     metoprolol succinate ER  25 mg Oral Daily     nicotine  1 patch Transdermal Daily     nicotine   Transdermal Q8H     pantoprazole  40 mg Oral BID AC     sodium chloride (PF)  3 mL Intracatheter Q8H     thiamine  100 mg Oral Daily   Medications reviewed    Francesco Hernandez MD  [facility] Medicine

## 2022-03-27 ENCOUNTER — APPOINTMENT (OUTPATIENT)
Dept: OCCUPATIONAL THERAPY | Facility: CLINIC | Age: 67
DRG: 368 | End: 2022-03-27
Payer: COMMERCIAL

## 2022-03-27 PROCEDURE — 250N000013 HC RX MED GY IP 250 OP 250 PS 637: Performed by: INTERNAL MEDICINE

## 2022-03-27 PROCEDURE — 250N000013 HC RX MED GY IP 250 OP 250 PS 637: Performed by: HOSPITALIST

## 2022-03-27 PROCEDURE — 999N000157 HC STATISTIC RCP TIME EA 10 MIN

## 2022-03-27 PROCEDURE — 120N000001 HC R&B MED SURG/OB

## 2022-03-27 PROCEDURE — 99232 SBSQ HOSP IP/OBS MODERATE 35: CPT | Performed by: INTERNAL MEDICINE

## 2022-03-27 PROCEDURE — 97535 SELF CARE MNGMENT TRAINING: CPT | Mod: GO

## 2022-03-27 PROCEDURE — 250N000013 HC RX MED GY IP 250 OP 250 PS 637: Performed by: PHYSICIAN ASSISTANT

## 2022-03-27 RX ORDER — CEFAZOLIN SODIUM 2 G/100ML
2 INJECTION, SOLUTION INTRAVENOUS EVERY 6 HOURS
Status: DISCONTINUED | OUTPATIENT
Start: 2022-03-27 | End: 2022-03-27

## 2022-03-27 RX ORDER — CEPHALEXIN 500 MG/1
1000 CAPSULE ORAL EVERY 6 HOURS SCHEDULED
Status: DISCONTINUED | OUTPATIENT
Start: 2022-03-27 | End: 2022-03-28 | Stop reason: HOSPADM

## 2022-03-27 RX ADMIN — GABAPENTIN 300 MG: 300 CAPSULE ORAL at 18:55

## 2022-03-27 RX ADMIN — THIAMINE HCL TAB 100 MG 100 MG: 100 TAB at 08:23

## 2022-03-27 RX ADMIN — METOPROLOL SUCCINATE 25 MG: 25 TABLET, FILM COATED, EXTENDED RELEASE ORAL at 08:12

## 2022-03-27 RX ADMIN — GABAPENTIN 300 MG: 300 CAPSULE ORAL at 04:08

## 2022-03-27 RX ADMIN — Medication 1 PATCH: at 08:09

## 2022-03-27 RX ADMIN — CEPHALEXIN 1000 MG: 500 CAPSULE ORAL at 23:37

## 2022-03-27 RX ADMIN — PANTOPRAZOLE SODIUM 40 MG: 40 TABLET, DELAYED RELEASE ORAL at 06:21

## 2022-03-27 RX ADMIN — LOSARTAN POTASSIUM 25 MG: 25 TABLET, FILM COATED ORAL at 08:13

## 2022-03-27 RX ADMIN — GABAPENTIN 300 MG: 300 CAPSULE ORAL at 12:11

## 2022-03-27 RX ADMIN — PANTOPRAZOLE SODIUM 40 MG: 40 TABLET, DELAYED RELEASE ORAL at 16:24

## 2022-03-27 RX ADMIN — CEPHALEXIN 1000 MG: 500 CAPSULE ORAL at 13:10

## 2022-03-27 RX ADMIN — CEPHALEXIN 1000 MG: 500 CAPSULE ORAL at 18:54

## 2022-03-27 RX ADMIN — FLUCONAZOLE 200 MG: 100 TABLET ORAL at 08:12

## 2022-03-27 ASSESSMENT — ACTIVITIES OF DAILY LIVING (ADL)
ADLS_ACUITY_SCORE: 7

## 2022-03-27 NOTE — PROGRESS NOTES
"MD aware that Pt. Does not have IV access and ordered antibiotics PO.    Pt. A & O x 4, CIWA score of 0, calls to make needs known, lungs clear, bowel sounds WNL, Left fore arm is red, hot, and firm to touch, provider notified and Keflex 1000 mg started, IV was removed on 3/26. Pt was able to ambulate well to restroom with walker and assist of 1, ate well for breakfast /75 (BP Location: Right arm)   Pulse 94   Temp 98.5  F (36.9  C) (Oral)   Resp 18   Ht 1.778 m (5' 10\")   Wt 77.9 kg (171 lb 11.8 oz)   SpO2 97%   BMI 24.64 kg/m    "

## 2022-03-27 NOTE — PLAN OF CARE
Goal Outcome Evaluation:  A/O X4, denies pain, ambulated to the bathroom with assist of one, refused PDC's.

## 2022-03-27 NOTE — PLAN OF CARE
Goal Outcome Evaluation:    Plan of Care Reviewed With: patient        Neuro: A&Ox4. Denies hallucinations, shakiness, agitation, restlessness. Watching a western shows. Ciwa dc'd  Cardiac: Tmax 99.5, VSS.   Respiratory: Sating mid 90's on RA.  GI/: Adequate urine output. BM X1  Diet/appetite: Tolerating regular diet. Eating well.  Activity:  Assist of 1 and walker, denies wanting to sit up in the chair.  Pain: At acceptable level on current regimen.   Skin: Left arm cellulitis post IV, keflex started.  LDA's:No PIV, ok per MD    Plan: Continue antibiotics/plan of care, offer toileting. Continue with POC. Notify primary team with changes.

## 2022-03-27 NOTE — PROGRESS NOTES
[facility]    Hospitalist Progress Note    Date of Service (when I saw the patient): 03/27/2022    Assessment & Plan    Luigi Vieyra is a 66 year old male admitted on 3/20/2022. He presented to the emergency department for evaluation of a syncopal episode in the setting of recent alcohol binge and alcohol dependence and was found to have hypotension, several metabolic abnormalities, acute kidney injury, and suspicion for GI bleed for which he is being admitted for further evaluation and treatment.     Syncope  Hypotension due to hypovolemia, resolved    Presented after witnessed syncopal episode, initially hypotensive with blood pressure 78/55. Blood pressure normalized after 1L LR bolus. Presume syncope was due to hypovolemia / hypotension from excessive alcohol intake and poor hydration. Head CT WNL. Patient then had recurrent hypotensive episode although asymptomatic 2/22 which was treated with 1 L LR bolus with resolution of hypotension.    BP normalizing. Have resumed PTA metoprolol but not losartan. 's.   - Holding PTA losartan until BP begins to elevate  - Monitor blood pressure      Alcoholic esophagitis and duodenitis    Presented with syncopal episode and some bloody stool a week previously.  Initial hemoglobin was 9.7.  Please see below regarding anemia.  Takes aspirin 81 mg daily preadmission but not on anticoagulation. Admits to red bloody stools about 1 week prior to admission which had since resolved and has not had any here in the hospital. CT abdomen & pelvis demonstrates thickened gastric antrum with adjacent edema, suggestive of gastritis.  Underwent EGD 3/21:  Impression:    - LA Grade A reflux, candidiasis and chronic esophagitis with no bleeding. Rule out Leon's esophagus. Biopsied.                          - Small hiatal hernia.                          - Normal stomach.                          - Chronic alcoholic duodenitis. Biopsied.    Recommendation:                                 - Return patient to hospital ngo for ongoing care.                          - Resume previous diet.                          - Use Protonix (pantoprazole) 40 mg PO BID.     Proximal esophageal biopsy with ulcer and granulation tissue.  HSV and CMV pending. The distal esophagus with only necrotic inflammatory debris.  Duodenal biopsies showing some peptic duodenitis with ulceration.    EGD findings discussed with surgeon 3/24; she was not convinced there was actually candidiasis present but given his diffuse esophagitis she thought a course of antifungal treatment would be warranted, see below.     - IV Protonix 40 mg BID 3/20/2022 - 3/23, changed to p.o. 40 mg BID effective 3/24 and will continue at discharge as per surgery recommendation  - Holding aspirin  - Patient should have a colonoscopy as an outpatient given his bright red blood prior to admission, but can be deferred to outpatient if no further bleeding  - HSV and CMV from esophageal biopsy pending  - Fluconazole initiated 3/24, intend for 2 week course; 400 mg po on 3/24, then 200 mg daily x 2 weeks     Anemia, likely multifactorial due to chronic alcohol use and recent GI losses    Hemoglobins:  9.7 ? 8.4 ? 7.5 ? 7.4 ? 6.7 (declined transfusion) ? 6.6 (agreed to 1 unit PRBC) ? 8.2 ? 7.9 ? 8.0       Admit hemoglobin 9.7 (recent baseline unavailable, was last hemoglobin was 14.6 in 2020), initially hemodynamically unstable with hypotension which resolved with fluid resuscitation. Continued to decline during first few days of hospitalization likely due to dilution.  Did not otherwise have any evidence of ongoing bleeding (suspect hemoglobin down trending due to dilution in face of ongoing LR infusion).  When hemoglobin reached 6.7 on 3/23, transfusion was advised but patient declined stating he would not want someone else's blood unless he absolutely needed it. Continued to decline after it was explained  that he would be more likely to heal and have less shortness of breath with the blood. Agreed to transfusion on 3/23 after he discussed with family. Received 1 unit PRBC on 3/24    Iron studies on admission suggest normal iron stores.    Hemoglobin stable post transfusion 3/26.  Does not need ongoing daily follow-up.  Would follow-up as outpatient in a few weeks.    Left upper extremity cellulitis  Nurses note erythema immediately proximal to site of previous IV.  Patient states area is tender.  - Start Keflex 1 g qid    MAIA (acute kidney injury)  Chronic kidney disease stage II    Admit creatinine 1.90 (baseline 0.7 -0.8 in Nov 2020).  With volume resuscitation this admission, creatinine has stabilized at 1.24-1.29, eGFR 63, with normal BUN over the the last several days.  Suspect this is his new baseline.   -Follow-up as outpatient     Weakness, generalized    Patient remains somewhat unsteady on his feet. PT and OT consulted, recommending TCU.  Initially the patient declined this but after further discussion with patient's sister by care management he is in agreement.  - Care Transitions following  - Awaiting TCU bed     Alcohol abuse / dependence  Transaminitis / alcoholic hepatitis    Known history of dependence, completed rehab a few years ago. Denies prior history of withdrawal or seizures, but feels shaky today. Has been drinking daily for the past 2 months, but significantly more on recent binge x 1 week; drinking about 1/2 bottle of 1.75L vodka daily. Last reported drink on 3/18. Admit hepatic labs with elevated alk phos (155) and alcoholic hepatitis pattern (ALT 58 / ).  These labs have subsequently normalized by 3/22.     No significant withdrawal symptoms this admission   - discontinue CIWA protocol    - continue gabapentin taper per protocol   - s/p IV vitamins, now oral    - agreeable to inpatient treatment following TCU stay, but declines to do this at [facility] after seeing reviewed with the  place     Elevated lactic acid level, resolved    Initial lactic acid 8.1, normalized to 1.9 after 1L LR. Etiology of elevated lactic acid seems to be due to hypovolemia and alcohol.  - Resolved 3/20/2022      Hyponatremia, mild, resolved    Initial Na = 128. Likely due to poor oral intake and possible beer potomania. Degree of hyponatremia is mild, low risk for ODS.  Sodium improved to 137.     Leukocytosis with left shift  Initial WBC 13.0, ANC 10.7. Afebrile. No evidence of infection on imaging or by history or exam.  Normalized to 9.3.  Urinalysis without evidence of infection.  - No indication for antibiotics at this time; continue to observe off antibiotics for now  - Blood cultures x 2 no growth to date     Thrombocytosis  Initial platelets 607, occurs in the setting of extreme dehydration. Improving. Likely from dehydration.  Platelets normalized to 349-383 following hydration.  Resolved.     Fall  Chest wall pain, right posterior near scapula    New posterior rib / chest wall pain after fall, says he hit his back on a table when he fell. CT chest, abdomen, & pelvis did not reveal any evidence of musculoskeletal injury.     Pain improved     Hypertension    Has known hypertension diagnosis prior to admission, but initially hypotensive in the emergency department. Managed prior to admission with losartan 100 mg daily and metoprolol XL 25 mg daily.  Blood pressures have normalized though not elevated on metoprolol which has been resumed and off losartan which is currently being held.  Likely his hypertension is exacerbated by his alcohol use, and if he stays off the alcohol he may need less medication for management.   -Continue metoprolol as prior to admission   - Resume losartan tomorrow 3/27 at reduced dose 25 mg     Tobacco abuse  Encourage cessation. Nicotine patch available.      Moderate malnutrition in the context of acute on chronic illness  -- RD following, supplements     Diet: Snacks/Supplements  Adult: Ensure Clear; With Meals  Regular Diet Adult    DVT Prophylaxis: VTE Prophylaxis contraindicated due to bleed  Frederick Catheter: Not present  Central Lines: None  Code Status: Full Code       Discussion: Appears stable.  Strength is improving.    Disposition: Expected discharge in once TCU bed obtained.    Tarun Serna    Interval History   Patient complaining of LUE tenderness.  No other acute complaints.    -Data reviewed today: I reviewed all new labs and imaging results over the last 24 hours. I personally reviewed no images or EKG's today.    Physical Exam   Temp: 98.5  F (36.9  C) Temp src: Oral BP: 114/75 Pulse: 94   Resp: 18 SpO2: 97 % O2 Device: None (Room air)    Vitals:    03/24/22 0717 03/25/22 0711 03/26/22 1616   Weight: 76.8 kg (169 lb 5 oz) 77.2 kg (170 lb 3.1 oz) 77.9 kg (171 lb 11.8 oz)     Vital Signs with Ranges  Temp:  [98  F (36.7  C)-99  F (37.2  C)] 98.5  F (36.9  C)  Pulse:  [] 94  Resp:  [17-20] 18  BP: (103-145)/(64-88) 114/75  SpO2:  [94 %-97 %] 97 %  I/O last 3 completed shifts:  In: 218 [P.O.:218]  Out: -     Gen: Well nourished, debilitated alert and oriented x 3, no acute distressed  HEENT: Atraumatic, normocephalic; sclera non-injected, anicterric; oral mucosa moist, no lesion, no exudate  Lungs: Clear to ausculation, no wheezes, no rhonchi, no rales  Heart: Regular rate, regular rhythm, no gallops, no rubs, no murmurs  GI: Bowel sound normal, no hepatosplenomegaly, no masses, non-tender, non-distended, no guarding, no rebound tenderness  Lymph: No lymphadenopathy, no edema  Skin: No chronic venous stasis, LUE erythema, tenderness, warmth.    Medications       [Held by provider] aspirin  81 mg Oral Daily     fluconazole  200 mg Oral Daily     [START ON 3/28/2022] gabapentin  100 mg Oral Q8H     gabapentin  300 mg Oral Q8H     losartan  25 mg Oral Daily     metoprolol succinate ER  25 mg Oral Daily     nicotine  1 patch Transdermal Daily     nicotine   Transdermal Q8H      pantoprazole  40 mg Oral BID AC     thiamine  100 mg Oral Daily       Data   Recent Labs   Lab 03/26/22 0447 03/25/22  0508 03/24/22  2146 03/24/22  1416 03/24/22  0445 03/23/22  0443 03/22/22  0500 03/21/22  1500 03/21/22  0545 03/20/22  1717 03/20/22  1434   WBC 9.8 10.3  --   --  9.3   < > 11.0  --  11.0  --  13.0*   HGB 8.0* 7.9*  --  8.2* 6.6*   < > 7.4*   < > 7.5*  7.5*   < > 9.7*   * 105*  --   --  113*   < > 110*  --  107*  --  107*    351  --   --  349   < > 428  --  483*  --  607*   INR  --   --   --   --   --   --   --   --   --   --  1.00    137  --   --  137   < > 135  --  133  133   < > 128*   POTASSIUM 3.5 3.6  --   --  3.2*   < > 3.6  --  3.5   < > 4.5   CHLORIDE 108 107  --   --  106   < > 103  --  100   < > 90*   CO2 25 25  --   --  27   < > 26  --  26   < > 18*   BUN 13 13  --   --  14   < > 24  --  40*   < > 46*   CR 1.26* 1.27*  --   --  1.24   < > 1.24  --  1.57*   < > 1.90*   ANIONGAP 6 5  --   --  4   < > 6  --  7   < > 20*   PINO 8.1* 8.2*  --   --  8.2*   < > 8.5  --  8.5   < > 9.6   GLC 97 105* 140*  --  145*   < > 102*  --  89   < > 115*   ALBUMIN  --   --   --   --   --   --  2.2*  --  2.3*  --  2.7*   PROTTOTAL  --   --   --   --   --   --  5.1*  --  5.3*  --  7.0   BILITOTAL  --   --   --   --   --   --  0.4  --  0.5  --  1.0   ALKPHOS  --   --   --   --   --   --  94  --  102  --  155*   ALT  --   --   --   --   --   --  26  --  32  --  58   AST  --   --   --   --   --   --  29  --  45  --  122*   LIPASE  --   --   --   --   --   --   --   --   --   --  676*    < > = values in this interval not displayed.       No results found for this or any previous visit (from the past 24 hour(s)).

## 2022-03-27 NOTE — PLAN OF CARE
Goal Outcome Evaluation:    Pt is A&O x4, able to make his needs be known. Denies any SOB, chest pain, palpations, headache, N/V, or dizziness. Denies any pain. Ambulates with SBA using a walker. Was incontinent of urine x2. Will use his call light for assistance. Bed alarm on for safety.

## 2022-03-27 NOTE — PROGRESS NOTES
"Care Management Follow Up    Length of Stay (days): 7    Expected Discharge Date: 03/28/2022     Concerns to be Addressed:       Patient plan of care discussed at interdisciplinary rounds: Yes    Anticipated Discharge Disposition: Home  Disposition Comments: TBD  Anticipated Discharge Services: [facility] Depend[facility]  Anticipated Discharge DME: [place]    Patient/family educated on Medicare website which has current facility and service quality ratings: yes  Education Provided on the Discharge Plan:    Patient/Family in Agreement with the Plan: no    Referrals Placed by CM/SW: Internal Clinic Care Coordination  Private pay costs discussed: Not applicable    Additional Information:    Sister, Larisa, reached out to CM with concerns regarding discharge plans. She states that after extensive research on [facility] (Phone: [phone] Fax: [phone]) she would \"not even send my dog there.\" Sister is adamantly refusing to allow patient to be discharged to that facility. She stated \"I know him, and if he goes there he will have a bad experience and never attempt rehab again.\"     Writer discussed with patient his thoughts on discharging to [facility]. Patient states that he is in agreement with his sister and will not go there.     Writer further discussed TCU options. Sister states that she is in the process of searching for a treatment facility for him to discharge to after a \"short TCU stay\".  Sister lives in [place] but is planning to fly to [place] this week assist patient with treatment and discharge options.     Ultimate goal for patient: TCU, treatment and then move in with his father as him and his wife do not have a good relationship.    TCU referrals pending:     --[facility]- Global referral to Brendan Jaramillo Patient Transition Liaison, (phone: [phone]/Fax: [phone]) - serves as referral for 15 TCU facilites in the [facility] area.     --[facility]- global referral " made for a TCU bed with [facility] Admission team (phone: [phone]/Fax: [phone])  - serves as a referral for 11 TCU facilities.      --[facility] (p: [phone]/ Emxqh-[phone]/F: [phone])    --[facility] [place] [facility] (Phone: [phone]/Fax: [phone])    --[facility] (Admissions phone: [phone] Main Phone: [phone] Fax: [phone])    --[facility] Phone: (Admissions: [phone] RN Report: [phone] Fax: [phone])- Declined ETOH use    --[facility] [place] [place] (Main Phone: [phone] Admissions: [phone]: [phone])    --[place] (Main Phone: [phone] Admissions Phone: [phone] Fax: [phone])    --[facility] (Admission phone: [phone] Main Phone: [phone] Fax: [phone])    --[facility] (Admissions phone: [phone] Main phone: [phone] Fax: [phone])- Declined for cares- ETOH, impulsive    --[facility] Admissions Phone: [phone] Main Phone: [phone] Fax: [phone]- Declined bed not available    --[facility] (Main Phone: [phone] Admissions phone: [phone] Fax: [phone])    --[facility] (Admissions phone: [phone] Main phone: [phone] Fax: [phone])    --[facility] (Admissions Phone: [phone] Main Phone: [phone] Fax: [phone])    --[facility]--       PLAN: MAUREEN MONTERO RN

## 2022-03-28 VITALS
HEART RATE: 98 BPM | SYSTOLIC BLOOD PRESSURE: 123 MMHG | OXYGEN SATURATION: 95 % | DIASTOLIC BLOOD PRESSURE: 79 MMHG | RESPIRATION RATE: 18 BRPM | WEIGHT: 171.74 LBS | TEMPERATURE: 98.7 F | HEIGHT: 70 IN | BODY MASS INDEX: 24.59 KG/M2

## 2022-03-28 LAB
ANION GAP SERPL CALCULATED.3IONS-SCNC: 9 MMOL/L (ref 3–14)
BUN SERPL-MCNC: 14 MG/DL (ref 7–30)
CALCIUM SERPL-MCNC: 7.8 MG/DL (ref 8.5–10.1)
CHLORIDE BLD-SCNC: 106 MMOL/L (ref 94–109)
CO2 SERPL-SCNC: 24 MMOL/L (ref 20–32)
CREAT SERPL-MCNC: 1.23 MG/DL (ref 0.66–1.25)
ERYTHROCYTE [DISTWIDTH] IN BLOOD BY AUTOMATED COUNT: 17.9 % (ref 10–15)
GFR SERPL CREATININE-BSD FRML MDRD: 65 ML/MIN/1.73M2
GLUCOSE BLD-MCNC: 103 MG/DL (ref 70–99)
HCT VFR BLD AUTO: 23.5 % (ref 40–53)
HGB BLD-MCNC: 7.7 G/DL (ref 13.3–17.7)
MCH RBC QN AUTO: 34.5 PG (ref 26.5–33)
MCHC RBC AUTO-ENTMCNC: 32.8 G/DL (ref 31.5–36.5)
MCV RBC AUTO: 105 FL (ref 78–100)
PATH REPORT.ADDENDUM SPEC: NORMAL
PATH REPORT.COMMENTS IMP SPEC: NORMAL
PATH REPORT.FINAL DX SPEC: NORMAL
PATH REPORT.GROSS SPEC: NORMAL
PATH REPORT.MICROSCOPIC SPEC OTHER STN: NORMAL
PATH REPORT.RELEVANT HX SPEC: NORMAL
PHOTO IMAGE: NORMAL
PLATELET # BLD AUTO: 470 10E3/UL (ref 150–450)
POTASSIUM BLD-SCNC: 3.4 MMOL/L (ref 3.4–5.3)
RBC # BLD AUTO: 2.23 10E6/UL (ref 4.4–5.9)
SODIUM SERPL-SCNC: 139 MMOL/L (ref 133–144)
WBC # BLD AUTO: 10.7 10E3/UL (ref 4–11)

## 2022-03-28 PROCEDURE — 99239 HOSP IP/OBS DSCHRG MGMT >30: CPT | Performed by: INTERNAL MEDICINE

## 2022-03-28 PROCEDURE — 85027 COMPLETE CBC AUTOMATED: CPT | Performed by: INTERNAL MEDICINE

## 2022-03-28 PROCEDURE — 80048 BASIC METABOLIC PNL TOTAL CA: CPT | Performed by: INTERNAL MEDICINE

## 2022-03-28 PROCEDURE — 250N000013 HC RX MED GY IP 250 OP 250 PS 637: Performed by: HOSPITALIST

## 2022-03-28 PROCEDURE — 250N000013 HC RX MED GY IP 250 OP 250 PS 637: Performed by: PHYSICIAN ASSISTANT

## 2022-03-28 PROCEDURE — 250N000013 HC RX MED GY IP 250 OP 250 PS 637: Performed by: INTERNAL MEDICINE

## 2022-03-28 PROCEDURE — 999N000157 HC STATISTIC RCP TIME EA 10 MIN

## 2022-03-28 PROCEDURE — 36415 COLL VENOUS BLD VENIPUNCTURE: CPT | Performed by: INTERNAL MEDICINE

## 2022-03-28 RX ORDER — LOSARTAN POTASSIUM 25 MG/1
25 TABLET ORAL DAILY
DISCHARGE
Start: 2022-03-29 | End: 2022-05-11

## 2022-03-28 RX ORDER — PANTOPRAZOLE SODIUM 40 MG/1
40 TABLET, DELAYED RELEASE ORAL
DISCHARGE
Start: 2022-03-28 | End: 2022-04-27

## 2022-03-28 RX ORDER — FLUCONAZOLE 200 MG/1
200 TABLET ORAL DAILY
DISCHARGE
Start: 2022-03-29 | End: 2022-04-08

## 2022-03-28 RX ORDER — GABAPENTIN 100 MG/1
100 CAPSULE ORAL EVERY 8 HOURS
Qty: 90 CAPSULE | Refills: 0 | Status: SHIPPED | OUTPATIENT
Start: 2022-03-28 | End: 2022-04-18

## 2022-03-28 RX ORDER — NICOTINE 21 MG/24HR
1 PATCH, TRANSDERMAL 24 HOURS TRANSDERMAL DAILY
Status: ON HOLD | DISCHARGE
Start: 2022-03-29 | End: 2022-10-14

## 2022-03-28 RX ORDER — CEPHALEXIN 500 MG/1
1000 CAPSULE ORAL EVERY 6 HOURS
DISCHARGE
Start: 2022-03-28 | End: 2022-04-04

## 2022-03-28 RX ORDER — LANOLIN ALCOHOL/MO/W.PET/CERES
100 CREAM (GRAM) TOPICAL DAILY
DISCHARGE
Start: 2022-03-29 | End: 2022-04-14

## 2022-03-28 RX ADMIN — FLUCONAZOLE 200 MG: 100 TABLET ORAL at 08:15

## 2022-03-28 RX ADMIN — GABAPENTIN 100 MG: 100 CAPSULE ORAL at 11:06

## 2022-03-28 RX ADMIN — GABAPENTIN 100 MG: 100 CAPSULE ORAL at 03:11

## 2022-03-28 RX ADMIN — METOPROLOL SUCCINATE 25 MG: 25 TABLET, FILM COATED, EXTENDED RELEASE ORAL at 08:15

## 2022-03-28 RX ADMIN — PANTOPRAZOLE SODIUM 40 MG: 40 TABLET, DELAYED RELEASE ORAL at 06:43

## 2022-03-28 RX ADMIN — CEPHALEXIN 1000 MG: 500 CAPSULE ORAL at 06:43

## 2022-03-28 RX ADMIN — LOSARTAN POTASSIUM 25 MG: 25 TABLET, FILM COATED ORAL at 08:15

## 2022-03-28 RX ADMIN — THIAMINE HCL TAB 100 MG 100 MG: 100 TAB at 08:15

## 2022-03-28 RX ADMIN — CEPHALEXIN 1000 MG: 500 CAPSULE ORAL at 11:06

## 2022-03-28 ASSESSMENT — ACTIVITIES OF DAILY LIVING (ADL)
ADLS_ACUITY_SCORE: 7
ADLS_ACUITY_SCORE: 5
ADLS_ACUITY_SCORE: 7

## 2022-03-28 NOTE — DISCHARGE INSTRUCTIONS
Sanjiv,     Thank you for speaking with me regarding your health issues.  Per your request, I am adding resources for chemical health services for people with Medicare insurance.  You will need to get a substance use assessment completed before entering into any program.      Chemical Health Services:  Medicare & Chemical Health Assessments:  [place], [place]   [phone]      [place], - [phone]  [facility] [place], [place]  [phone]    Rule 25 and/or Chemical Health Assessment:  If a person has insurance, s/he must contact their insurance companies Behavioral Health division and follow recommendations for a chemical health assessment and then follow the recommendations of the .   If a person does NOT have insurance, they must get a Rule 25 (state funded chemical health assessment).  They can contact their County of Residence's Chemical Health Unit to discover who their county networks to conduct the assessment.    Best of [place] to you!  Care Transitions Team at [facility] [phone]

## 2022-03-28 NOTE — PLAN OF CARE
Occupational Therapy Discharge Summary    Reason for therapy discharge:    Discharged to transitional care facility.    Progress towards therapy goal(s). See goals on Care Plan in [facility] electronic health record for goal details.  Goals partially met.  Barriers to achieving goals:   discharge from facility.    Therapy recommendation(s):    Continued therapy is recommended.  Rationale/Recommendations:  TCU to increase independence/safety with ADLs/IADLs and functional mobility.

## 2022-03-28 NOTE — PROGRESS NOTES
[facility] DISCHARGE NOTE    Patient discharged to transitional care unit at 12:57 PM via wheel chair. Accompanied by staff. Discharge instructions reviewed with patient and other, opportunity offered to ask questions. Prescriptions - None ordered for discharge. All belongings sent with patient. Pt sent to [facility] via transport. Call report to facility and updated Any. Pt left in good spirits.    Kareem Finn RN

## 2022-03-28 NOTE — DISCHARGE SUMMARY
[facility]  Hospitalist Discharge Summary       Date of Admission:  3/20/2022  Date of Discharge:  3/28/2022  Discharging Provider: Tarun Serna MD      Discharge Diagnoses     Syncope  Hypotension due to hypovolemia, resolved  Alcoholic esophagitis and duodenitis  Anemia, likely multifactorial due to chronic alcohol use and recent GI losses  Left upper extremity cellulitis  MAIA (acute kidney injury)  Chronic kidney disease stage II  Weakness, generalized  Alcohol abuse  Alcohol dependence  Transaminitis due to alcoholic hepatitis  Elevated lactic acid level, resolved   Hyponatremia, mild, resolved  Leukocytosis with left shift  Thrombocytosis  Mechanical fall  Chest wall pain, right posterior near scapula  Hypertension  Tobacco abuse  Moderate malnutrition in the context of acute on chronic illness    Follow-ups Needed After Discharge   Follow-up Appointments     Follow Up and recommended labs and tests      Follow up with halfway physician.  The following labs/tests are   recommended: BMP and CBC.           Discharge Disposition   Discharged to short-term care facility  Condition at discharge: [facility]    Hospital Course   Luigi Vieyra is a 66 year old male admitted on 3/20/2022. He presented to the emergency department for evaluation of a syncopal episode in the setting of recent alcohol binge and alcohol dependence and was found to have hypotension, several metabolic abnormalities, acute kidney injury, and suspicion for GI bleed for which he is being admitted for further evaluation and treatment.     Syncope  Hypotension due to hypovolemia, resolved    Presented after witnessed syncopal episode, initially hypotensive with blood pressure 78/55. Blood pressure normalized after 1L LR bolus. Presume syncope was due to hypovolemia / hypotension from excessive alcohol intake and poor hydration. Head CT WNL. Patient then had recurrent hypotensive episode although asymptomatic 2/22  which was treated with 1 L LR bolus with resolution of hypotension.    BP normalizing. Have resumed PTA metoprolol but not losartan. 's.   - Holding PTA losartan until BP begins to elevate  - Monitor blood pressure      Alcoholic esophagitis and duodenitis    Presented with syncopal episode and some bloody stool a week previously.  Initial hemoglobin was 9.7.  Please see below regarding anemia.  Takes aspirin 81 mg daily preadmission but not on anticoagulation. Admits to red bloody stools about 1 week prior to admission which had since resolved and has not had any here in the hospital. CT abdomen & pelvis demonstrates thickened gastric antrum with adjacent edema, suggestive of gastritis.  Underwent EGD 3/21:  Impression:    - LA Grade A reflux, candidiasis and chronic esophagitis with no bleeding. Rule out Leon's esophagus. Biopsied.                          - Small hiatal hernia.                          - Normal stomach.                          - Chronic alcoholic duodenitis. Biopsied.   Recommendation:                                 - Return patient to hospital ngo for ongoing care.                          - Resume previous diet.                          - Use Protonix (pantoprazole) 40 mg PO BID.     Proximal esophageal biopsy with ulcer and granulation tissue.  HSV and CMV pending. The distal esophagus with only necrotic inflammatory debris.  Duodenal biopsies showing some peptic duodenitis with ulceration.    EGD findings discussed with surgeon 3/24; she was not convinced there was actually candidiasis present but given his diffuse esophagitis she thought a course of antifungal treatment would be warranted, see below.     - IV Protonix 40 mg BID 3/20/2022 - 3/23, changed to p.o. 40 mg BID effective 3/24 and will continue at discharge as per surgery recommendation  - Holding aspirin  - Patient should have a colonoscopy as an outpatient given his bright red blood prior to admission, but can be  deferred to outpatient if no further bleeding  - HSV and CMV from esophageal biopsy pending  - Fluconazole initiated 3/24, intend for 2 week course; 400 mg po on 3/24, then 200 mg daily x 2 weeks     Anemia, likely multifactorial due to chronic alcohol use and recent GI losses    Hemoglobins:  9.7 ? 8.4 ? 7.5 ? 7.4 ? 6.7 (declined transfusion) ? 6.6 (agreed to 1 unit PRBC) ? 8.2 ? 7.9 ? 8.0 ? 7.7       Admit hemoglobin 9.7 (recent baseline unavailable, was last hemoglobin was 14.6 in 2020), initially hemodynamically unstable with hypotension which resolved with fluid resuscitation. Continued to decline during first few days of hospitalization likely due to dilution.  Did not otherwise have any evidence of ongoing bleeding (suspect hemoglobin down trending due to dilution in face of ongoing LR infusion).  When hemoglobin reached 6.7 on 3/23, transfusion was advised but patient declined stating he would not want someone else's blood unless he absolutely needed it. Continued to decline after it was explained that he would be more likely to heal and have less shortness of breath with the blood. Agreed to transfusion on 3/23 after he discussed with family. Received 1 unit PRBC on 3/24    Iron studies on admission suggest normal iron stores.    Hemoglobin stable post transfusion 3/26.  Does not need ongoing daily follow-up.  Would follow-up as outpatient in a few weeks.    Left upper extremity cellulitis  Nurses note erythema immediately proximal to site of previous IV.  Patient states area is tender.  - Start Keflex 1 g qid to complete 7 day course    MAIA (acute kidney injury)  Chronic kidney disease stage II    Admit creatinine 1.90 (baseline 0.7 -0.8 in Nov 2020).  With volume resuscitation this admission, creatinine has stabilized at 1.24-1.29, eGFR 63, with normal BUN over the the last several days.  Suspect this is his new baseline.   -Follow-up as outpatient     Weakness, generalized    Patient remains somewhat  unsteady on his feet. PT and OT consulted, recommending TCU.  Initially the patient declined this but after further discussion with patient's sister by care management he is in agreement.  - Care Transitions following  - Discharge to TCU     Alcohol abuse  Alcohol dependence  Transaminitis due to alcoholic hepatitis    Known history of dependence, completed rehab a few years ago. Denies prior history of withdrawal or seizures, but feels shaky today. Has been drinking daily for the past 2 months, but significantly more on recent binge x 1 week; drinking about 1/2 bottle of 1.75L vodka daily. Last reported drink on 3/18. Admit hepatic labs with elevated alk phos (155) and alcoholic hepatitis pattern (ALT 58 / ).  These labs have subsequently normalized by 3/22.     No significant withdrawal symptoms this admission   - discontinue CIWA protocol    - continue gabapentin taper per protocol   - s/p IV vitamins, now oral    - agreeable to inpatient treatment following TCU stay, but declines to do this at [facility] after seeing reviewed with the place     Elevated lactic acid level, resolved    Initial lactic acid 8.1, normalized to 1.9 after 1L LR. Etiology of elevated lactic acid seems to be due to hypovolemia and alcohol.  - Resolved 3/20/2022      Hyponatremia, mild, resolved    Initial Na = 128. Likely due to poor oral intake and possible beer potomania. Degree of hyponatremia is mild, low risk for ODS.  Sodium improved to 137.     Leukocytosis with left shift  Initial WBC 13.0, ANC 10.7. Afebrile. No evidence of infection on imaging or by history or exam.  Normalized to 9.3.  Urinalysis without evidence of infection.  - No indication for antibiotics at this time; continue to observe off antibiotics for now  - Blood cultures x 2 no growth to date     Thrombocytosis  Initial platelets 607, occurs in the setting of extreme dehydration. Improving. Likely from dehydration.  Platelets normalized to 349-383 following  hydration.  Resolved.     Mechanical fall  Chest wall pain, right posterior near scapula    New posterior rib / chest wall pain after fall, says he hit his back on a table when he fell. CT chest, abdomen, & pelvis did not reveal any evidence of musculoskeletal injury.     Pain improved     Hypertension    Has known hypertension diagnosis prior to admission, but initially hypotensive in the emergency department. Managed prior to admission with losartan 100 mg daily and metoprolol XL 25 mg daily.  Blood pressures have normalized though not elevated on metoprolol which has been resumed and off losartan which is currently being held.  Likely his hypertension is exacerbated by his alcohol use, and if he stays off the alcohol he may need less medication for management.   -Continue metoprolol as prior to admission   - Resumed losartan 3/27 at reduced dose 25 mg     Tobacco abuse  Encourage cessation. Nicotine patch available.      Moderate malnutrition in the context of acute on chronic illness  -- RD following, supplements     Consultations This Hospital Stay   SURGERY GENERAL IP CONSULT  CARE MANAGEMENT / SOCIAL WORK IP CONSULT  PHYSICAL THERAPY ADULT IP CONSULT  OCCUPATIONAL THERAPY ADULT IP CONSULT  PHYSICAL THERAPY ADULT IP CONSULT  OCCUPATIONAL THERAPY ADULT IP CONSULT    Code Status   Full Code    Time Spent on this Encounter   I, Tarun Serna MD, personally saw the patient today and spent greater than 30 minutes discharging this patient.       Tarun Serna MD  [facility]  ______________________________________________________________________    Physical Exam   Vital Signs: Temp: 98.5  F (36.9  C) Temp src: Oral BP: 123/79 Pulse: 90   Resp: 18 SpO2: 96 % O2 Device: None (Room air)    Weight: 171 lbs 11.81 oz       Gen: Well nourished, debilitated, alert and oriented x 3, no acute distressed  HEENT: Atraumatic, normocephalic; sclera non-injected, anicterric; oral mucosa  moist, no lesion, no exudate  Lungs: Clear to ausculation, no wheezes, no rhonchi, no rales  Heart: Regular rate, regular rhythm, no gallops, no rubs, no murmurs  GI: Bowel sound normal, no hepatosplenomegaly, no masses, non-tender, non-distended, no guarding, no rebound tenderness  Lymph: No lymphadenopathy, no edema  Skin: No rashes, no chronic venous stasis     Primary Care Physician   Gatito Ware    Discharge Orders      General info for SNF    Length of Stay Estimate: Short Term Care: Estimated # of Days <30  Condition at Discharge: Improving  Level of care:skilled   Rehabilitation Potential: Good  Admission H&P remains valid and up-to-date: Yes  Recent Chemotherapy: N/A  Use Nursing Home Standing Orders: Yes     Mantoux instructions    Give two-step Mantoux (PPD) Per Facility Policy Yes     Reason for your hospital stay    This is a 66 year old male admitted with anemia due to alcoholic duodenitis.     Follow Up and recommended labs and tests    Follow up with custodial physician.  The following labs/tests are recommended: BMP and CBC.     Activity - Up with nursing assistance     Physical Therapy Adult Consult    Evaluate and treat as clinically indicated.    Reason:  Physical deconditioning     Occupational Therapy Adult Consult    Evaluate and treat as clinically indicated.    Reason:  Physical deconditioning     Fall precautions     Advance Diet as Tolerated    Follow this diet upon discharge: Orders Placed This Encounter      Snacks/Supplements Adult: Ensure Clear; With Meals      Regular Diet Adult         Significant Results and Procedures   Most Recent 3 CBC's:Recent Labs   Lab Test 03/28/22  0500 03/26/22  0447 03/25/22  0508   WBC 10.7 9.8 10.3   HGB 7.7* 8.0* 7.9*   * 105* 105*   * 383 351     Most Recent 3 BMP's:Recent Labs   Lab Test 03/28/22  0500 03/26/22  0447 03/25/22  0508    139 137   POTASSIUM 3.4 3.5 3.6   CHLORIDE 106 108 107   CO2 24 25 25   BUN 14 13 13    CR 1.23 1.26* 1.27*   ANIONGAP 9 6 5   PINO 7.8* 8.1* 8.2*   * 97 105*     Most Recent 2 LFT's:Recent Labs   Lab Test 03/22/22  0500 03/21/22  0545   AST 29 45   ALT 26 32   ALKPHOS 94 102   BILITOTAL 0.4 0.5   ,   Results for orders placed or performed during the hospital encounter of 03/20/22   CT Chest/Abdomen/Pelvis w Contrast    Narrative    EXAM: CT CHEST/ABDOMEN/PELVIS W CONTRAST  LOCATION: [facility]  DATE/TIME: 3/20/2022 4:35 PM    INDICATION: Sepsis.  COMPARISON: None.  TECHNIQUE: CT scan of the chest, abdomen, and pelvis was performed following injection of IV contrast. Multiplanar reformats were obtained. Dose reduction techniques were used.   CONTRAST: 83 mL Isovue 370.    FINDINGS:   LUNGS AND PLEURA: No effusions. No acute airspace disease.    MEDIASTINUM/AXILLAE: No acute thoracic aortic abnormality. There is diffuse esophageal wall thickening. No central pulmonary embolism. The exam is not tailored for specific assessment of pulmonary embolism. No suspicious lymph node. Mediastinal lymph   nodes appears small.    CORONARY ARTERY CALCIFICATION: None.    HEPATOBILIARY: Hepatic steatosis. There is mild gallbladder wall thickening.    PANCREAS: Normal.    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 A few splenules at the left upper quadrant near the stomach.    ADRENAL GLANDS: Normal.    KIDNEYS/BLADDER: No significant mass, stones, or hydronephrosis. There are simple or benign cysts. No follow up is needed.    BOWEL: No obstruction. There is wall thickening of the gastric antrum with suggestion of mild adjacent edema. No evidence for abscess or free air. No evidence for appendicitis. Appendix not seen.    LYMPH NODES: Normal.    VASCULATURE: Scattered vascular calcifications.    PELVIC ORGANS: Normal.    MUSCULOSKELETAL: Normal.      Impression    IMPRESSION:  1.  Some wall thickening of the gastric antrum with mild adjacent edema may be a distal gastritis. No abscess or free air.  2.   Mild wall thickening of the gallbladder. Correlate clinically with any evidence of cholecystitis.  3.  Fatty liver.   CT Head w/o Contrast    Narrative    EXAM: CT HEAD W/O CONTRAST  LOCATION: [facility]  DATE/TIME: 3/20/2022 7:30 PM    INDICATION: Head trauma, minor (Age >= 65y).  COMPARISON: None.  TECHNIQUE: Routine CT Head without IV contrast. Multiplanar reformats. Dose reduction techniques were used.    FINDINGS:  INTRACRANIAL CONTENTS: No intracranial hemorrhage, extraaxial collection, or mass effect. No CT evidence of acute infarct. Mild presumed chronic small vessel ischemic changes. Moderate generalized volume loss. No hydrocephalus. Corpus callosum is   satisfactory. Position of the cerebellar tonsils is normal. Sella shows no acute abnormality.     VISUALIZED ORBITS/SINUSES/MASTOIDS: No acute orbital process. No paranasal sinus mucosal disease. No middle ear or mastoid effusion.    BONES/SOFT TISSUES: No fracture of the calvarium or skull base. Satisfactory mineralization. No significant swelling of the facial or scalp tissues. Midline forehead abrasion described clinically not well-defined.      Impression    IMPRESSION:  1.  No CT evidence for acute intracranial process.    2.  Brain atrophy and presumed chronic microvascular ischemic changes as above.    3.  No hyperdense hemorrhage. Nothing for subarachnoid, subdural or epidural hemorrhage is specifically identified.    4.  No fractures.    5.  Additional details and description are provided above.       Discharge Medications   Current Discharge Medication List      START taking these medications    Details   cephALEXin (KEFLEX) 500 MG capsule Take 2 capsules (1,000 mg) by mouth every 6 hours for 26 doses    Associated Diagnoses: Cellulitis of left upper extremity      fluconazole (DIFLUCAN) 200 MG tablet Take 1 tablet (200 mg) by mouth daily for 10 days    Associated Diagnoses: Gastroesophageal reflux disease with  esophagitis and hemorrhage      gabapentin (NEURONTIN) 100 MG capsule Take 1 capsule (100 mg) by mouth every 8 hours  Qty: 90 capsule, Refills: 0    Associated Diagnoses: Alcohol abuse      nicotine (NICODERM CQ) 14 MG/24HR 24 hr patch Place 1 patch onto the skin daily    Associated Diagnoses: Tobacco abuse      pantoprazole (PROTONIX) 40 MG EC tablet Take 1 tablet (40 mg) by mouth 2 times daily (before meals) For 26 days, then daily    Associated Diagnoses: Alcoholic gastritis, presence of bleeding unspecified, unspecified chronicity      thiamine (B-1) 100 MG tablet Take 1 tablet (100 mg) by mouth daily    Associated Diagnoses: Alcohol abuse         CONTINUE these medications which have CHANGED    Details   losartan (COZAAR) 25 MG tablet Take 1 tablet (25 mg) by mouth daily    Associated Diagnoses: Benign essential hypertension         CONTINUE these medications which have NOT CHANGED    Details   albuterol (PROAIR HFA/PROVENTIL HFA/VENTOLIN HFA) 108 (90 Base) MCG/ACT inhaler Inhale 2 puffs into the lungs every 4 hours as needed for shortness of breath / dyspnea or wheezing  Qty: 8.5 g, Refills: 3    Comments: Pharmacy may dispense brand covered by insurance (Proair, or proventil or ventolin or generic albuterol inhaler)  Associated Diagnoses: Mild persistent asthma without complication      beclomethasone HFA (QVAR REDIHALER) 80 MCG/ACT inhaler Inhale 1 puff into the lungs 2 times daily  Qty: 10.6 g, Refills: 11    Associated Diagnoses: Mild persistent asthma without complication      metoprolol succinate ER (TOPROL-XL) 25 MG 24 hr tablet Take 1 tablet (25 mg) by mouth daily Please make a clinic visit to be seen in person for medication refills.  Qty: 90 tablet, Refills: 3    Associated Diagnoses: Benign essential hypertension      nicotine (NICORETTE) 4 MG lozenge Place 4 mg inside cheek as needed       order for DME Equipment being ordered: Digital home blood pressure monitor kit  Qty: 1 each, Refills: 0     Associated Diagnoses: Uncontrolled hypertension         STOP taking these medications       aspirin 81 MG tablet Comments:   Reason for Stopping:         EPINEPHrine (PRIMATENE MIST) 0.125 MG/ACT AERO Comments:   Reason for Stopping:             Allergies   No Known Allergies

## 2022-03-28 NOTE — PLAN OF CARE
Physical Therapy Discharge Summary    Reason for therapy discharge:    Discharged to transitional care facility.    Progress towards therapy goal(s). See goals on Care Plan in [facility] electronic health record for goal details.  Goals partially met.  Barriers to achieving goals:   Pt refused stairs so goal not addressed.    Therapy recommendation(s):    Continued therapy is recommended.  Rationale/Recommendations:  Strengthening and gait training.    Joan Rey PT

## 2022-03-28 NOTE — PROGRESS NOTES
Care Management Discharge Note    Discharge Date: 03/28/2022    Discharge Disposition: TCU; [facility] (Admissions Phone: [phone] Main Phone: [phone] Fax: [phone])    Discharge Services: [facility]    Discharge DME: Walker    Discharge Transportation: agency    Private pay costs discussed: transportation costs    PAS Confirmation Code:  ([id number])  Patient/family educated on Medicare website which has current facility and service quality ratings: yes    Education Provided on the Discharge Plan:  yes  Persons Notified of Discharge Plans: Patient & sisterDianne via phone  Patient/Family in Agreement with the Plan: yes    Handoff Referral Completed: Yes    Additional Information:  Per IDT rounds today, MD team states that pt is medically stable for discharge today and has been accepted at [facility] (Main Phone: [phone] Fax: [phone])    Transportation discussed and [facility] WHEELCHAIR  to transport at 12:45 PM today.  Pt is aware of costs associated with [facility] transportation services.    PLAN OF CARE:  Pt will discharge to [facility] via [facility] wheelchair at 12:45 PM today.    MD and RN are aware.      BIRGIT Campbell

## 2022-03-28 NOTE — PLAN OF CARE
Goal Outcome Evaluation:    Pt is A&O x4, able to make his needs be known. Pt denies any SOB, chest pain, palpations, N/V, or dizziness. Left arm continues to be tender and red due to cellulitis, no increase redness noted or drainage. Education provided to pt about keflex being used to treat the cellulitis. Pt awake frequently throughout the night, using his call light to call for assistance. Ambulates with SBA using a walker. Pt has been using the bathroom for voiding. Afebrile, O2 sat 98% on room air. Bed alarm on for safety.

## 2022-03-29 ENCOUNTER — PATIENT OUTREACH (OUTPATIENT)
Dept: FAMILY MEDICINE | Facility: CLINIC | Age: 67
End: 2022-03-29
Payer: COMMERCIAL

## 2022-04-14 ENCOUNTER — OFFICE VISIT (OUTPATIENT)
Dept: FAMILY MEDICINE | Facility: CLINIC | Age: 67
End: 2022-04-14
Payer: COMMERCIAL

## 2022-04-14 VITALS
BODY MASS INDEX: 23.37 KG/M2 | OXYGEN SATURATION: 98 % | DIASTOLIC BLOOD PRESSURE: 86 MMHG | HEIGHT: 70 IN | RESPIRATION RATE: 16 BRPM | HEART RATE: 89 BPM | SYSTOLIC BLOOD PRESSURE: 112 MMHG | WEIGHT: 163.2 LBS | TEMPERATURE: 99 F

## 2022-04-14 DIAGNOSIS — F10.21 ALCOHOL DEPENDENCE IN REMISSION (H): ICD-10-CM

## 2022-04-14 DIAGNOSIS — Z11.4 SCREENING FOR HIV (HUMAN IMMUNODEFICIENCY VIRUS): ICD-10-CM

## 2022-04-14 DIAGNOSIS — R79.89 ELEVATED SERUM CREATININE: ICD-10-CM

## 2022-04-14 DIAGNOSIS — R74.8 ALKALINE PHOSPHATASE ELEVATION: ICD-10-CM

## 2022-04-14 DIAGNOSIS — D62 ANEMIA DUE TO BLOOD LOSS, ACUTE: ICD-10-CM

## 2022-04-14 DIAGNOSIS — Z23 NEED FOR VACCINATION: ICD-10-CM

## 2022-04-14 DIAGNOSIS — Z12.11 SCREEN FOR COLON CANCER: ICD-10-CM

## 2022-04-14 DIAGNOSIS — K92.2 UGIB (UPPER GASTROINTESTINAL BLEED): Primary | ICD-10-CM

## 2022-04-14 LAB
ALBUMIN SERPL-MCNC: 3.2 G/DL (ref 3.4–5)
ALP SERPL-CCNC: 239 U/L (ref 40–150)
ALT SERPL W P-5'-P-CCNC: 19 U/L (ref 0–70)
ANION GAP SERPL CALCULATED.3IONS-SCNC: 8 MMOL/L (ref 3–14)
AST SERPL W P-5'-P-CCNC: 19 U/L (ref 0–45)
BASOPHILS # BLD MANUAL: 0.3 10E3/UL (ref 0–0.2)
BASOPHILS NFR BLD MANUAL: 3 %
BILIRUB DIRECT SERPL-MCNC: <0.1 MG/DL (ref 0–0.2)
BILIRUB SERPL-MCNC: 0.3 MG/DL (ref 0.2–1.3)
BUN SERPL-MCNC: 10 MG/DL (ref 7–30)
CALCIUM SERPL-MCNC: 9.5 MG/DL (ref 8.5–10.1)
CHLORIDE BLD-SCNC: 108 MMOL/L (ref 94–109)
CO2 SERPL-SCNC: 25 MMOL/L (ref 20–32)
CREAT SERPL-MCNC: 1.31 MG/DL (ref 0.66–1.25)
EOSINOPHIL # BLD MANUAL: 0.4 10E3/UL (ref 0–0.7)
EOSINOPHIL NFR BLD MANUAL: 4 %
ERYTHROCYTE [DISTWIDTH] IN BLOOD BY AUTOMATED COUNT: 15.4 % (ref 10–15)
GFR SERPL CREATININE-BSD FRML MDRD: 60 ML/MIN/1.73M2
GLUCOSE BLD-MCNC: 113 MG/DL (ref 70–99)
HCT VFR BLD AUTO: 32.8 % (ref 40–53)
HGB BLD-MCNC: 10.5 G/DL (ref 13.3–17.7)
LYMPHOCYTES # BLD MANUAL: 2.8 10E3/UL (ref 0.8–5.3)
LYMPHOCYTES NFR BLD MANUAL: 26 %
MCH RBC QN AUTO: 33.2 PG (ref 26.5–33)
MCHC RBC AUTO-ENTMCNC: 32 G/DL (ref 31.5–36.5)
MCV RBC AUTO: 104 FL (ref 78–100)
METAMYELOCYTES # BLD MANUAL: 0.1 10E3/UL
METAMYELOCYTES NFR BLD MANUAL: 1 %
MONOCYTES # BLD MANUAL: 1.2 10E3/UL (ref 0–1.3)
MONOCYTES NFR BLD MANUAL: 11 %
NEUTROPHILS # BLD MANUAL: 6 10E3/UL (ref 1.6–8.3)
NEUTROPHILS NFR BLD MANUAL: 55 %
PLAT MORPH BLD: ABNORMAL
PLATELET # BLD AUTO: 660 10E3/UL (ref 150–450)
POTASSIUM BLD-SCNC: 4.3 MMOL/L (ref 3.4–5.3)
PROT SERPL-MCNC: 7.5 G/DL (ref 6.8–8.8)
RBC # BLD AUTO: 3.16 10E6/UL (ref 4.4–5.9)
RBC MORPH BLD: ABNORMAL
SODIUM SERPL-SCNC: 141 MMOL/L (ref 133–144)
WBC # BLD AUTO: 10.9 10E3/UL (ref 4–11)

## 2022-04-14 PROCEDURE — G0009 ADMIN PNEUMOCOCCAL VACCINE: HCPCS | Performed by: FAMILY MEDICINE

## 2022-04-14 PROCEDURE — 87389 HIV-1 AG W/HIV-1&-2 AB AG IA: CPT | Performed by: FAMILY MEDICINE

## 2022-04-14 PROCEDURE — 80053 COMPREHEN METABOLIC PANEL: CPT | Performed by: FAMILY MEDICINE

## 2022-04-14 PROCEDURE — 36415 COLL VENOUS BLD VENIPUNCTURE: CPT | Performed by: FAMILY MEDICINE

## 2022-04-14 PROCEDURE — 90732 PPSV23 VACC 2 YRS+ SUBQ/IM: CPT | Performed by: FAMILY MEDICINE

## 2022-04-14 PROCEDURE — 82248 BILIRUBIN DIRECT: CPT | Performed by: FAMILY MEDICINE

## 2022-04-14 PROCEDURE — 99214 OFFICE O/P EST MOD 30 MIN: CPT | Mod: 25 | Performed by: FAMILY MEDICINE

## 2022-04-14 PROCEDURE — 85027 COMPLETE CBC AUTOMATED: CPT | Performed by: FAMILY MEDICINE

## 2022-04-14 RX ORDER — LANOLIN ALCOHOL/MO/W.PET/CERES
100 CREAM (GRAM) TOPICAL DAILY
Qty: 90 TABLET | Refills: 3 | Status: ON HOLD | OUTPATIENT
Start: 2022-04-14 | End: 2022-10-14

## 2022-04-14 ASSESSMENT — ASTHMA QUESTIONNAIRES: ACT_TOTALSCORE: 12

## 2022-04-14 NOTE — PROGRESS NOTES
Assessment & Plan     UGIB (upper gastrointestinal bleed)  Not active anymore per patient report.  Monitor hgb and hematocrit until normal or stable.  Refer to GI if with suspicion of recurrent esophagitis or duodenitis, or if with new GI concerns.  Refer for repeat EGD if suspecting recurrent bleeding.  Reasons to go to ER discussed in detail with patient.  - CBC with Platelets & Differential  - CBC with Platelets & Differential  - Fecal colorectal cancer screen FIT    Anemia due to blood loss, acute  See above.  - CBC with Platelets & Differential  - CBC with Platelets & Differential  - Fecal colorectal cancer screen FIT    Alcohol dependence in remission (H)  Reviewed with patient risks of the habit.  He vowed not to consume ETOH anynmore. Has intake appt with [facility] for outpatient treatment.  Reviewed his med list and verified their role in his treatments.  Refer to hepatology if showing signs of chronic liver disease.  Patient and sister verified he has been sober for 22 days now.  - Basic metabolic panel  - Hepatic panel (Albumin, ALT, AST, Bili, Alk Phos, TP)  - thiamine (B-1) 100 MG tablet  Dispense: 90 tablet; Refill: 3  - Hepatic panel (Albumin, ALT, AST, Bili, Alk Phos, TP)  - Basic metabolic panel  - Fecal colorectal cancer screen FIT    Screen for colon cancer  Discussed options for screening.  Patient preferred to start with FIT testing.  - Fecal colorectal cancer screen (FIT)  - Fecal colorectal cancer screen FIT    Screening for HIV (human immunodeficiency virus)  Offered screening based on current recommendations. Patient concurred to screen.  - HIV Antigen Antibody Combo  - HIV Antigen Antibody Combo  - Fecal colorectal cancer screen FIT    Need for vaccination  Reviewed his HM list. He concurred to getting pneumococcal vaccination today.  - Pneumococcal vaccine 23 valent PPSV23  (Pneumovax) [10189]  - Fecal colorectal cancer screen FIT        Patient Instructions   You will be contacted in 1-2  days for results of your lab tests.     Avoid alcohol.  Continue plan to see alcohol dependency treatment at [facility].    Continue all meds as prescribed.    Pneumococal vaccine today.    Turn in your FIT test at your soonest convenience to screen for colon cancer.     Drink at least 6-8 full tall glasses of water a day.      Literature about patient's conditions and other advice has been attached to her after visit summary.    Return in about 1 month (around 5/14/2022) for In-clinic visit for folow up on alcoholic esophagitis/duodenitis.    Gatito Ware MD  [facility] LEIGH Meyers is a 66 year old who presents for the following health issues  accompanied by his sister.  Chief Complaint   Patient presents with     Hospital F/U     Pt being seen for post hospital follow up.       HPI       Hospital Follow-up Visit:    Hospital/Nursing Home/IP Rehab Facility: [facility] [place]  Date of Admission: 3/20/22  Date of Discharge: 3/28/22  Reason(s) for Admission: alcoholic esophagitis and duodenitis   TCU in [place]   Was your hospitalization related to COVID-19? No   Problems taking medications regularly:  None  Medication changes since discharge: gabapentin, protonix, vitamin B-1  Problems adhering to non-medication therapy:  Not taking nicotene patches or lozenges    Summary of hospitalization:  [facility] hospital discharge summary reviewed  TCU discharge summary not available.    Hospital discharge orders:  Discharge Orders             General info for SNF     Length of Stay Estimate: Short Term Care: Estimated # of Days <30  Condition at Discharge: Improving  Level of care:skilled   Rehabilitation Potential: Good  Admission H&P remains valid and up-to-date: Yes  Recent Chemotherapy: N/A  Use Nursing Home Standing Orders: Yes          Mantoux instructions     Give two-step Mantoux (PPD) Per Facility Policy Yes          Reason for your hospital stay      This is a 66 year old male admitted with anemia due to alcoholic duodenitis.          Follow Up and recommended labs and tests     Follow up with [facility] physician.  The following labs/tests are recommended: BMP and CBC.      Activity - Up with nursing assistance          Physical Therapy Adult Consult     Evaluate and treat as clinically indicated.     Reason:  Physical deconditioning          Occupational Therapy Adult Consult     Evaluate and treat as clinically indicated.     Reason:  Physical deconditioning      Fall precautions          Advance Diet as Tolerated     Follow this diet upon discharge: Orders Placed This Encounter      Snacks/Supplements Adult: Ensure Clear; With Meals      Regular Diet Adult       Diagnostic Tests/Treatments reviewed.  Follow up needed: see above  Other Healthcare Providers Involved in Patient s Care:         no specialist appointment scheduled per pt and sister.  Update since discharge: improved.   Post Discharge Medication Reconciliation: discharge medications reconciled, continue medications without change.  Plan of care communicated with patient and sister.        Patient Active Problem List   Diagnosis     Mild persistent asthma     Alcohol abuse     Tobacco abuse     CARDIOVASCULAR SCREENING; LDL GOAL LESS THAN 130     Alcohol dependence (H)     Seasonal allergic rhinitis     Uncontrolled hypertension     Mixed hyperlipidemia     Hyponatremia     Anemia, unspecified type     Alcoholic gastritis, presence of bleeding unspecified, unspecified chronicity     Hypotension due to hypovolemia     MAIA (acute kidney injury) (H)     Elevated lactic acid level     Transaminitis     Syncope     Thrombocytosis     Leukocytosis     Past Surgical History:   Procedure Laterality Date     ESOPHAGOSCOPY, GASTROSCOPY, DUODENOSCOPY (EGD), COMBINED N/A 3/21/2022    Procedure: ESOPHAGOGASTRODUODENOSCOPY, WITH BIOPSY;  Surgeon: Eunice Rubin MD;  Location: [facility]       Social History      Tobacco Use     Smoking status: Current Every Day Smoker     Packs/day: 0.75     Years: 45.00     Pack years: 33.75     Types: Cigarettes     Start date: 1976     Smokeless tobacco: Never Used   Substance Use Topics     Alcohol use: Yes     Comment: about 2 1.75 of vodka currently, sober since 3/20/22     Family History   Problem Relation Age of Onset     Asthma Mother      Cerebrovascular Disease Mother      C.A.D. No family hx of      Diabetes No family hx of      Hypertension No family hx of      Breast Cancer No family hx of      Cancer - colorectal No family hx of      Prostate Cancer No family hx of           Current Outpatient Medications   Medication Sig Dispense Refill     albuterol (PROAIR HFA/PROVENTIL HFA/VENTOLIN HFA) 108 (90 Base) MCG/ACT inhaler Inhale 2 puffs into the lungs every 4 hours as needed for shortness of breath / dyspnea or wheezing 8.5 g 3     beclomethasone HFA (QVAR REDIHALER) 80 MCG/ACT inhaler Inhale 1 puff into the lungs 2 times daily 10.6 g 11     Cholecalciferol (VITAMIN D-3) 125 MCG (5000 UT) TABS Take by mouth daily       gabapentin (NEURONTIN) 100 MG capsule Take 1 capsule (100 mg) by mouth every 8 hours 90 capsule 0     losartan (COZAAR) 25 MG tablet Take 1 tablet (25 mg) by mouth daily       metoprolol succinate ER (TOPROL-XL) 25 MG 24 hr tablet Take 1 tablet (25 mg) by mouth daily Please make a clinic visit to be seen in person for medication refills. 90 tablet 3     pantoprazole (PROTONIX) 40 MG EC tablet Take 1 tablet (40 mg) by mouth 2 times daily (before meals) For 26 days, then daily       thiamine (B-1) 100 MG tablet Take 1 tablet (100 mg) by mouth daily       Zinc 22.5 MG TABS Take by mouth daily       nicotine (NICODERM CQ) 14 MG/24HR 24 hr patch Place 1 patch onto the skin daily (Patient not taking: Reported on 4/14/2022)       nicotine (NICORETTE) 4 MG lozenge Place 4 mg inside cheek as needed  (Patient not taking: Reported on 4/14/2022)       order for DME  "Equipment being ordered: Digital home blood pressure monitor kit 1 each 0     No Known Allergies      Review of Systems   Constitutional, HEENT, cardiovascular, pulmonary, GI, , musculoskeletal, neuro, skin, endocrine and psych systems are negative, except as otherwise noted.      Objective    /86   Pulse 89   Temp 99  F (37.2  C) (Tympanic)   Resp 16   Ht 1.778 m (5' 10\")   Wt 74 kg (163 lb 3.2 oz)   SpO2 98%   BMI 23.42 kg/m    Body mass index is 23.42 kg/m .  Physical Exam   GENERAL: well-nourished, ambulatory w/o assist with very mild shakiness when taking first few steps , alert and no distress  EYES: pink conjunctivae, no icterus  RESP: lungs clear to auscultation - no rales, no rhonchi, no wheezes  CV: regular rates and rhythm, normal S1 S2, no S3 or S4 and no murmur, no click or rub  ABD: rounded abdomen, no skin changes, no TTP, no  palpable mass, no guarding, no Bailey's sign, no palpable organomegaly  MS: extremities- no gross deformities noted, no edema  SKIN: good turgor, no jaundice or rash    Results for orders placed or performed in visit on 04/14/22   CBC with platelets and differential     Status: Abnormal (Preliminary result)   Result Value Ref Range    WBC Count      RBC Count 3.16 (L) 4.40 - 5.90 10e6/uL    Hemoglobin 10.5 (L) 13.3 - 17.7 g/dL    Hematocrit 32.8 (L) 40.0 - 53.0 %     (H) 78 - 100 fL    MCH 33.2 (H) 26.5 - 33.0 pg    MCHC 32.0 31.5 - 36.5 g/dL    RDW 15.4 (H) 10.0 - 15.0 %    Platelet Count 660 (H) 150 - 450 10e3/uL   CBC with Platelets & Differential     Status: Abnormal (In process)    Narrative    The following orders were created for panel order CBC with Platelets & Differential.  Procedure                               Abnormality         Status                     ---------                               -----------         ------                     CBC with platelets and d...[[id number]]  Abnormal            Preliminary result           Please view " results for these tests on the individual orders.

## 2022-04-14 NOTE — PATIENT INSTRUCTIONS
You will be contacted in 1-2 days for results of your lab tests.     Avoid alcohol.  Continue plan to see alcohol dependency treatment at [facility].    Continue all meds as prescribed.    Pneumococal vaccine today.    Turn in your FIT test at your soonest convenience to screen for colon cancer.     Drink at least 6-8 full tall glasses of water a day.

## 2022-04-14 NOTE — NURSING NOTE
Prior to immunization administration, verified patients identity using patient s name and date of birth. Please see Immunization Activity for additional information.     Screening Questionnaire for Adult Immunization    Are you sick today?   No   Do you have allergies to medications, food, a vaccine component or latex?   No   Have you ever had a serious reaction after receiving a vaccination?   No   Do you have a long-term health problem with heart, lung, kidney, or metabolic disease (e.g., diabetes), asthma, a blood disorder, no spleen, complement component deficiency, a cochlear implant, or a spinal fluid leak?  Are you on long-term aspirin therapy?   No   Do you have cancer, leukemia, HIV/AIDS, or any other immune system problem?   No   Do you have a parent, brother, or sister with an immune system problem?   No   In the past 3 months, have you taken medications that affect  your immune system, such as prednisone, other steroids, or anticancer drugs; drugs for the treatment of rheumatoid arthritis, Crohn s disease, or psoriasis; or have you had radiation treatments?   No   Have you had a seizure, or a brain or other nervous system problem?   No   During the past year, have you received a transfusion of blood or blood    products, or been given immune (gamma) globulin or antiviral drug?   No   For women: Are you pregnant or is there a chance you could become       pregnant during the next month?   No   Have you received any vaccinations in the past 4 weeks?   No     Immunization questionnaire answers were all negative.        Per orders of Dr. Ware, injection of pneumovax 23 given by Shantelle Ruiz CMA. Patient instructed to remain in clinic for 15 minutes afterwards, and to report any adverse reaction to me immediately.       Screening performed by Shantelle Ruiz CMA on 4/14/2022 at 1:58 PM.

## 2022-04-14 NOTE — LETTER
April 18, 2022      Luigi Angeleso  [id number] [facility] [id number]        Dear ,    We are writing to inform you of your test results.    HIV screen result was released after the other labs were resulted. HIV screen is negative for infection.   No additional recommendations to the advice given last week.       Resulted Orders   Hepatic panel (Albumin, ALT, AST, Bili, Alk Phos, TP)   Result Value Ref Range    Bilirubin Total 0.3 0.2 - 1.3 mg/dL    Bilirubin Direct <0.1 0.0 - 0.2 mg/dL    Protein Total 7.5 6.8 - 8.8 g/dL    Albumin 3.2 (L) 3.4 - 5.0 g/dL    Alkaline Phosphatase 239 (H) 40 - 150 U/L    AST 19 0 - 45 U/L    ALT 19 0 - 70 U/L   HIV Antigen Antibody Combo   Result Value Ref Range    HIV Antigen Antibody Combo Nonreactive Nonreactive      Comment:      HIV-1 p24 Ag & HIV-1/HIV-2 Ab Not Detected   Basic metabolic panel   Result Value Ref Range    Sodium 141 133 - 144 mmol/L    Potassium 4.3 3.4 - 5.3 mmol/L    Chloride 108 94 - 109 mmol/L    Carbon Dioxide (CO2) 25 20 - 32 mmol/L    Anion Gap 8 3 - 14 mmol/L    Urea Nitrogen 10 7 - 30 mg/dL    Creatinine 1.31 (H) 0.66 - 1.25 mg/dL    Calcium 9.5 8.5 - 10.1 mg/dL    Glucose 113 (H) 70 - 99 mg/dL    GFR Estimate 60 (L) >60 mL/min/1.73m2      Comment:      Effective December 21, 2021 eGFRcr in adults is calculated using the 2021 CKD-EPI creatinine equation which includes age and gender (Elisa et al., [facility], DOI: 10.1056/DQDEbn0616513)   CBC with platelets and differential   Result Value Ref Range    WBC Count 10.9 4.0 - 11.0 10e3/uL    RBC Count 3.16 (L) 4.40 - 5.90 10e6/uL    Hemoglobin 10.5 (L) 13.3 - 17.7 g/dL    Hematocrit 32.8 (L) 40.0 - 53.0 %     (H) 78 - 100 fL    MCH 33.2 (H) 26.5 - 33.0 pg    MCHC 32.0 31.5 - 36.5 g/dL    RDW 15.4 (H) 10.0 - 15.0 %    Platelet Count 660 (H) 150 - 450 10e3/uL   Manual Differential   Result Value Ref Range    % Neutrophils 55 %    % Lymphocytes 26 %    % Monocytes 11 %    % Eosinophils 4 %    %  Basophils 3 %    % Metamyelocytes 1 %    Absolute Neutrophils 6.0 1.6 - 8.3 10e3/uL    Absolute Lymphocytes 2.8 0.8 - 5.3 10e3/uL    Absolute Monocytes 1.2 0.0 - 1.3 10e3/uL    Absolute Eosinophils 0.4 0.0 - 0.7 10e3/uL    Absolute Basophils 0.3 (H) 0.0 - 0.2 10e3/uL    Absolute Metamyelocytes 0.1 (H) <=0.0 10e3/uL    RBC Morphology Confirmed RBC Indices     Platelet Assessment  Automated Count Confirmed. Platelet morphology is normal.     Automated Count Confirmed. Platelet morphology is normal.       If you have any questions or concerns, please call the clinic at the number listed above.       Sincerely,      Gatito Ware MD

## 2022-04-15 LAB — HIV 1+2 AB+HIV1 P24 AG SERPL QL IA: NONREACTIVE

## 2022-04-18 ENCOUNTER — TELEPHONE (OUTPATIENT)
Dept: NURSING | Facility: CLINIC | Age: 67
End: 2022-04-18
Payer: COMMERCIAL

## 2022-04-18 DIAGNOSIS — F10.10 ALCOHOL ABUSE: ICD-10-CM

## 2022-04-18 RX ORDER — GABAPENTIN 100 MG/1
CAPSULE ORAL
Qty: 90 CAPSULE | Refills: 1 | Status: ON HOLD | OUTPATIENT
Start: 2022-04-18 | End: 2022-10-14

## 2022-04-18 NOTE — TELEPHONE ENCOUNTER
Patient returning message he received from the clinic, RN able to inform patient that message was left regarding refill of medication. Patient confirmed that he received a message from his pharmacy today that his prescription is ready for .  No further questions.    Palak Marcano RN  04/18/22 4:25 PM  [facility] Nurse Advisor

## 2022-04-18 NOTE — TELEPHONE ENCOUNTER
Pending Prescriptions:                       Disp   Refills    gabapentin (NEURONTIN) 100 MG capsule [Ph*30 cap*             Sig: TAKE 1 CAPSULE BY MOUTH EVERY 8 HOURS    Routing refill request to provider for review/approval because:  Drug not on the [facility] refill protocol       Ovidio Tolentino RN

## 2022-04-18 NOTE — TELEPHONE ENCOUNTER
Pt sister is calling and down to two tabs of this and needs 4 daily.  Phar is Ct in [facility]    Rachel Andino  [employer]

## 2022-04-26 ENCOUNTER — TELEPHONE (OUTPATIENT)
Dept: FAMILY MEDICINE | Facility: CLINIC | Age: 67
End: 2022-04-26

## 2022-04-26 ENCOUNTER — OFFICE VISIT (OUTPATIENT)
Dept: FAMILY MEDICINE | Facility: CLINIC | Age: 67
End: 2022-04-26
Payer: COMMERCIAL

## 2022-04-26 VITALS
RESPIRATION RATE: 16 BRPM | SYSTOLIC BLOOD PRESSURE: 124 MMHG | DIASTOLIC BLOOD PRESSURE: 80 MMHG | TEMPERATURE: 98.8 F | BODY MASS INDEX: 24.05 KG/M2 | HEART RATE: 97 BPM | WEIGHT: 168 LBS | HEIGHT: 70 IN | OXYGEN SATURATION: 98 %

## 2022-04-26 DIAGNOSIS — D62 ANEMIA DUE TO BLOOD LOSS, ACUTE: ICD-10-CM

## 2022-04-26 DIAGNOSIS — F10.21 ALCOHOL DEPENDENCE IN REMISSION (H): ICD-10-CM

## 2022-04-26 DIAGNOSIS — Z01.818 PREOP GENERAL PHYSICAL EXAM: Primary | ICD-10-CM

## 2022-04-26 DIAGNOSIS — F17.200 TOBACCO USE DISORDER: ICD-10-CM

## 2022-04-26 DIAGNOSIS — H26.9 CATARACT OF BOTH EYES, UNSPECIFIED CATARACT TYPE: ICD-10-CM

## 2022-04-26 DIAGNOSIS — J45.30 MILD PERSISTENT ASTHMA WITHOUT COMPLICATION: ICD-10-CM

## 2022-04-26 DIAGNOSIS — I10 BENIGN ESSENTIAL HYPERTENSION: ICD-10-CM

## 2022-04-26 PROCEDURE — 99214 OFFICE O/P EST MOD 30 MIN: CPT | Performed by: FAMILY MEDICINE

## 2022-04-26 NOTE — PATIENT INSTRUCTIONS
Work on stopping smoking soon.  You may take all medications as prescribed.    Do not take Ibuprofen, Aspirin or Naproxen for now since you recently had GI bleeding.  If you need to take something for pain, take Acetaminophen 325 mg orally 1-2 tabs every 4-6 hrs as needed for pain.      Preparing for Your Surgery  Getting started  A nurse will call you to review your health history and instructions. They will give you an arrival time based on your scheduled surgery time. Please be ready to share:  Your doctor's clinic name and phone number  Your medical, surgical and anesthesia history  A list of allergies and sensitivities  A list of medicines, including herbal treatments and over-the-counter drugs  Whether the patient has a legal guardian (ask how to send us the papers in advance)  Please tell us if you're pregnant--or if there's any chance you might be pregnant. Some surgeries may injure a fetus (unborn baby), so they require a pregnancy test. Surgeries that are safe for a fetus don't always need a test, and you can choose whether to have one.   If you have a child who's having surgery, please ask for a copy of Preparing for Your Child's Surgery.    Preparing for surgery  Within 30 days of surgery: Have a pre-op exam (sometimes called an H&P, or History and Physical). This can be done at a clinic or pre-operative center.  If you're having a , you may not need this exam. Talk to your care team.  At your pre-op exam, talk to your care team about all medicines you take. If you need to stop any medicines before surgery, ask when to start taking them again.  We do this for your safety. Many medicines can make you bleed too much during surgery. Some change how well surgery (anesthesia) drugs work.  Call your insurance company to let them know you're having surgery. (If you don't have insurance, call [phone].)  Call your clinic if there's any change in your health. This includes signs of a cold or flu  (sore throat, runny nose, cough, rash, fever). It also includes a scrape or scratch near the surgery site.  If you have questions on the day of surgery, call your hospital or surgery center.  COVID testing  You may need to be tested for COVID-19 before having surgery. If so, your surgical team will give you instructions for scheduling this test, separate from your preoperative history and physical.  Eating and drinking guidelines  For your safety: Unless your surgeon tells you otherwise, follow the guidelines below.  Eat and drink as usual until 8 hours before surgery. After that, no food or milk.  Drink clear liquids until 2 hours before surgery. These are liquids you can see through, like water, Gatorade and Propel Water. You may also have black coffee and tea (no cream or milk).  Nothing by mouth within 2 hours of surgery. This includes gum, candy and breath mints.  If you drink alcohol: Stop drinking it the night before surgery.  If your care team tells you to take medicine on the morning of surgery, it's okay to take it with a sip of water.  Preventing infection  Shower or bathe the night before and morning of your surgery. Follow the instructions your clinic gave you. (If no instructions, use regular soap.)  Don't shave or clip hair near your surgery site. We'll remove the hair if needed.  Don't smoke or vape the morning of surgery. You may chew nicotine gum up to 2 hours before surgery. A nicotine patch is okay.  Note: Some surgeries require you to completely quit smoking and nicotine. Check with your surgeon.  Your care team will make every effort to keep you safe from infection. We will:  Clean our hands often with soap and water (or an alcohol-based hand rub).  Clean the skin at your surgery site with a special soap that kills germs.  Give you a special gown to keep you warm. (Cold raises the risk of infection.)  Wear special hair covers, masks, gowns and gloves during surgery.  Give antibiotic medicine,  if prescribed. Not all surgeries need antibiotics.  What to bring on the day of surgery  Photo ID and insurance card  Copy of your health care directive, if you have one  Glasses and hearing aides (bring cases)  You can't wear contacts during surgery  Inhaler and eye drops, if you use them (tell us about these when you arrive)  CPAP machine or breathing device, if you use them  A few personal items, if spending the night  If you have . . .  A pacemaker, ICD (cardiac defibrillator) or other implant: Bring the ID card.  An implanted stimulator: Bring the remote control.  A legal guardian: Bring a copy of the certified (court-stamped) guardianship papers.  Please remove any jewelry, including body piercings. Leave jewelry and other valuables at home.  If you're going home the day of surgery  You must have a responsible adult drive you home. They should stay with you overnight as well.  If you don't have someone to stay with you, and you aren't safe to go home alone, we may keep you overnight. Insurance often won't pay for this.  After surgery  If it's hard to control your pain or you need more pain medicine, please call your surgeon's office.  Questions?   If you have any questions for your care team, list them here: _________________________________________________________________________________________________________________________________________________________________________ ____________________________________ ____________________________________ ____________________________________  For informational purposes only. Not to replace the advice of your health care provider. Copyright   2003, 2019 [facility]. All rights reserved. Clinically reviewed by Mireya Arellano MD. [employer] [id number] - REV 07/21.    Before Your Procedure or Hospital Admission  Testing for COVID-19 (Coronavirus)  Thank you for choosing [facility] for your health care needs. The COVID-19 pandemic is a very challenging time  "for everyone.   Our goal is to keep you and our team here at [facility] safe and healthy. We've taken many steps to make this happen. For example:  We test and screen our staff, care teams and patients for COVID-19.  Everyone at [facility] must wear a mask and stay 6 feet apart.  We are limiting hospital and clinic visitors.  Before you come in  If you test COVID-19 positive with any kind of test before your surgery date, call your surgeon's office. We need to know the date of your positive test. We may need to re-schedule your surgery.  Unless you've had a positive COVID-19 test within the past 90 days, you must get tested for COVID-19 even if you've been vaccinated. Your test needs to happen 2 to 4 days before you check in to the hospital or surgery site.  A clinic scheduler will call you about a week in advance to set up a testing time at one of our labs.  Note: If you have a test anywhere but [facility], be sure you get an RT-PCR or an NAAT (Nucleic Acid Amplification Test) test.  Do NOT get a \"rapid antigen\" test. Negative results from \"rapid antigen\" tests are NOT accepted before your surgery.  After the test, please stay at home and out of contact with other people. This will help prevent possible COVID-19 exposure before your treatment. Please follow all current safety guidelines, including:  Limit trips outside your home.  Limit the number of people you see.  Always wear a mask outside your home.  Use social distancing. Stay 6 feet away from others whenever you can.  Wash your hands often.  If your test shows you have COVID-19  If your test is positive, we'll let you know. A positive test means that you have the virus.   We'll probably have to postpone your admission, surgery or procedure. Your doctor will discuss this with you. After that, we'll let you know what to do and when you can re-schedule.   We may need to cancel your treatment on short notice for other reasons, too.  If your " test shows you DON'T have COVID-19  Even if your test is negative, you can still get COVID-19. It's rare but, sometimes, the test result is wrong. You could also catch the virus after taking the test.   There's a very small chance that you could catch COVID-19 in the hospital or surgery center. [facility] has taken many steps to prevent this from happening.   Day of your surgery or procedure  Please come wearing a face covering that covers both your nose and mouth.  When you arrive, we'll ask you some questions to find out if you've had any exposures to COVID-19, or have any signs of COVID-19.  Ask your care team if you can have visitors. All visitors must wear face coverings and will be screened for exposure to, or signs of, COVID-19.  Even if no visitors are allowed, you can still have with you:  Your legal guardian or legal decision maker  Someone to help you, if you are disabled  A parent and one other visitor, if you are younger than 18 years old  A partner and a , if you are in labor  We might need to teach you about taking care of yourself after surgery. If so, a visitor can come into the hospital to learn about it, too.  The rules for visitors change often, depending on how much the virus is spreading. To learn more, see Visiting a Loved One in the Hospital during the COVID-19 Outbreak.  Please call your care team, hospital or surgery center if you have any questions. We thank you for your understanding and for choosing [facility] for your care.   Possible surgery delay  Like you, we want your surgery to happen when it's scheduled. But sometimes the hospital is so full that it's not safe for you to have your surgery. This is especially true during the pandemic. Your surgery may need to be re-scheduled at a later date. If this happens, we will call and tell you.  Questions and answers  Does it matter where I get tested for COVID-19?  Yes. We urge you to get tested at one of our [facility]  "[place] [address] testing sites. These tests will be either RT-PCR or NAAT, and are accepted. We process these tests in our lab and can get the results quickly. Your [facility] care team needs to get your results before you check in.  What should I do if I can't get tested at [facility]?  You can get tested somewhere else, but you'll need to take these extra steps:   Contact your family doctor or clinic to arrange your test.  Take the test within 4 days of your surgery or procedure. We can't accept tests older than 4 days.  Make sure you're getting an RT-PCR test, or a NAAT. Some places use \"rapid antigen\" tests, but we do NOT accept negative results from those tests before your surgery.  Make sure your doctor or clinic faxes your results to [facility] at [phone]. Or take a photo of the results and upload it into [employer].  If we don't get your results in time, we may have to delay or cancel your treatment.  For informational purposes only. Not to replace the advice of your health care provider. Copyright   2020 [facility]. All rights reserved. Clinically reviewed by Infection Prevention and the [facility] Clinical [facility]. [employer] [id number] - Rev 02/01/22.    "

## 2022-04-26 NOTE — PROGRESS NOTES
[facility]  [address] [address]  Phone: [phone]  Primary Provider: Gatito Ware  Pre-op Performing Provider: GATTIO WARE      PREOPERATIVE EVALUATION:  Today's date: 4/26/2022    Luigi Vieyra is a 66 year old male who presents for a preoperative evaluation.    Surgical Information:  Surgery/Procedure: removal of cataracts  Surgery Location: [facility], [place]  Surgeon: Dr Boyd  Surgery Date: 5/27 for right eye, 6/14 for left eye  Time of Surgery: unknown  Where patient plans to recover: At home with family  Fax number for surgical facility: [phone]    Type of Anesthesia Anticipated: to be determined    Assessment & Plan     The proposed surgical procedure is considered LOW risk.    Preop general physical exam    Cataract of both eyes, unspecified cataract type    Benign essential hypertension  Controlled.  Low salt, low fat diet.   Exercise as tolerated.  Take meds as prescribed; call if with side effects.     Mild persistent asthma without complication  Stable per patient.    Alcohol dependence in remission (H)  Advised to contiinue to avoid alcohol.    Anemia due to blood loss, acute  Improving on last measurement. Due for recheck in 2 weeks.    Tobacco use disorder  Advised risks, especially around surgeries.  Advised cessation. Patient did not commit yet.           Risks and Recommendations:  The patient has the following additional risks and recommendations for perioperative complications:  Pulmonary:    - Incentive spirometry post-op   - Consider Respiratory Therapy (Respiratory Care IP Consult) post-op   - Active nicotine user, advised smoking cessation  Social and Substance:    - Active nicotine user, advised smoking cessation   - alcohol abuse in remission    Medication Instructions:  Patient is to take all scheduled medications on the day of surgery    RECOMMENDATION:  APPROVAL GIVEN to proceed  with proposed procedure, without further diagnostic evaluation.  Typical preop validity is 30 days per our organization's guidelines. However, patient brought in preop packet from his ophthalmologist clinic stating that preops can be completged within 60 days. If patient's health changes between now and his planned surgery, patient should be reevaluated.    Subjective     HPI related to upcoming procedure: Patient has bilateral cataracts causing blurred vision. Hence, planned surgery. Reviewed above with patient.      Preop Questions 4/26/2022   1. Have you ever had a heart attack or stroke? No   2. Have you ever had surgery on your heart or blood vessels, such as a stent placement, a coronary artery bypass, or surgery on an artery in your head, neck, heart, or legs? No   3. Do you have chest pain with activity? No   4. Do you have a history of  heart failure? No   5. Do you currently have a cold, bronchitis or symptoms of other infection? No   6. Do you have a cough, shortness of breath, or wheezing? YES - pt has history of asthma - denies acute symptoms today   7. Do you or anyone in your family have previous history of blood clots? No   8. Do you or does anyone in your family have a serious bleeding problem such as prolonged bleeding following surgeries or cuts? No   9. Have you ever had problems with anemia or been told to take iron pills? No   10. Have you had any abnormal blood loss such as black, tarry or bloody stools? Has had recent episode of GI bleed - was hospitalized. hgb has bene improving - measured 12 days ago.   11. Have you ever had a blood transfusion? No   12. Are you willing to have a blood transfusion if it is medically needed before, during, or after your surgery? Yes   13. Have you or any of your relatives ever had problems with anesthesia? No   14. Do you have sleep apnea, excessive snoring or daytime drowsiness? No   15. Do you have any artifical heart valves or other implanted medical  devices like a pacemaker, defibrillator, or continuous glucose monitor? No   16. Do you have artificial joints? No   17. Are you allergic to latex? No     Health Care Directive:  Patient does not have a Health Care Directive or Living Will: Discussed advance care planning with patient; information given to patient to review.    Preoperative Review of :   reviewed - no record of controlled substances prescribed.      Status of Chronic Conditions:  ANEMIA - Patient has a recent history of moderate-severe anemia, which has been symptomatic. Work up to date has revealed improving hgb. Treatment has been supportive.     HYPERTENSION - Patient has longstanding history of HTN , currently denies any symptoms referable to elevated blood pressure. Specifically denies chest pain, palpitations, dyspnea, orthopnea, PND or peripheral edema. Blood pressure readings have been in normal range. Current medication regimen is as listed below. Patient denies any side effects of medication.       Review of Systems  CONSTITUTIONAL: NEGATIVE for fever, chills, change in weight  INTEGUMENTARY/SKIN: NEGATIVE for worrisome rashes, moles or lesions  EYES: NEGATIVE for vision changes or irritation  ENT/MOUTH: NEGATIVE for ear, mouth and throat problems  RESP: NEGATIVE for significant cough or SOB  CV: NEGATIVE for chest pain, palpitations or peripheral edema  GI: NEGATIVE for nausea, abdominal pain, heartburn, or change in bowel habits  : NEGATIVE for frequency, dysuria, or hematuria  MUSCULOSKELETAL: NEGATIVE for significant arthralgias or myalgia  NEURO: NEGATIVE for weakness, dizziness or paresthesias  ENDOCRINE: NEGATIVE for temperature intolerance, skin/hair changes  HEME: NEGATIVE for bleeding problems  PSYCHIATRIC: NEGATIVE for changes in mood or affect    Patient Active Problem List    Diagnosis Date Noted     Hyponatremia 03/20/2022     Priority: Medium     Anemia, unspecified type 03/20/2022     Priority: Medium     Alcoholic  gastritis, presence of bleeding unspecified, unspecified chronicity 03/20/2022     Priority: Medium     Hypotension due to hypovolemia 03/20/2022     Priority: Medium     MAIA (acute kidney injury) (H) 03/20/2022     Priority: Medium     Elevated lactic acid level 03/20/2022     Priority: Medium     Transaminitis 03/20/2022     Priority: Medium     Syncope 03/20/2022     Priority: Medium     Thrombocytosis 03/20/2022     Priority: Medium     Leukocytosis 03/20/2022     Priority: Medium     Mixed hyperlipidemia 03/09/2020     Priority: Medium     Uncontrolled hypertension 01/02/2019     Priority: Medium     Alcohol dependence (H) 08/14/2017     Priority: Medium     Seasonal allergic rhinitis 06/13/2017     Priority: Medium     CARDIOVASCULAR SCREENING; LDL GOAL LESS THAN 130 03/31/2014     Priority: Medium     Tobacco abuse 03/25/2014     Priority: Medium     Mild persistent asthma 05/18/2012     Priority: Medium     Alcohol abuse 05/18/2012     Priority: Medium      Past Medical History:   Diagnosis Date     Asthma      Past Surgical History:   Procedure Laterality Date     ESOPHAGOSCOPY, GASTROSCOPY, DUODENOSCOPY (EGD), COMBINED N/A 3/21/2022    Procedure: ESOPHAGOGASTRODUODENOSCOPY, WITH BIOPSY;  Surgeon: Eunice Rubin MD;  Location: [facility]     Current Outpatient Medications   Medication Sig Dispense Refill     albuterol (PROAIR HFA/PROVENTIL HFA/VENTOLIN HFA) 108 (90 Base) MCG/ACT inhaler Inhale 2 puffs into the lungs every 4 hours as needed for shortness of breath / dyspnea or wheezing 8.5 g 3     beclomethasone HFA (QVAR REDIHALER) 80 MCG/ACT inhaler Inhale 1 puff into the lungs 2 times daily 10.6 g 11     Cholecalciferol (VITAMIN D-3) 125 MCG (5000 UT) TABS Take by mouth daily       gabapentin (NEURONTIN) 100 MG capsule TAKE 1 CAPSULE BY MOUTH EVERY 8 HOURS 90 capsule 1     losartan (COZAAR) 25 MG tablet Take 1 tablet (25 mg) by mouth daily       metoprolol succinate ER (TOPROL-XL) 25 MG 24 hr tablet Take 1  "tablet (25 mg) by mouth daily Please make a clinic visit to be seen in person for medication refills. 90 tablet 3     pantoprazole (PROTONIX) 40 MG EC tablet Take 1 tablet (40 mg) by mouth 2 times daily (before meals) For 26 days, then daily       thiamine (B-1) 100 MG tablet Take 1 tablet (100 mg) by mouth daily 90 tablet 3     Zinc 22.5 MG TABS Take by mouth daily       nicotine (NICODERM CQ) 14 MG/24HR 24 hr patch Place 1 patch onto the skin daily (Patient not taking: Reported on 4/14/2022)       nicotine (NICORETTE) 4 MG lozenge Place 4 mg inside cheek as needed  (Patient not taking: Reported on 4/14/2022)       order for DME Equipment being ordered: Digital home blood pressure monitor kit 1 each 0       No Known Allergies     Social History     Tobacco Use     Smoking status: Current Every Day Smoker     Packs/day: 0.75     Years: 45.00     Pack years: 33.75     Types: Cigarettes     Start date: 1976     Smokeless tobacco: Never Used   Substance Use Topics     Alcohol use: Yes     Comment: about 2 1.75 of vodka currently, sober since 3/20/22     Family History   Problem Relation Age of Onset     Asthma Mother      Cerebrovascular Disease Mother      C.A.D. No family hx of      Diabetes No family hx of      Hypertension No family hx of      Breast Cancer No family hx of      Cancer - colorectal No family hx of      Prostate Cancer No family hx of      History   Drug Use No         Objective     /80   Pulse 97   Temp 98.8  F (37.1  C) (Tympanic)   Resp 16   Ht 1.778 m (5' 10\")   Wt 76.2 kg (168 lb)   SpO2 98%   BMI 24.11 kg/m      Physical Exam  GENERAL APPEARANCE: alert and no distress; ambulatory w/o assist  EYES: pink conj, no icterus, PERRL, EOMI  HENT: ear canals and TM's normal, nose and mouth without ulcers or lesions, oropharynx clear and oral mucous membranes moist  NECK: no adenopathy, no asymmetry, masses, or scars and thyroid normal to palpation  RESP: lungs clear to auscultation - no " rales, rhonchi or wheezes  CV: regular rates and rhythm, normal S1 S2, no S3 or S4, no murmur, click or rub, no peripheral edema and peripheral pulses strong  ABDOMEN: soft, nontender, no hepatosplenomegaly, no masses and bowel sounds normal  MS: no musculoskeletal defects are noted and gait is age appropriate without ataxia  SKIN: good turgor, no rash/jaundice/ecchymosis  NEURO: Normal strength and tone, sensory exam grossly normal, mentation intact and speech normal    Recent Labs   Lab Test 04/14/22  1405 03/28/22  0500 03/20/22  1717 03/20/22  1434   HGB 10.5* 7.7*   < > 9.7*   * 470*   < > 607*   INR  --   --   --  1.00    139   < > 128*   POTASSIUM 4.3 3.4   < > 4.5   CR 1.31* 1.23   < > 1.90*    < > = values in this interval not displayed.        Diagnostics:  No labs were ordered during this visit.   No EKG required for low risk surgery (cataract, skin procedure, breast biopsy, etc).    Revised Cardiac Risk Index (RCRI):  The patient has the following serious cardiovascular risks for perioperative complications:   - No serious cardiac risks = 0 points     RCRI Interpretation: 0 points: Class I (very low risk - 0.4% complication rate)           Signed Electronically by: Gatito Ware MD  Copy of this evaluation report is provided to requesting physician.

## 2022-04-26 NOTE — TELEPHONE ENCOUNTER
Noted that patient's eye clinic verified that they will honor a preop done more than 30 days from the day of the first cataract surgery.

## 2022-04-26 NOTE — TELEPHONE ENCOUNTER
[facility] states the preop is good for 60 days with them.    Jeannine Quintero, [facility]chie

## 2022-05-03 ENCOUNTER — TELEPHONE (OUTPATIENT)
Dept: FAMILY MEDICINE | Facility: CLINIC | Age: 67
End: 2022-05-03
Payer: COMMERCIAL

## 2022-05-03 NOTE — TELEPHONE ENCOUNTER
Dr. Ware:    Received a call from patient's father Tyrese (patient gave verbal permission to speak to Tyrese). Tyrese says patient left the [facility] recently and was provided a script card for the Pantoprazole 40 mg BID, the patient also has a bottle for this medication from a NP with the last name of Charlie with no refills. Tyrese is trying to figure out if the patient is to continue to take the Pantoprazole or not. Asked Tyrese if patient has been instructed from the [facility] to continue via discharge instructions and they are not sure. Do not see that you have prescribed this before for the patient. Would you advise refill? Does patient need appointment? Pharmacy is pended if appropriate, please advise.    CADEN Ingram

## 2022-05-04 NOTE — TELEPHONE ENCOUNTER
Sister Elizabeth, was notified that patient is to stay on the pantoprazole.  She is on record ,ok to communicate.  Father Tyrese was not reachable.

## 2022-05-09 DIAGNOSIS — I10 BENIGN ESSENTIAL HYPERTENSION: ICD-10-CM

## 2022-05-09 DIAGNOSIS — Z87.19 HISTORY OF GI BLEED: Primary | ICD-10-CM

## 2022-05-10 RX ORDER — METOPROLOL SUCCINATE 25 MG/1
TABLET, EXTENDED RELEASE ORAL
Qty: 90 TABLET | Refills: 3 | Status: SHIPPED | OUTPATIENT
Start: 2022-05-10 | End: 2022-11-18

## 2022-05-10 NOTE — TELEPHONE ENCOUNTER
"Requested Prescriptions   Pending Prescriptions Disp Refills    pantoprazole (PROTONIX) 40 MG EC tablet [Pharmacy Med Name: PANTOPRAZOLE 40MG TABLETS] 30 tablet      Sig: TAKE 1 TABLET BY MOUTH TWICE DAILY        PPI Protocol Failed - 5/9/2022  4:38 PM        Failed - Medication is active on med list        Passed - Not on Clopidogrel (unless Pantoprazole ordered)        Passed - No diagnosis of osteoporosis on record        Passed - Recent (12 mo) or future (30 days) visit within the authorizing provider's specialty     Patient has had an office visit with the authorizing provider or a provider within the authorizing providers department within the previous 12 mos or has a future within next 30 days. See \"Patient Info\" tab in inbasket, or \"Choose Columns\" in Meds & Orders section of the refill encounter.              Passed - Patient is age 18 or older           losartan (COZAAR) 25 MG tablet [Pharmacy Med Name: LOSARTAN 25MG TABLETS] 30 tablet      Sig: TAKE 1 TABLET BY MOUTH EVERY MORNING        Angiotensin-II Receptors Failed - 5/9/2022  4:38 PM        Failed - Normal serum creatinine on file in past 12 months     Recent Labs   Lab Test 04/14/22  1405   CR 1.31*       Ok to refill medication if creatinine is low          Passed - Last blood pressure under 140/90 in past 12 months       BP Readings from Last 3 Encounters:   04/26/22 124/80   04/14/22 112/86   03/28/22 123/79                 Passed - Recent (12 mo) or future (30 days) visit within the authorizing provider's specialty     Patient has had an office visit with the authorizing provider or a provider within the authorizing providers department within the previous 12 mos or has a future within next 30 days. See \"Patient Info\" tab in inbasket, or \"Choose Columns\" in Meds & Orders section of the refill encounter.              Passed - Medication is active on med list        Passed - Patient is age 18 or older        Passed - Normal serum potassium on file " "in past 12 months       Recent Labs   Lab Test 04/14/22  1405   POTASSIUM 4.3                      Signed Prescriptions Disp Refills    metoprolol succinate ER (TOPROL XL) 25 MG 24 hr tablet 90 tablet 3     Sig: TAKE 1 TABLET BY MOUTH EVERY MORNING        Beta-Blockers Protocol Passed - 5/9/2022  4:38 PM        Passed - Blood pressure under 140/90 in past 12 months       BP Readings from Last 3 Encounters:   04/26/22 124/80   04/14/22 112/86   03/28/22 123/79                 Passed - Patient is age 6 or older        Passed - Recent (12 mo) or future (30 days) visit within the authorizing provider's specialty     Patient has had an office visit with the authorizing provider or a provider within the authorizing providers department within the previous 12 mos or has a future within next 30 days. See \"Patient Info\" tab in inbasket, or \"Choose Columns\" in Meds & Orders section of the refill encounter.              Passed - Medication is active on med list              "

## 2022-05-11 RX ORDER — PANTOPRAZOLE SODIUM 40 MG/1
40 TABLET, DELAYED RELEASE ORAL DAILY
Qty: 90 TABLET | Refills: 3 | Status: ON HOLD | OUTPATIENT
Start: 2022-05-11 | End: 2022-10-14

## 2022-05-11 RX ORDER — LOSARTAN POTASSIUM 25 MG/1
TABLET ORAL
Qty: 90 TABLET | Refills: 3 | Status: SHIPPED | OUTPATIENT
Start: 2022-05-11 | End: 2022-11-18

## 2022-08-15 ENCOUNTER — TELEPHONE (OUTPATIENT)
Dept: FAMILY MEDICINE | Facility: CLINIC | Age: 67
End: 2022-08-15

## 2022-08-15 DIAGNOSIS — J45.30 MILD PERSISTENT ASTHMA WITHOUT COMPLICATION: ICD-10-CM

## 2022-08-15 NOTE — TELEPHONE ENCOUNTER
Patient requesting a refill on his albuterol inhaler and asked if he can get the bigger one with 200 puffs please.      Joan Guallpa, MARC Almanza

## 2022-08-16 RX ORDER — ALBUTEROL SULFATE 90 UG/1
2 AEROSOL, METERED RESPIRATORY (INHALATION) EVERY 4 HOURS PRN
Qty: 18 G | Refills: 0 | Status: SHIPPED | OUTPATIENT
Start: 2022-08-16 | End: 2023-06-15

## 2022-08-16 NOTE — TELEPHONE ENCOUNTER
"Requested Prescriptions   Pending Prescriptions Disp Refills     albuterol (PROAIR HFA/PROVENTIL HFA/VENTOLIN HFA) 108 (90 Base) MCG/ACT inhaler 18 g 3     Sig: Inhale 2 puffs into the lungs every 4 hours as needed for shortness of breath / dyspnea or wheezing       Asthma Maintenance Inhalers - Anticholinergics Failed - 8/16/2022  1:51 PM        Failed - Asthma control assessment score within normal limits in last 6 months     Please review ACT score.           Passed - Patient is age 12 years or older        Passed - Medication is active on med list        Passed - Recent (6 mo) or future (30 days) visit within the authorizing provider's specialty     Patient had office visit in the last 6 months or has a visit in the next 30 days with authorizing provider or within the authorizing provider's specialty.  See \"Patient Info\" tab in inbasket, or \"Choose Columns\" in Meds & Orders section of the refill encounter.           Short-Acting Beta Agonist Inhalers Protocol  Failed - 8/16/2022  1:51 PM        Failed - Asthma control assessment score within normal limits in last 6 months     Please review ACT score.           Passed - Patient is age 12 or older        Passed - Medication is active on med list        Passed - Recent (6 mo) or future (30 days) visit within the authorizing provider's specialty     Patient had office visit in the last 6 months or has a visit in the next 30 days with authorizing provider or within the authorizing provider's specialty.  See \"Patient Info\" tab in inbasket, or \"Choose Columns\" in Meds & Orders section of the refill encounter.                   "

## 2022-08-16 NOTE — TELEPHONE ENCOUNTER
Pending Prescriptions:                       Disp   Refills    albuterol (PROAIR HFA/PROVENTIL HFA/VENTOL*18 g   3        Sig: Inhale 2 puffs into the lungs every 4 hours as needed           for shortness of breath / dyspnea or wheezing    Routing refill request to provider for review/approval because:  ACT needs to be updated.  Score of 12 in April.    Jolie Adams RN

## 2022-08-16 NOTE — TELEPHONE ENCOUNTER
Please call patient to repeat ACT.  If score is less than 20, please schedule patient for an in-clinic visit.

## 2022-08-17 NOTE — TELEPHONE ENCOUNTER
Patient is reached and he is at his [place] helping [place] out right now.  Patient wants ACT mailed to him. Done. Rachel NICHOLAS RN

## 2022-08-26 ASSESSMENT — ASTHMA QUESTIONNAIRES: ACT_TOTALSCORE: 15

## 2022-08-26 NOTE — TELEPHONE ENCOUNTER
ACT received, score was entered into patients chart, score was 15. Saw previous message below, scheduled pt for nearest available appt 10/12/22.

## 2022-08-29 NOTE — PROGRESS NOTES
SUBJECTIVE:   CC: Luigi Vieyra is an 64 year old male who presents for preventative health visit.   Chief Complaint   Patient presents with     Physical     Physical/ Med Review , Pt is not fasting.     Asthma     renew meds     Hypertension     renew meds      Health Maintenance     Due for Colonscopy or FIT       Imm/Inj     Flu Shot   flu shot  Colonoscopy declined    Healthy Habits:     Getting at least 3 servings of Calcium per day:  Yes    Bi-annual eye exam:  Yes    Dental care twice a year:  NO    Sleep apnea or symptoms of sleep apnea:  None    Diet:  Regular (no restrictions)    Frequency of exercise:  None    Duration of exercise:  N/A    Taking medications regularly:  Yes    Barriers to taking medications:  None    Medication side effects:  None    PHQ-2 Total Score: 0    Additional concerns today:  No    Patient was advised that any concern beyond preventive/wellness screenings or items may incur secondary charges in his medical bill. Patient concurred to proceed with the following:    Hypertension Follow-up      Do you check your blood pressure regularly outside of the clinic? No     Are you following a low salt diet? Yes    Are your blood pressures ever more than 140 on the top number (systolic) OR more   than 90 on the bottom number (diastolic), for example 140/90? No, has not had checked    Asthma Follow-Up    Was ACT completed today?    Yes    ACT Total Scores 2/10/2020   ACT TOTAL SCORE -   ASTHMA ER VISITS -   ASTHMA HOSPITALIZATIONS -   ACT TOTAL SCORE (Goal Greater than or Equal to 20) 15   In the past 12 months, how many times did you visit the emergency room for your asthma without being admitted to the hospital? 0   In the past 12 months, how many times were you hospitalized overnight because of your asthma? 0       How many days per week do you miss taking your asthma controller medication?  0    Please describe any recent triggers for your asthma: smoke, humidity and milk    Have you had  any Emergency Room Visits, Urgent Care Visits, or Hospital Admissions since your last office visit?  No    Patient ran out of beclomethasone inhaler for 6 months and never refilled.      How many servings of fruits and vegetables do you eat daily?  0-1    On average, how many sweetened beverages do you drink each day (Examples: soda, juice, sweet tea, etc.  Do NOT count diet or artificially sweetened beverages)?   1    How many days per week do you exercise enough to make your heart beat faster? 3 or less    How many minutes a day do you exercise enough to make your heart beat faster? 9 or less    How many days per week do you miss taking your medication? 0      Today's PHQ-2 Score:   PHQ-2 ( 1999 [employer]) 2/10/2020   Q1: Little interest or pleasure in doing things 0   Q2: Feeling down, depressed or hopeless 0   PHQ-2 Score 0       Abuse: Current or Past(Physical, Sexual or Emotional)- No  Do you feel safe in your environment? Yes        Social History     Tobacco Use     Smoking status: Current Every Day Smoker     Packs/day: 1.00     Years: 35.00     Pack years: 35.00     Types: Cigarettes     Smokeless tobacco: Never Used   Substance Use Topics     Alcohol use: Yes     Comment: casual     Patient states he thinks of stopping cigarettes but no plans at all.    If you drink alcohol do you typically have >3 drinks per day or >7 drinks per week? No      Last PSA: No results found for: PSA    Reviewed orders with patient. Reviewed health maintenance and updated orders accordingly - Yes  BP Readings from Last 3 Encounters:   02/10/20 (!) 154/102   06/07/19 128/72   04/26/19 159/90    Wt Readings from Last 3 Encounters:   02/10/20 83.5 kg (184 lb)   02/20/19 79.2 kg (174 lb 9.6 oz)   12/27/18 80.6 kg (177 lb 9.6 oz)           Patient Active Problem List   Diagnosis     Mild persistent asthma     Alcohol abuse     Tobacco abuse     CARDIOVASCULAR SCREENING; LDL GOAL LESS THAN 130     Alcohol dependence in remission (H)      Seasonal allergic rhinitis     Uncontrolled hypertension     History reviewed. No pertinent surgical history.    Social History     Tobacco Use     Smoking status: Current Every Day Smoker     Packs/day: 1.00     Years: 35.00     Pack years: 35.00     Types: Cigarettes     Smokeless tobacco: Never Used   Substance Use Topics     Alcohol use: Yes     Comment: casual     Family History   Problem Relation Age of Onset     Asthma Mother      Cerebrovascular Disease Mother      C.A.D. No family hx of      Diabetes No family hx of      Hypertension No family hx of      Breast Cancer No family hx of      Cancer - colorectal No family hx of      Prostate Cancer No family hx of          Current Outpatient Medications   Medication Sig Dispense Refill     albuterol (PROAIR HFA/PROVENTIL HFA/VENTOLIN HFA) 108 (90 Base) MCG/ACT inhaler Inhale 2 puffs into the lungs every 4 hours as needed for shortness of breath / dyspnea or wheezing 8.5 g 11     aspirin 81 MG tablet Take 81 mg by mouth daily       beclomethasone HFA (QVAR REDIHALER) 80 MCG/ACT inhaler Inhale 1 puff into the lungs 2 times daily 1 Inhaler 11     losartan (COZAAR) 100 MG tablet Take 1 tablet (100 mg) by mouth daily 90 tablet 3     No Known Allergies    Reviewed and updated as needed this visit by clinical staff  Tobacco  Allergies  Meds  Med Hx  Surg Hx  Fam Hx  Soc Hx        Reviewed and updated as needed this visit by Provider        Past Medical History:   Diagnosis Date     Asthma       History reviewed. No pertinent surgical history.    Review of Systems  CONSTITUTIONAL: NEGATIVE for fever, chills, change in weight  INTEGUMENTARY/SKIN: NEGATIVE for worrisome rashes, moles or lesions  EYES: NEGATIVE for vision changes or irritation  ENT: NEGATIVE for ear, mouth and throat problems  RESP: NEGATIVE for significant cough or SOB  CV: NEGATIVE for chest pain, palpitations or peripheral edema  GI: NEGATIVE for nausea, abdominal pain, heartburn, or change  "in bowel habits   male: negative for dysuria, hematuria, decreased urinary stream, erectile dysfunction, urethral discharge  MUSCULOSKELETAL: NEGATIVE for significant arthralgias or myalgia  NEURO: NEGATIVE for weakness, dizziness or paresthesias  ENDOCRINE: NEGATIVE for temperature intolerance, skin/hair changes  HEME/ALLERGY/IMMUNE: NEGATIVE for bleeding problems  PSYCHIATRIC: NEGATIVE for changes in mood or affect    OBJECTIVE:   BP (!) 154/102   Pulse 104   Temp 98.9  F (37.2  C) (Tympanic)   Resp 14   Ht 1.778 m (5' 10\")   Wt 83.5 kg (184 lb)   SpO2 97%   BMI 26.40 kg/m      Physical Exam  GENERAL APPEARANCE: slightly overweight, alert and no distress  EYES: pink conj, no icterus, PERRL, EOMI  HENT: ear canals and TM's normal, nose and mouth without ulcers or lesions, oropharynx clear and oral mucous membranes moist  NECK: no adenopathy, no asymmetry, masses, or scars and thyroid normal to palpation  RESP: lungs clear to auscultation - no rales, rhonchi or wheezes  CV: regular rates and rhythm, normal S1 S2, no S3 or S4, no murmur, click or rub, no peripheral edema and peripheral pulses strong  ABDOMEN: soft, nontender, no hepatosplenomegaly, no masses and bowel sounds normal  RECTAL: deferred  MS: no musculoskeletal defects are noted and gait is age appropriate without ataxia  SKIN: no suspicious lesions or rashes  NEURO: Normal strength and tone, sensory exam grossly normal, mentation intact and speech normal    Diagnostic Test Results:  none     ASSESSMENT/PLAN:   Luigi was seen today for physical, asthma, hypertension, health maintenance and imm/inj.    Diagnoses and all orders for this visit:    Encounter for routine adult health examination without abnormal findings  Patient was advised on recommended screening and preventive health recommendations.  He verbalized understanding and agreed to the plans below.    Mild persistent asthma without complication  -     albuterol (PROAIR HFA/PROVENTIL " Date Of Previous Biopsy (Optional): 07/05/22 HFA/VENTOLIN HFA) 108 (90 Base) MCG/ACT inhaler; Inhale 2 puffs into the lungs every 4 hours as needed for shortness of breath / dyspnea or wheezing  -     beclomethasone HFA (QVAR REDIHALER) 80 MCG/ACT inhaler; Inhale 1 puff into the lungs 2 times daily  -     OFFICE/OUTPT VISIT,EST,LEVL IV  Uncontrolled.  Discussed role of maintenance and rescue medications.  Patient will restart beclomethasone inhaler - advised to make sure no breaks in use.  Discussed goal of treatment is to reduce use of albuterol and avoid exacerbations.  Return precautions discussed and given to patient.    Uncontrolled hypertension  -     losartan (COZAAR) 100 MG tablet; Take 1 tablet (100 mg) by mouth daily  -     Basic metabolic panel; Future  -     OFFICE/OUTPT VISIT,EST,LEVL IV  Patient was advised BP uncontrolled today.  Reviewed meds with patient.  Increased losartan dose to optimize management.  Reinforced sodium restriction.  Exercise recommendations reviewed with patient.  Recheck with RN in 2 weks.     Tobacco abuse  Discussed risks of continuous smoking tobacco.  Patient is not interested in quitting completely at this time.  Will continue to  in the future.    Need for prophylactic vaccination and inoculation against influenza  -     INFLUENZA QUAD, RECOMBINANT, P-FREE (RIV4) (FLUBLOCK) [68303]  -     Vaccine Administration, Initial [57561]    Screening for malignant neoplasm of colon  -     GASTROENTEROLOGY ADULT REF PROCEDURE ONLY    Lipid screening  -     Lipid panel reflex to direct LDL Fasting; Future    In addition to preventive health visit, spent another 25 minutes in counseling and coordination of care as above.        COUNSELING:   Reviewed preventive health counseling, as reflected in patient instructions       Regular exercise       Healthy diet/nutrition       Vision screening       Hearing screening       Immunizations    Vaccinated for: Influenza             Aspirin Prophylaxsis       Alcohol Use       Safe  "sex practices/STD prevention       HIV screeninx in teen years, 1x in adult years, and at intervals if high risk       Colon cancer screening       Prostate cancer screening       Osteoporosis Prevention/Bone Health       The 10-year ASCVD risk score (Priyanka MARTINES JrKrystina, et al., 2013) is: 22.6%    Values used to calculate the score:      Age: 64 years      Sex: Male      Is Non- : No      Diabetic: No      Tobacco smoker: Yes      Systolic Blood Pressure: 154 mmHg      Is BP treated: Yes      HDL Cholesterol: 58 mg/dL      Total Cholesterol: 200 mg/dL       Advance Care Planning    Estimated body mass index is 26.4 kg/m  as calculated from the following:    Height as of this encounter: 1.778 m (5' 10\").    Weight as of this encounter: 83.5 kg (184 lb).     Weight management plan: Discussed healthy diet and exercise guidelines     reports that he has been smoking cigarettes. He has a 35.00 pack-year smoking history. He has never used smokeless tobacco.  Tobacco Cessation Action Plan: Information offered: Patient not interested at this time    Counseling Resources:  ATP IV Guidelines  Pooled Cohorts Equation Calculator  FRAX Risk Assessment  ICSI Preventive Guidelines  Dietary Guidelines for Americans,   USDA's MyPlate  ASA Prophylaxis  Lung CA Screening    Gatito Ware MD  [facility]  "

## 2022-10-10 ENCOUNTER — TELEPHONE (OUTPATIENT)
Dept: FAMILY MEDICINE | Facility: CLINIC | Age: 67
End: 2022-10-10

## 2022-10-10 NOTE — TELEPHONE ENCOUNTER
Noted ADRIAN from RN.  Patient ha sa known history of UGIB just earlier this year.  He is advised to seek care in the ER if there is witnessed bloody vomitus or bloody phlegm, or if he has dark or bright red stools.

## 2022-10-10 NOTE — TELEPHONE ENCOUNTER
Per Dr Ware, Pt was notified to go to ER if he has bloody vomit or phlegm or black or bloody stools.  He verbalized understanding.  No other info was given to patient at this time.

## 2022-10-10 NOTE — TELEPHONE ENCOUNTER
Spoke with sister who is anonymously reporting concerning sx she has noticed over past week while being with pt: States pt known alcoholic, drinks vodka daily until passes out. Puffy in face, weak. Feels pt coughing up/ vomiting blood however pt denies stating due to nosebleed.  Pt has appt with Dr. Ware 10/12- asthma.   Forwarded to provider as FRANSISCO Pro RN

## 2022-10-10 NOTE — TELEPHONE ENCOUNTER
Symptoms    Describe your symptoms: Sister Dianne is calling stating that the last 3 days she has noticed the patient coughing up blood and patient denied and said he had a bloody nose.     Any pain: No    How long have you been having symptoms: 3  days    Have you been seen for this:  No    Appointment offered?: No    Triage offered?: Yes:       Okay to leave a detailed message?: Yes at Home number on file [phone] (home)

## 2022-10-12 ENCOUNTER — OFFICE VISIT (OUTPATIENT)
Dept: FAMILY MEDICINE | Facility: CLINIC | Age: 67
End: 2022-10-12
Payer: COMMERCIAL

## 2022-10-12 VITALS
BODY MASS INDEX: 23.25 KG/M2 | WEIGHT: 162.4 LBS | OXYGEN SATURATION: 98 % | HEIGHT: 70 IN | DIASTOLIC BLOOD PRESSURE: 52 MMHG | SYSTOLIC BLOOD PRESSURE: 104 MMHG | HEART RATE: 96 BPM | RESPIRATION RATE: 20 BRPM | TEMPERATURE: 98.6 F

## 2022-10-12 DIAGNOSIS — F10.10 ALCOHOL ABUSE: ICD-10-CM

## 2022-10-12 DIAGNOSIS — E87.8 HYPOCHLOREMIA: ICD-10-CM

## 2022-10-12 DIAGNOSIS — R79.89 ELEVATED SERUM CREATININE: ICD-10-CM

## 2022-10-12 DIAGNOSIS — Z23 HIGH PRIORITY FOR 2019-NCOV VACCINE: ICD-10-CM

## 2022-10-12 DIAGNOSIS — F17.200 TOBACCO USE DISORDER: ICD-10-CM

## 2022-10-12 DIAGNOSIS — N18.31 STAGE 3A CHRONIC KIDNEY DISEASE (CKD) (H): ICD-10-CM

## 2022-10-12 DIAGNOSIS — I10 BENIGN ESSENTIAL HYPERTENSION: ICD-10-CM

## 2022-10-12 DIAGNOSIS — D62 ANEMIA DUE TO BLOOD LOSS, ACUTE: ICD-10-CM

## 2022-10-12 DIAGNOSIS — R79.9 ELEVATED BUN: ICD-10-CM

## 2022-10-12 DIAGNOSIS — Z23 NEED FOR PROPHYLACTIC VACCINATION AND INOCULATION AGAINST INFLUENZA: ICD-10-CM

## 2022-10-12 DIAGNOSIS — J45.30 MILD PERSISTENT ASTHMA WITHOUT COMPLICATION: Primary | ICD-10-CM

## 2022-10-12 DIAGNOSIS — Z12.5 SCREENING FOR PROSTATE CANCER: ICD-10-CM

## 2022-10-12 DIAGNOSIS — R74.8 ALKALINE PHOSPHATASE ELEVATION: ICD-10-CM

## 2022-10-12 DIAGNOSIS — E87.1 HYPONATREMIA: ICD-10-CM

## 2022-10-12 DIAGNOSIS — E78.2 MIXED HYPERLIPIDEMIA: ICD-10-CM

## 2022-10-12 DIAGNOSIS — Z00.00 MEDICARE ANNUAL WELLNESS VISIT, SUBSEQUENT: ICD-10-CM

## 2022-10-12 LAB
ALBUMIN SERPL BCG-MCNC: 4 G/DL (ref 3.5–5.2)
ALP SERPL-CCNC: 138 U/L (ref 40–129)
ALT SERPL W P-5'-P-CCNC: 13 U/L (ref 10–50)
ANION GAP SERPL CALCULATED.3IONS-SCNC: 15 MMOL/L (ref 7–15)
AST SERPL W P-5'-P-CCNC: 24 U/L (ref 10–50)
BASOPHILS # BLD AUTO: 0.1 10E3/UL (ref 0–0.2)
BASOPHILS NFR BLD AUTO: 1 %
BILIRUB SERPL-MCNC: 0.8 MG/DL
BUN SERPL-MCNC: 42.4 MG/DL (ref 8–23)
CALCIUM SERPL-MCNC: 9.5 MG/DL (ref 8.8–10.2)
CHLORIDE SERPL-SCNC: 89 MMOL/L (ref 98–107)
CHOLEST SERPL-MCNC: 150 MG/DL
CREAT SERPL-MCNC: 1.35 MG/DL (ref 0.67–1.17)
DEPRECATED HCO3 PLAS-SCNC: 27 MMOL/L (ref 22–29)
EOSINOPHIL # BLD AUTO: 0.1 10E3/UL (ref 0–0.7)
EOSINOPHIL NFR BLD AUTO: 0 %
ERYTHROCYTE [DISTWIDTH] IN BLOOD BY AUTOMATED COUNT: 20.7 % (ref 10–15)
GFR SERPL CREATININE-BSD FRML MDRD: 58 ML/MIN/1.73M2
GLUCOSE SERPL-MCNC: 120 MG/DL (ref 70–99)
HCT VFR BLD AUTO: 35.7 % (ref 40–53)
HDLC SERPL-MCNC: 70 MG/DL
HGB BLD-MCNC: 12.2 G/DL (ref 13.3–17.7)
IMM GRANULOCYTES # BLD: 0.2 10E3/UL
IMM GRANULOCYTES NFR BLD: 1 %
LDLC SERPL CALC-MCNC: 57 MG/DL
LYMPHOCYTES # BLD AUTO: 2 10E3/UL (ref 0.8–5.3)
LYMPHOCYTES NFR BLD AUTO: 12 %
MCH RBC QN AUTO: 32.6 PG (ref 26.5–33)
MCHC RBC AUTO-ENTMCNC: 34.2 G/DL (ref 31.5–36.5)
MCV RBC AUTO: 96 FL (ref 78–100)
MONOCYTES # BLD AUTO: 2.4 10E3/UL (ref 0–1.3)
MONOCYTES NFR BLD AUTO: 15 %
NEUTROPHILS # BLD AUTO: 11.9 10E3/UL (ref 1.6–8.3)
NEUTROPHILS NFR BLD AUTO: 71 %
NONHDLC SERPL-MCNC: 80 MG/DL
NRBC # BLD AUTO: 0 10E3/UL
NRBC BLD AUTO-RTO: 0 /100
PLATELET # BLD AUTO: 575 10E3/UL (ref 150–450)
POTASSIUM SERPL-SCNC: 3.5 MMOL/L (ref 3.4–5.3)
PROT SERPL-MCNC: 7 G/DL (ref 6.4–8.3)
PSA SERPL-MCNC: 0.51 NG/ML (ref 0–4.5)
RBC # BLD AUTO: 3.74 10E6/UL (ref 4.4–5.9)
SODIUM SERPL-SCNC: 131 MMOL/L (ref 136–145)
TRIGL SERPL-MCNC: 115 MG/DL
WBC # BLD AUTO: 16.7 10E3/UL (ref 4–11)

## 2022-10-12 PROCEDURE — G0103 PSA SCREENING: HCPCS | Performed by: FAMILY MEDICINE

## 2022-10-12 PROCEDURE — 80053 COMPREHEN METABOLIC PANEL: CPT | Performed by: FAMILY MEDICINE

## 2022-10-12 PROCEDURE — 85025 COMPLETE CBC W/AUTO DIFF WBC: CPT | Performed by: FAMILY MEDICINE

## 2022-10-12 PROCEDURE — 0134A COVID-19,PF,MODERNA BIVALENT: CPT | Performed by: FAMILY MEDICINE

## 2022-10-12 PROCEDURE — 99214 OFFICE O/P EST MOD 30 MIN: CPT | Mod: 25 | Performed by: FAMILY MEDICINE

## 2022-10-12 PROCEDURE — 36415 COLL VENOUS BLD VENIPUNCTURE: CPT | Performed by: FAMILY MEDICINE

## 2022-10-12 PROCEDURE — 90662 IIV NO PRSV INCREASED AG IM: CPT | Performed by: FAMILY MEDICINE

## 2022-10-12 PROCEDURE — 91313 COVID-19,PF,MODERNA BIVALENT: CPT | Performed by: FAMILY MEDICINE

## 2022-10-12 PROCEDURE — G0008 ADMIN INFLUENZA VIRUS VAC: HCPCS | Performed by: FAMILY MEDICINE

## 2022-10-12 PROCEDURE — 80061 LIPID PANEL: CPT | Performed by: FAMILY MEDICINE

## 2022-10-12 PROCEDURE — G0438 PPPS, INITIAL VISIT: HCPCS | Performed by: FAMILY MEDICINE

## 2022-10-12 RX ORDER — INHALER, ASSIST DEVICES
SPACER (EA) MISCELLANEOUS
Qty: 1 EACH | Refills: 0 | Status: SHIPPED | OUTPATIENT
Start: 2022-10-12 | End: 2024-05-26

## 2022-10-12 ASSESSMENT — PAIN SCALES - GENERAL: PAINLEVEL: NO PAIN (0)

## 2022-10-12 ASSESSMENT — ASTHMA QUESTIONNAIRES: ACT_TOTALSCORE: 17

## 2022-10-12 NOTE — PROGRESS NOTES
Assessment & Plan     Mild persistent asthma without complication  Uncontrolled likely due to continuous tobacco smoking and patient's non-adherence to maintenance inhaler.  Advised role and use of corticosteroid inhalers.  Reinforced when to use albuterol.  Advised use of spacers.  Reviewed vaccination recommendations: patient concurred to get due immunizations today.  - beclomethasone HFA (QVAR REDIHALER) 80 MCG/ACT inhaler  Dispense: 10.6 g; Refill: 11  - spacer (OPTICHAMBER YIMI) holding chamber  Dispense: 1 each; Refill: 0    Tobacco use disorder  Discussed risks of continuous smoking tobacco.  Patient is not interested in quitting completely at this time.  He did express he may try lozenges and may be interested in Chantix or Bupropion in the future.  Will continue to  in the future.    Alcohol abuse  Relapsed per patient just 2 weeks ago.  Advised risks.  Advised to reduce gradually and work on cessation.  Offered referral to addiction med but patient deferred.  Advised to also possibly use Alcoholics Anonymous as help in cessation.    Need for prophylactic vaccination and inoculation against influenza  - INFLUENZA, QUAD, HIGH DOSE, PF, 65YR + (FLUZONE HD)    High priority for 2019-nCoV vaccine  - COVID-19,PF,MODERNA BIVALENT 18+Yrs    Mixed hyperlipidemia  - Lipid panel reflex to direct LDL Fasting    Benign essential hypertension  - Comprehensive metabolic panel    Screening for prostate cancer  - Prostate Specific Antigen Screen    Anemia due to blood loss, acute  - CBC with Platelets & Differential    Labs above were released from previously ordered ones.      Patient Instructions   Restart Qvar inhaler as prescribed. This is your maintenance inhaler to prevent asthma symptoms.  Albuterol inhaler 2 puffs every 4 hrs as needed for wheezing or coughing spells or tightness in breathing while active.   Use spacer with the inhalers for easier administration.    Work on stopping smoking.  You are  strongly advised to stop smoking soon!  Continuing to smoke will increase your risk for heart disease, stroke, cancer, and  lung damage.   Contact care team when you are ready to quit smoking.    Reduce alcohol consumption gradually over the next 2 weeks, and work on completely stopping gain.  Request for referral to addiction medicine clinic if you need more help.  Seek help with Alcoholics Anonymous.          Return in about 1 month (around 11/12/2022) for you will be contacted y the care team for asthma symptom follow up questions.    Gatito Ware MD  [facility]    Ryanne Meyers is a 66 year old, presenting for the following health issues:  Asthma, Imm/Inj (Flu Shot), and Imm/Inj (COVID-19 VACCINE)      HPI     Asthma Follow-Up    Was ACT completed today?    Yes    ACT Total Scores 10/12/2022   ACT TOTAL SCORE -   ASTHMA ER VISITS -   ASTHMA HOSPITALIZATIONS -   ACT TOTAL SCORE (Goal Greater than or Equal to 20) 17   In the past 12 months, how many times did you visit the emergency room for your asthma without being admitted to the hospital? 0   In the past 12 months, how many times were you hospitalized overnight because of your asthma? 0     Patient reports he has needed to use albuterol inhaler about 2 x a day almost every day due to feeling tighness in breathing and to get phlegm out.    Patient reports he has stopped use of Qvar a while back - he does not give reason why.  He is amenable to restart.  He does not have a spacer for the inhalers.    Still smokes cigarettes.  Not ready to quit yet.        How many days per week do you miss taking your asthma controller medication?  I do not have an asthma controller medication    Please describe any recent triggers for your asthma: None    Have you had any Emergency Room Visits, Urgent Care Visits, or Hospital Admissions since your last office visit?  No      How many servings of fruits and vegetables do you eat daily?   0-1    On average, how many sweetened beverages do you drink each day (Examples: soda, juice, sweet tea, etc.  Do NOT count diet or artificially sweetened beverages)?   4    How many days per week do you exercise enough to make your heart beat faster? 3 or less    How many minutes a day do you exercise enough to make your heart beat faster?     How many days per week do you miss taking your medication? 0    Patient Active Problem List   Diagnosis     Mild persistent asthma     Alcohol abuse     Tobacco abuse     CARDIOVASCULAR SCREENING; LDL GOAL LESS THAN 130     Alcohol dependence (H)     Seasonal allergic rhinitis     Uncontrolled hypertension     Mixed hyperlipidemia     Hyponatremia     Anemia, unspecified type     Alcoholic gastritis, presence of bleeding unspecified, unspecified chronicity     Hypotension due to hypovolemia     MAIA (acute kidney injury) (H)     Elevated lactic acid level     Transaminitis     Syncope     Thrombocytosis     Leukocytosis     Past Surgical History:   Procedure Laterality Date     ESOPHAGOSCOPY, GASTROSCOPY, DUODENOSCOPY (EGD), COMBINED N/A 3/21/2022    Procedure: ESOPHAGOGASTRODUODENOSCOPY, WITH BIOPSY;  Surgeon: Eunice Rubin MD;  Location: [facility]       Social History     Tobacco Use     Smoking status: Every Day     Packs/day: 0.75     Years: 45.00     Pack years: 33.75     Types: Cigarettes     Start date: 1976     Smokeless tobacco: Never   Substance Use Topics     Alcohol use: Yes     Comment: one bottle of vodka weekly     Family History   Problem Relation Age of Onset     Asthma Mother      Cerebrovascular Disease Mother      C.A.D. No family hx of      Diabetes No family hx of      Hypertension No family hx of      Breast Cancer No family hx of      Cancer - colorectal No family hx of      Prostate Cancer No family hx of          Current Outpatient Medications   Medication Sig Dispense Refill     albuterol (PROAIR HFA/PROVENTIL HFA/VENTOLIN HFA) 108 (90 Base) MCG/ACT  inhaler Inhale 2 puffs into the lungs every 4 hours as needed for shortness of breath / dyspnea or wheezing 18 g 0     Cholecalciferol (VITAMIN D-3) 125 MCG (5000 UT) TABS Take by mouth daily       fluticasone (ARNUITY ELLIPTA) 100 MCG/ACT inhaler Inhale 1 puff into the lungs daily 60 each 1     losartan (COZAAR) 25 MG tablet TAKE 1 TABLET BY MOUTH EVERY MORNING 90 tablet 3     metoprolol succinate ER (TOPROL XL) 25 MG 24 hr tablet TAKE 1 TABLET BY MOUTH EVERY MORNING 90 tablet 3     spacer (OPTICHAMBER YIMI) holding chamber Use with inhaler (albuterol and beclomethasone) 1 each 0     gabapentin (NEURONTIN) 100 MG capsule TAKE 1 CAPSULE BY MOUTH EVERY 8 HOURS (Patient not taking: Reported on 10/12/2022) 90 capsule 1     nicotine (NICODERM CQ) 14 MG/24HR 24 hr patch Place 1 patch onto the skin daily (Patient not taking: No sig reported)       nicotine (NICORETTE) 4 MG lozenge Place 4 mg inside cheek as needed  (Patient not taking: No sig reported)       order for DME Equipment being ordered: Digital home blood pressure monitor kit 1 each 0     pantoprazole (PROTONIX) 40 MG EC tablet Take 1 tablet (40 mg) by mouth daily (Patient not taking: Reported on 10/12/2022) 90 tablet 3     thiamine (B-1) 100 MG tablet Take 1 tablet (100 mg) by mouth daily (Patient not taking: Reported on 10/12/2022) 90 tablet 3     Zinc 22.5 MG TABS Take by mouth daily (Patient not taking: Reported on 10/12/2022)       No Known Allergies  Annual Wellness Visit    Patient has been advised of split billing requirements and indicates understanding: Yes     Are you in the first 12 months of your Medicare Part B coverage?  Yes,  Visual Acuity:  Right Eye: 20/32   Left Eye: 20/32  Both Eyes: 20/25    Physical Health:    In general, how would you rate your overall physical health? good    Outside of work, how many days during the week do you exercise?none    Outside of work, approximately how many minutes a day do you exercise?not applicable  If  "you drink alcohol do you typically have >3 drinks per day or >7 drinks per week? Yes - AUDIT SCORE:  21  AUDIT - Alcohol Use Disorders Identification Test - Reproduced from the World Health Organization Audit 2001 (Second Edition) 10/12/2022   1.  How often do you have a drink containing alcohol? 2 to 3 times a week   2.  How many drinks containing alcohol do you have on a typical day when you are drinking? 3 or 4   3.  How often do you have five or more drinks on one occasion? Weekly   4.  How often during the last year have you found that you were not able to stop drinking once you had started? Weekly   5.  How often during the last year have you failed to do what was normally expected of you because of drinking? Less than monthly   6.  How often during the last year have you needed a first drink in the morning to get yourself going after a heavy drinking session? Less than monthly   7.  How often during the last year have you had a feeling of guilt or remorse after drinking? Weekly   8.  How often during the last year have you been unable to remember what happened the night before because of your drinking? Monthly   9.  Have you or someone else been injured because of your drinking? No   10. Has a relative, friend, doctor or other health care worker been concerned about your drinking or suggested you cut down? Yes, during the last year   TOTAL SCORE 21       Do you usually eat at least 4 servings of fruit and vegetables a day, include whole grains & fiber and avoid regularly eating high fat or \"junk\" foods? NO    Do you have any problems taking medications regularly? No    Do you have any side effects from medications? none    Needs assistance for the following daily activities: no assistance needed    Which of the following safety concerns are present in your home?  none identified     Hearing impairment: No    In the past 6 months, have you been bothered by leaking of urine? no    Mental Health:    In general, " "how would you rate your overall mental or emotional health? good  PHQ-2 Score: 0    Do you feel safe in your environment? Yes    Have you ever done Advance Care Planning? (For example, a Health Directive, POLST, or a discussion with a medical provider or your loved ones about your wishes)? No, advance care planning information given to patient to review.  Patient plans to discuss their wishes with loved ones or provider.      Fall risk:  Fallen 2 or more times in the past year?: No  Any fall with injury in the past year?: No    Cognitive Screenin) Repeat 3 items (Leader, Season, Table)    2) Clock draw: ABNORMAL hands not placed correctly  3) 3 item recall: Recalls 2 objects   Results: ABNORMAL clock, 1-2 items recalled: PROBABLE COGNITIVE IMPAIRMENT, **INFORM PROVIDER**    Mini-CogTM Copyright S Elsa. Licensed by the author for use in [facility] [employer]; reprinted with permission (blas@[email or web address]). All rights reserved.      Do you have sleep apnea, excessive snoring or daytime drowsiness?: no    Current providers sharing in care for this patient include:   Patient Care Team:  Gatito Ware MD as PCP - General (Family Practice)  Gatito Ware MD as Assigned PCP    Patient has been advised of split billing requirements and indicates understanding: Yes    Review of Systems   Constitutional, HEENT, cardiovascular, pulmonary, GI, , musculoskeletal, neuro, skin, endocrine and psych systems are negative, except as otherwise noted.      Objective    /52 (BP Location: Left arm, Patient Position: Chair, Cuff Size: Adult Regular)   Pulse 96   Temp 98.6  F (37  C) (Tympanic)   Resp 20   Ht 1.778 m (5' 10\")   Wt 73.7 kg (162 lb 6.4 oz)   SpO2 98%   BMI 23.30 kg/m    Body mass index is 23.3 kg/m .  Physical Exam   GENERAL:  alert and no distress, ambulatory w/o assist  NECK: no tenderness, no adenopathy,  Thyroid not enlarged  RESP: lungs clear to auscultation - no rales, no " rhonchi, no wheezes  CV: regular rates and rhythm, no murmur  MS: no edema  SKIN: no suspicious lesions, no rashes  ABD:  nontender    Results for orders placed or performed in visit on 10/12/22   CBC with platelets and differential     Status: Abnormal   Result Value Ref Range    WBC Count 16.7 (H) 4.0 - 11.0 10e3/uL    RBC Count 3.74 (L) 4.40 - 5.90 10e6/uL    Hemoglobin 12.2 (L) 13.3 - 17.7 g/dL    Hematocrit 35.7 (L) 40.0 - 53.0 %    MCV 96 78 - 100 fL    MCH 32.6 26.5 - 33.0 pg    MCHC 34.2 31.5 - 36.5 g/dL    RDW 20.7 (H) 10.0 - 15.0 %    Platelet Count 575 (H) 150 - 450 10e3/uL    % Neutrophils 71 %    % Lymphocytes 12 %    % Monocytes 15 %    % Eosinophils 0 %    % Basophils 1 %    % Immature Granulocytes 1 %    NRBCs per 100 WBC 0 <1 /100    Absolute Neutrophils 11.9 (H) 1.6 - 8.3 10e3/uL    Absolute Lymphocytes 2.0 0.8 - 5.3 10e3/uL    Absolute Monocytes 2.4 (H) 0.0 - 1.3 10e3/uL    Absolute Eosinophils 0.1 0.0 - 0.7 10e3/uL    Absolute Basophils 0.1 0.0 - 0.2 10e3/uL    Absolute Immature Granulocytes 0.2 <=0.4 10e3/uL    Absolute NRBCs 0.0 10e3/uL   CBC with Platelets & Differential     Status: Abnormal    Narrative    The following orders were created for panel order CBC with Platelets & Differential.  Procedure                               Abnormality         Status                     ---------                               -----------         ------                     CBC with platelets and d...[[id number]]  Abnormal            Final result                 Please view results for these tests on the individual orders.

## 2022-10-12 NOTE — LETTER
October 12, 2022      Luigi Vieyra  [address] [address] [address]        Dear ,    We are writing to inform you of your test results.      Metabolic panel shows mildly low sodium and chloride, high blood urea nitrogen, mildly high alkaline phosphatase, high fasting blood sugar and stable long term kidney disease. PSA and cholesterol levels are normal.     The electrolyte and nitrogen abnormality is likely related to his alcohol consumption.   Patient is again advised to work on gradually decreasing and eventually stopping alcohol consumption as we discussed already.     Drink at least 6-8 full glasses of water a day.   Avoid Ibuprofen or Naproxen use.     Repeat CMP in 2 weeks to follow his levels.     Resulted Orders   Lipid panel reflex to direct LDL Fasting   Result Value Ref Range    Cholesterol 150 <200 mg/dL    Triglycerides 115 <150 mg/dL    Direct Measure HDL 70 >=40 mg/dL    LDL Cholesterol Calculated 57 <=100 mg/dL    Non HDL Cholesterol 80 <130 mg/dL    Narrative    Cholesterol  Desirable:  <200 mg/dL    Triglycerides  Normal:  Less than 150 mg/dL  Borderline High:  150-199 mg/dL  High:  200-499 mg/dL  Very High:  Greater than or equal to 500 mg/dL    Direct Measure HDL  Female:  Greater than or equal to 50 mg/dL   Male:  Greater than or equal to 40 mg/dL    LDL Cholesterol  Desirable:  <100mg/dL  Above Desirable:  100-129 mg/dL   Borderline High:  130-159 mg/dL   High:  160-189 mg/dL   Very High:  >= 190 mg/dL    Non HDL Cholesterol  Desirable:  130 mg/dL  Above Desirable:  130-159 mg/dL  Borderline High:  160-189 mg/dL  High:  190-219 mg/dL  Very High:  Greater than or equal to 220 mg/dL   Comprehensive metabolic panel   Result Value Ref Range    Sodium 131 (L) 136 - 145 mmol/L    Potassium 3.5 3.4 - 5.3 mmol/L    Chloride 89 (L) 98 - 107 mmol/L    Carbon Dioxide (CO2) 27 22 - 29 mmol/L    Anion Gap 15 7 - 15 mmol/L    Urea Nitrogen 42.4 (H) 8.0 - 23.0 mg/dL    Creatinine 1.35 (H)  0.67 - 1.17 mg/dL    Calcium 9.5 8.8 - 10.2 mg/dL    Glucose 120 (H) 70 - 99 mg/dL    Alkaline Phosphatase 138 (H) 40 - 129 U/L    AST 24 10 - 50 U/L    ALT 13 10 - 50 U/L    Protein Total 7.0 6.4 - 8.3 g/dL    Albumin 4.0 3.5 - 5.2 g/dL    Bilirubin Total 0.8 <=1.2 mg/dL    GFR Estimate 58 (L) >60 mL/min/1.73m2      Comment:      Effective December 21, 2021 eGFRcr in adults is calculated using the 2021 CKD-EPI creatinine equation which includes age and gender (Elisa et al., NEJ, DOI: 10.1056/SUVYpc0349726)   Prostate Specific Antigen Screen   Result Value Ref Range    Prostate Specific Antigen Screen 0.51 0.00 - 4.50 ng/mL    Narrative    This result is obtained using the Roche Elecsys total PSA method on the john [id number] immunoassay analyzer. Results obtained with different assay methods or kits cannot be used interchangeably.   CBC with platelets and differential   Result Value Ref Range    WBC Count 16.7 (H) 4.0 - 11.0 10e3/uL    RBC Count 3.74 (L) 4.40 - 5.90 10e6/uL    Hemoglobin 12.2 (L) 13.3 - 17.7 g/dL    Hematocrit 35.7 (L) 40.0 - 53.0 %    MCV 96 78 - 100 fL    MCH 32.6 26.5 - 33.0 pg    MCHC 34.2 31.5 - 36.5 g/dL    RDW 20.7 (H) 10.0 - 15.0 %    Platelet Count 575 (H) 150 - 450 10e3/uL    % Neutrophils 71 %    % Lymphocytes 12 %    % Monocytes 15 %    % Eosinophils 0 %    % Basophils 1 %    % Immature Granulocytes 1 %    NRBCs per 100 WBC 0 <1 /100    Absolute Neutrophils 11.9 (H) 1.6 - 8.3 10e3/uL    Absolute Lymphocytes 2.0 0.8 - 5.3 10e3/uL    Absolute Monocytes 2.4 (H) 0.0 - 1.3 10e3/uL    Absolute Eosinophils 0.1 0.0 - 0.7 10e3/uL    Absolute Basophils 0.1 0.0 - 0.2 10e3/uL    Absolute Immature Granulocytes 0.2 <=0.4 10e3/uL    Absolute NRBCs 0.0 10e3/uL       If you have any questions or concerns, please call the clinic at the number listed above.       Sincerely,      Gatito Ware MD

## 2022-10-12 NOTE — PATIENT INSTRUCTIONS
Restart Qvar inhaler as prescribed. This is your maintenance inhaler to prevent asthma symptoms.  Albuterol inhaler 2 puffs every 4 hrs as needed for wheezing or coughing spells or tightness in breathing while active.   Use spacer with the inhalers for easier administration.    Work on stopping smoking.  You are strongly advised to stop smoking soon!  Continuing to smoke will increase your risk for heart disease, stroke, cancer, and  lung damage.   Contact care team when you are ready to quit smoking.    Reduce alcohol consumption gradually over the next 2 weeks, and work on completely stopping gain.  Request for referral to addiction medicine clinic if you need more help.  Seek help with Alcoholics Anonymous.

## 2022-10-13 ENCOUNTER — APPOINTMENT (OUTPATIENT)
Dept: CT IMAGING | Facility: CLINIC | Age: 67
End: 2022-10-13
Attending: EMERGENCY MEDICINE
Payer: COMMERCIAL

## 2022-10-13 ENCOUNTER — HOSPITAL ENCOUNTER (EMERGENCY)
Facility: CLINIC | Age: 67
Discharge: ANOTHER HOSPITAL | End: 2022-10-13
Attending: EMERGENCY MEDICINE | Admitting: EMERGENCY MEDICINE
Payer: COMMERCIAL

## 2022-10-13 VITALS
BODY MASS INDEX: 22.65 KG/M2 | TEMPERATURE: 99.8 F | HEIGHT: 71 IN | HEART RATE: 100 BPM | DIASTOLIC BLOOD PRESSURE: 65 MMHG | OXYGEN SATURATION: 97 % | RESPIRATION RATE: 22 BRPM | SYSTOLIC BLOOD PRESSURE: 104 MMHG

## 2022-10-13 DIAGNOSIS — F10.20 ALCOHOL USE DISORDER, SEVERE, DEPENDENCE (H): ICD-10-CM

## 2022-10-13 DIAGNOSIS — K92.2 GASTROINTESTINAL HEMORRHAGE, UNSPECIFIED GASTROINTESTINAL HEMORRHAGE TYPE: ICD-10-CM

## 2022-10-13 DIAGNOSIS — D64.9 ANEMIA, UNSPECIFIED TYPE: ICD-10-CM

## 2022-10-13 LAB
ABO/RH(D): NORMAL
ALBUMIN SERPL BCG-MCNC: 3.1 G/DL (ref 3.5–5.2)
ALP SERPL-CCNC: 108 U/L (ref 40–129)
ALT SERPL W P-5'-P-CCNC: 14 U/L (ref 10–50)
ANION GAP SERPL CALCULATED.3IONS-SCNC: 17 MMOL/L (ref 7–15)
ANTIBODY SCREEN: NEGATIVE
APTT PPP: <20 SECONDS (ref 22–38)
AST SERPL W P-5'-P-CCNC: 27 U/L (ref 10–50)
BASOPHILS # BLD AUTO: 0.1 10E3/UL (ref 0–0.2)
BASOPHILS NFR BLD AUTO: 1 %
BILIRUB SERPL-MCNC: 0.2 MG/DL
BLD PROD TYP BPU: NORMAL
BLD PROD TYP BPU: NORMAL
BLOOD COMPONENT TYPE: NORMAL
BLOOD COMPONENT TYPE: NORMAL
BUN SERPL-MCNC: 42.8 MG/DL (ref 8–23)
CALCIUM SERPL-MCNC: 8.6 MG/DL (ref 8.8–10.2)
CHLORIDE SERPL-SCNC: 90 MMOL/L (ref 98–107)
CODING SYSTEM: NORMAL
CODING SYSTEM: NORMAL
CREAT SERPL-MCNC: 1.45 MG/DL (ref 0.67–1.17)
CROSSMATCH: NORMAL
CROSSMATCH: NORMAL
DEPRECATED HCO3 PLAS-SCNC: 23 MMOL/L (ref 22–29)
EOSINOPHIL # BLD AUTO: 0.2 10E3/UL (ref 0–0.7)
EOSINOPHIL NFR BLD AUTO: 1 %
ERYTHROCYTE [DISTWIDTH] IN BLOOD BY AUTOMATED COUNT: 20.2 % (ref 10–15)
ETHANOL SERPL-MCNC: 0.02 G/DL
FLUAV RNA SPEC QL NAA+PROBE: NEGATIVE
FLUBV RNA RESP QL NAA+PROBE: NEGATIVE
GFR SERPL CREATININE-BSD FRML MDRD: 53 ML/MIN/1.73M2
GLUCOSE SERPL-MCNC: 170 MG/DL (ref 70–99)
HCT VFR BLD AUTO: 27.9 % (ref 40–53)
HGB BLD-MCNC: 8.4 G/DL (ref 13.3–17.7)
HGB BLD-MCNC: 9.6 G/DL (ref 13.3–17.7)
HOLD SPECIMEN: NORMAL
IMM GRANULOCYTES # BLD: 0.4 10E3/UL
IMM GRANULOCYTES NFR BLD: 3 %
INR PPP: 1 (ref 0.85–1.15)
ISSUE DATE AND TIME: NORMAL
LACTATE SERPL-SCNC: 1.9 MMOL/L (ref 0.7–2)
LIPASE SERPL-CCNC: 82 U/L (ref 13–60)
LYMPHOCYTES # BLD AUTO: 1.9 10E3/UL (ref 0.8–5.3)
LYMPHOCYTES NFR BLD AUTO: 11 %
MAGNESIUM SERPL-MCNC: 1.7 MG/DL (ref 1.7–2.3)
MCH RBC QN AUTO: 33.3 PG (ref 26.5–33)
MCHC RBC AUTO-ENTMCNC: 34.4 G/DL (ref 31.5–36.5)
MCV RBC AUTO: 97 FL (ref 78–100)
MONOCYTES # BLD AUTO: 1.6 10E3/UL (ref 0–1.3)
MONOCYTES NFR BLD AUTO: 10 %
NEUTROPHILS # BLD AUTO: 12.2 10E3/UL (ref 1.6–8.3)
NEUTROPHILS NFR BLD AUTO: 74 %
NRBC # BLD AUTO: 0 10E3/UL
NRBC BLD AUTO-RTO: 0 /100
PHOSPHATE SERPL-MCNC: 3.5 MG/DL (ref 2.5–4.5)
PLATELET # BLD AUTO: 517 10E3/UL (ref 150–450)
POTASSIUM SERPL-SCNC: 3.6 MMOL/L (ref 3.4–5.3)
PROT SERPL-MCNC: 6 G/DL (ref 6.4–8.3)
RBC # BLD AUTO: 2.88 10E6/UL (ref 4.4–5.9)
SARS-COV-2 RNA RESP QL NAA+PROBE: NEGATIVE
SODIUM SERPL-SCNC: 130 MMOL/L (ref 136–145)
SPECIMEN EXPIRATION DATE: NORMAL
UNIT ABO/RH: NORMAL
UNIT ABO/RH: NORMAL
UNIT NUMBER: NORMAL
UNIT NUMBER: NORMAL
UNIT STATUS: NORMAL
UNIT STATUS: NORMAL
UNIT TYPE ISBT: 5100
UNIT TYPE ISBT: 5100
WBC # BLD AUTO: 16.5 10E3/UL (ref 4–11)

## 2022-10-13 PROCEDURE — 36415 COLL VENOUS BLD VENIPUNCTURE: CPT | Performed by: EMERGENCY MEDICINE

## 2022-10-13 PROCEDURE — 93010 ELECTROCARDIOGRAM REPORT: CPT | Performed by: EMERGENCY MEDICINE

## 2022-10-13 PROCEDURE — 96375 TX/PRO/DX INJ NEW DRUG ADDON: CPT | Mod: 59 | Performed by: EMERGENCY MEDICINE

## 2022-10-13 PROCEDURE — 250N000011 HC RX IP 250 OP 636: Performed by: EMERGENCY MEDICINE

## 2022-10-13 PROCEDURE — 99292 CRITICAL CARE ADDL 30 MIN: CPT | Mod: 25 | Performed by: EMERGENCY MEDICINE

## 2022-10-13 PROCEDURE — 80053 COMPREHEN METABOLIC PANEL: CPT | Performed by: EMERGENCY MEDICINE

## 2022-10-13 PROCEDURE — 84100 ASSAY OF PHOSPHORUS: CPT | Performed by: EMERGENCY MEDICINE

## 2022-10-13 PROCEDURE — 36430 TRANSFUSION BLD/BLD COMPNT: CPT | Performed by: EMERGENCY MEDICINE

## 2022-10-13 PROCEDURE — 85018 HEMOGLOBIN: CPT | Performed by: EMERGENCY MEDICINE

## 2022-10-13 PROCEDURE — 87636 SARSCOV2 & INF A&B AMP PRB: CPT | Performed by: EMERGENCY MEDICINE

## 2022-10-13 PROCEDURE — 86901 BLOOD TYPING SEROLOGIC RH(D): CPT | Performed by: EMERGENCY MEDICINE

## 2022-10-13 PROCEDURE — 83605 ASSAY OF LACTIC ACID: CPT | Performed by: EMERGENCY MEDICINE

## 2022-10-13 PROCEDURE — 93005 ELECTROCARDIOGRAM TRACING: CPT | Performed by: EMERGENCY MEDICINE

## 2022-10-13 PROCEDURE — C9803 HOPD COVID-19 SPEC COLLECT: HCPCS | Performed by: EMERGENCY MEDICINE

## 2022-10-13 PROCEDURE — 74177 CT ABD & PELVIS W/CONTRAST: CPT

## 2022-10-13 PROCEDURE — 99291 CRITICAL CARE FIRST HOUR: CPT | Mod: 25 | Performed by: EMERGENCY MEDICINE

## 2022-10-13 PROCEDURE — C9113 INJ PANTOPRAZOLE SODIUM, VIA: HCPCS | Performed by: EMERGENCY MEDICINE

## 2022-10-13 PROCEDURE — 85730 THROMBOPLASTIN TIME PARTIAL: CPT | Performed by: EMERGENCY MEDICINE

## 2022-10-13 PROCEDURE — 85610 PROTHROMBIN TIME: CPT | Performed by: EMERGENCY MEDICINE

## 2022-10-13 PROCEDURE — 72125 CT NECK SPINE W/O DYE: CPT

## 2022-10-13 PROCEDURE — 83690 ASSAY OF LIPASE: CPT | Performed by: EMERGENCY MEDICINE

## 2022-10-13 PROCEDURE — 250N000009 HC RX 250: Performed by: EMERGENCY MEDICINE

## 2022-10-13 PROCEDURE — 83735 ASSAY OF MAGNESIUM: CPT | Performed by: EMERGENCY MEDICINE

## 2022-10-13 PROCEDURE — P9016 RBC LEUKOCYTES REDUCED: HCPCS | Performed by: EMERGENCY MEDICINE

## 2022-10-13 PROCEDURE — 96374 THER/PROPH/DIAG INJ IV PUSH: CPT | Mod: 59 | Performed by: EMERGENCY MEDICINE

## 2022-10-13 PROCEDURE — 82077 ASSAY SPEC XCP UR&BREATH IA: CPT | Performed by: EMERGENCY MEDICINE

## 2022-10-13 PROCEDURE — 86923 COMPATIBILITY TEST ELECTRIC: CPT | Performed by: EMERGENCY MEDICINE

## 2022-10-13 PROCEDURE — 70450 CT HEAD/BRAIN W/O DYE: CPT

## 2022-10-13 PROCEDURE — 85025 COMPLETE CBC W/AUTO DIFF WBC: CPT | Performed by: EMERGENCY MEDICINE

## 2022-10-13 RX ORDER — ONDANSETRON 2 MG/ML
4 INJECTION INTRAMUSCULAR; INTRAVENOUS ONCE
Status: COMPLETED | OUTPATIENT
Start: 2022-10-13 | End: 2022-10-13

## 2022-10-13 RX ORDER — IOPAMIDOL 755 MG/ML
72 INJECTION, SOLUTION INTRAVASCULAR ONCE
Status: COMPLETED | OUTPATIENT
Start: 2022-10-13 | End: 2022-10-13

## 2022-10-13 RX ADMIN — SODIUM CHLORIDE 60 ML: 9 INJECTION, SOLUTION INTRAVENOUS at 20:39

## 2022-10-13 RX ADMIN — ONDANSETRON 4 MG: 2 INJECTION INTRAMUSCULAR; INTRAVENOUS at 19:59

## 2022-10-13 RX ADMIN — PANTOPRAZOLE SODIUM 80 MG: 40 INJECTION, POWDER, FOR SOLUTION INTRAVENOUS at 20:03

## 2022-10-13 RX ADMIN — IOPAMIDOL 72 ML: 755 INJECTION, SOLUTION INTRAVENOUS at 20:40

## 2022-10-13 ASSESSMENT — ACTIVITIES OF DAILY LIVING (ADL)
ADLS_ACUITY_SCORE: 35
ADLS_ACUITY_SCORE: 35

## 2022-10-14 ENCOUNTER — HOSPITAL ENCOUNTER (INPATIENT)
Facility: HOSPITAL | Age: 67
LOS: 2 days | Discharge: HOME | DRG: 811 | End: 2022-10-16
Attending: INTERNAL MEDICINE | Admitting: INTERNAL MEDICINE
Payer: COMMERCIAL

## 2022-10-14 ENCOUNTER — TRANSFERRED RECORDS (OUTPATIENT)
Dept: HEALTH INFORMATION MANAGEMENT | Facility: CLINIC | Age: 67
End: 2022-10-14

## 2022-10-14 DIAGNOSIS — D62 ACUTE BLOOD LOSS ANEMIA: Primary | ICD-10-CM

## 2022-10-14 DIAGNOSIS — J18.9 ATYPICAL PNEUMONIA: ICD-10-CM

## 2022-10-14 DIAGNOSIS — F10.21 ALCOHOL DEPENDENCE IN REMISSION (H): ICD-10-CM

## 2022-10-14 LAB
ABO/RH(D): NORMAL
AMMONIA PLAS-SCNC: <10 UMOL/L (ref 16–60)
ANION GAP SERPL CALCULATED.3IONS-SCNC: 11 MMOL/L (ref 7–15)
ANION GAP SERPL CALCULATED.3IONS-SCNC: 9 MMOL/L (ref 7–15)
ANTIBODY SCREEN: NEGATIVE
BUN SERPL-MCNC: 41.8 MG/DL (ref 8–23)
BUN SERPL-MCNC: 43.7 MG/DL (ref 8–23)
CALCIUM SERPL-MCNC: 8 MG/DL (ref 8.8–10.2)
CALCIUM SERPL-MCNC: 8.2 MG/DL (ref 8.8–10.2)
CHLORIDE SERPL-SCNC: 93 MMOL/L (ref 98–107)
CHLORIDE SERPL-SCNC: 93 MMOL/L (ref 98–107)
CREAT SERPL-MCNC: 1.27 MG/DL (ref 0.67–1.17)
CREAT SERPL-MCNC: 1.28 MG/DL (ref 0.67–1.17)
DEPRECATED HCO3 PLAS-SCNC: 26 MMOL/L (ref 22–29)
DEPRECATED HCO3 PLAS-SCNC: 26 MMOL/L (ref 22–29)
ERYTHROCYTE [DISTWIDTH] IN BLOOD BY AUTOMATED COUNT: 19.9 % (ref 10–15)
GFR SERPL CREATININE-BSD FRML MDRD: 62 ML/MIN/1.73M2
GFR SERPL CREATININE-BSD FRML MDRD: 62 ML/MIN/1.73M2
GLUCOSE BLDC GLUCOMTR-MCNC: 105 MG/DL (ref 70–99)
GLUCOSE BLDC GLUCOMTR-MCNC: 106 MG/DL (ref 70–99)
GLUCOSE BLDC GLUCOMTR-MCNC: 129 MG/DL (ref 70–99)
GLUCOSE SERPL-MCNC: 111 MG/DL (ref 70–99)
GLUCOSE SERPL-MCNC: 113 MG/DL (ref 70–99)
HCT VFR BLD AUTO: 27.8 % (ref 40–53)
HGB BLD-MCNC: 8.7 G/DL (ref 13.3–17.7)
HGB BLD-MCNC: 8.8 G/DL (ref 13.3–17.7)
HGB BLD-MCNC: 9.3 G/DL (ref 13.3–17.7)
HGB BLD-MCNC: 9.6 G/DL (ref 13.3–17.7)
HOLD SPECIMEN: NORMAL
HOLD SPECIMEN: NORMAL
LACTATE SERPL-SCNC: 0.7 MMOL/L (ref 0.7–2)
MAGNESIUM SERPL-MCNC: 1.7 MG/DL (ref 1.7–2.3)
MCH RBC QN AUTO: 32.5 PG (ref 26.5–33)
MCHC RBC AUTO-ENTMCNC: 34.5 G/DL (ref 31.5–36.5)
MCV RBC AUTO: 94 FL (ref 78–100)
OSMOLALITY SERPL: 287 MMOL/KG (ref 280–301)
PLATELET # BLD AUTO: 467 10E3/UL (ref 150–450)
POTASSIUM SERPL-SCNC: 3.7 MMOL/L (ref 3.4–5.3)
POTASSIUM SERPL-SCNC: 3.8 MMOL/L (ref 3.4–5.3)
PROCALCITONIN SERPL IA-MCNC: 0.62 NG/ML
RBC # BLD AUTO: 2.95 10E6/UL (ref 4.4–5.9)
SODIUM SERPL-SCNC: 128 MMOL/L (ref 136–145)
SODIUM SERPL-SCNC: 130 MMOL/L (ref 136–145)
SPECIMEN EXPIRATION DATE: NORMAL
TSH SERPL DL<=0.005 MIU/L-ACNC: 0.58 UIU/ML (ref 0.3–4.2)
UPPER GI ENDOSCOPY: NORMAL
WBC # BLD AUTO: 21.1 10E3/UL (ref 4–11)

## 2022-10-14 PROCEDURE — G0378 HOSPITAL OBSERVATION PER HR: HCPCS

## 2022-10-14 PROCEDURE — 96365 THER/PROPH/DIAG IV INF INIT: CPT | Mod: XU

## 2022-10-14 PROCEDURE — 96366 THER/PROPH/DIAG IV INF ADDON: CPT | Mod: XU

## 2022-10-14 PROCEDURE — 83930 ASSAY OF BLOOD OSMOLALITY: CPT | Performed by: INTERNAL MEDICINE

## 2022-10-14 PROCEDURE — 99233 SBSQ HOSP IP/OBS HIGH 50: CPT | Performed by: HOSPITALIST

## 2022-10-14 PROCEDURE — 36415 COLL VENOUS BLD VENIPUNCTURE: CPT | Performed by: INTERNAL MEDICINE

## 2022-10-14 PROCEDURE — 0W3P8ZZ CONTROL BLEEDING IN GASTROINTESTINAL TRACT, VIA NATURAL OR ARTIFICIAL OPENING ENDOSCOPIC: ICD-10-PCS | Performed by: INTERNAL MEDICINE

## 2022-10-14 PROCEDURE — 250N000013 HC RX MED GY IP 250 OP 250 PS 637: Performed by: INTERNAL MEDICINE

## 2022-10-14 PROCEDURE — 258N000003 HC RX IP 258 OP 636: Performed by: INTERNAL MEDICINE

## 2022-10-14 PROCEDURE — 250N000011 HC RX IP 250 OP 636: Performed by: INTERNAL MEDICINE

## 2022-10-14 PROCEDURE — 86850 RBC ANTIBODY SCREEN: CPT | Performed by: INTERNAL MEDICINE

## 2022-10-14 PROCEDURE — 83735 ASSAY OF MAGNESIUM: CPT | Performed by: INTERNAL MEDICINE

## 2022-10-14 PROCEDURE — 85027 COMPLETE CBC AUTOMATED: CPT | Performed by: INTERNAL MEDICINE

## 2022-10-14 PROCEDURE — 83605 ASSAY OF LACTIC ACID: CPT | Performed by: INTERNAL MEDICINE

## 2022-10-14 PROCEDURE — G0500 MOD SEDAT ENDO SERVICE >5YRS: HCPCS | Performed by: INTERNAL MEDICINE

## 2022-10-14 PROCEDURE — 96375 TX/PRO/DX INJ NEW DRUG ADDON: CPT | Mod: XU

## 2022-10-14 PROCEDURE — 84145 PROCALCITONIN (PCT): CPT | Performed by: INTERNAL MEDICINE

## 2022-10-14 PROCEDURE — 99207 PR APP CREDIT; MD BILLING SHARED VISIT: CPT | Performed by: INTERNAL MEDICINE

## 2022-10-14 PROCEDURE — 43255 EGD CONTROL BLEEDING ANY: CPT | Performed by: INTERNAL MEDICINE

## 2022-10-14 PROCEDURE — 85018 HEMOGLOBIN: CPT | Performed by: INTERNAL MEDICINE

## 2022-10-14 PROCEDURE — 210N000001 HC R&B IMCU HEART CARE

## 2022-10-14 PROCEDURE — 80048 BASIC METABOLIC PNL TOTAL CA: CPT | Performed by: INTERNAL MEDICINE

## 2022-10-14 PROCEDURE — 82140 ASSAY OF AMMONIA: CPT | Performed by: INTERNAL MEDICINE

## 2022-10-14 PROCEDURE — 250N000011 HC RX IP 250 OP 636: Performed by: HOSPITALIST

## 2022-10-14 PROCEDURE — 258N000003 HC RX IP 258 OP 636: Performed by: HOSPITALIST

## 2022-10-14 PROCEDURE — 84443 ASSAY THYROID STIM HORMONE: CPT | Performed by: INTERNAL MEDICINE

## 2022-10-14 PROCEDURE — C9113 INJ PANTOPRAZOLE SODIUM, VIA: HCPCS | Performed by: INTERNAL MEDICINE

## 2022-10-14 PROCEDURE — 43235 EGD DIAGNOSTIC BRUSH WASH: CPT | Performed by: INTERNAL MEDICINE

## 2022-10-14 RX ORDER — ONDANSETRON 2 MG/ML
4 INJECTION INTRAMUSCULAR; INTRAVENOUS EVERY 6 HOURS PRN
Status: DISCONTINUED | OUTPATIENT
Start: 2022-10-14 | End: 2022-10-16 | Stop reason: HOSPADM

## 2022-10-14 RX ORDER — PROCHLORPERAZINE 25 MG
12.5 SUPPOSITORY, RECTAL RECTAL EVERY 12 HOURS PRN
Status: DISCONTINUED | OUTPATIENT
Start: 2022-10-14 | End: 2022-10-16 | Stop reason: HOSPADM

## 2022-10-14 RX ORDER — FENTANYL CITRATE 50 UG/ML
INJECTION, SOLUTION INTRAMUSCULAR; INTRAVENOUS PRN
Status: DISCONTINUED | OUTPATIENT
Start: 2022-10-14 | End: 2022-10-16 | Stop reason: HOSPADM

## 2022-10-14 RX ORDER — ONDANSETRON 4 MG/1
4 TABLET, ORALLY DISINTEGRATING ORAL EVERY 6 HOURS PRN
Status: DISCONTINUED | OUTPATIENT
Start: 2022-10-14 | End: 2022-10-16 | Stop reason: HOSPADM

## 2022-10-14 RX ORDER — LIDOCAINE 40 MG/G
CREAM TOPICAL
Status: DISCONTINUED | OUTPATIENT
Start: 2022-10-14 | End: 2022-10-16 | Stop reason: HOSPADM

## 2022-10-14 RX ORDER — DEXTROSE MONOHYDRATE 25 G/50ML
25-50 INJECTION, SOLUTION INTRAVENOUS
Status: DISCONTINUED | OUTPATIENT
Start: 2022-10-14 | End: 2022-10-16 | Stop reason: HOSPADM

## 2022-10-14 RX ORDER — PROCHLORPERAZINE MALEATE 5 MG
5 TABLET ORAL EVERY 6 HOURS PRN
Status: DISCONTINUED | OUTPATIENT
Start: 2022-10-14 | End: 2022-10-16 | Stop reason: HOSPADM

## 2022-10-14 RX ORDER — SODIUM CHLORIDE, SODIUM LACTATE, POTASSIUM CHLORIDE, CALCIUM CHLORIDE 600; 310; 30; 20 MG/100ML; MG/100ML; MG/100ML; MG/100ML
INJECTION, SOLUTION INTRAVENOUS CONTINUOUS
Status: DISCONTINUED | OUTPATIENT
Start: 2022-10-14 | End: 2022-10-14

## 2022-10-14 RX ORDER — EPINEPHRINE 1 MG/ML
INJECTION, SOLUTION, CONCENTRATE INTRAVENOUS PRN
Status: DISCONTINUED | OUTPATIENT
Start: 2022-10-14 | End: 2022-10-16 | Stop reason: HOSPADM

## 2022-10-14 RX ORDER — NICOTINE POLACRILEX 4 MG
15-30 LOZENGE BUCCAL
Status: DISCONTINUED | OUTPATIENT
Start: 2022-10-14 | End: 2022-10-16 | Stop reason: HOSPADM

## 2022-10-14 RX ORDER — ALBUTEROL SULFATE 0.83 MG/ML
2.5 SOLUTION RESPIRATORY (INHALATION)
Status: DISCONTINUED | OUTPATIENT
Start: 2022-10-14 | End: 2022-10-16 | Stop reason: HOSPADM

## 2022-10-14 RX ORDER — SODIUM CHLORIDE 9 MG/ML
INJECTION, SOLUTION INTRAVENOUS CONTINUOUS
Status: DISCONTINUED | OUTPATIENT
Start: 2022-10-14 | End: 2022-10-15

## 2022-10-14 RX ADMIN — AZITHROMYCIN MONOHYDRATE 500 MG: 500 INJECTION, POWDER, LYOPHILIZED, FOR SOLUTION INTRAVENOUS at 17:59

## 2022-10-14 RX ADMIN — SODIUM CHLORIDE 8 MG/HR: 9 INJECTION, SOLUTION INTRAVENOUS at 02:26

## 2022-10-14 RX ADMIN — SODIUM CHLORIDE, POTASSIUM CHLORIDE, SODIUM LACTATE AND CALCIUM CHLORIDE: 600; 310; 30; 20 INJECTION, SOLUTION INTRAVENOUS at 12:15

## 2022-10-14 RX ADMIN — SODIUM CHLORIDE 8 MG/HR: 9 INJECTION, SOLUTION INTRAVENOUS at 12:45

## 2022-10-14 RX ADMIN — FLUTICASONE FUROATE 1 PUFF: 100 POWDER RESPIRATORY (INHALATION) at 09:36

## 2022-10-14 RX ADMIN — SODIUM CHLORIDE: 9 INJECTION, SOLUTION INTRAVENOUS at 18:00

## 2022-10-14 RX ADMIN — SODIUM CHLORIDE, POTASSIUM CHLORIDE, SODIUM LACTATE AND CALCIUM CHLORIDE: 600; 310; 30; 20 INJECTION, SOLUTION INTRAVENOUS at 01:50

## 2022-10-14 ASSESSMENT — ACTIVITIES OF DAILY LIVING (ADL)
DIFFICULTY_EATING/SWALLOWING: NO
ADLS_ACUITY_SCORE: 20
ADLS_ACUITY_SCORE: 35
WALKING_OR_CLIMBING_STAIRS_DIFFICULTY: NO
ADLS_ACUITY_SCORE: 20
ADLS_ACUITY_SCORE: 20
DRESSING/BATHING_DIFFICULTY: NO
ADLS_ACUITY_SCORE: 20
HEARING_DIFFICULTY_OR_DEAF: NO
CONCENTRATING,_REMEMBERING_OR_MAKING_DECISIONS_DIFFICULTY: NO
ADLS_ACUITY_SCORE: 20
DOING_ERRANDS_INDEPENDENTLY_DIFFICULTY: NO
ADLS_ACUITY_SCORE: 20
FALL_HISTORY_WITHIN_LAST_SIX_MONTHS: YES
WEAR_GLASSES_OR_BLIND: YES
TOILETING_ISSUES: NO
ADLS_ACUITY_SCORE: 20
ADLS_ACUITY_SCORE: 20
CHANGE_IN_FUNCTIONAL_STATUS_SINCE_ONSET_OF_CURRENT_ILLNESS/INJURY: NO
DIFFICULTY_COMMUNICATING: NO

## 2022-10-14 NOTE — PLAN OF CARE
Goal Outcome Evaluation:       Pt arrived via EMS with PRBC infusing and protonix drip. Pt A/O denies pain, C/O headache 3/10 and some mild nausea but states no emesis or stool since arriving to ED. Feeling thirsty and mouth swabs at bedside, dried blood cleaned from chin. VSS, on room air. No family here at this time but verified with pt correct contacts in chart. MD notified of arrival, no orders yet. Pt is pleasant and appropriate.

## 2022-10-14 NOTE — ED PROVIDER NOTES
History     Chief Complaint   Patient presents with     Fall     Fall at home, by EMS, hypotensive 77/66 [facility] 1642     HPI  Luigi Vieyra is a 66 year old male who presents by EMS from home after several witnessed falls.  History is from patient as well as spouse.  Alcohol use disorder with dependence, severe, drinking all day every day vodka.  Last alcohol intake approximately 1600.  Wife reports coming home from work, heard a loud crash and found patient on the floor, he got up fell again, went to the bathroom believes he vomited, then went in the bedroom fell again and hit his head.  She does not believe he lost consciousness.  He was making some gurgling snoring noises when she attended to him he was responsive.  Currently denies headache visual change numbness or weakness, denies pain in his neck back chest or abdomen.  He reports having some bright red blood per rectum today.  He does not have history of cirrhosis or varices.  Upper endoscopy 3/21/2022 significant for chronic alcoholic duodenitis.  Patient denies antiplatelet or anticoagulation therapy.  CBC from yesterday White count 16.7, hemoglobin 12.2, platelet count 575, hemoglobin down to 9.6 today.  INR remains normal at 1.0.    Allergies:  No Known Allergies    Problem List:    Patient Active Problem List    Diagnosis Date Noted     Hyponatremia 03/20/2022     Priority: Medium     Anemia, unspecified type 03/20/2022     Priority: Medium     Alcoholic gastritis, presence of bleeding unspecified, unspecified chronicity 03/20/2022     Priority: Medium     Hypotension due to hypovolemia 03/20/2022     Priority: Medium     MAIA (acute kidney injury) (H) 03/20/2022     Priority: Medium     Elevated lactic acid level 03/20/2022     Priority: Medium     Transaminitis 03/20/2022     Priority: Medium     Syncope 03/20/2022     Priority: Medium     Thrombocytosis 03/20/2022     Priority: Medium     Leukocytosis 03/20/2022     Priority: Medium     Mixed  "hyperlipidemia 03/09/2020     Priority: Medium     Uncontrolled hypertension 01/02/2019     Priority: Medium     Alcohol dependence (H) 08/14/2017     Priority: Medium     Seasonal allergic rhinitis 06/13/2017     Priority: Medium     CARDIOVASCULAR SCREENING; LDL GOAL LESS THAN 130 03/31/2014     Priority: Medium     Tobacco abuse 03/25/2014     Priority: Medium     Mild persistent asthma 05/18/2012     Priority: Medium     Alcohol abuse 05/18/2012     Priority: Medium        Past Medical History:    Past Medical History:   Diagnosis Date     Asthma        Past Surgical History:    Past Surgical History:   Procedure Laterality Date     ESOPHAGOSCOPY, GASTROSCOPY, DUODENOSCOPY (EGD), COMBINED N/A 3/21/2022    Procedure: ESOPHAGOGASTRODUODENOSCOPY, WITH BIOPSY;  Surgeon: Eunice Rubin MD;  Location: [facility]       Family History:    Family History   Problem Relation Age of Onset     Asthma Mother      Cerebrovascular Disease Mother      C.A.D. No family hx of      Diabetes No family hx of      Hypertension No family hx of      Breast Cancer No family hx of      Cancer - colorectal No family hx of      Prostate Cancer No family hx of        Social History:  Marital Status:   [2]  Social History     Tobacco Use     Smoking status: Every Day     Packs/day: 0.75     Years: 45.00     Pack years: 33.75     Types: Cigarettes     Start date: 1976     Smokeless tobacco: Never   Vaping Use     Vaping Use: Never used   Substance Use Topics     Alcohol use: Yes     Comment: one bottle of vodka weekly     Drug use: No        Medications:    No current outpatient medications on file.        Review of Systems  All other systems reviewed and are negative.    Physical Exam   BP: (!) 86/60  Pulse: 94  Temp: 98.1  F (36.7  C)  Resp: 18  Height: 180.3 cm (5' 11\")  SpO2: 92 %      Physical Exam   GENERAL chronically ill-appearing, alert oriented x3    HEENT Head atraumatic normocephalic     PERRL, EOMI, conjunctiva clear, sclera " nonicteric, no discharge, no periorbital swelling or redness     Oropharynx moist without lesions, erythema or exudate.  Tongue is not swollen.     Nose is unremarkable to inspection, mucous membranes are moist and pink, no nasal discharge or bleeding    MUSCULOSKELETAL neck supple full active painless range of motion no posterior midline tenderness.      Spine nontender to palpation    Pelvis stable nontender    Chest nontender    No outward sign of trauma to the chest back or abdomen    Extremities are atraumatic, full painless active range of motion all joints    PULMONARY diffusely diminished breath sounds    CARDIOVASCULAR heart is regular no murmur appreciated.  Peripheral pulses are symmetrical and strong.  Capillary refill is normal.     GASTROINTESTINAL abdomen is soft, nondistended, nontender    NEUROLOGICAL Cranial nerves; vision baseline fields intact, PERRL, EOMI, facial sensation intact to light touch, facial muscle tone intact and symmetrical, hearing grossly intact,swallowing without difficulty, SCM strength intact, tongue protrudes midline, shoulder shrug intact      Strength is 5/5 throughout all muscle groups of the extremities     Sensation intact to light touch    SKIN skin pale warm and dry    PSYCHIATRIC patient is alert, attentive, good eye contact, well kempt, thought processes are rational and organized, affect is normal, mood is neutral, patient is not agitated, no delusions, able to answer questions appropriately    ED Course          ECG: Normal rate and rhythm, axis and intervals within normal limits, no Q waves, no ectopy no acute ST or T wave changes, unchanged from previous, read by Dr. Blas Rey         Procedures  ECG: Normal rate and rhythm, axis and intervals within normal limits, no Q waves, no ectopy no acute ST or T wave changes, unchanged from previous, read by Dr. Blas Rey              Critical Care time:  none               Results for orders placed or performed  during the hospital encounter of 10/13/22 (from the past 24 hour(s))   [place] Draw    Narrative    The following orders were created for panel order [place] Draw.  Procedure                               Abnormality         Status                     ---------                               -----------         ------                     Extra Blue Top Tube[[id number]]                              Final result               Extra Red Top Tube[[id number]]                               Final result               Extra Green Top (Lithium...[[id number]]                      Final result               Extra Purple Top Tube[[id number]]                            Final result                 Please view results for these tests on the individual orders.   Extra Blue Top Tube   Result Value Ref Range    Hold Specimen JIC    Extra Red Top Tube   Result Value Ref Range    Hold Specimen JIC    Extra Green Top (Lithium Heparin) Tube   Result Value Ref Range    Hold Specimen JIC    Extra Purple Top Tube   Result Value Ref Range    Hold Specimen JIC    CBC with platelets differential    Narrative    The following orders were created for panel order CBC with platelets differential.  Procedure                               Abnormality         Status                     ---------                               -----------         ------                     CBC with platelets and d...[[id number]]  Abnormal            Final result                 Please view results for these tests on the individual orders.   Comprehensive metabolic panel   Result Value Ref Range    Sodium 130 (L) 136 - 145 mmol/L    Potassium 3.6 3.4 - 5.3 mmol/L    Chloride 90 (L) 98 - 107 mmol/L    Carbon Dioxide (CO2) 23 22 - 29 mmol/L    Anion Gap 17 (H) 7 - 15 mmol/L    Urea Nitrogen 42.8 (H) 8.0 - 23.0 mg/dL    Creatinine 1.45 (H) 0.67 - 1.17 mg/dL    Calcium 8.6 (L) 8.8 - 10.2 mg/dL    Glucose 170 (H) 70 - 99 mg/dL    Alkaline Phosphatase 108 40 - 129 U/L    AST 27 10  - 50 U/L    ALT 14 10 - 50 U/L    Protein Total 6.0 (L) 6.4 - 8.3 g/dL    Albumin 3.1 (L) 3.5 - 5.2 g/dL    Bilirubin Total 0.2 <=1.2 mg/dL    GFR Estimate 53 (L) >60 mL/min/1.73m2   INR   Result Value Ref Range    INR 1.00 0.85 - 1.15   Partial thromboplastin time   Result Value Ref Range    aPTT <20 (L) 22 - 38 Seconds   Lipase   Result Value Ref Range    Lipase 82 (H) 13 - 60 U/L   Magnesium   Result Value Ref Range    Magnesium 1.7 1.7 - 2.3 mg/dL   Alcohol ethyl   Result Value Ref Range    Alcohol ethyl 0.02 (H) <=0.00 g/dL   Phosphorus   Result Value Ref Range    Phosphorus 3.5 2.5 - 4.5 mg/dL   ABO/Rh type and screen    Narrative    The following orders were created for panel order ABO/Rh type and screen.  Procedure                               Abnormality         Status                     ---------                               -----------         ------                     Adult Type and Screen[[id number]]                            In process                   Please view results for these tests on the individual orders.   CBC with platelets and differential   Result Value Ref Range    WBC Count 16.5 (H) 4.0 - 11.0 10e3/uL    RBC Count 2.88 (L) 4.40 - 5.90 10e6/uL    Hemoglobin 9.6 (L) 13.3 - 17.7 g/dL    Hematocrit 27.9 (L) 40.0 - 53.0 %    MCV 97 78 - 100 fL    MCH 33.3 (H) 26.5 - 33.0 pg    MCHC 34.4 31.5 - 36.5 g/dL    RDW 20.2 (H) 10.0 - 15.0 %    Platelet Count 517 (H) 150 - 450 10e3/uL    % Neutrophils 74 %    % Lymphocytes 11 %    % Monocytes 10 %    % Eosinophils 1 %    % Basophils 1 %    % Immature Granulocytes 3 %    NRBCs per 100 WBC 0 <1 /100    Absolute Neutrophils 12.2 (H) 1.6 - 8.3 10e3/uL    Absolute Lymphocytes 1.9 0.8 - 5.3 10e3/uL    Absolute Monocytes 1.6 (H) 0.0 - 1.3 10e3/uL    Absolute Eosinophils 0.2 0.0 - 0.7 10e3/uL    Absolute Basophils 0.1 0.0 - 0.2 10e3/uL    Absolute Immature Granulocytes 0.4 <=0.4 10e3/uL    Absolute NRBCs 0.0 10e3/uL   Symptomatic; Unknown Influenza A/B &  SARS-CoV2 (COVID-19) Virus PCR Multiplex Nasopharyngeal    Specimen: Nasopharyngeal; Swab   Result Value Ref Range    Influenza A PCR Negative Negative    Influenza B PCR Negative Negative    SARS CoV2 PCR Negative Negative    Narrative    Testing was performed using the john SARS-CoV-2 & Influenza A/B Assay on the john [employer] System. This test should be ordered for the detection of SARS-CoV-2 and influenza viruses in individuals who meet clinical and/or epidemiological criteria. Test performance is unknown in asymptomatic patients. This test is for in vitro diagnostic use under the FDA EUA for laboratories certified under CLIA to perform moderate and/or high complexity testing. This test has not been FDA cleared or approved. A negative result does not rule out the presence of PCR inhibitors in the specimen or target RNA in concentration below the limit of detection for the assay. If only one viral target is positive but coinfection with multiple targets is suspected, the sample should be re-tested with another FDA cleared, approved or authorized test, if coinfection would change clinical management. [facility] are certified under the Clinical Laboratory Improvement Amendments of 1988 (CLIA-88) as qualified to perform moderate and/or high complexity laboratory testing.   Lactic acid whole blood   Result Value Ref Range    Lactic Acid 1.9 0.7 - 2.0 mmol/L   Hemaglobin   Result Value Ref Range    Hemoglobin 8.4 (L) 13.3 - 17.7 g/dL   Prepare red blood cells (unit)   Result Value Ref Range    Blood Component Type Red Blood Cells     Product Code E0418J31     Unit Status Issued     Unit Number [id number]     CROSSMATCH Compatible     CODING SYSTEM [id number]     ISSUE DATE AND TIME [id number]     UNIT ABO/RH O+     UNIT TYPE ISBT 5100    Prepare red blood cells (unit)   Result Value Ref Range    Blood Component Type Red Blood Cells     Product Code O7747O30     Unit Status Ready for issue      Unit Number [id number]     CROSSMATCH Compatible     CODING SYSTEM [id number]    CT Head w/o Contrast    Narrative    EXAM: CT HEAD W/O CONTRAST, CT CERVICAL SPINE W/O CONTRAST  LOCATION: [facility]  DATE/TIME: 10/13/2022 8:52 PM    INDICATION: Fall, closed head injury  COMPARISON:  CT head 03/20/2022.  TECHNIQUE:   1) Routine CT Head without IV contrast. Multiplanar reformats. Dose reduction techniques were used.  2) Routine CT Cervical Spine without IV contrast. Multiplanar reformats. Dose reduction techniques were used.    FINDINGS:   HEAD CT:   INTRACRANIAL CONTENTS: No intracranial hemorrhage, extraaxial collection, or mass effect.  No CT evidence of acute infarct. Mild presumed chronic small vessel ischemic changes. Moderate generalized volume loss. No hydrocephalus.     VISUALIZED ORBITS/SINUSES/MASTOIDS: No intraorbital abnormality. No paranasal sinus mucosal disease. No middle ear or mastoid effusion.    BONES/SOFT TISSUES: No acute abnormality.    CERVICAL SPINE CT:   VERTEBRA: Normal vertebral body heights and alignment. No fracture or posttraumatic subluxation.     CANAL/FORAMINA: Multilevel cervical spondylosis.  No high-grade canal stenosis. Severe left C3-C4, right C4-C5, and bilateral C5-C6 foraminal stenosis.    PARASPINAL: No extraspinal abnormality.       Impression    IMPRESSION:  HEAD CT:  1.  No CT evidence for acute intracranial process.  2.  Brain atrophy and presumed chronic microvascular ischemic changes as above.    CERVICAL SPINE CT:  1.  No CT evidence for acute fracture or post traumatic subluxation.   CT Cervical Spine w/o Contrast    Narrative    EXAM: CT HEAD W/O CONTRAST, CT CERVICAL SPINE W/O CONTRAST  LOCATION: [facility]  DATE/TIME: 10/13/2022 8:52 PM    INDICATION: Fall, closed head injury  COMPARISON:  CT head 03/20/2022.  TECHNIQUE:   1) Routine CT Head without IV contrast. Multiplanar reformats. Dose reduction techniques  were used.  2) Routine CT Cervical Spine without IV contrast. Multiplanar reformats. Dose reduction techniques were used.    FINDINGS:   HEAD CT:   INTRACRANIAL CONTENTS: No intracranial hemorrhage, extraaxial collection, or mass effect.  No CT evidence of acute infarct. Mild presumed chronic small vessel ischemic changes. Moderate generalized volume loss. No hydrocephalus.     VISUALIZED ORBITS/SINUSES/MASTOIDS: No intraorbital abnormality. No paranasal sinus mucosal disease. No middle ear or mastoid effusion.    BONES/SOFT TISSUES: No acute abnormality.    CERVICAL SPINE CT:   VERTEBRA: Normal vertebral body heights and alignment. No fracture or posttraumatic subluxation.     CANAL/FORAMINA: Multilevel cervical spondylosis.  No high-grade canal stenosis. Severe left C3-C4, right C4-C5, and bilateral C5-C6 foraminal stenosis.    PARASPINAL: No extraspinal abnormality.       Impression    IMPRESSION:  HEAD CT:  1.  No CT evidence for acute intracranial process.  2.  Brain atrophy and presumed chronic microvascular ischemic changes as above.    CERVICAL SPINE CT:  1.  No CT evidence for acute fracture or post traumatic subluxation.   CT Chest/Abdomen/Pelvis w Contrast    Narrative    EXAM: CT CHEST/ABDOMEN/PELVIS W CONTRAST  LOCATION: [facility]  DATE/TIME: 10/13/2022 8:59 PM    INDICATION: Multiple falls, chronic alcohol use, drop in hemoglobin, hypotensive and tachycardic  COMPARISON: 03/20/2022  TECHNIQUE: CT scan of the chest, abdomen, and pelvis was performed following injection of IV contrast. Multiplanar reformats were obtained. Dose reduction techniques were used.   CONTRAST: 72 mL Isovue 370    FINDINGS:   LOWER NECK: Right thyroid 0.5 cm low-attenuation nodule. Left thyroid gland appears unremarkable.    LUNGS AND PLEURA: Patchy groundglass opacities in the right lower lobe with additional subtle groundglass opacities in the right upper lobe, right middle lobe, and left lower  lobe. No pleural effusion or pneumothorax.    MEDIASTINUM/AXILLAE: No lymphadenopathy. No pericardial effusion.    CORONARY ARTERY CALCIFICATION: None.    HEPATOBILIARY: Normal.    PANCREAS: Normal.    SPLEEN: Small splenules in the left upper quadrant measuring up to 2.5 cm.    ADRENAL GLANDS: Normal.    KIDNEYS/BLADDER: Several low-attenuation lesions in the left kidney, with the larger ones compatible with simple cysts, for which no follow-up is indicated, and the smaller ones too small to characterize, but unchanged from prior. Right kidney appears   normal. No hydronephrosis. Urinary bladder appears normal.    BOWEL: No obstruction or inflammatory change. Normal appendix. No evidence of acute appendicitis.    LYMPH NODES: No lymphadenopathy.    VASCULATURE: Moderate atherosclerotic calcifications.    PELVIC ORGANS: Minimal prostatic calcification.    MUSCULOSKELETAL: New minimal height loss involving T12 with thin sclerotic line paralleling the superior endplate. No significant retropulsion. Otherwise, no acute fracture. Chronic bilateral rib fractures. Multilevel degenerative changes of the spine.      Impression    IMPRESSION:  1.  New T12 compression fracture with minimal height loss, age indeterminate. No significant retropulsion.    2.  Otherwise, no acute traumatic findings in the chest, abdomen, or pelvis.    3.  Diffuse groundglass opacities throughout the majority of both lungs, suspicious for multifocal atypical infection versus nonspecific pneumonitis.       Medications   pantoprazole (PROTONIX) IV push injection 80 mg (80 mg Intravenous Given 10/13/22 2003)   ondansetron (ZOFRAN) injection 4 mg (4 mg Intravenous Given 10/13/22 1959)   iopamidol (ISOVUE-370) solution 72 mL (72 mLs Intravenous Given 10/13/22 2040)   sodium chloride 0.9 % bag 500mL for CT scan flush use (60 mLs Intravenous Given 10/13/22 2039)       Critical Care Addendum    My initial assessment, based on my review of prehospital  provider report, review of nursing observations, review of vital signs, focused history, physical exam, review of cardiac rhythm monitor, 12 lead ECG analysis and discussion with spouse over the phone, established that Luigi Vieyra has severe hypotension, which requires immediate intervention, and therefore He is critically ill.     After the initial assessment, the care team initiated multiple lab tests, initiated IV fluid administration and initiated medication therapy with Protonix to provide stabilization care. Due to the critical nature of this patient, I reassessed nursing observations, vital signs, physical exam, review of cardiac rhythm monitor, 12 lead ECG analysis, mental status and and Serial evaluations  multiple times prior to He disposition.     Time also spent performing documentation, discussion with family to obtain medical information for decision making, reviewing test results, discussion with consultants, coordination of care and and And arranging transfer.     Critical care time (excluding teaching time and procedures): 60 minutes.       Assessments & Plan (with Medical Decision Making)  Chronic alcohol use disorder, multiple falls this evening, hematemesis GI bleeding, hemoglobin dropping hypotensive tachycardic upon arrival.  Resuscitated with 1 L crystalloid, 1 unit packed red cells ordered.  CT head and neck negative for acute finding, CT chest abdomen pelvis significant for some groundglass opacities in the lower fields of the lungs, probable new T12 compression fracture.  Blood alcohol level 0.  Remained stable without further bleeding.  Protonix 80 mg IV and 8 mg/h infusion.  Requires transfer to Center with GI available.  Reviewed with hospitalist service and intensivist at [facility] who kindly accept patient in transfer.  Normotensive, heart rate down post crystalloid and packed red cell transfusion.  Findings consistent with GI bleed in the context of chronic alcohol use.   No evidence for acute alcohol withdrawal at this time although certainly at high risk for same.     I have reviewed the nursing notes.    I have reviewed the findings, diagnosis, plan and need for follow up with the patient.          Discharge Medication List as of 10/13/2022 11:32 PM          Final diagnoses:   Gastrointestinal hemorrhage, unspecified gastrointestinal hemorrhage type   Anemia, unspecified type   Alcohol use disorder, severe, dependence (H)       10/13/2022   [facility] EMERGENCY DEPT     Blas Rey MD  10/14/22 0050

## 2022-10-14 NOTE — ED NOTES
Spoke with sister Dianne and updated that patient will likely transfer to another facility ([facility] in [place]) due to falling hgb. Dianne verbalized understanding and would like to be updated with any changes in condition or updates.

## 2022-10-14 NOTE — ED NOTES
Pt had a witnessed fall at home.  Does not take blood thinners.  Wife left area to make a phone call and when returned patient had vomited bloody emeses all over self.  Patient states he's had 5 vodka drinks today.  Reports drinking 1qt of vodka per day.  LS coarse posterior on right side.  Alert and oriented x 3.

## 2022-10-14 NOTE — PLAN OF CARE
Goal Outcome Evaluation:      Plan of Care Reviewed With: patient      Pt kept bedrest through most of night but then insistent on getting to [facility] for stool. BP has been stable and pt denies dizziness. Assisted to [facility] x2 without any issues or concerns. SBA and stayed with pt whole time. BP cont to be stable with activity. Pt aware that activity allowances may change with new orders or if pt becomes symptomatic. Pt states he understands and has been appropriate using call light.

## 2022-10-14 NOTE — H&P
[facility]    History and Physical - Hospitalist Service       Date of Admission:  10/14/2022    Assessment & Plan      Luigi Vieyra is admitted on 10/14/2022. He is a 66 y/o man with PMH of alcohol use, hypertension and asthma transferred for further evaluation and management of multiple falls, hematemesis and hematochezia.  Initial investigation done in outside facility showed acute drop in hemoglobin and hyponatremia.  Initial trauma work-up negative; he received blood transfusion prior to transfer.  Patient is being admitted and managed for acute blood loss anemia secondary to GIB.      # Acute Blood Loss Anemia due to GIB   Serial hemoglobin monitoring  Type/screen + transfuse as needed for hemoglobin less than 8  NPO + IVF hydration  Pantoprazole continue for GI prophylaxis  GI evaluation for possible endoscopy and/or colonoscopy  Monitor for overt bleeding    # Falls + Hyponatremia   Serial neuro assessment + serial sodium level monitoring  Follow up TSH + serum/urine osmolalities  Fall precautions + seizure precautions  IVF hydration + further work-up for hyponatremia as per primary team  PT/OT evaluation as per primary team prior to discharge     #New T12 compression fracture  Symptomatic care with optimal pain management  We will continue to monitor    #Abnormal chest CT  Follow-up procalcitonin level to rule out possible infectious etiology of abnormal chest CT  Possible treatment for pneumonia as per primary team with results of procalcitonin level    # Alcohol Use   Thiamine and multivitamin supplementation  Monitor for signs of alcohol withdrawal  Will implement CIWA protocol if signs of alcohol withdrawal ensues  Electrolyte monitoring and management    # Hypertension + Asthma   Antihypertensive medication presently on hold in view of low blood pressure  Scheduled LABA resumed + albuterol as needed for shortness of breath  Monitor for signs of respiratory distress          Diet: NPO for Medical/Clinical Reasons Except for: Meds    DVT Prophylaxis: Pneumatic Compression Devices  Frederick Catheter: Not present  Central Lines: None  Cardiac Monitoring: ACTIVE order. Indication: ICU  Code Status: Full Code      Clinically Significant Risk Factors Present on Admission         # Hyponatremia: Na = 130 mmol/L (Ref range: 136 - 145 mmol/L) on admission, will monitor as appropriate     # Hypoalbuminemia: Albumin = 3.1 g/dL (Ref range: 3.5 - 5.2 g/dL) on admission, will monitor as appropriate   # Coagulation Defect: INR = 1.00 (Ref range: 0.85 - 1.15) and/or PTT = <20 Seconds (Ref range: 22 - 38 Seconds) on admission, will monitor for bleeding   # Hypertension: home medication list includes antihypertensive(s)          Disposition Plan      Expected Discharge Date: 10/16/2022                The patient's care was discussed with the Bedside Nurse and Patient.    Paola Patel MD  Hospitalist Service  [facility]  Securely message with the Vocera Web Console (learn more here)  Text page via [employer] Paging/Directory         ______________________________________________________________________    Chief Complaint   Falls + Hematemesis     History is obtained from the patient and outside chart review     History of Present Illness   Luigi Vieyra is a 67 year old man with  PMH of alcohol use, hypertension and asthma transferred for further evaluation and management of multiple falls, hematemesis and hematochezia.  Patient had an unwitnessed fall yesterday at about 4 PM.  He endorses associated dizziness/lightheadedness prior to fall.  On attempting to get up he had another fall followed by vomit and a third fall in the bedroom where he was reported to have hit his head.  He denies complete loss of consciousness with fall episodes.  He also denies seizure-like activity, weakness/numbness of extremities, photophobia  or headache.    Vomitus was said to have had blood in it  with blood clot and patient also endorses episode of bright red blood per rectum earlier today.  He denies epigastric pain, lower abdominal pain, palpitation, shortness of breath or exertional dyspnea.  Of note, last EGD done 7 months ago showed chronic alcoholic duodenitis, LA grade a reflux, candidiasis and esophagitis with no bleeding to rule out Leon's esophagus; no reported varices.  Patient and wife endorses severe daily drinking with last alcohol intake at about 4 PM.     Review of other system grossly at baseline.    Review of Systems    The 10 point Review of Systems is negative other than noted in the HPI or here.    Past Medical History    I have reviewed this patient's medical history and updated it with pertinent information if needed.   Past Medical History:   Diagnosis Date     Asthma        Past Surgical History   I have reviewed this patient's surgical history and updated it with pertinent information if needed.  Past Surgical History:   Procedure Laterality Date     ESOPHAGOSCOPY, GASTROSCOPY, DUODENOSCOPY (EGD), COMBINED N/A 3/21/2022    Procedure: ESOPHAGOGASTRODUODENOSCOPY, WITH BIOPSY;  Surgeon: Eunice Rubin MD;  Location: [facility]       Social History   I have reviewed this patient's social history and updated it with pertinent information if needed.  Social History     Tobacco Use     Smoking status: Every Day     Packs/day: 0.75     Years: 45.00     Pack years: 33.75     Types: Cigarettes     Start date: 1976     Smokeless tobacco: Never   Vaping Use     Vaping Use: Never used   Substance Use Topics     Alcohol use: Yes     Comment: one bottle of vodka weekly     Drug use: No       Family History   I have reviewed this patient's family history and updated it with pertinent information if needed.  Family History   Problem Relation Age of Onset     Asthma Mother      Cerebrovascular Disease Mother      C.A.D. No family hx of      Diabetes No family hx of      Hypertension No family hx of       Breast Cancer No family hx of      Cancer - colorectal No family hx of      Prostate Cancer No family hx of        Prior to Admission Medications   Prior to Admission Medications   Prescriptions Last Dose Informant Patient Reported? Taking?   Cholecalciferol (VITAMIN D-3) 125 MCG (5000 UT) TABS   Yes No   Sig: Take by mouth daily   Zinc 22.5 MG TABS   Yes No   Sig: Take by mouth daily   Patient not taking: Reported on 10/12/2022   albuterol (PROAIR HFA/PROVENTIL HFA/VENTOLIN HFA) 108 (90 Base) MCG/ACT inhaler   No No   Sig: Inhale 2 puffs into the lungs every 4 hours as needed for shortness of breath / dyspnea or wheezing   fluticasone (ARNUITY ELLIPTA) 100 MCG/ACT inhaler   No No   Sig: Inhale 1 puff into the lungs daily   gabapentin (NEURONTIN) 100 MG capsule   No No   Sig: TAKE 1 CAPSULE BY MOUTH EVERY 8 HOURS   Patient not taking: Reported on 10/12/2022   losartan (COZAAR) 25 MG tablet   No No   Sig: TAKE 1 TABLET BY MOUTH EVERY MORNING   metoprolol succinate ER (TOPROL XL) 25 MG 24 hr tablet   No No   Sig: TAKE 1 TABLET BY MOUTH EVERY MORNING   nicotine (NICODERM CQ) 14 MG/24HR 24 hr patch   No No   Sig: Place 1 patch onto the skin daily   Patient not taking: No sig reported   nicotine (NICORETTE) 4 MG lozenge  Self Yes No   Sig: Place 4 mg inside cheek as needed    Patient not taking: No sig reported   order for DME  Self No No   Sig: Equipment being ordered: Digital home blood pressure monitor kit   pantoprazole (PROTONIX) 40 MG EC tablet   No No   Sig: Take 1 tablet (40 mg) by mouth daily   Patient not taking: Reported on 10/12/2022   spacer (OPTICHAMBER YIMI) holding chamber   No No   Sig: Use with inhaler (albuterol and beclomethasone)   thiamine (B-1) 100 MG tablet   No No   Sig: Take 1 tablet (100 mg) by mouth daily   Patient not taking: Reported on 10/12/2022      Facility-Administered Medications: None     Allergies   No Known Allergies    Physical Exam   Vital Signs: Temp: 99.6  F (37.6  C)  Temp src: Oral BP: 97/61 Pulse: 95   Resp: 30 SpO2: 94 % O2 Device: None (Room air)    Weight: 161 lbs 1.6 oz    Constitutional: alert, oriented, pale+, anicteric, mildly tremulous, afebrile, acyanotic  Respiratory: No increased work of breathing, good air exchange, clear to auscultation bilaterally, no crackles or wheezing  Cardiovascular: S1S2 heard   GI: soft, non-tender, BS+   Skin: no bruising or bleeding and normal skin color, texture, turgor  Musculoskeletal: There is no redness, warmth, or swelling of the joints.  Full range of motion noted.  Motor strength is 5 out of 5 all extremities bilaterally.  Tone is normal.  Neurologic: Awake, alert, oriented to name, place and time.  Cranial nerves II-XII are grossly intact.  Motor is 5 out of 5 bilaterally.  Cerebellar finger to nose, heel to shin intact.  Sensory is intact.  Babinski down going, Romberg negative, and gait is normal.    Data   Data reviewed today: I reviewed all medications, new labs and imaging results over the last 24 hours. I personally reviewed the EKG tracing showing no acute changes.    Recent Labs   Lab 10/14/22  0407 10/14/22  0135 10/13/22  2035 10/13/22  1915 10/12/22  1149   WBC  --  21.1*  --  16.5* 16.7*   HGB  --  9.6* 8.4* 9.6* 12.2*   MCV  --  94  --  97 96   PLT  --  467*  --  517* 575*   INR  --   --   --  1.00  --    NA  --  130*  --  130* 131*   POTASSIUM  --  3.8  --  3.6 3.5   CHLORIDE  --  93*  --  90* 89*   CO2  --  26  --  23 27   BUN  --  43.7*  --  42.8* 42.4*   CR  --  1.28*  --  1.45* 1.35*   ANIONGAP  --  11  --  17* 15   PINO  --  8.2*  --  8.6* 9.5   * 113*  --  170* 120*   ALBUMIN  --   --   --  3.1* 4.0   PROTTOTAL  --   --   --  6.0* 7.0   BILITOTAL  --   --   --  0.2 0.8   ALKPHOS  --   --   --  108 138*   ALT  --   --   --  14 13   AST  --   --   --  27 24   LIPASE  --   --   --  82*  --      Recent Results (from the past 24 hour(s))   CT Head w/o Contrast    Narrative    EXAM: CT HEAD W/O CONTRAST, CT  CERVICAL SPINE W/O CONTRAST  LOCATION: [facility]  DATE/TIME: 10/13/2022 8:52 PM    INDICATION: Fall, closed head injury  COMPARISON:  CT head 03/20/2022.  TECHNIQUE:   1) Routine CT Head without IV contrast. Multiplanar reformats. Dose reduction techniques were used.  2) Routine CT Cervical Spine without IV contrast. Multiplanar reformats. Dose reduction techniques were used.    FINDINGS:   HEAD CT:   INTRACRANIAL CONTENTS: No intracranial hemorrhage, extraaxial collection, or mass effect.  No CT evidence of acute infarct. Mild presumed chronic small vessel ischemic changes. Moderate generalized volume loss. No hydrocephalus.     VISUALIZED ORBITS/SINUSES/MASTOIDS: No intraorbital abnormality. No paranasal sinus mucosal disease. No middle ear or mastoid effusion.    BONES/SOFT TISSUES: No acute abnormality.    CERVICAL SPINE CT:   VERTEBRA: Normal vertebral body heights and alignment. No fracture or posttraumatic subluxation.     CANAL/FORAMINA: Multilevel cervical spondylosis.  No high-grade canal stenosis. Severe left C3-C4, right C4-C5, and bilateral C5-C6 foraminal stenosis.    PARASPINAL: No extraspinal abnormality.       Impression    IMPRESSION:  HEAD CT:  1.  No CT evidence for acute intracranial process.  2.  Brain atrophy and presumed chronic microvascular ischemic changes as above.    CERVICAL SPINE CT:  1.  No CT evidence for acute fracture or post traumatic subluxation.   CT Cervical Spine w/o Contrast    Narrative    EXAM: CT HEAD W/O CONTRAST, CT CERVICAL SPINE W/O CONTRAST  LOCATION: [facility]  DATE/TIME: 10/13/2022 8:52 PM    INDICATION: Fall, closed head injury  COMPARISON:  CT head 03/20/2022.  TECHNIQUE:   1) Routine CT Head without IV contrast. Multiplanar reformats. Dose reduction techniques were used.  2) Routine CT Cervical Spine without IV contrast. Multiplanar reformats. Dose reduction techniques were used.    FINDINGS:   HEAD CT:    INTRACRANIAL CONTENTS: No intracranial hemorrhage, extraaxial collection, or mass effect.  No CT evidence of acute infarct. Mild presumed chronic small vessel ischemic changes. Moderate generalized volume loss. No hydrocephalus.     VISUALIZED ORBITS/SINUSES/MASTOIDS: No intraorbital abnormality. No paranasal sinus mucosal disease. No middle ear or mastoid effusion.    BONES/SOFT TISSUES: No acute abnormality.    CERVICAL SPINE CT:   VERTEBRA: Normal vertebral body heights and alignment. No fracture or posttraumatic subluxation.     CANAL/FORAMINA: Multilevel cervical spondylosis.  No high-grade canal stenosis. Severe left C3-C4, right C4-C5, and bilateral C5-C6 foraminal stenosis.    PARASPINAL: No extraspinal abnormality.       Impression    IMPRESSION:  HEAD CT:  1.  No CT evidence for acute intracranial process.  2.  Brain atrophy and presumed chronic microvascular ischemic changes as above.    CERVICAL SPINE CT:  1.  No CT evidence for acute fracture or post traumatic subluxation.   CT Chest/Abdomen/Pelvis w Contrast    Narrative    EXAM: CT CHEST/ABDOMEN/PELVIS W CONTRAST  LOCATION: [facility]  DATE/TIME: 10/13/2022 8:59 PM    INDICATION: Multiple falls, chronic alcohol use, drop in hemoglobin, hypotensive and tachycardic  COMPARISON: 03/20/2022  TECHNIQUE: CT scan of the chest, abdomen, and pelvis was performed following injection of IV contrast. Multiplanar reformats were obtained. Dose reduction techniques were used.   CONTRAST: 72 mL Isovue 370    FINDINGS:   LOWER NECK: Right thyroid 0.5 cm low-attenuation nodule. Left thyroid gland appears unremarkable.    LUNGS AND PLEURA: Patchy groundglass opacities in the right lower lobe with additional subtle groundglass opacities in the right upper lobe, right middle lobe, and left lower lobe. No pleural effusion or pneumothorax.    MEDIASTINUM/AXILLAE: No lymphadenopathy. No pericardial effusion.    CORONARY ARTERY CALCIFICATION:  None.    HEPATOBILIARY: Normal.    PANCREAS: Normal.    SPLEEN: Small splenules in the left upper quadrant measuring up to 2.5 cm.    ADRENAL GLANDS: Normal.    KIDNEYS/BLADDER: Several low-attenuation lesions in the left kidney, with the larger ones compatible with simple cysts, for which no follow-up is indicated, and the smaller ones too small to characterize, but unchanged from prior. Right kidney appears   normal. No hydronephrosis. Urinary bladder appears normal.    BOWEL: No obstruction or inflammatory change. Normal appendix. No evidence of acute appendicitis.    LYMPH NODES: No lymphadenopathy.    VASCULATURE: Moderate atherosclerotic calcifications.    PELVIC ORGANS: Minimal prostatic calcification.    MUSCULOSKELETAL: New minimal height loss involving T12 with thin sclerotic line paralleling the superior endplate. No significant retropulsion. Otherwise, no acute fracture. Chronic bilateral rib fractures. Multilevel degenerative changes of the spine.      Impression    IMPRESSION:  1.  New T12 compression fracture with minimal height loss, age indeterminate. No significant retropulsion.    2.  Otherwise, no acute traumatic findings in the chest, abdomen, or pelvis.    3.  Diffuse groundglass opacities throughout the majority of both lungs, suspicious for multifocal atypical infection versus nonspecific pneumonitis.

## 2022-10-14 NOTE — ED NOTES
Spoke with Sister Dianne Marsh and updated on patient status- please call with updates - [phone]

## 2022-10-14 NOTE — PROGRESS NOTES
.  Hospitalist Progress Note    Assessment/Plan    Principal Problem:    Acute blood loss anemia    Luigi Vieyra is admitted on 10/14/2022. He is a 66 y/o man with PMH of alcohol use, hypertension and asthma admitted for further evaluation and management of multiple falls, hematemesis and hematochezia.  Initial investigation done in outside facility showed acute drop in hemoglobin and hyponatremia.  Initial trauma work-up negative; he received blood transfusion prior to transfer.  Patient is being admitted and managed for acute blood loss anemia secondary to GIB.       # Acute Blood Loss Anemia due to GIB   -CT chest/abd/pelvis - no active signs of bleeding.  -Differential likely ann jay vrs PUD vrs esophageal varices.  -S/p 1 U PRBC prior to transfer from outside facility  -Serial hemoglobin monitoring, transfuse as needed for hemoglobin less than 7. Last Hb 8.8  -Pantoprazole infusion for GI prophylaxis  -GI following, planned for EGD today  -Monitor for overt bleeding    Hyponatremia likely hypovolemic  -Continue with IVF  -Monitor Na levels closely  -TSH wnl. Serum osm WNL. Urine Osm pending.     # Falls - multifactorial  -May be due to h/o alcohol abuse, electrolyte abnormalities  -Place on fall precautions  -PT/OT consulted      #New T12 compression fracture  -Symptomatic care with optimal pain management  -PT/OT consulted       #Abnormal chest CT  -CT chest showed diffuse ground glass opacities in both lungs, s/o non specific pneumonitis vrs atypical infection  -Procalcitonin elevated   -Started on Azithromycin X 5 days.  -May need out-patient imaging to follow up on resolution.      # Alcohol Use   -Monitor on telemetry  -Monitor for signs of alcohol withdrawal  -Will implement CIWA protocol if signs of alcohol withdrawal ensues  -Thiamine and multivitamin supplementation      Asthma - Not in exacerbation  -Prn inhalers     Barriers to Discharge:  Hb monitoring, EGD    Anticipated discharge  date/Disposition: 2-3 days    Subjective  .  Patient is new to me. Chart reviewed and events noted.  Patient seen and examined.   In bed, laying down. Denies any abdominal pain, N/V. No more hematemesis. No other signs of active bleeding.    Objective    Vital signs in last 24 hours  Temp:  [98.1  F (36.7  C)-99.8  F (37.7  C)] 99.1  F (37.3  C)  Pulse:  [] 82  Resp:  [12-41] 20  BP: ()/(55-79) 127/59  SpO2:  [91 %-100 %] 99 % [unfilled] O2 Device: Nasal cannula    Weight:   [unfilled] Weight change:     Intake/Output last 3 shifts  I/O last 3 completed shifts:  In: 1561.34 [I.V.:1278.34; Blood:283]  Out: 500 [Urine:200; Stool:300]  Body mass index is 22.47 kg/m .    Physical Exam    General Appearance:    Alert, cooperative, no distress, appears stated age   Lungs:     CTAB   Cardiovascular:    RRR   Abdomen:     Soft, non-tender,ND   Neurologic:   Awake alert, oriented, speech +     Pertinent Labs   Lab Results: personally reviewed.   Recent Labs   Lab 10/14/22  0601 10/14/22  0135 10/13/22  1915 10/12/22  1149   * 130* 130* 131*   CO2 26 26 23 27   BUN 41.8* 43.7* 42.8* 42.4*   ALBUMIN  --   --  3.1* 4.0   ALKPHOS  --   --  108 138*   ALT  --   --  14 13   AST  --   --  27 24     Recent Labs   Lab 10/14/22  1144 10/14/22  0601 10/14/22  0135 10/13/22  2035 10/13/22  1915 10/12/22  1149   WBC  --   --  21.1*  --  16.5* 16.7*   HGB 8.8* 9.3* 9.6*   < > 9.6* 12.2*   HCT  --   --  27.8*  --  27.9* 35.7*   PLT  --   --  467*  --  517* 575*    < > = values in this interval not displayed.     No results for input(s): CKTOTAL, TROPONINI in the last 168 hours.    Invalid input(s): TROPONINT, CKMBINDEX  Invalid input(s): POCGLUFGR    Medications  Drug and lactation database from the [place] National Library of Medicine:  http://toxnet.nlm.nih.gov/cgi-bin/sis/htmlgen?LACT      Pertinent Radiology   Radiology Results:  Personally reviewed impressions    Reviewed labs in last 24 hours.    Advanced  Care Planning:  Discharge Planning discussed with patient  Total time with this patient is 35 min with greater than 50% of time spent in coordination of care.  Care discussed and coordinated with patient and his care team.    This Note is created using dragon voice recognition software.  Errors in spelling or words which seems out of context are unintentional.  Sounds alike errors may have escaped editing.    Abby Estrada MD  Hospitalist

## 2022-10-14 NOTE — CARE PLAN
Report called to P3 RN. Pt to go to [facility]. / W/C with NA. Personal belongings with pt. Wife called , pt going to new room. Wife verbalized feels pt need Tx, Rehab  of some kind , cant go home without going to [place] first. Feels needs to have doctors set up , not family.

## 2022-10-14 NOTE — CONSULTS
[facility] Gastroenterology Consultation    Luigi Vieyra MRN# [id number]   Age: 67 year old YOB: 1955     Date of Admission:  10/14/2022    Reason for consult: Upper GI bleed       Requesting physician: Jorge       Level of consult: Consult, follow and place orders           Assessment and Plan:   Assessment:   Upper GI bleed this is a 67-year-old gentleman with a significant history of alcohol abuse who presents with hematemesis, falls and and hyponatremia.  Following hematemesis he did have a large bloody stool with clots.  He has never had an upper endoscopy or colonoscopy.  He noted lightheadedness and falling which was associated with the hematemesis.  The last drink was at 3:00 yesterday.  He does not use nonsteroidals.  His appetite is been quite poor over the last several months.  He thinks he has lost some weight.  Significant abdominal pain.  No family history of liver disease or colonic disease.  No chest pain.  He drinks about a quart and a half of vodka weekly.  He has been in with alcohol-related issues in the past.  He relates that he really wants to stop drinking at this time.  Hemoglobin fell from 12 6-8 8.  No further bleeding and no bowel movement since 4 this morning.  Denies any confusion.  Mild swelling of the ankles.  Albumin is mildly depressed.  Bilirubin is normal as are AST and ALT.  His glucose is somewhat elevated.  IMR is normal.  CT shows normal colon.  Does have a new compression fracture at C4.    Given liver enzymes and liver function I suspect this is more likely peptic.  Does not appear to have significant hepatic fibrosis at least by laboratory studies PPI and EGD are planned.  Colonoscopy at some point depending upon the upper findings.    Creatinine is elevated, this may be volume related.  We will recheck after hydration.      Plan:   PPI and EGD             Chief Complaint:   Upper GI bleed     History is obtained from the patient: See  assessment    -           Past Medical History:   I have reviewed this patient's past medical history          Past Surgical History:   I have reviewed this patient's past surgical history          Social History:     Social History     Tobacco Use     Smoking status: Every Day     Packs/day: 0.75     Years: 45.00     Pack years: 33.75     Types: Cigarettes     Start date: 1976     Smokeless tobacco: Never   Substance Use Topics     Alcohol use: Yes     Comment: one bottle of vodka weekly             Family History:   He is a drinker.          Immunizations:   Immunizations are up to date          Allergies:   All allergies reviewed and addressed          Medications:   Examination: HEENT normal without jaundice  Chest clear to auscultation  Cardiovascular exam shows a regular rate and rhythm  Abdomen is soft nontender to palpation without guarding.  No asterixis  Current Facility-Administered Medications   Medication     albuterol (PROVENTIL) neb solution 2.5 mg     Contraindications to both pharmacological and mechanical prophylaxis (must document contraindications for both in this order)     glucose gel 15-30 g    Or     dextrose 50 % injection 25-50 mL    Or     glucagon injection 1 mg     fluticasone (ARNUITY ELLIPTA) 100 MCG/ACT inhaler 1 puff     lactated ringers infusion     ondansetron (ZOFRAN ODT) ODT tab 4 mg    Or     ondansetron (ZOFRAN) injection 4 mg     pantoprazole (PROTONIX) 80 mg in sodium chloride 0.9 % 100 mL infusion     prochlorperazine (COMPAZINE) injection 5 mg    Or     prochlorperazine (COMPAZINE) tablet 5 mg    Or     prochlorperazine (COMPAZINE) suppository 12.5 mg     thiamine (B-1) tablet 100 mg             Review of Systems:   The Review of Systems is negative other than noted in the HPI     -          Data:   All laboratory data reviewed  -  All imaging studies reviewed by me.    Attestation:  I have reviewed today's vital signs, notes, medications, labs and imaging.    Cash  Luigi Oconnor MD

## 2022-10-14 NOTE — PLAN OF CARE
Problem: Gastrointestinal Bleeding  Goal: Hemostasis  Outcome: Progressing     Problem: Gastrointestinal Bleeding  Goal: Optimal Coping with Acute Illness  Outcome: Progressing   Goal Outcome Evaluation:      Plan of Care Reviewed With: patient      [facility] - ICU    RN Progress Note:            Pertinent Assessments:      Please refer to flowsheet rows for full assessment     Pt transferred via EMS from [facility] after a fall with bloody emesis and pt reports bloody stools prior. Pt A/O, pleasant and cooperative.         Mobility Level:     Bedrest until no signs or symptoms of bleeding          Key Events - This Shift:                                  Hgb stable 9.6,  VSS, pts main complaint is thirst freq req water or ice.               Plan:     Cont NPO, protonix gtt and bedrest with probable scope today. Pt states he understands and has been compliant with interventions.

## 2022-10-14 NOTE — PLAN OF CARE
Goal Outcome Evaluation:  1200 HGB at 8.8. MD Rank here to assess pt . Scope to be done at bedside. VS cont to be stable, no bloody stools or N/V this shift.

## 2022-10-14 NOTE — H&P
Preop H&P    Please see my cold consult note of today.  There have been no new major changes.    ASA 2    Chest is clear    Cardiovascular exam shows a regular rate and rhythm    Airway is intact    There is nothing on history or physical to preclude this exam    EGD is planned.

## 2022-10-14 NOTE — ED NOTES
Patient Spouse Larisa notified of patient status and of pending transfer to [facility] in [place].  Larisa was informed of patient low hgb and low blood pressures, CT scan pending

## 2022-10-14 NOTE — PHARMACY-ADMISSION MEDICATION HISTORY
Pharmacy Note - Admission Medication History    Pertinent Provider Information: none     ______________________________________________________________________    Prior To Admission (PTA) med list completed and updated in EMR.       PTA Med List   Medication Sig Last Dose     albuterol (PROAIR HFA/PROVENTIL HFA/VENTOLIN HFA) 108 (90 Base) MCG/ACT inhaler Inhale 2 puffs into the lungs every 4 hours as needed for shortness of breath / dyspnea or wheezing Unknown     fluticasone (ARNUITY ELLIPTA) 100 MCG/ACT inhaler Inhale 1 puff into the lungs daily 10/13/2022     losartan (COZAAR) 25 MG tablet TAKE 1 TABLET BY MOUTH EVERY MORNING 10/13/2022     metoprolol succinate ER (TOPROL XL) 25 MG 24 hr tablet TAKE 1 TABLET BY MOUTH EVERY MORNING 10/13/2022       Information source(s): Patient, Clinic records and [facility]  Method of interview communication: in-person    Summary of Changes to PTA Med List  New: none  Discontinued: none  Changed: none    Patient was asked about OTC/herbal products specifically.  PTA med list reflects this.    In the past week, patient estimated taking medication this percent of the time:  greater than 90%.    Allergies were reviewed, assessed, and updated with the patient.      Patient did not bring any medications to the hospital and can't retrieve from home. No multi-dose medications are available for use during hospital stay.     The information provided in this note is only as accurate as the sources available at the time of the update(s).    Thank you for the opportunity to participate in the care of this patient.    Myles Hyde RPH  10/14/2022 10:31 AM

## 2022-10-14 NOTE — ED TRIAGE NOTES
Pt has been drinking vodka all day. Wife witnessed patient fall.  Per patient hit postior head.  Wife went outside for cell service, when she came back in patient had vomited bloody emesis.  Patient reports blood in stools last night. Lung sounds course on right side posterior.     Triage Assessment     Row Name 10/13/22 6242       Triage Assessment (Adult)    Airway WDL X    Additional Documentation Breath Sounds (Group)       Respiratory WDL    Respiratory WDL X       Breath Sounds    Breath Sounds All Fields    All Lung Fields Breath Sounds Posterior:;crackles, coarse       Skin Circulation/Temperature WDL    Skin Circulation/Temperature WDL WDL       Cardiac WDL    Cardiac WDL X       Peripheral/Neurovascular WDL    Peripheral Neurovascular WDL WDL       Cognitive/Neuro/Behavioral WDL    Cognitive/Neuro/Behavioral WDL WDL

## 2022-10-15 ENCOUNTER — APPOINTMENT (OUTPATIENT)
Dept: PHYSICAL THERAPY | Facility: HOSPITAL | Age: 67
DRG: 811 | End: 2022-10-15
Attending: HOSPITALIST
Payer: COMMERCIAL

## 2022-10-15 ENCOUNTER — APPOINTMENT (OUTPATIENT)
Dept: OCCUPATIONAL THERAPY | Facility: HOSPITAL | Age: 67
DRG: 811 | End: 2022-10-15
Attending: HOSPITALIST
Payer: COMMERCIAL

## 2022-10-15 LAB
ALBUMIN SERPL BCG-MCNC: 2.8 G/DL (ref 3.5–5.2)
ALP SERPL-CCNC: 87 U/L (ref 40–129)
ALT SERPL W P-5'-P-CCNC: 9 U/L (ref 10–50)
ANION GAP SERPL CALCULATED.3IONS-SCNC: 12 MMOL/L (ref 7–15)
AST SERPL W P-5'-P-CCNC: 15 U/L (ref 10–50)
BILIRUB SERPL-MCNC: 0.3 MG/DL
BUN SERPL-MCNC: 21.2 MG/DL (ref 8–23)
CALCIUM SERPL-MCNC: 8.2 MG/DL (ref 8.8–10.2)
CHLORIDE SERPL-SCNC: 101 MMOL/L (ref 98–107)
CREAT SERPL-MCNC: 1.16 MG/DL (ref 0.67–1.17)
DEPRECATED HCO3 PLAS-SCNC: 24 MMOL/L (ref 22–29)
ERYTHROCYTE [DISTWIDTH] IN BLOOD BY AUTOMATED COUNT: 20.4 % (ref 10–15)
GFR SERPL CREATININE-BSD FRML MDRD: 69 ML/MIN/1.73M2
GLUCOSE BLDC GLUCOMTR-MCNC: 168 MG/DL (ref 70–99)
GLUCOSE BLDC GLUCOMTR-MCNC: 84 MG/DL (ref 70–99)
GLUCOSE SERPL-MCNC: 92 MG/DL (ref 70–99)
HCT VFR BLD AUTO: 24.7 % (ref 40–53)
HGB BLD-MCNC: 8 G/DL (ref 13.3–17.7)
HGB BLD-MCNC: 8.1 G/DL (ref 13.3–17.7)
HGB BLD-MCNC: 8.1 G/DL (ref 13.3–17.7)
HGB BLD-MCNC: 8.4 G/DL (ref 13.3–17.7)
HGB BLD-MCNC: 8.5 G/DL (ref 13.3–17.7)
MCH RBC QN AUTO: 32.3 PG (ref 26.5–33)
MCHC RBC AUTO-ENTMCNC: 32.8 G/DL (ref 31.5–36.5)
MCV RBC AUTO: 98 FL (ref 78–100)
OSMOLALITY UR: 572 MMOL/KG (ref 100–1200)
PLATELET # BLD AUTO: 488 10E3/UL (ref 150–450)
POTASSIUM SERPL-SCNC: 3.6 MMOL/L (ref 3.4–5.3)
PROT SERPL-MCNC: 5 G/DL (ref 6.4–8.3)
RBC # BLD AUTO: 2.51 10E6/UL (ref 4.4–5.9)
SODIUM SERPL-SCNC: 137 MMOL/L (ref 136–145)
WBC # BLD AUTO: 12.1 10E3/UL (ref 4–11)

## 2022-10-15 PROCEDURE — 85018 HEMOGLOBIN: CPT | Performed by: INTERNAL MEDICINE

## 2022-10-15 PROCEDURE — 258N000003 HC RX IP 258 OP 636: Performed by: HOSPITALIST

## 2022-10-15 PROCEDURE — 99233 SBSQ HOSP IP/OBS HIGH 50: CPT | Performed by: HOSPITALIST

## 2022-10-15 PROCEDURE — 97161 PT EVAL LOW COMPLEX 20 MIN: CPT | Mod: GP

## 2022-10-15 PROCEDURE — 250N000011 HC RX IP 250 OP 636: Performed by: INTERNAL MEDICINE

## 2022-10-15 PROCEDURE — 258N000003 HC RX IP 258 OP 636: Performed by: INTERNAL MEDICINE

## 2022-10-15 PROCEDURE — 97165 OT EVAL LOW COMPLEX 30 MIN: CPT | Mod: GO

## 2022-10-15 PROCEDURE — 96366 THER/PROPH/DIAG IV INF ADDON: CPT

## 2022-10-15 PROCEDURE — 36415 COLL VENOUS BLD VENIPUNCTURE: CPT | Performed by: INTERNAL MEDICINE

## 2022-10-15 PROCEDURE — 80053 COMPREHEN METABOLIC PANEL: CPT | Performed by: HOSPITALIST

## 2022-10-15 PROCEDURE — 210N000001 HC R&B IMCU HEART CARE

## 2022-10-15 PROCEDURE — G0378 HOSPITAL OBSERVATION PER HR: HCPCS

## 2022-10-15 PROCEDURE — 83935 ASSAY OF URINE OSMOLALITY: CPT | Performed by: INTERNAL MEDICINE

## 2022-10-15 PROCEDURE — 94640 AIRWAY INHALATION TREATMENT: CPT

## 2022-10-15 PROCEDURE — C9113 INJ PANTOPRAZOLE SODIUM, VIA: HCPCS | Performed by: INTERNAL MEDICINE

## 2022-10-15 PROCEDURE — 250N000013 HC RX MED GY IP 250 OP 250 PS 637: Performed by: HOSPITALIST

## 2022-10-15 RX ORDER — AZITHROMYCIN 250 MG/1
500 TABLET, FILM COATED ORAL DAILY
Status: DISCONTINUED | OUTPATIENT
Start: 2022-10-15 | End: 2022-10-16 | Stop reason: HOSPADM

## 2022-10-15 RX ORDER — PANTOPRAZOLE SODIUM 40 MG/1
40 TABLET, DELAYED RELEASE ORAL
Status: DISCONTINUED | OUTPATIENT
Start: 2022-10-16 | End: 2022-10-16 | Stop reason: HOSPADM

## 2022-10-15 RX ADMIN — AZITHROMYCIN MONOHYDRATE 500 MG: 250 TABLET ORAL at 16:29

## 2022-10-15 RX ADMIN — SODIUM CHLORIDE: 9 INJECTION, SOLUTION INTRAVENOUS at 04:23

## 2022-10-15 RX ADMIN — SODIUM CHLORIDE 8 MG/HR: 9 INJECTION, SOLUTION INTRAVENOUS at 00:59

## 2022-10-15 RX ADMIN — SODIUM CHLORIDE 8 MG/HR: 9 INJECTION, SOLUTION INTRAVENOUS at 04:23

## 2022-10-15 RX ADMIN — FLUTICASONE FUROATE 1 PUFF: 100 POWDER RESPIRATORY (INHALATION) at 09:17

## 2022-10-15 ASSESSMENT — ACTIVITIES OF DAILY LIVING (ADL)
ADLS_ACUITY_SCORE: 20

## 2022-10-15 NOTE — PLAN OF CARE
Goal Outcome Evaluation:    Pt denies pain, alert and oriented x4.   IVF infusing, NPO per MD order.   No withdrawls noted overnight.  Hgb 8.1

## 2022-10-15 NOTE — PROGRESS NOTES
10/15/22 1010   Appointment Info   Signing Clinician's Name / Credentials (OT) Tabitha Mccurdyyes, OTR/L   Living Environment   People in Home spouse   Current Living Arrangements house   Home Accessibility stairs to enter home   Number of Stairs, Main Entrance 3  (through garage)   Stair Railings, Main Entrance none   Transportation Anticipated family or friend will provide   Living Environment Comments 2 level home   Self-Care   Current Activity Tolerance good   Equipment Currently Used at Home none   Fall history within last six months yes   Number of times patient has fallen within last six months 1   Instrumental Activities of Daily Living (IADL)   IADL Comments stated indep. ADLs and mobility, does not drive or work currently   General Information   Onset of Illness/Injury or Date of Surgery 10/14/22   Patient/Family Therapy Goal Statement (OT) none stated   Cognitive Status Examination   Orientation Status person;place;time   Visual Perception   Visual Impairment/Limitations WFL   Range of Motion Comprehensive   General Range of Motion no range of motion deficits identified   Strength Comprehensive (MMT)   General Manual Muscle Testing (MMT) Assessment no strength deficits identified   Coordination   Upper Extremity Coordination No deficits were identified   Bed Mobility   Bed Mobility supine-sit;sit-supine   Supine-Sit [place] (Bed Mobility) independent   Sit-Supine [place] (Bed Mobility) independent   Transfers   Transfers toilet transfer;bed-chair transfer;sit-stand transfer   Transfer Skill: Bed to Chair/Chair to Bed   Bed-Chair [place] (Transfers) independent   Sit-Stand Transfer   Sit-Stand [place] (Transfers) independent   Toilet Transfer   Type (Toilet Transfer) sit-stand;stand-sit   [place] Level (Toilet Transfer) independent   Balance   Balance Assessment no deficits were identified   Activities of Daily Living   BADL Assessment/Intervention grooming   Grooming  Assessment/Training   [place] Level (Grooming) independent   Clinical Impression   Criteria for Skilled Therapeutic Interventions Met (OT) Evaluation only   OT Total Evaluation Time   OT Eval, Low Complexity Minutes (75910) 10   OT Goals   Therapy Frequency (OT) One time eval and treatment   OT Predicted Duration/Target Date for Goal Attainment 10/15/22   OT Goals Transfers   OT: Transfer Independent   OT Discharge Planning   OT Plan d/c OT   OT Discharge Recommendation (DC Rec) home with assist   OT Rationale for DC Rec independent w/ ADLs and mobility, anticipate d/c home w/ A for driving, shopping   OT Brief overview of current status independent w/ ADLs and mobility   Total Session Time   Total Session Time (sum of timed and untimed services) 10

## 2022-10-15 NOTE — CONSULTS
"Care Management Initial Consult    General Information  Assessment completed with: Family (Sister Dianne),    Type of CM/SW Visit: Initial Assessment    Primary Care Provider verified and updated as needed: Yes   Readmission within the last 30 days:        Reason for Consult: discharge planning, substance use concerns  Advance Care Planning:            Communication Assessment  Patient's communication style: spoken language (English or Bilingual)    Hearing Difficulty or Deaf: no   Wear Glasses or Blind: yes    Cognitive  Cognitive/Neuro/Behavioral: WDL                      Living Environment:   People in home:       Current living Arrangements:        Able to return to prior arrangements:         Family/Social Support:  Care provided by:    Provides care for:    Marital Status:   Wife, Sibling(s)          Description of Support System: Supportive, Uninvolved (Per anoop Dean, pt/spouse do not get along well and spouse also struggles with ETOH abuse. \"Our grandpa was also a drunk and some of Luigi's kids don't want to be involved with him\")         Current Resources:   Patient receiving home care services:       Community Resources: Chemical Dependency Services (Has been to treatment a few times, has a hard time following through with OP treatment and is resistant to therapy, per anoop Dean.  Sister hopes for residential treatment options that accept [facility] Medicare Adv.)  Equipment currently used at home: none  Supplies currently used at home:      Employment/Financial:  Employment Status:          Financial Concerns:             Lifestyle & Psychosocial Needs:  Social Determinants of Health     Tobacco Use: High Risk     Smoking Tobacco Use: Every Day     Smokeless Tobacco Use: Never     Passive Exposure: Not on file   Alcohol Use: Not on file   Financial Resource Strain: Not on file   Food Insecurity: Not on file   Transportation Needs: Not on file   Physical Activity: Not on file   Stress: Not on file "   Social Connections: Not on file   Intimate Partner Violence: Not on file   Depression: Not at risk     PHQ-2 Score: 0   Housing Stability: Not on file       Functional Status:  Prior to admission patient needed assistance:              Mental Health Status:  Mental Health Status: Current Concern       Chemical Dependency Status:  Chemical Dependency Status: Current Concern  Chemical Dependency Management:  (Per anoop Dean, pt struggles with followup to AA meetings)          Values/Beliefs:  Spiritual, Cultural Beliefs, Orthodoxy Practices, Values that affect care:                 Additional Information:    SWCM involved for CD treatment support.     SW spoke with sister Dianne over the phone. She lives in [place] and is hoping pt will go to residential treatment program. Pt/sis mother is on hospice and father was just moved to nursing home.     Per Dianne, pt has been to treatment a few times, has a hard time following through with AA after treatment and is resistant to therapy. Sister hopes for residential treatment options that accept [facility] Medicare Adv.    SW to f/u with patient for discharge goals.     1500: SWCM met with patient in room to discuss discharge planning. Pt states that he needs a taxi at discharge and would like a phone number to coordinate. RE: Chemical dependency. Pt states 'I just have to quit'. Pt states that from MD report, if he doesn't quit, he will die. Pt interested in SW sending referral to [employer] to start the process of scheduling a Rule 25 and OP CD treatment. SW provided [employer] flyer to pt in room with taxi phone number. Facesheet sent to [employer] with instructions to contact pt directly after hospital discharge.     Anticipate pt to return home with OP CD resources and taxi transport tomorrow. CM following.       LOURDES Gonzalez

## 2022-10-15 NOTE — PROGRESS NOTES
Prn Albuterol neb given. BS diminished/crackles pre/post treatment. On room air, pt is saturating high 90s.     Robert Treviño, RT

## 2022-10-15 NOTE — PLAN OF CARE
Physical Therapy Discharge Summary    Reason for therapy discharge:    All goals and outcomes met, no further needs identified.    Progress towards therapy goal(s). See goals on Care Plan in [facility] electronic health record for goal details.  Goals met    Therapy recommendation(s):    continue amb with nursing staff while remains in hospital

## 2022-10-15 NOTE — PROGRESS NOTES
GI PROGRESS NOTE  10/15/2022  Luigi Vieyra  1955  /-18    Subjective:  He denies having complaints and has tolerated liquids without pain or nausea.     Objective:    Patient Vitals for the past 24 hrs:   BP Temp Temp src Pulse Resp SpO2 Weight   10/15/22 1139 127/75 99.1  F (37.3  C) Oral 84 20 96 % --   10/15/22 0756 131/78 98  F (36.7  C) Oral 90 20 97 % --   10/15/22 0347 117/58 97.5  F (36.4  C) Oral 78 20 97 % 73.5 kg (162 lb 1.6 oz)   10/14/22 2344 123/72 97.7  F (36.5  C) Oral 77 20 97 % --   10/14/22 1923 127/72 98.7  F (37.1  C) Oral 78 22 95 % --   10/14/22 1800 103/54 -- -- 75 21 94 % --   10/14/22 1700 131/75 -- -- 82 25 99 % --   10/14/22 1600 131/82 -- -- 87 18 99 % --   10/14/22 1530 121/73 -- -- 74 11 96 % --   10/14/22 1500 123/75 -- -- 89 23 97 % --   10/14/22 1445 129/63 -- -- 74 23 100 % --   10/14/22 1430 130/55 -- -- 71 14 100 % --   10/14/22 1415 126/55 -- -- 79 22 100 % --   10/14/22 1410 127/59 -- -- 82 20 99 % --   10/14/22 1405 118/59 -- -- 83 24 99 % --   10/14/22 1400 133/63 -- -- 89 24 100 % --   10/14/22 1355 (!) 146/72 -- -- 94 19 98 % --   10/14/22 1350 (!) 155/74 -- -- 101 21 98 % --   10/14/22 1345 108/57 -- -- 78 24 100 % --   10/14/22 1340 115/69 -- -- 84 (!) 41 99 % --   10/14/22 1332 115/69 99.1  F (37.3  C) Oral 87 (!) 35 97 % --     Body mass index is 22.61 kg/m .  Gen: NAD  GI: Non-distended, BS positive, soft, non-tender    Laboratory  Recent Labs   Lab Test 10/15/22  1201 10/15/22  0551 10/15/22  0006 10/14/22  0601 10/14/22  0135 10/13/22  2035 10/13/22  1915 03/20/22  1717 03/20/22  1434   WBC  --  12.1*  --   --  21.1*  --  16.5*   < > 13.0*   HGB 8.5* 8.1*  8.1* 8.0*   < > 9.6*   < > 9.6*   < > 9.7*   MCV  --  98  --   --  94  --  97   < > 107*   PLT  --  488*  --   --  467*  --  517*   < > 607*   INR  --   --   --   --   --   --  1.00  --  1.00    < > = values in this interval not displayed.     Recent Labs   Lab Test 10/15/22  1138 10/15/22  0801  10/15/22  0551 10/14/22  1255 10/14/22  0601 10/14/22  0407 10/14/22  0135   NA  --   --  137  --  128*  --  130*   POTASSIUM  --   --  3.6  --  3.7  --  3.8   CHLORIDE  --   --  101  --  93*  --  93*   CO2  --   --  24  --  26  --  26   BUN  --   --  21.2  --  41.8*  --  43.7*   CR  --   --  1.16  --  1.27*  --  1.28*   ANIONGAP  --   --  12  --  9  --  11   PINO  --   --  8.2*  --  8.0*  --  8.2*   * 84 92   < > 111*   < > 113*    < > = values in this interval not displayed.     Recent Labs   Lab Test 10/15/22  0551 10/13/22  1915 10/12/22  1149 03/21/22  0545 03/20/22  2040 03/20/22  1434 11/21/20  1231 12/27/18  1017   ALBUMIN 2.8* 3.1* 4.0   < >  --  2.7*   < >  --    BILITOTAL 0.3 0.2 0.8   < >  --  1.0   < >  --    ALT 9* 14 13   < >  --  58   < >  --    AST 15 27 24   < >  --  122*   < >  --    ALKPHOS 87 108 138*   < >  --  155*   < >  --    PROTEIN  --   --   --   --  Negative  --   --  Trace*   LIPASE  --  82*  --   --   --  676*  --   --     < > = values in this interval not displayed.     10/14/2022 EGD  Findings:        LA Grade D (one or more mucosal breaks involving at least 75% of        esophageal circumference) esophagitis with no bleeding was found 25 to        40 cm from the incisors.        One benign-appearing, intrinsic mild stenosis was found 35 to 40 cm from        the incisors. [length in cm]. [Traversed].        The entire examined stomach was normal.        One partially obstructing non-bleeding cratered duodenal ulcer with a        visible vessel was found in the duodenal bulb. The lesion was 13 mm in        largest dimension. Area was successfully injected with 4 mL of a 0.1        mg/mL solution of epinephrine for drug delivery. To stop active        bleeding, one hemostatic clip was successfully placed. There was no        bleeding at the end of the procedure.                                                                                     Moderate Sedation:        An  independent trained observer was present and continuously monitored        the patient.   Impression:            - LA Grade D reflux esophagitis with no bleeding.                          - Benign-appearing esophageal stenosis.                          - Normal stomach.                          - Partially obstructing non-bleeding duodenal ulcer                          with a visible vessel. Injected. Clip was placed.                          - No specimens collected.   Recommendation:        - Clear liquid diet.                          - Continue present medications.     CT Chest/Abdomen/Pelvis w Contrast    Result Date: 10/13/2022  EXAM: CT CHEST/ABDOMEN/PELVIS W CONTRAST LOCATION: [facility] DATE/TIME: 10/13/2022 8:59 PM INDICATION: Multiple falls, chronic alcohol use, drop in hemoglobin, hypotensive and tachycardic COMPARISON: 03/20/2022 TECHNIQUE: CT scan of the chest, abdomen, and pelvis was performed following injection of IV contrast. Multiplanar reformats were obtained. Dose reduction techniques were used. CONTRAST: 72 mL Isovue 370 FINDINGS: LOWER NECK: Right thyroid 0.5 cm low-attenuation nodule. Left thyroid gland appears unremarkable. LUNGS AND PLEURA: Patchy groundglass opacities in the right lower lobe with additional subtle groundglass opacities in the right upper lobe, right middle lobe, and left lower lobe. No pleural effusion or pneumothorax. MEDIASTINUM/AXILLAE: No lymphadenopathy. No pericardial effusion. CORONARY ARTERY CALCIFICATION: None. HEPATOBILIARY: Normal. PANCREAS: Normal. SPLEEN: Small splenules in the left upper quadrant measuring up to 2.5 cm. ADRENAL GLANDS: Normal. KIDNEYS/BLADDER: Several low-attenuation lesions in the left kidney, with the larger ones compatible with simple cysts, for which no follow-up is indicated, and the smaller ones too small to characterize, but unchanged from prior. Right kidney appears normal. No hydronephrosis. Urinary  bladder appears normal. BOWEL: No obstruction or inflammatory change. Normal appendix. No evidence of acute appendicitis. LYMPH NODES: No lymphadenopathy. VASCULATURE: Moderate atherosclerotic calcifications. PELVIC ORGANS: Minimal prostatic calcification. MUSCULOSKELETAL: New minimal height loss involving T12 with thin sclerotic line paralleling the superior endplate. No significant retropulsion. Otherwise, no acute fracture. Chronic bilateral rib fractures. Multilevel degenerative changes of the spine.     IMPRESSION: 1.  New T12 compression fracture with minimal height loss, age indeterminate. No significant retropulsion. 2.  Otherwise, no acute traumatic findings in the chest, abdomen, or pelvis. 3.  Diffuse groundglass opacities throughout the majority of both lungs, suspicious for multifocal atypical infection versus nonspecific pneumonitis.    CT Cervical Spine w/o Contrast    Result Date: 10/13/2022  EXAM: CT HEAD W/O CONTRAST, CT CERVICAL SPINE W/O CONTRAST LOCATION: [facility] DATE/TIME: 10/13/2022 8:52 PM INDICATION: Fall, closed head injury COMPARISON:  CT head 03/20/2022. TECHNIQUE: 1) Routine CT Head without IV contrast. Multiplanar reformats. Dose reduction techniques were used. 2) Routine CT Cervical Spine without IV contrast. Multiplanar reformats. Dose reduction techniques were used. FINDINGS: HEAD CT: INTRACRANIAL CONTENTS: No intracranial hemorrhage, extraaxial collection, or mass effect.  No CT evidence of acute infarct. Mild presumed chronic small vessel ischemic changes. Moderate generalized volume loss. No hydrocephalus. VISUALIZED ORBITS/SINUSES/MASTOIDS: No intraorbital abnormality. No paranasal sinus mucosal disease. No middle ear or mastoid effusion. BONES/SOFT TISSUES: No acute abnormality. CERVICAL SPINE CT: VERTEBRA: Normal vertebral body heights and alignment. No fracture or posttraumatic subluxation. CANAL/FORAMINA: Multilevel cervical spondylosis.  No  high-grade canal stenosis. Severe left C3-C4, right C4-C5, and bilateral C5-C6 foraminal stenosis. PARASPINAL: No extraspinal abnormality.     IMPRESSION: HEAD CT: 1.  No CT evidence for acute intracranial process. 2.  Brain atrophy and presumed chronic microvascular ischemic changes as above. CERVICAL SPINE CT: 1.  No CT evidence for acute fracture or post traumatic subluxation.    CT Head w/o Contrast    Result Date: 10/13/2022  EXAM: CT HEAD W/O CONTRAST, CT CERVICAL SPINE W/O CONTRAST LOCATION: [facility] DATE/TIME: 10/13/2022 8:52 PM INDICATION: Fall, closed head injury COMPARISON:  CT head 03/20/2022. TECHNIQUE: 1) Routine CT Head without IV contrast. Multiplanar reformats. Dose reduction techniques were used. 2) Routine CT Cervical Spine without IV contrast. Multiplanar reformats. Dose reduction techniques were used. FINDINGS: HEAD CT: INTRACRANIAL CONTENTS: No intracranial hemorrhage, extraaxial collection, or mass effect.  No CT evidence of acute infarct. Mild presumed chronic small vessel ischemic changes. Moderate generalized volume loss. No hydrocephalus. VISUALIZED ORBITS/SINUSES/MASTOIDS: No intraorbital abnormality. No paranasal sinus mucosal disease. No middle ear or mastoid effusion. BONES/SOFT TISSUES: No acute abnormality. CERVICAL SPINE CT: VERTEBRA: Normal vertebral body heights and alignment. No fracture or posttraumatic subluxation. CANAL/FORAMINA: Multilevel cervical spondylosis.  No high-grade canal stenosis. Severe left C3-C4, right C4-C5, and bilateral C5-C6 foraminal stenosis. PARASPINAL: No extraspinal abnormality.     IMPRESSION: HEAD CT: 1.  No CT evidence for acute intracranial process. 2.  Brain atrophy and presumed chronic microvascular ischemic changes as above. CERVICAL SPINE CT: 1.  No CT evidence for acute fracture or post traumatic subluxation.    Assessment:  Hematemesis, found to have severe esophagitis and a DU on EGD, S/P clip and epi injection.   Hgb is improved without further bleeding.  He has a history of alcohol and tobacco abuse as well which are contributing factors.    Patient Active Problem List   Diagnosis     Mild persistent asthma     Alcohol abuse     Tobacco abuse     CARDIOVASCULAR SCREENING; LDL GOAL LESS THAN 130     Alcohol dependence (H)     Seasonal allergic rhinitis     Uncontrolled hypertension     Mixed hyperlipidemia     Hyponatremia     Anemia, unspecified type     Alcoholic gastritis, presence of bleeding unspecified, unspecified chronicity     Hypotension due to hypovolemia     MAIA (acute kidney injury) (H)     Elevated lactic acid level     Transaminitis     Syncope     Thrombocytosis     Leukocytosis     Acute blood loss anemia        Plan:    1. Continue PPI.  2.  CLD and ADAT.  3.  Recommend etoh sobriety and smoking cessation.  4.  Outpatient EGD and colonoscopy recommended in 2 months.  [facility] will contact him to schedule.  5.  Likely discharge home tomorrow if doing well.    Approximately 15 minutes of total time was spent providing patient care, including patient evaluation, reviewing documentation/test results and .     Jessica Soto PA-C  [facility]  [phone]

## 2022-10-15 NOTE — PROGRESS NOTES
.  Hospitalist Progress Note    Assessment/Plan    Principal Problem:    Acute blood loss anemia    Luigi Vieyra is admitted on 10/14/2022. He is a 66 y/o man with PMH of alcohol use, hypertension and asthma admitted for further evaluation and management of multiple falls, hematemesis and hematochezia.  Initial investigation done in outside facility showed acute drop in hemoglobin and hyponatremia.  Initial trauma work-up negative; he received blood transfusion prior to transfer.  Patient is being admitted and managed for acute blood loss anemia secondary to GIB.       # Acute Blood Loss Anemia due to GIB   -CT chest/abd/pelvis - no active signs of bleeding.  -Differential likely ann jay vrs PUD vrs esophageal varices.  -S/p 1 U PRBC prior to transfer from outside facility  -Serial hemoglobin monitoring, transfuse as needed for hemoglobin less than 7. Last Hb 8  -Continue PPI  -GI following, S/p EGD which showed reflux esophagitis with no bleeding.  Also seen was a partially obstructing non bleeding duodenal ulcer with a visible vessel which was injected and clip placed.  -Monitor for overt bleeding  -Started on CLD, advance diet as tolerated.    Hyponatremia likely hypovolemic - resolved     # Falls - multifactorial  -May be due to h/o alcohol abuse, electrolyte abnormalities  -Place on fall precautions  -PT/OT following      #New T12 compression fracture  -Symptomatic care with optimal pain management  -PT/OT following       #Abnormal chest CT  -CT chest showed diffuse ground glass opacities in both lungs, s/o non specific pneumonitis vrs atypical infection  -Procalcitonin elevated. WBC trending down.   -Started on Azithromycin X 5 days, started on 10/14  -May need out-patient imaging to follow up on resolution.      # Alcohol Use   -Monitor on telemetry  -Monitor for signs of alcohol withdrawal  -Will implement CIWA protocol if signs of alcohol withdrawal ensues  -Thiamine and multivitamin  supplementation    Asthma - Not in exacerbation  -Prn inhalers     Barriers to Discharge:  Hb monitoring, ETOH withdrawal    Anticipated discharge date/Disposition: 1-2 days    Subjective  Chart reviewed and events noted.  Patient seen and examined.   Denies any abd pain, N/V today. No signs of bleeding noted. No melena as pt hasnt had any BM yet.     Objective    Vital signs in last 24 hours  Temp:  [97.5  F (36.4  C)-99.1  F (37.3  C)] 99.1  F (37.3  C)  Pulse:  [] 84  Resp:  [11-41] 20  BP: (103-155)/(54-82) 127/75  SpO2:  [94 %-100 %] 96 % [unfilled] O2 Device: Nasal cannula    Weight:   [unfilled] Weight change: 0.454 kg (1 lb)    Intake/Output last 3 shifts  I/O last 3 completed shifts:  In: 745 [I.V.:745]  Out: 1250 [Urine:1250]  Body mass index is 22.61 kg/m .    Physical Exam    General Appearance:    Alert, cooperative, no distress, appears stated age   Lungs:     CTAB   Cardiovascular:    RRR   Abdomen:     Soft, non-tender,ND   Neurologic:   Awake alert, oriented, speech +     Pertinent Labs   Lab Results: personally reviewed.   Recent Labs   Lab 10/15/22  0551 10/14/22  0601 10/14/22  0135 10/13/22  1915 10/12/22  1149    128* 130* 130* 131*   CO2 24 26 26 23 27   BUN 21.2 41.8* 43.7* 42.8* 42.4*   ALBUMIN 2.8*  --   --  3.1* 4.0   ALKPHOS 87  --   --  108 138*   ALT 9*  --   --  14 13   AST 15  --   --  27 24     Recent Labs   Lab 10/15/22  0551 10/15/22  0006 10/14/22  1747 10/14/22  0601 10/14/22  0135 10/13/22  2035 10/13/22  1915   WBC 12.1*  --   --   --  21.1*  --  16.5*   HGB 8.1*  8.1* 8.0* 8.7*   < > 9.6*   < > 9.6*   HCT 24.7*  --   --   --  27.8*  --  27.9*   *  --   --   --  467*  --  517*    < > = values in this interval not displayed.     No results for input(s): CKTOTAL, TROPONINI in the last 168 hours.    Invalid input(s): TROPONINT, CKMBINDEX  Invalid input(s): POCGLUFGR    Medications  Drug and lactation database from the [employer] of  Medicine:  http://toxnet.nlm.nih.gov/cgi-bin/sis/htmlgen?LACT      Pertinent Radiology   Radiology Results:  Personally reviewed impressions    Reviewed labs in last 24 hours.    Advanced Care Planning:  Discharge Planning discussed with patient  Total time with this patient is 35 min with greater than 50% of time spent in coordination of care.  Care discussed and coordinated with patient, RN, SW/CM    This Note is created using dragon voice recognition software.  Errors in spelling or words which seems out of context are unintentional.  Sounds alike errors may have escaped editing.    Abby Estrada MD  Hospitalist

## 2022-10-15 NOTE — PLAN OF CARE
Problem: Gastrointestinal Bleeding  Goal: Hemostasis  Outcome: Progressing   Goal Outcome Evaluation:    At 0600 lab draw his hemoglobin results were 8.1, then at 1200 hemoglobin improved to 8.5. No nausea or vomiting and no bowel movements. Started on clear liquid diet, well tolerated and now advanced to full liquid diet. CIWA scores this AM were 0 and 0.

## 2022-10-15 NOTE — PROGRESS NOTES
10/15/22 1400   Appointment Info   Signing Clinician's Name / Credentials (PT) Genny Sneed PT   Living Environment   People in Home spouse   Current Living Arrangements house   Home Accessibility stairs within home   Number of Stairs, Main Entrance 3   Stair Railings, Main Entrance none   Number of Stairs, Within Home, Primary nine   Stair Railings, Within Home, Primary railing on left side (ascending)   Transportation Anticipated family or friend will provide   Self-Care   Usual Activity Tolerance good   Current Activity Tolerance good   Regular Exercise No   Equipment Currently Used at Home none   Fall history within last six months yes   Number of times patient has fallen within last six months 1   Activity/Exercise/Self-Care Comment patient indeepndent with mobility and ADL .Retired last year   General Information   Onset of Illness/Injury or Date of Surgery 10/14/22   Referring Physician Bert Uribe   Patient/Family Therapy Goals Statement (PT) return home,considering CD tX   Pertinent History of Current Problem (include personal factors and/or comorbidities that impact the POC) admitted after fall while intixicated ,hx of ETOH with previoyus CD TX,,GI bleed ,found to have T12 compression fx   Existing Precautions/Restrictions fall   Weight-Bearing Status - LLE full weight-bearing   Weight-Bearing Status - RLE full weight-bearing   General Observations in bed ,cooperative   Cognition   Affect/Mental Status (Cognition) WFL   Orientation Status (Cognition) oriented x 4   Pain Assessment   Patient Currently in Pain No   Range of Motion (ROM)   Range of Motion ROM is WFL   Strength (Manual Muscle Testing)   Strength (Manual Muscle Testing) strength is WFL   Bed Mobility   Supine-Sit [place] (Bed Mobility) independent   Sit-Supine [place] (Bed Mobility) independent   Sit-Stand Transfer   Sit-Stand [place] (Transfers) supervision   Assistive Device (Sit-Stand Transfers) other (see  comments)  (none)   Stand-Sit Transfer   Stand-Sit [place] (Transfers) supervision   Assistive Device (Stand-Sit Transfers) other (see comments)  (none)   Gait/Stairs (Locomotion)   [place] Level (Gait) supervision   Assistive Device (Gait) other (see comments)  (none)   Distance in Feet (Required for LE Total Joints) 160 feet   Pattern (Gait) step-through   Deviations/Abnormal Patterns (Gait) base of support, wide   Maintains Weight-bearing Status (Gait) able to maintain   Negotiation (Stairs) number of steps;handrail location;stairs independence;ascending technique;descending technique   Comment, (Gait/Stairs) 13 steps up /down with one rail ,sBA   Clinical Impression   Criteria for Skilled Therapeutic Intervention No problems identified which require skilled intervention;Evaluation only   Clinical Presentation (PT Evaluation Complexity) Stable/Uncomplicated   Clinical Presentation Rationale pt presents as med diagnosed   Clinical Decision Making (Complexity) low complexity   Anticipated Equipment Needs at Discharge (PT) other (see comments)  (none)   Risk & Benefits of therapy have been explained evaluation/treatment results reviewed;patient   Clinical Impression Comments Patient is a 66 yo male admitted with GI bleed,fall,ETOH presents with independent mobility annnd steady gait .Able to negotiate 113 steps up/down with no difficulty..No acute,skilled PT is needed .Recommend amb with nursing staff while remains in hospital   PT Total Evaluation Time   PT Eval, Low Complexity Minutes (28147) 20   Physical Therapy Goals   PT Frequency Daily   PT Predicted Duration/Target Date for Goal Attainment 10/15/22   PT Goals Bed Mobility;Transfers;Gait;Stairs   PT: Bed Mobility Independent;Supine to/from sit;Goal Met   PT: Transfers Independent;Sit to/from stand;Goal Met   PT: Gait Supervision/stand-by assist;150 feet;Goal Met   PT: Stairs Modified independent;Supervision/stand-by assist;Greater than 10  stairs;Rail on left;Goal Met   PT Discharge Planning   PT Plan d/c PT   PT Discharge Recommendation (DC Rec) home   PT Rationale for DC Rec patient independent-met functional goals   PT Brief overview of current status patient is a 68 yo with hx of ETOH,HTN-lives with wife who works still,pt retired last year   Total Session Time   Total Session Time (sum of timed and untimed services) 20

## 2022-10-16 VITALS
DIASTOLIC BLOOD PRESSURE: 78 MMHG | BODY MASS INDEX: 23.15 KG/M2 | OXYGEN SATURATION: 97 % | SYSTOLIC BLOOD PRESSURE: 149 MMHG | TEMPERATURE: 98.6 F | WEIGHT: 165.4 LBS | HEIGHT: 71 IN | RESPIRATION RATE: 20 BRPM | HEART RATE: 79 BPM

## 2022-10-16 LAB
GLUCOSE BLDC GLUCOMTR-MCNC: 96 MG/DL (ref 70–99)
HGB BLD-MCNC: 7.8 G/DL (ref 13.3–17.7)
HGB BLD-MCNC: 8.2 G/DL (ref 13.3–17.7)
HGB BLD-MCNC: 8.7 G/DL (ref 13.3–17.7)

## 2022-10-16 PROCEDURE — 99239 HOSP IP/OBS DSCHRG MGMT >30: CPT | Performed by: HOSPITALIST

## 2022-10-16 PROCEDURE — 85018 HEMOGLOBIN: CPT | Performed by: INTERNAL MEDICINE

## 2022-10-16 PROCEDURE — G0378 HOSPITAL OBSERVATION PER HR: HCPCS

## 2022-10-16 PROCEDURE — 250N000013 HC RX MED GY IP 250 OP 250 PS 637: Performed by: HOSPITALIST

## 2022-10-16 PROCEDURE — 36415 COLL VENOUS BLD VENIPUNCTURE: CPT | Performed by: INTERNAL MEDICINE

## 2022-10-16 PROCEDURE — 250N000013 HC RX MED GY IP 250 OP 250 PS 637: Performed by: INTERNAL MEDICINE

## 2022-10-16 RX ORDER — LANOLIN ALCOHOL/MO/W.PET/CERES
100 CREAM (GRAM) TOPICAL DAILY
Qty: 90 TABLET | Refills: 3 | Status: SHIPPED | OUTPATIENT
Start: 2022-10-16 | End: 2024-05-26

## 2022-10-16 RX ORDER — AZITHROMYCIN 500 MG/1
500 TABLET, FILM COATED ORAL DAILY
Qty: 4 TABLET | Refills: 0 | Status: SHIPPED | OUTPATIENT
Start: 2022-10-16 | End: 2022-10-20

## 2022-10-16 RX ORDER — MULTIVITAMIN
1 TABLET ORAL DAILY
Qty: 30 TABLET | Refills: 0 | Status: SHIPPED | OUTPATIENT
Start: 2022-10-16 | End: 2024-05-26

## 2022-10-16 RX ORDER — PANTOPRAZOLE SODIUM 40 MG/1
40 TABLET, DELAYED RELEASE ORAL
Qty: 30 TABLET | Refills: 1 | Status: SHIPPED | OUTPATIENT
Start: 2022-10-16 | End: 2024-05-26

## 2022-10-16 RX ORDER — FOLIC ACID 1 MG/1
1 TABLET ORAL DAILY
Qty: 30 TABLET | Refills: 0 | Status: SHIPPED | OUTPATIENT
Start: 2022-10-16 | End: 2022-11-18

## 2022-10-16 RX ADMIN — PANTOPRAZOLE SODIUM 40 MG: 40 TABLET, DELAYED RELEASE ORAL at 06:13

## 2022-10-16 RX ADMIN — FLUTICASONE FUROATE 1 PUFF: 100 POWDER RESPIRATORY (INHALATION) at 08:52

## 2022-10-16 RX ADMIN — THIAMINE HCL TAB 100 MG 100 MG: 100 TAB at 08:52

## 2022-10-16 ASSESSMENT — ACTIVITIES OF DAILY LIVING (ADL)
ADLS_ACUITY_SCORE: 20

## 2022-10-16 NOTE — DISCHARGE SUMMARY
[facility] MEDICINE  DISCHARGE SUMMARY     Primary Care Physician: Gatito Ware  Admission Date: 10/14/2022   Discharge Provider: Abby Estrada MD Discharge Date: 10/16/2022   Diet:   Active Diet and Nourishment Order   Procedures     Low Saturated Fat Na <2400 mg     Diet       Code Status: Full Code   Activity: DCACTIVITY: Activity as tolerated        Condition at Discharge: Stable     REASON FOR PRESENTATION(See Admission Note for Details)     multiple falls, hematemesis and hematochezia    PRINCIPAL & ACTIVE DISCHARGE DIAGNOSES     Principal Problem:    Acute blood loss anemia      PENDING LABS     Unresulted Labs Ordered in the Past 30 Days of this Admission     Date and Time Order Name Status Description    10/13/2022  8:50 PM Prepare red blood cells (unit) Preliminary             PROCEDURES ( this hospitalization only)      Procedure(s):  ESOPHAGOGASTRODUODENOSCOPY (EGD) with epi injection and clip placement    RECOMMENDATIONS TO OUTPATIENT PROVIDER FOR F/U VISIT     Follow-up Appointments     Follow-up and recommended labs and tests       Follow up with primary care provider, Gatito Ware, within 7 days   for hospital follow- up.  The following labs/tests are recommended: CBC,   BMP.    Follow up with [facility] in 2 months for repeat EGD  Follow up for out-pt rehab programs             DISPOSITION     Home    SUMMARY OF HOSPITAL COURSE:      Luigi Vieyra is admitted on 10/14/2022. He is a 68 y/o man with PMH of alcohol use, hypertension and asthma admitted for further evaluation and management of multiple falls, hematemesis and hematochezia.  Initial investigation done in outside facility showed acute drop in hemoglobin and hyponatremia.  Initial trauma work-up negative; he received blood transfusion prior to transfer.  Patient is being admitted and managed for acute blood loss anemia secondary to GIB.       # Acute Blood Loss Anemia due to GIB   -CT  chest/abd/pelvis - no active signs of bleeding.  -Differential likely ann jay vrs PUD vrs esophageal varices.  -S/p 1 U PRBC prior to transfer from outside facility  -Serial hemoglobin monitoring, transfuse as needed for hemoglobin less than 7. Last Hb 8.2 on discharge  -Continue PPI  -GI following, S/p EGD which showed reflux esophagitis with no bleeding.  Also seen was a partially obstructing non bleeding duodenal ulcer with a visible vessel which was injected and clip placed.Needs repeat EGD in 2 months     Hyponatremia likely hypovolemic - resolved     # Falls - multifactorial  -May be due to h/o alcohol abuse, electrolyte abnormalities  -Place on fall precautions  -PT/OT following, recommend discharge home on discharge      #New T12 compression fracture  -Symptomatic care with optimal pain management  -PT/OT following        #Abnormal chest CT  -CT chest showed diffuse ground glass opacities in both lungs, s/o non specific pneumonitis vrs atypical infection  -Procalcitonin elevated. WBC trending down.   -Treat with Azithromycin X 5 days  -May need out-patient follow up imaging to look for resolution.      # Alcohol Use   -Continue thiamine, folic acid  and multivitamin supplementation     Asthma - Not in exacerbation  -Prn inhalers    .Patient stable to be discharged home today. Please refer to discharge medications and instructions for more details.      Discharge Medications with Med changes:     Current Discharge Medication List      START taking these medications    Details   azithromycin (ZITHROMAX) 500 MG tablet Take 1 tablet (500 mg) by mouth daily for 4 days  Qty: 4 tablet, Refills: 0    Associated Diagnoses: Atypical pneumonia      folic acid (FOLVITE) 1 MG tablet Take 1 tablet (1 mg) by mouth daily  Qty: 30 tablet, Refills: 0    Associated Diagnoses: Alcohol dependence in remission (H)      multivitamin (ONE-DAILY) tablet Take 1 tablet by mouth daily  Qty: 30 tablet, Refills: 0    Associated  Diagnoses: Alcohol dependence in remission (H)      pantoprazole (PROTONIX) 40 MG EC tablet Take 1 tablet (40 mg) by mouth every morning (before breakfast)  Qty: 30 tablet, Refills: 1    Associated Diagnoses: Acute blood loss anemia         CONTINUE these medications which have CHANGED    Details   thiamine (B-1) 100 MG tablet Take 1 tablet (100 mg) by mouth daily  Qty: 90 tablet, Refills: 3    Associated Diagnoses: Alcohol dependence in remission (H)         CONTINUE these medications which have NOT CHANGED    Details   albuterol (PROAIR HFA/PROVENTIL HFA/VENTOLIN HFA) 108 (90 Base) MCG/ACT inhaler Inhale 2 puffs into the lungs every 4 hours as needed for shortness of breath / dyspnea or wheezing  Qty: 18 g, Refills: 0    Comments: Pharmacy may dispense brand covered by insurance (Proair, or proventil or ventolin or generic albuterol inhaler)  Associated Diagnoses: Mild persistent asthma without complication      fluticasone (ARNUITY ELLIPTA) 100 MCG/ACT inhaler Inhale 1 puff into the lungs daily  Qty: 60 each, Refills: 1    Associated Diagnoses: Mild persistent asthma without complication      losartan (COZAAR) 25 MG tablet TAKE 1 TABLET BY MOUTH EVERY MORNING  Qty: 90 tablet, Refills: 3    Associated Diagnoses: Benign essential hypertension      metoprolol succinate ER (TOPROL XL) 25 MG 24 hr tablet TAKE 1 TABLET BY MOUTH EVERY MORNING  Qty: 90 tablet, Refills: 3    Associated Diagnoses: Benign essential hypertension      order for DME Equipment being ordered: Digital home blood pressure monitor kit  Qty: 1 each, Refills: 0    Associated Diagnoses: Uncontrolled hypertension      spacer (OPTICHAMBER YIMI) holding chamber Use with inhaler (albuterol and beclomethasone)  Qty: 1 each, Refills: 0    Associated Diagnoses: Mild persistent asthma without complication                   Rationale for medication changes:      See med rec        Consults   GASTROENTEROLOGY IP CONSULT  PHYSICAL THERAPY ADULT IP  CONSULT  OCCUPATIONAL THERAPY ADULT IP CONSULT    Immunizations given this encounter     Most Recent Immunizations   Administered Date(s) Administered     COVID-19,PF,Moderna 03/28/2022     COVID-19,PF,Moderna BIVALENT Booster 10/12/2022     Flu, Unspecified 09/12/2016     Influenza Quad, Recombinant, pf(RIV4) (Flublok) 09/15/2021     Influenza Vaccine IM > 6 months Valent IIV4 (Alfuria,Fluzone) 10/19/2020     Influenza, Quad, High Dose, Pf, 65yr+ (Fluzone HD) 10/12/2022     Pneumococcal 23 valent 04/14/2022     TDAP Vaccine (Adacel) 03/10/2016           Anticoagulation Information      Recent INR results:   Recent Labs   Lab 10/13/22  1915   INR 1.00     Warfarin doses (if applicable) or name of other anticoagulant: N/A      SIGNIFICANT IMAGING FINDINGS     No results found for this visit on 10/14/22.    SIGNIFICANT LABORATORY FINDINGS     Discharge Orders        Reason for your hospital stay    GI bleed     Follow-up and recommended labs and tests     Follow up with primary care provider, Gatito Ware, within 7 days for hospital follow- up.  The following labs/tests are recommended: CBC, BMP.    Follow up with [facility] in 2 months for repeat EGD  Follow up for out-pt rehab programs     Activity    Your activity upon discharge: activity as tolerated     Diet    Follow this diet upon discharge: Orders Placed This Encounter      Low Saturated Fat Na <2400 mg       Examination   Physical Exam   Temp:  [98.6  F (37  C)-99.8  F (37.7  C)] 98.6  F (37  C)  Pulse:  [75-94] 79  Resp:  [18-20] 20  BP: (108-149)/(58-82) 149/78  SpO2:  [96 %-98 %] 97 %  Wt Readings from Last 1 Encounters:   10/16/22 75 kg (165 lb 6.4 oz)       General Appearance:    Alert, cooperative, no distress, appears stated age   Lungs:     CTAB   Cardiovascular:    RRR   Abdomen:     Soft, non-tender,ND   Neurologic:   Awake alert, oriented, speech +           Please see EMR for more detailed significant labs, imaging, consultant notes etc.    I,  Abby Estrada MD, personally saw the patient today and spent greater than 30 minutes discharging this patient.    Abby Estrada MD  [facility]    CC:Gatito Ware

## 2022-10-16 NOTE — PLAN OF CARE
Problem: Gastrointestinal Bleeding  Goal: Optimal Coping with Acute Illness  Outcome: Progressing  Goal: Hemostasis  Outcome: Progressing     Problem: Risk for Delirium  Goal: Optimal Coping  Outcome: Progressing  Goal: Improved Behavioral Control  Outcome: Progressing  Goal: Improved Attention and Thought Clarity  Outcome: Progressing  Goal: Improved Sleep  Outcome: Progressing   Goal Outcome Evaluation:       Pt denies pain nausea sob.  Pt Hgb at 6pm was 8.4 with next check 12 a.m.  PT is on RA with tele reading NSR.  Pt is axox4 calm and cooperative.

## 2022-10-16 NOTE — PLAN OF CARE
Problem: Plan of Care - These are the overarching goals to be used throughout the patient stay.    Goal: Readiness for Transition of Care  Outcome: Met     Goal Outcome Evaluation:  Denies pain.  NSR on tele.  Hgb 8.2 this afternoon.  Ambulating in room independently.  Discharge instructions and education reviewed with patient.  Questions answered.  Belongings sent with patient.  Discharging to home, transported by family.

## 2022-10-16 NOTE — PLAN OF CARE
Problem: Plan of Care - These are the overarching goals to be used throughout the patient stay.    Goal: Plan of Care Review  Description: The Plan of Care Review/Shift note should be completed every shift.  The Outcome Evaluation is a brief statement about your assessment that the patient is improving, declining, or no change.  This information will be displayed automatically on your shift note.  Outcome: Progressing     Problem: Gastrointestinal Bleeding  Goal: Optimal Coping with Acute Illness  Outcome: Progressing   Goal Outcome Evaluation:       Patient denied pain all shift, independent in the room. Rhythm=NSR, 02 sat's 98% on room air and VSS.

## 2022-10-16 NOTE — PROGRESS NOTES
Care Management Discharge Note    Discharge Date: 10/16/2022       Discharge Disposition: Home    Discharge Services:  Outpatient Chemical Dependency resources provided    Discharge DME:      Discharge Transportation: taxi    Private pay costs discussed: transportation costs    Education Provided on the Discharge Plan:    Persons Notified of Discharge Plans: MD placed orders  Patient/Family in Agreement with the Plan:      Handoff Referral Completed: Yes    Additional Information:      Pt to return home with OP CD resources and taxi transport. Referral was sent to [facility] and their team will f/up with patient post-hospital discharge. No additional CM needs.         ALEXA GonzalezSW

## 2022-10-17 ENCOUNTER — PATIENT OUTREACH (OUTPATIENT)
Dept: FAMILY MEDICINE | Facility: CLINIC | Age: 67
End: 2022-10-17

## 2022-10-20 ENCOUNTER — TELEPHONE (OUTPATIENT)
Dept: FAMILY MEDICINE | Facility: CLINIC | Age: 67
End: 2022-10-20

## 2022-10-20 NOTE — TELEPHONE ENCOUNTER
Patient Quality Outreach    Patient is due for the following:   Colon Cancer Screening  Physical Annual Wellness Visit      Topic Date Due     Hepatitis B Vaccine (1 of 3 - 3-dose series) Never done     Zoster (Shingles) Vaccine (1 of 2) Never done       Next Steps:   Schedule a Annual Wellness Visit    Type of outreach:    Sent letter.      Questions for provider review:    None     PENNIE Rivera LPN

## 2022-10-20 NOTE — LETTER
October 20, 2022    To  Luigi Vieyra  [address] [facility] [address]      If you have completed these tests at another facility, please call your clinic with the details about when, where, and the results, or have your records sent to our clinic so that we can best coordinate your care.     You are in particular need of attention regarding the following:     Call or MyChart message your clinic to schedule a colonoscopy, schedule/ a FIT Test, or order a Cologuard test. If you are unsure what type of test you need, please call your clinic and speak to clinic staff.     Colon cancer is now the second leading cause of cancer-related deaths in the [facility] for both men and women and there are over 130,000 new cases and 50,000 deaths per year from colon cancer. Colonoscopies can prevent 90-95% of these deaths. Problem lesions can be removed before they ever become cancer. This test is not only looking for cancer, but also getting rid of precancerous lesions.     If you are under/uninsured, we recommend you contact the [employer] Program.[employer] is a free colorectal cancer screening program that provides colonoscopies for eligible under/uninsured [place] men and women. If you are interested in receiving a free colonoscopy, please call [employer] at t [phone] (mention code [employer]) to see if you're eligible. Please have them send us the results.     Immunizations that are due - ZOSTER (shingles). Please check with your insurance to see where they cover this vaccine (either pharmacy or clinic) and schedule accordingly.    PREVENTATIVE VISIT: Annual Medicare Wellness:Schedule an Annual Medicare Wellness Exam. Please call your [facility] clinic to set up your appointment.    If you have already completed these items, please contact the clinic via phone or   [employer] so your care team can review and update your records. Thank you for   choosing [facility] for your  healthcare needs. For any questions,   concerns, or to schedule an appointment please contact our clinic at [phone].    Healthy Regards,      Your  Health [facility] Team

## 2022-10-26 ENCOUNTER — LAB (OUTPATIENT)
Dept: LAB | Facility: CLINIC | Age: 67
End: 2022-10-26
Payer: COMMERCIAL

## 2022-10-26 DIAGNOSIS — E87.8 HYPOCHLOREMIA: ICD-10-CM

## 2022-10-26 DIAGNOSIS — N18.31 STAGE 3A CHRONIC KIDNEY DISEASE (CKD) (H): ICD-10-CM

## 2022-10-26 DIAGNOSIS — E87.1 HYPONATREMIA: ICD-10-CM

## 2022-10-26 DIAGNOSIS — R79.9 ELEVATED BUN: ICD-10-CM

## 2022-10-26 LAB
ALBUMIN SERPL BCG-MCNC: 4 G/DL (ref 3.5–5.2)
ALP SERPL-CCNC: 151 U/L (ref 40–129)
ALT SERPL W P-5'-P-CCNC: 13 U/L (ref 10–50)
ANION GAP SERPL CALCULATED.3IONS-SCNC: 12 MMOL/L (ref 7–15)
AST SERPL W P-5'-P-CCNC: 20 U/L (ref 10–50)
BILIRUB SERPL-MCNC: 0.3 MG/DL
BUN SERPL-MCNC: 15.9 MG/DL (ref 8–23)
CALCIUM SERPL-MCNC: 9.6 MG/DL (ref 8.8–10.2)
CHLORIDE SERPL-SCNC: 101 MMOL/L (ref 98–107)
CREAT SERPL-MCNC: 1.23 MG/DL (ref 0.67–1.17)
DEPRECATED HCO3 PLAS-SCNC: 24 MMOL/L (ref 22–29)
GFR SERPL CREATININE-BSD FRML MDRD: 65 ML/MIN/1.73M2
GLUCOSE SERPL-MCNC: 111 MG/DL (ref 70–99)
POTASSIUM SERPL-SCNC: 4.3 MMOL/L (ref 3.4–5.3)
PROT SERPL-MCNC: 7 G/DL (ref 6.4–8.3)
SODIUM SERPL-SCNC: 137 MMOL/L (ref 136–145)

## 2022-10-26 PROCEDURE — 36415 COLL VENOUS BLD VENIPUNCTURE: CPT

## 2022-10-26 PROCEDURE — 80053 COMPREHEN METABOLIC PANEL: CPT

## 2022-11-02 ENCOUNTER — OFFICE VISIT (OUTPATIENT)
Dept: FAMILY MEDICINE | Facility: CLINIC | Age: 67
End: 2022-11-02
Payer: COMMERCIAL

## 2022-11-02 VITALS
TEMPERATURE: 98.7 F | WEIGHT: 167.8 LBS | DIASTOLIC BLOOD PRESSURE: 74 MMHG | SYSTOLIC BLOOD PRESSURE: 112 MMHG | BODY MASS INDEX: 26.34 KG/M2 | RESPIRATION RATE: 24 BRPM | HEIGHT: 67 IN | HEART RATE: 89 BPM | OXYGEN SATURATION: 98 %

## 2022-11-02 DIAGNOSIS — F10.10 ALCOHOL ABUSE: Primary | ICD-10-CM

## 2022-11-02 DIAGNOSIS — K92.2 UGIB (UPPER GASTROINTESTINAL BLEED): ICD-10-CM

## 2022-11-02 DIAGNOSIS — N18.31 STAGE 3A CHRONIC KIDNEY DISEASE (CKD) (H): ICD-10-CM

## 2022-11-02 DIAGNOSIS — D62 ANEMIA DUE TO BLOOD LOSS, ACUTE: ICD-10-CM

## 2022-11-02 DIAGNOSIS — R79.89 ELEVATED SERUM CREATININE: ICD-10-CM

## 2022-11-02 LAB
ANION GAP SERPL CALCULATED.3IONS-SCNC: 9 MMOL/L (ref 7–15)
BASOPHILS # BLD AUTO: 0.2 10E3/UL (ref 0–0.2)
BASOPHILS NFR BLD AUTO: 2 %
BUN SERPL-MCNC: 12.5 MG/DL (ref 8–23)
CALCIUM SERPL-MCNC: 9.8 MG/DL (ref 8.8–10.2)
CHLORIDE SERPL-SCNC: 104 MMOL/L (ref 98–107)
CREAT SERPL-MCNC: 1.28 MG/DL (ref 0.67–1.17)
DEPRECATED HCO3 PLAS-SCNC: 26 MMOL/L (ref 22–29)
EOSINOPHIL # BLD AUTO: 0.2 10E3/UL (ref 0–0.7)
EOSINOPHIL NFR BLD AUTO: 2 %
ERYTHROCYTE [DISTWIDTH] IN BLOOD BY AUTOMATED COUNT: 17.8 % (ref 10–15)
GFR SERPL CREATININE-BSD FRML MDRD: 62 ML/MIN/1.73M2
GLUCOSE SERPL-MCNC: 95 MG/DL (ref 70–99)
HCT VFR BLD AUTO: 34.1 % (ref 40–53)
HGB BLD-MCNC: 10.9 G/DL (ref 13.3–17.7)
IMM GRANULOCYTES # BLD: 0 10E3/UL
IMM GRANULOCYTES NFR BLD: 1 %
LYMPHOCYTES # BLD AUTO: 2.9 10E3/UL (ref 0.8–5.3)
LYMPHOCYTES NFR BLD AUTO: 36 %
MCH RBC QN AUTO: 30.9 PG (ref 26.5–33)
MCHC RBC AUTO-ENTMCNC: 32 G/DL (ref 31.5–36.5)
MCV RBC AUTO: 97 FL (ref 78–100)
MONOCYTES # BLD AUTO: 0.8 10E3/UL (ref 0–1.3)
MONOCYTES NFR BLD AUTO: 10 %
NEUTROPHILS # BLD AUTO: 3.9 10E3/UL (ref 1.6–8.3)
NEUTROPHILS NFR BLD AUTO: 49 %
NRBC # BLD AUTO: 0 10E3/UL
NRBC BLD AUTO-RTO: 0 /100
PLATELET # BLD AUTO: 645 10E3/UL (ref 150–450)
POTASSIUM SERPL-SCNC: 4.6 MMOL/L (ref 3.4–5.3)
RBC # BLD AUTO: 3.53 10E6/UL (ref 4.4–5.9)
SODIUM SERPL-SCNC: 139 MMOL/L (ref 136–145)
WBC # BLD AUTO: 8 10E3/UL (ref 4–11)

## 2022-11-02 PROCEDURE — 36415 COLL VENOUS BLD VENIPUNCTURE: CPT | Performed by: FAMILY MEDICINE

## 2022-11-02 PROCEDURE — 80048 BASIC METABOLIC PNL TOTAL CA: CPT | Performed by: FAMILY MEDICINE

## 2022-11-02 PROCEDURE — 99214 OFFICE O/P EST MOD 30 MIN: CPT | Performed by: FAMILY MEDICINE

## 2022-11-02 PROCEDURE — 85025 COMPLETE CBC W/AUTO DIFF WBC: CPT | Performed by: FAMILY MEDICINE

## 2022-11-02 ASSESSMENT — PAIN SCALES - GENERAL: PAINLEVEL: NO PAIN (0)

## 2022-11-02 NOTE — PROGRESS NOTES
Assessment & Plan     Alcohol abuse  Patient states he still consumes 2-3 drinks per day.  Reviewed with patient importance of cessation. He said he and spouse are thinking about seeking treatment at a facility in [place].  When discussed other options, he expressed interest in Addiction Medicine referral with [facility].  Return precautions discussed and given to patient.  Reviewed all his meds- he will continue all meds prescribed at discharge.  - Adult GI  Referral - Procedure Only  - Adult Mental Health  Referral    UGIB (upper gastrointestinal bleed)  No recurrence.  Reviewed with patient discharge recommendations.  Repeat CBC today and follow hgb until stable.  Patient did not know about EGD plans - reviewed this with him. Referred back to [facility] for continuity of care.  Reasons to go to ER discussed in detail with patient.  - CBC with Platelets & Differential  - Adult GI  Referral - Procedure Only  - CBC with Platelets & Differential    Elevated serum creatinine  Follow until stable.  Drink plenty of water everyday.   Avoid taking Ibuprofen or Naproxen (NSAIDs)  - Basic metabolic panel  - Basic metabolic panel   MED REC REQUIRED  Post Medication Reconciliation Status: discharge medications reconciled, continue medications without change    Patient Instructions   You will be contacted in 1-2 days for results of your lab tests.     Referrals to addiction medicine clinic and [facility] (for endoscopy) have been signed. Schedulers will call you in the next 3-5 business days.     Call the referral information if you do not receive a call in 5 business days.    Do your best to eliminate alcohol consumption completely to prevent further complications.    Read more information about your conditions in the handouts attached to this visit summary.     Continue all medications prescribed to you from the hospital.      Return in about 2 weeks (around 11/16/2022) for see  provider again in clinic if you develop new abdominal symptoms..    Gatito Ware MD  [facility] LEIGH Meyers is a 67 year old  presenting for the following health issues:  Hospital F/U and Health Maintenance (Patient is requesting order for cologard)      HPI       Hospital Follow-up Visit:    Hospital/Nursing Home/IP Rehab Facility: [facility]  Date of Admission: 10/14/22  Date of Discharge: 10/16/22  Reason(s) for Admission: Acute blood loss, anemia    Was your hospitalization related to COVID-19? No   Problems taking medications regularly:  None  Medication changes since discharge: None  Problems adhering to non-medication therapy:  None    Summary of hospitalization:  [facility] hospital discharge summary reviewed    RECOMMENDATIONS TO OUTPATIENT PROVIDER FOR F/U VISIT      Follow-up Appointments     Follow-up and recommended labs and tests       Follow up with primary care provider, Gatito Ware, within 7 days   for hospital follow- up.  The following labs/tests are recommended: CBC,   BMP.    Follow up with [facility] in 2 months for repeat EGD  Follow up for out-pt rehab programs       Discharge Orders              Reason for your hospital stay     GI bleed          Follow-up and recommended labs and tests      Follow up with primary care provider, Gatito Ware, within 7 days for hospital follow- up.  The following labs/tests are recommended: CBC, BMP.    Follow up with [facility] in 2 months for repeat EGD  Follow up for out-pt rehab programs          Activity     Your activity upon discharge: activity as tolerated          Diet     Follow this diet upon discharge: Orders Placed This Encounter      Low Saturated Fat Na <2400 mg        Diagnostic Tests/Treatments reviewed.  Follow up needed: see above  Other Healthcare Providers Involved in Patient s Care:         Specialist appointment - [facility] in 2 months for EGD  Update since  "discharge: improved.   Plan of care communicated with patient     Review of Systems   Constitutional, HEENT, cardiovascular, pulmonary, GI, , musculoskeletal, neuro, skin, endocrine and psych systems are negative, except as otherwise noted.      Objective    /74 (BP Location: Left arm, Patient Position: Chair, Cuff Size: Adult Regular)   Pulse 89   Temp 98.7  F (37.1  C) (Tympanic)   Resp 24   Ht 1.708 m (5' 7.25\")   Wt 76.1 kg (167 lb 12.8 oz)   SpO2 98%   BMI 26.09 kg/m    Body mass index is 26.09 kg/m .  Physical Exam   GENERAL: slightly overweight, ambulatory w/o assist, alert and no distress  EYES: no icterus  RESP: lungs clear to auscultation - no rales, no rhonchi, no wheezes  CV: regular rates and rhythm, normal S1 S2, no S3 or S4 and no murmur, no click or rub  ABD: rounded abdomen, no skin changes, no TTP, no palpable mass, no guarding, no Bailey's sign, no palpable organomegaly  MS: extremities- no gross deformities noted, no edema  SKIN: good turgor, no jaundice or rash    Lab on 10/26/2022   Component Date Value Ref Range Status     Sodium 10/26/2022 137  136 - 145 mmol/L Final     Potassium 10/26/2022 4.3  3.4 - 5.3 mmol/L Final     Chloride 10/26/2022 101  98 - 107 mmol/L Final     Carbon Dioxide (CO2) 10/26/2022 24  22 - 29 mmol/L Final     Anion Gap 10/26/2022 12  7 - 15 mmol/L Final     Urea Nitrogen 10/26/2022 15.9  8.0 - 23.0 mg/dL Final     Creatinine 10/26/2022 1.23 (H)  0.67 - 1.17 mg/dL Final     Calcium 10/26/2022 9.6  8.8 - 10.2 mg/dL Final     Glucose 10/26/2022 111 (H)  70 - 99 mg/dL Final     Alkaline Phosphatase 10/26/2022 151 (H)  40 - 129 U/L Final     AST 10/26/2022 20  10 - 50 U/L Final     ALT 10/26/2022 13  10 - 50 U/L Final     Protein Total 10/26/2022 7.0  6.4 - 8.3 g/dL Final     Albumin 10/26/2022 4.0  3.5 - 5.2 g/dL Final     Bilirubin Total 10/26/2022 0.3  <=1.2 mg/dL Final     GFR Estimate 10/26/2022 65  >60 mL/min/1.73m2 Final    Effective December 21, 2021 " eGFRcr in adults is calculated using the 2021 CKD-EPI creatinine equation which includes age and gender (Elisa et al., NEJM, DOI: 10.1056/LYQTvj6322243)

## 2022-11-02 NOTE — PATIENT INSTRUCTIONS
You will be contacted in 1-2 days for results of your lab tests.     Referrals to addiction medicine clinic and [facility] (for endoscopy) have been signed. Schedulers will call you in the next 3-5 business days.     Call the referral information if you do not receive a call in 5 business days.    Do your best to eliminate alcohol consumption completely to prevent further complications.    Read more information about your conditions in the handouts attached to this visit summary.     Continue all medications prescribed to you from the hospital.

## 2022-11-02 NOTE — LETTER
November 2, 2022      Luigi Angeleso  [address] [address] [address]        Dear ,    We are writing to inform you of your test results.    Test results indicate you may require additional follow up, see comment below.    Creatinine is still slightly high but appears stable. This is a measure of kidney function.   Be sure to drink at least 8 glasses of water a day, and avoid taking Ibuprofen or Naproxen.     Hemoglobin (one of the measures for blood loss) is improving but still deficient.   Repeat lab in 1 month.     Continue all recommendations discussed on his visit today.       Resulted Orders   Basic metabolic panel   Result Value Ref Range    Sodium 139 136 - 145 mmol/L    Potassium 4.6 3.4 - 5.3 mmol/L    Chloride 104 98 - 107 mmol/L    Carbon Dioxide (CO2) 26 22 - 29 mmol/L    Anion Gap 9 7 - 15 mmol/L    Urea Nitrogen 12.5 8.0 - 23.0 mg/dL    Creatinine 1.28 (H) 0.67 - 1.17 mg/dL    Calcium 9.8 8.8 - 10.2 mg/dL    Glucose 95 70 - 99 mg/dL    GFR Estimate 62 >60 mL/min/1.73m2      Comment:      Effective December 21, 2021 eGFRcr in adults is calculated using the 2021 CKD-EPI creatinine equation which includes age and gender (Elisa et al., NE, DOI: 10.1056/EDABxy5875166)   CBC with platelets and differential   Result Value Ref Range    WBC Count 8.0 4.0 - 11.0 10e3/uL    RBC Count 3.53 (L) 4.40 - 5.90 10e6/uL    Hemoglobin 10.9 (L) 13.3 - 17.7 g/dL    Hematocrit 34.1 (L) 40.0 - 53.0 %    MCV 97 78 - 100 fL    MCH 30.9 26.5 - 33.0 pg    MCHC 32.0 31.5 - 36.5 g/dL    RDW 17.8 (H) 10.0 - 15.0 %    Platelet Count 645 (H) 150 - 450 10e3/uL    % Neutrophils 49 %    % Lymphocytes 36 %    % Monocytes 10 %    % Eosinophils 2 %    % Basophils 2 %    % Immature Granulocytes 1 %    NRBCs per 100 WBC 0 <1 /100    Absolute Neutrophils 3.9 1.6 - 8.3 10e3/uL    Absolute Lymphocytes 2.9 0.8 - 5.3 10e3/uL    Absolute Monocytes 0.8 0.0 - 1.3 10e3/uL    Absolute Eosinophils 0.2 0.0 - 0.7 10e3/uL    Absolute  Basophils 0.2 0.0 - 0.2 10e3/uL    Absolute Immature Granulocytes 0.0 <=0.4 10e3/uL    Absolute NRBCs 0.0 10e3/uL       If you have any questions or concerns, please call the clinic at the number listed above.       Sincerely,      Gatito Ware MD

## 2022-11-14 DIAGNOSIS — I10 BENIGN ESSENTIAL HYPERTENSION: ICD-10-CM

## 2022-11-16 RX ORDER — METOPROLOL SUCCINATE 25 MG/1
TABLET, EXTENDED RELEASE ORAL
Qty: 90 TABLET | Refills: 3 | OUTPATIENT
Start: 2022-11-16

## 2022-11-16 RX ORDER — LOSARTAN POTASSIUM 100 MG/1
TABLET ORAL
Qty: 90 TABLET | Refills: 3 | OUTPATIENT
Start: 2022-11-16

## 2022-11-18 DIAGNOSIS — I10 BENIGN ESSENTIAL HYPERTENSION: ICD-10-CM

## 2022-11-18 DIAGNOSIS — F10.21 ALCOHOL DEPENDENCE IN REMISSION (H): ICD-10-CM

## 2022-11-18 RX ORDER — METOPROLOL SUCCINATE 25 MG/1
25 TABLET, EXTENDED RELEASE ORAL EVERY MORNING
Qty: 90 TABLET | Refills: 3 | Status: SHIPPED | OUTPATIENT
Start: 2022-11-18 | End: 2023-12-04

## 2022-11-18 RX ORDER — LOSARTAN POTASSIUM 25 MG/1
25 TABLET ORAL EVERY MORNING
Qty: 90 TABLET | Refills: 3 | Status: SHIPPED | OUTPATIENT
Start: 2022-11-18 | End: 2023-12-04

## 2022-11-18 RX ORDER — FOLIC ACID 1 MG/1
1 TABLET ORAL DAILY
Qty: 30 TABLET | Refills: 0 | Status: SHIPPED | OUTPATIENT
Start: 2022-11-18 | End: 2024-05-26

## 2022-11-21 ENCOUNTER — TELEPHONE (OUTPATIENT)
Dept: FAMILY MEDICINE | Facility: CLINIC | Age: 67
End: 2022-11-21

## 2022-11-21 NOTE — TELEPHONE ENCOUNTER
Brenna Delcid contacted Luigi on 11/21/22 and left a message. If patient calls back please schedule appointment Annual Medicare Wellness visit before 12/31/2022.    Brenna Delcid PSC on 11/21/2022 at 2:07 PM

## 2023-02-21 ENCOUNTER — TELEPHONE (OUTPATIENT)
Dept: FAMILY MEDICINE | Facility: CLINIC | Age: 68
End: 2023-02-21
Payer: MEDICARE

## 2023-02-21 NOTE — LETTER
February 21, 2023      Luigi Vieyra  [address] [address] [address]        Dear Luigi,     Your team at [facility] cares about your health. We have reviewed your chart and based on our findings; we are making the following recommendations to better manage your health.     You are in particular need of attention regarding the following:   Colon cancer screening, FIT test  Annual wellness exam    Call or MyChart message your clinic to schedule a colonoscopy, schedule/ a FIT Test, or order a Cologuard test. If you are unsure what type of test you need, please call your clinic and speak to clinic staff.   Colon cancer is now the second leading cause of cancer-related deaths in the [facility] for both men and women and there are over 130,000 new cases and 50,000 deaths per year from colon cancer. Colonoscopies can prevent 90-95% of these deaths. Problem lesions can be removed before they ever become cancer. This test is not only looking for cancer, but also getting rid of precancerous lesions.   If you are under/uninsured, we recommend you contact the [employer] Program.[employer] is a free colorectal cancer screening program that provides colonoscopies for eligible under/uninsured [place] men and women. If you are interested in receiving a free colonoscopy, please call [employer] at t [phone] (mention code [employer]) to see if you're eligible. Please have them send us the results.   PREVENTATIVE VISIT: Annual Medicare Wellness:Schedule an Annual Medicare Wellness Exam. Please call your [facility] clinic to set up your appointment.    If you have already completed these items, please contact the clinic via phone or   Paymentushart so your care team can review and update your records. Thank you for   choosing [facility] for your healthcare needs. For any questions,   concerns, or to schedule an appointment please contact our clinic.    Healthy Regards,      Your [facility]  [facility] Team

## 2023-02-21 NOTE — TELEPHONE ENCOUNTER
Patient Quality Outreach    Patient is due for the following:   Colon Cancer Screening  Physical Annual Wellness Visit    Next Steps:   Schedule a Annual Wellness Visit    Type of outreach:    Sent letter.      Questions for provider review:    None     Maine Mccall, [facility]

## 2023-06-14 DIAGNOSIS — J45.30 MILD PERSISTENT ASTHMA WITHOUT COMPLICATION: ICD-10-CM

## 2023-06-15 RX ORDER — ALBUTEROL SULFATE 90 UG/1
2 AEROSOL, METERED RESPIRATORY (INHALATION) EVERY 4 HOURS PRN
Qty: 8.5 G | Refills: 0 | Status: SHIPPED | OUTPATIENT
Start: 2023-06-15 | End: 2023-09-21

## 2023-06-15 NOTE — TELEPHONE ENCOUNTER
Dr. Ware:    Routing refill request to provider for review/approval because:  Act was 17 last on 10-12-22. RN unable to refill due to ACT under 20. Please advise refill.      CADEN Ingram

## 2023-07-19 ENCOUNTER — OFFICE VISIT (OUTPATIENT)
Dept: FAMILY MEDICINE | Facility: CLINIC | Age: 68
End: 2023-07-19
Payer: MEDICARE

## 2023-07-19 VITALS
BODY MASS INDEX: 22.76 KG/M2 | SYSTOLIC BLOOD PRESSURE: 94 MMHG | HEIGHT: 70 IN | TEMPERATURE: 98.3 F | DIASTOLIC BLOOD PRESSURE: 82 MMHG | RESPIRATION RATE: 24 BRPM | WEIGHT: 159 LBS | OXYGEN SATURATION: 96 % | HEART RATE: 113 BPM

## 2023-07-19 DIAGNOSIS — H02.9 LESION OF LEFT UPPER EYELID: ICD-10-CM

## 2023-07-19 DIAGNOSIS — D62 ANEMIA DUE TO BLOOD LOSS, ACUTE: ICD-10-CM

## 2023-07-19 DIAGNOSIS — N18.31 STAGE 3A CHRONIC KIDNEY DISEASE (CKD) (H): ICD-10-CM

## 2023-07-19 DIAGNOSIS — R27.0 ATAXIA: ICD-10-CM

## 2023-07-19 DIAGNOSIS — Z12.11 SCREEN FOR COLON CANCER: ICD-10-CM

## 2023-07-19 DIAGNOSIS — K70.0 ALCOHOLIC FATTY LIVER: ICD-10-CM

## 2023-07-19 DIAGNOSIS — Z12.11 SCREENING FOR MALIGNANT NEOPLASM OF COLON: ICD-10-CM

## 2023-07-19 DIAGNOSIS — E83.42 HYPOMAGNESEMIA: ICD-10-CM

## 2023-07-19 DIAGNOSIS — K70.30 ALCOHOLIC CIRRHOSIS, UNSPECIFIED WHETHER ASCITES PRESENT (H): ICD-10-CM

## 2023-07-19 DIAGNOSIS — R74.01 ELEVATED AST (SGOT): ICD-10-CM

## 2023-07-19 DIAGNOSIS — Z00.00 ENCOUNTER FOR MEDICARE ANNUAL WELLNESS EXAM: Primary | ICD-10-CM

## 2023-07-19 DIAGNOSIS — F10.10 ALCOHOL ABUSE: ICD-10-CM

## 2023-07-19 PROBLEM — F10.20 ALCOHOL DEPENDENCE (H): Status: RESOLVED | Noted: 2017-08-14 | Resolved: 2023-07-19

## 2023-07-19 LAB
ALBUMIN SERPL BCG-MCNC: 4.3 G/DL (ref 3.5–5.2)
ALP SERPL-CCNC: 126 U/L (ref 40–129)
ALT SERPL W P-5'-P-CCNC: 29 U/L (ref 0–70)
ANION GAP SERPL CALCULATED.3IONS-SCNC: 15 MMOL/L (ref 7–15)
AST SERPL W P-5'-P-CCNC: 153 U/L (ref 0–45)
BASOPHILS # BLD AUTO: 0.1 10E3/UL (ref 0–0.2)
BASOPHILS NFR BLD AUTO: 1 %
BILIRUB SERPL-MCNC: 1.1 MG/DL
BUN SERPL-MCNC: 22.6 MG/DL (ref 8–23)
CALCIUM SERPL-MCNC: 10.1 MG/DL (ref 8.8–10.2)
CHLORIDE SERPL-SCNC: 93 MMOL/L (ref 98–107)
CREAT SERPL-MCNC: 1.71 MG/DL (ref 0.67–1.17)
CREAT UR-MCNC: 244.7 MG/DL
DEPRECATED HCO3 PLAS-SCNC: 24 MMOL/L (ref 22–29)
EOSINOPHIL # BLD AUTO: 0.2 10E3/UL (ref 0–0.7)
EOSINOPHIL NFR BLD AUTO: 1 %
ERYTHROCYTE [DISTWIDTH] IN BLOOD BY AUTOMATED COUNT: 14.3 % (ref 10–15)
GFR SERPL CREATININE-BSD FRML MDRD: 44 ML/MIN/1.73M2
GLUCOSE SERPL-MCNC: 119 MG/DL (ref 70–99)
HCT VFR BLD AUTO: 40.3 % (ref 40–53)
HGB BLD-MCNC: 14 G/DL (ref 13.3–17.7)
IMM GRANULOCYTES # BLD: 0.1 10E3/UL
IMM GRANULOCYTES NFR BLD: 1 %
LYMPHOCYTES # BLD AUTO: 2.3 10E3/UL (ref 0.8–5.3)
LYMPHOCYTES NFR BLD AUTO: 17 %
MAGNESIUM SERPL-MCNC: 1.2 MG/DL (ref 1.7–2.3)
MCH RBC QN AUTO: 36 PG (ref 26.5–33)
MCHC RBC AUTO-ENTMCNC: 34.7 G/DL (ref 31.5–36.5)
MCV RBC AUTO: 104 FL (ref 78–100)
MICROALBUMIN UR-MCNC: 25.4 MG/L
MICROALBUMIN/CREAT UR: 10.38 MG/G CR (ref 0–17)
MONOCYTES # BLD AUTO: 1.6 10E3/UL (ref 0–1.3)
MONOCYTES NFR BLD AUTO: 11 %
NEUTROPHILS # BLD AUTO: 9.8 10E3/UL (ref 1.6–8.3)
NEUTROPHILS NFR BLD AUTO: 70 %
PLATELET # BLD AUTO: 379 10E3/UL (ref 150–450)
POTASSIUM SERPL-SCNC: 4 MMOL/L (ref 3.4–5.3)
PROT SERPL-MCNC: 7.7 G/DL (ref 6.4–8.3)
RBC # BLD AUTO: 3.89 10E6/UL (ref 4.4–5.9)
SODIUM SERPL-SCNC: 132 MMOL/L (ref 136–145)
VIT B12 SERPL-MCNC: 1143 PG/ML (ref 232–1245)
WBC # BLD AUTO: 14.1 10E3/UL (ref 4–11)

## 2023-07-19 PROCEDURE — 82570 ASSAY OF URINE CREATININE: CPT | Performed by: FAMILY MEDICINE

## 2023-07-19 PROCEDURE — 91313 COVID-19 BIVALENT 18+ (MODERNA): CPT | Performed by: FAMILY MEDICINE

## 2023-07-19 PROCEDURE — 90677 PCV20 VACCINE IM: CPT | Performed by: FAMILY MEDICINE

## 2023-07-19 PROCEDURE — 0134A COVID-19 BIVALENT 18+ (MODERNA): CPT | Performed by: FAMILY MEDICINE

## 2023-07-19 PROCEDURE — 82043 UR ALBUMIN QUANTITATIVE: CPT | Performed by: FAMILY MEDICINE

## 2023-07-19 PROCEDURE — G0009 ADMIN PNEUMOCOCCAL VACCINE: HCPCS | Performed by: FAMILY MEDICINE

## 2023-07-19 PROCEDURE — 80053 COMPREHEN METABOLIC PANEL: CPT | Performed by: FAMILY MEDICINE

## 2023-07-19 PROCEDURE — G0439 PPPS, SUBSEQ VISIT: HCPCS | Performed by: FAMILY MEDICINE

## 2023-07-19 PROCEDURE — 82607 VITAMIN B-12: CPT | Performed by: FAMILY MEDICINE

## 2023-07-19 PROCEDURE — 99214 OFFICE O/P EST MOD 30 MIN: CPT | Mod: 25 | Performed by: FAMILY MEDICINE

## 2023-07-19 PROCEDURE — 36415 COLL VENOUS BLD VENIPUNCTURE: CPT | Performed by: FAMILY MEDICINE

## 2023-07-19 PROCEDURE — 83735 ASSAY OF MAGNESIUM: CPT | Performed by: FAMILY MEDICINE

## 2023-07-19 PROCEDURE — 85025 COMPLETE CBC W/AUTO DIFF WBC: CPT | Performed by: FAMILY MEDICINE

## 2023-07-19 RX ORDER — MAGNESIUM OXIDE 400 MG/1
400 TABLET ORAL DAILY
Qty: 30 TABLET | Refills: 0 | Status: SHIPPED | OUTPATIENT
Start: 2023-07-19 | End: 2024-05-26

## 2023-07-19 ASSESSMENT — ASTHMA QUESTIONNAIRES
QUESTION_2 LAST FOUR WEEKS HOW OFTEN HAVE YOU HAD SHORTNESS OF BREATH: ONCE OR TWICE A WEEK
ACT_TOTALSCORE: 20
QUESTION_5 LAST FOUR WEEKS HOW WOULD YOU RATE YOUR ASTHMA CONTROL: WELL CONTROLLED
QUESTION_4 LAST FOUR WEEKS HOW OFTEN HAVE YOU USED YOUR RESCUE INHALER OR NEBULIZER MEDICATION (SUCH AS ALBUTEROL): ONE OR TWO TIMES PER DAY
QUESTION_1 LAST FOUR WEEKS HOW MUCH OF THE TIME DID YOUR ASTHMA KEEP YOU FROM GETTING AS MUCH DONE AT WORK, SCHOOL OR AT HOME: NONE OF THE TIME
ACT_TOTALSCORE: 20
QUESTION_3 LAST FOUR WEEKS HOW OFTEN DID YOUR ASTHMA SYMPTOMS (WHEEZING, COUGHING, SHORTNESS OF BREATH, CHEST TIGHTNESS OR PAIN) WAKE YOU UP AT NIGHT OR EARLIER THAN USUAL IN THE MORNING: NOT AT ALL

## 2023-07-19 ASSESSMENT — PAIN SCALES - GENERAL: PAINLEVEL: NO PAIN (0)

## 2023-07-19 NOTE — LETTER
July 21, 2023      Luigi JACOBSON Az[place]  [address] [address] [address]        Dear ,    We are writing to inform you of your test results.  Vitamin B12 is normal.   Follow all other recommendations given on your visit.       Resulted Orders   Albumin Random Urine Quantitative with Creat Ratio   Result Value Ref Range    Creatinine Urine mg/dL 244.7 mg/dL      Comment:      The reference ranges have not been established in urine creatinine. The results should be integrated into the clinical context for interpretation.    Albumin Urine mg/L 25.4 mg/L      Comment:      The reference ranges have not been established in urine albumin. The results should be integrated into the clinical context for interpretation.    Albumin Urine mg/g Cr 10.38 0.00 - 17.00 mg/g Cr      Comment:      Microalbuminuria is defined as an albumin:creatinine ratio of 17 to 299 for males and 25 to 299 for females. A ratio of albumin:creatinine of 300 or higher is indicative of overt proteinuria.  Due to biologic variability, positive results should be confirmed by a second, first-morning random or 24-hour timed urine specimen. If there is discrepancy, a third specimen is recommended. When 2 out of 3 results are in the microalbuminuria range, this is evidence for incipient nephropathy and warrants increased efforts at glucose control, blood pressure control, and institution of therapy with an angiotensin-converting-enzyme (ACE) inhibitor (if the patient can tolerate it).     Comprehensive metabolic panel   Result Value Ref Range    Sodium 132 (L) 136 - 145 mmol/L    Potassium 4.0 3.4 - 5.3 mmol/L    Chloride 93 (L) 98 - 107 mmol/L    Carbon Dioxide (CO2) 24 22 - 29 mmol/L    Anion Gap 15 7 - 15 mmol/L    Urea Nitrogen 22.6 8.0 - 23.0 mg/dL    Creatinine 1.71 (H) 0.67 - 1.17 mg/dL    Calcium 10.1 8.8 - 10.2 mg/dL    Glucose 119 (H) 70 - 99 mg/dL    Alkaline Phosphatase 126 40 - 129 U/L     (H) 0 - 45 U/L      Comment:      Reference  intervals for this test were updated on 6/12/2023 to more accurately reflect our healthy population. There may be differences in the flagging of prior results with similar values performed with this method. Interpretation of those prior results can be made in the context of the updated reference intervals.    ALT 29 0 - 70 U/L      Comment:      Reference intervals for this test were updated on 6/12/2023 to more accurately reflect our healthy population. There may be differences in the flagging of prior results with similar values performed with this method. Interpretation of those prior results can be made in the context of the updated reference intervals.      Protein Total 7.7 6.4 - 8.3 g/dL    Albumin 4.3 3.5 - 5.2 g/dL    Bilirubin Total 1.1 <=1.2 mg/dL    GFR Estimate 44 (L) >60 mL/min/1.73m2   Vitamin B12   Result Value Ref Range    Vitamin B12 1,143 232 - 1,245 pg/mL   Magnesium   Result Value Ref Range    Magnesium 1.2 (L) 1.7 - 2.3 mg/dL   CBC with platelets and differential   Result Value Ref Range    WBC Count 14.1 (H) 4.0 - 11.0 10e3/uL    RBC Count 3.89 (L) 4.40 - 5.90 10e6/uL    Hemoglobin 14.0 13.3 - 17.7 g/dL    Hematocrit 40.3 40.0 - 53.0 %     (H) 78 - 100 fL    MCH 36.0 (H) 26.5 - 33.0 pg    MCHC 34.7 31.5 - 36.5 g/dL    RDW 14.3 10.0 - 15.0 %    Platelet Count 379 150 - 450 10e3/uL    % Neutrophils 70 %    % Lymphocytes 17 %    % Monocytes 11 %    % Eosinophils 1 %    % Basophils 1 %    % Immature Granulocytes 1 %    Absolute Neutrophils 9.8 (H) 1.6 - 8.3 10e3/uL    Absolute Lymphocytes 2.3 0.8 - 5.3 10e3/uL    Absolute Monocytes 1.6 (H) 0.0 - 1.3 10e3/uL    Absolute Eosinophils 0.2 0.0 - 0.7 10e3/uL    Absolute Basophils 0.1 0.0 - 0.2 10e3/uL    Absolute Immature Granulocytes 0.1 <=0.4 10e3/uL       If you have any questions or concerns, please call the clinic at the number listed above.       Sincerely,      Gatito Ware MD/lissa

## 2023-07-19 NOTE — NURSING NOTE
"Chief Complaint   Patient presents with     Leg Problem     Calf cramping and leg drop x 3 weeks - right     Eye Problem     Left eye bump - getting bigger     BP 94/82   Pulse 113   Temp 98.3  F (36.8  C) (Tympanic)   Resp 24   Ht 1.778 m (5' 10\")   Wt 72.1 kg (159 lb)   SpO2 96%   BMI 22.81 kg/m   Estimated body mass index is 22.81 kg/m  as calculated from the following:    Height as of this encounter: 1.778 m (5' 10\").    Weight as of this encounter: 72.1 kg (159 lb).  Patient presents to the clinic using No DME      Health Maintenance that is potentially due pending provider review:    Health Maintenance Due   Topic Date Due     MICROALBUMIN  Never done     COLORECTAL CANCER SCREENING  Never done     ZOSTER IMMUNIZATION (1 of 2) Never done     ASTHMA ACTION PLAN  02/10/2021     MEDICARE ANNUAL WELLNESS VISIT  10/14/2021     COVID-19 Vaccine (5 - Moderna series) 02/12/2023     Pneumococcal Vaccine: 65+ Years (3 - PCV) 04/14/2023        n/a        "

## 2023-07-19 NOTE — PATIENT INSTRUCTIONS
To schedule the MRI of the brain, call [phone].     Referrals to neurology, dermatology and addiction medicine have been signed. Schedulers will call you in the next 3-5 business days.     You may get the Shingrix vaccine for shingles if you desire, and after you verify with insurance how they cover the vaccine.     Your symptoms are most consistent with damage sustained from excessive alcohol consumption.  It is very important to work on reducing and eventually stopping alcohol use.    You will be contacted in 1-2 days for results of your lab tests.  Further recommendations thereafter.    Walk as you are able.  At home, remove all loose rugs/mats and nay other trip hazards.    Do drink at least 6-8 glasses of water a day.    Work on stopping tobacco use as well.    Preventative Care Visits include: Yearly physicals, Well-child visits, Welcome to Medicare visits, & Medicare yearly wellness exams.    The purpose of these visits is to discuss your medical history and prevent health problems before you are sick.  You may need to pay a copay, coinsurance or deductible if your visit today includes testing or treating for a new or existing condition.    Additional charges may be incurred for today's visit. If you have questions about what your insurance plan covers, please contact your Insurance Company's member service department.  If you have questions specific to a bill you have already received from [employer], please contact the [facility] at [phone].        Patient Education   Personalized Prevention Plan  You are due for the preventive services outlined below.  Your care team is available to assist you in scheduling these services.  If you have already completed any of these items, please share that information with your care team to update in your medical record.  Health Maintenance Due   Topic Date Due    Kidney Microalbumin Urine Test  Never done    Colorectal Cancer Screening  Never done     Zoster (Shingles) Vaccine (1 of 2) Never done    Asthma Action Plan - yearly  02/10/2021    COVID-19 Vaccine (5 - Moderna series) 02/12/2023    Pneumococcal Vaccine (3 - PCV) 04/14/2023     Preventive Health Recommendations  See your health care provider every year to  Review health changes.   Discuss preventive care.    Review your medicines if your doctor has prescribed any.  Talk with your health care provider about whether you should have a test to screen for prostate cancer (PSA).  Every 3 years, have a diabetes test (fasting glucose). If you are at risk for diabetes, you should have this test more often.  Every 5 years, have a cholesterol test. Have this test more often if you are at risk for high cholesterol or heart disease.   Every 10 years, have a colonoscopy. Or, have a yearly FIT test (stool test). These exams will check for colon cancer.  Talk to with your health care provider about screening for Abdominal Aortic Aneurysm if you have a family history of AAA or have a history of smoking.    Shots:   Get a flu shot each year.   Get a tetanus shot every 10 years.   Talk to your doctor about your pneumonia vaccines. There are now two you should receive - Pneumovax (PPSV 23) and Prevnar (PCV 13).  Talk to your pharmacist about a shingles vaccine.   Talk to your doctor about the hepatitis B vaccine.    Nutrition:   Eat at least 5 servings of fruits and vegetables each day.   Eat whole-grain bread, whole-wheat pasta and brown rice instead of white grains and rice.   Get adequate Calcium and Vitamin D.     Lifestyle  Exercise for at least 150 minutes a week (30 minutes a day, 5 days a week). This will help you control your weight and prevent disease.   Limit alcohol to one drink per day.   No smoking.   Wear sunscreen to prevent skin cancer.   See your dentist every six months for an exam and cleaning.   See your eye doctor every 1 to 2 years to screen for conditions such as glaucoma, macular degeneration  and cataracts.    Personalized Prevention Plan  You are due for the preventive services outlined below.  Your care team is available to assist you in scheduling these services.  If you have already completed any of these items, please share that information with your care team to update in your medical record.  Health Maintenance   Topic Date Due    MICROALBUMIN  Never done    COLORECTAL CANCER SCREENING  Never done    ZOSTER IMMUNIZATION (1 of 2) Never done    ASTHMA ACTION PLAN  02/10/2021    COVID-19 Vaccine (5 - Moderna series) 02/12/2023    Pneumococcal Vaccine: 65+ Years (3 - PCV) 04/14/2023    INFLUENZA VACCINE (1) 09/01/2023    NICOTINE/TOBACCO CESSATION COUNSELING Q 1 YR  10/12/2023    LIPID  10/12/2023    ANNUAL REVIEW OF HM ORDERS  10/12/2023    LUNG CANCER SCREENING  10/13/2023    BMP  11/02/2023    HEMOGLOBIN  11/02/2023    ASTHMA CONTROL TEST  01/19/2024    MEDICARE ANNUAL WELLNESS VISIT  07/19/2024    FALL RISK ASSESSMENT  07/19/2024    DTAP/TDAP/TD IMMUNIZATION (2 - Td or Tdap) 03/10/2026    ADVANCE CARE PLANNING  07/19/2028    HEPATITIS C SCREENING  Completed    PHQ-2 (once per calendar year)  Completed    URINALYSIS  Completed    AORTIC ANEURYSM SCREENING (SYSTEM ASSIGNED)  Completed    IPV IMMUNIZATION  Aged Out    MENINGITIS IMMUNIZATION  Aged Out         Exercise for a Healthier Heart  You may wonder how you can improve the health of your heart. If you re thinking about exercise, you re on the right track. You don t need to become an athlete. But you do need a certain amount of brisk exercise to help strengthen your heart. If you have been diagnosed with a heart condition, your healthcare provider may advise exercise to help your condition. To help make exercise a habit, choose safe, fun activities.      Exercise with a friend. When activity is fun, you're more likely to stick with it.     Before you start  Check with your healthcare provider before starting an exercise program. This is  especially important if you haven't been active for a while. It's also important if you have a long-term (chronic) health problem such as heart disease, diabetes, or obesity. Also check with your provider if you're at high risk for having these problems.   Why exercise?  Exercising regularly offers many healthy rewards. It can help you do all of these:   Improve your blood cholesterol level to help prevent further heart trouble.  Lower your blood pressure to help prevent a stroke or heart attack.  Control diabetes or reduce your risk of getting this disease.  Improve your heart and lung function.  Reach and stay at a healthy weight.  Make your muscles stronger so you can stay active.  Prevent falls and fractures by slowing the loss of bone mass (osteoporosis).  Manage stress better.  Improve your sense of self and your body image.  Exercise tips    Ease into your routine. Set small goals. Then build on them. Talk with your healthcare provider first before starting an exercise routine if you're not sure what your activity level should be.  Exercise on most days. Aim for a total of at least 150 minutes (2 hours and 30 minutes) or more of moderate-intensity aerobic activity each week. You could also do 75 minutes (1 hour and 15 minutes) or more of vigorous-intensity aerobic activity each week. Or try for a combination of both. Moderate activity means that you breathe heavier and your heart rate increases, but you can still talk. Think about doing at least 30 minutes of moderate exercise, 5 times a week. It's OK to work up to the 30-minute period over time. Examples of moderate-intensity activity are brisk walking, gardening, and water aerobics.  Step up your daily activity level.  Along with your exercise program, try being more active the whole day. Walk instead of drive. Or park further away so that you take more steps each day. Do more household tasks or yard work. You may not be able to meet the advised amount of  physical activity. But doing some moderate- or vigorous-intensity aerobic activity can help reduce your risk for heart disease. Your healthcare provider can help you figure out what is best for you.  Choose 1 or more activities you enjoy.  Walking is one of the easiest things you can do. You can also try swimming, riding a bike, dancing, or taking an exercise class.    Call 911  Call 911 right away if any of these occur:   Chest pain that doesn't go away quickly with rest  New burning, tightness, pressure, or heaviness in your chest, neck, shoulders, back, or arms  Abnormal or severe shortness of breath  A very fast or irregular heartbeat (palpitations)  Fainting  When to call your healthcare provider  Call your healthcare provider if you have any of these:   Dizziness or lightheadedness  Mild shortness of breath or chest pain  Increased or new joint or muscle pain    [employer] last reviewed this educational content on 7/1/2022 2000-2023 [employer]. All rights reserved. This information is not intended as a substitute for professional medical care. Always follow your healthcare professional's instructions.          Understanding USDA MyPlate  The USDA has guidelines to help you make healthy food choices. These are called MyPlate. MyPlate shows the food groups that make up healthy meals using the image of a place setting. Before you eat, think about the healthiest choices for what to put on your plate or in your cup or bowl. To learn more about building a healthy plate, visit www.myplate.gov.     The food groups  Fruits. Any fruit or 100% fruit juice counts as part of the Fruit Group. Fruits may be fresh, canned, frozen, or dried, and may be whole, cut-up, or pureed. Make 1/2 of your plate fruits and vegetables.  Vegetables. Any vegetable or 100% vegetable juice counts as a member of the Vegetable Group. Vegetables may be fresh, frozen, canned, or dried. They can be served raw or cooked and may be  whole, cut-up, or mashed. Make 1/2 of your plate fruits and vegetables.  Grains. All foods made from grains are part of the Grains Group. These include wheat, rice, oats, cornmeal, and barley. Grains are often used to make foods such as bread, pasta, oatmeal, cereal, tortillas, and grits. Grains should be no more than 1/4 of your plate. At least half of your grains should be whole grains.  Protein. This group includes meat, poultry, seafood, beans and peas, eggs, processed soy products (such as tofu), nuts (including nut butters), and seeds. Make protein choices no more than 1/4 of your plate. Meat and poultry choices should be lean or low fat.  Dairy. The Dairy Group includes all fluid milk products and foods made from milk that contain calcium, such as yogurt and cheese. (Foods that have little calcium, such as cream, butter, and cream cheese, are not part of this group.) Most dairy choices should be low-fat or fat-free.  Things to limit  Eating healthy also means limiting these things in your diet:  Oils. Oils aren't a food group, but they do contain essential nutrients. These are fats that are liquid at room temperature. Including small amounts of oils in your diet is fine and can even be good for you. But it's important to watch how much oil you include in your diet. Oils include avocado, canola, corn, olive, soybean, vegetable, and sunflower. Foods that are mainly oil include mayonnaise, certain salad dressings, and soft margarines. You likely already get your daily oil allowance from the foods you eat.  Salt (sodium).  Many processed foods have a lot of sodium. To keep sodium intake down, eat fresh vegetables, meats, poultry, and seafood when possible. Buy low-sodium, reduced-sodium, or no-salt-added food products at the store. And don't add salt to your meals at home. Instead, season them with herbs and spices such as dill, oregano, cumin, and paprika. Or try adding flavor with vinegar, onion, garlic, or  lemon or lime zest and juice.  Saturated fat . Saturated fats are most often found in animal products such as beef, pork, and chicken. They are often solid at room temperature, such as butter. To reduce your saturated fat intake, choose leaner cuts of meat and poultry. And try healthier cooking methods such as grilling, broiling, roasting, or baking. For a simple lower-fat swap, use plain nonfat yogurt instead of mayonnaise when making potato salad or macaroni salad.  Added sugars.  These are sugars added to foods. They are in foods such as ice cream, candy, soda, fruit drinks, sports drinks, energy drinks, cookies, pastries, jams, and syrups. Cut down on added sugars by sharing sweet treats with a family member or friend. You can also choose fruit for dessert, and drink water or other unsweetened beverages.  Alcohol. Alcohol contains calories but few nutrients. If you don't drink alcohol, don't start drinking for any reason. But if you do choose to drink alcohol, do so only in moderation. This means no more than 2 drinks in a day for men and no more than 1 drink per day for women, on days when you have alcohol.  [employer] last reviewed this educational content on 1/1/2023 2000-2023 [employer]. All rights reserved. This information is not intended as a substitute for professional medical care. Always follow your healthcare professional's instructions.

## 2023-07-19 NOTE — PROGRESS NOTES
Assessment & Plan     Encounter for Medicare annual wellness exam  Patient was advised on recommended screening and preventive health recommendations.  He verbalized understanding and agreed to the plans below.   He deferred other immunizations for now.    Stage 3a chronic kidney disease (CKD) (H)  Stable renal function on recent labs.  Maintain hydration. Avoid nephrotoxins.   - Albumin Random Urine Quantitative with Creat Ratio  - Albumin Random Urine Quantitative with Creat Ratio    Ataxia  Concerning for Wernicke's considering his ongoing alcohol abuse.  DDx: occult intracranial mass, undiagnosed CVA, other neuromuscular disorder, demyelinating disorder  Work up discussed and patient agreed as below.  Patient was highly advised to work on alcohol cessation. He agreed to referral to addiction med today.  Referred to neurology.  Fall precautions.  Return precautions discussed and given to patient.   - CBC with Platelets & Differential  - Comprehensive metabolic panel  - Vitamin B12  - Magnesium  - Adult Neurology  Referral  - MR Brain w/o Contrast  - OFFICE/OUTPT VISIT,EST,LEVL IV  - CBC with Platelets & Differential  - Comprehensive metabolic panel  - Vitamin B12  - Magnesium    Alcohol abuse  See above.  Patient agreed to referral to addiction medicine after discussing with  that his recent symptoms are suspect for alcohol-induced condition.  - CBC with Platelets & Differential  - Comprehensive metabolic panel  - Vitamin B12  - Magnesium  - Adult Neurology  Referral  - Adult Mental Health  Referral  - OFFICE/OUTPT VISIT,EST,LEVL IV  - CBC with Platelets & Differential  - Comprehensive metabolic panel  - Vitamin B12  - Magnesium    Lesion of left upper eyelid  Development of chalazion? Also considering skin cancer.  Hence, referral to derm for more definitive management.  - Adult Dermatology Referral  - OFFICE/OUTPT VISIT,EST,LEVL IV    Screening for malignant neoplasm of colon  -  Fecal colorectal cancer screen (FIT)  - Fecal colorectal cancer screen (FIT)         Patient Instructions   To schedule the MRI of the brain, call [phone].     Referrals to neurology, dermatology and addiction medicine have been signed. Schedulers will call you in the next 3-5 business days.     You may get the Shingrix vaccine for shingles if you desire, and after you verify with insurance how they cover the vaccine.     Your symptoms are most consistent with damage sustained from excessive alcohol consumption.  It is very important to work on reducing and eventually stopping alcohol use.    You will be contacted in 1-2 days for results of your lab tests.  Further recommendations thereafter.    Walk as you are able.  At home, remove all loose rugs/mats and nay other trip hazards.    Do drink at least 6-8 glasses of water a day.    Work on stopping tobacco use as well.    Preventative Care Visits include: Yearly physicals, Well-child visits, Welcome to Medicare visits, & Medicare yearly wellness exams.    The purpose of these visits is to discuss your medical history and prevent health problems before you are sick.  You may need to pay a copay, coinsurance or deductible if your visit today includes testing or treating for a new or existing condition.    Additional charges may be incurred for today's visit. If you have questions about what your insurance plan covers, please contact your Insurance Company's member service department.  If you have questions specific to a bill you have already received from [employer], please contact the [email or web address] Billing Office at [phone].        Patient Education  Personalized Prevention Plan  You are due for the preventive services outlined below.  Your care team is available to assist you in scheduling these services.  If you have already completed any of these items, please share that information with your care team to update in your medical record.  Health Maintenance Due    Topic Date Due    Kidney Microalbumin Urine Test  Never done    Colorectal Cancer Screening  Never done    Zoster (Shingles) Vaccine (1 of 2) Never done    Asthma Action Plan - yearly  02/10/2021    COVID-19 Vaccine (5 - Moderna series) 02/12/2023    Pneumococcal Vaccine (3 - PCV) 04/14/2023     Preventive Health Recommendations  See your health care provider every year to  Review health changes.   Discuss preventive care.    Review your medicines if your doctor has prescribed any.  Talk with your health care provider about whether you should have a test to screen for prostate cancer (PSA).  Every 3 years, have a diabetes test (fasting glucose). If you are at risk for diabetes, you should have this test more often.  Every 5 years, have a cholesterol test. Have this test more often if you are at risk for high cholesterol or heart disease.   Every 10 years, have a colonoscopy. Or, have a yearly FIT test (stool test). These exams will check for colon cancer.  Talk to with your health care provider about screening for Abdominal Aortic Aneurysm if you have a family history of AAA or have a history of smoking.    Shots:   Get a flu shot each year.   Get a tetanus shot every 10 years.   Talk to your doctor about your pneumonia vaccines. There are now two you should receive - Pneumovax (PPSV 23) and Prevnar (PCV 13).  Talk to your pharmacist about a shingles vaccine.   Talk to your doctor about the hepatitis B vaccine.    Nutrition:   Eat at least 5 servings of fruits and vegetables each day.   Eat whole-grain bread, whole-wheat pasta and brown rice instead of white grains and rice.   Get adequate Calcium and Vitamin D.     Lifestyle  Exercise for at least 150 minutes a week (30 minutes a day, 5 days a week). This will help you control your weight and prevent disease.   Limit alcohol to one drink per day.   No smoking.   Wear sunscreen to prevent skin cancer.   See your dentist every six months for an exam and cleaning.    See your eye doctor every 1 to 2 years to screen for conditions such as glaucoma, macular degeneration and cataracts.    Personalized Prevention Plan  You are due for the preventive services outlined below.  Your care team is available to assist you in scheduling these services.  If you have already completed any of these items, please share that information with your care team to update in your medical record.  Health Maintenance   Topic Date Due    MICROALBUMIN  Never done    COLORECTAL CANCER SCREENING  Never done    ZOSTER IMMUNIZATION (1 of 2) Never done    ASTHMA ACTION PLAN  02/10/2021    COVID-19 Vaccine (5 - Moderna series) 02/12/2023    Pneumococcal Vaccine: 65+ Years (3 - PCV) 04/14/2023    INFLUENZA VACCINE (1) 09/01/2023    NICOTINE/TOBACCO CESSATION COUNSELING Q 1 YR  10/12/2023    LIPID  10/12/2023    ANNUAL REVIEW OF HM ORDERS  10/12/2023    LUNG CANCER SCREENING  10/13/2023    BMP  11/02/2023    HEMOGLOBIN  11/02/2023    ASTHMA CONTROL TEST  01/19/2024    MEDICARE ANNUAL WELLNESS VISIT  07/19/2024    FALL RISK ASSESSMENT  07/19/2024    DTAP/TDAP/TD IMMUNIZATION (2 - Td or Tdap) 03/10/2026    ADVANCE CARE PLANNING  07/19/2028    HEPATITIS C SCREENING  Completed    PHQ-2 (once per calendar year)  Completed    URINALYSIS  Completed    AORTIC ANEURYSM SCREENING (SYSTEM ASSIGNED)  Completed    IPV IMMUNIZATION  Aged Out    MENINGITIS IMMUNIZATION  Aged Out         Exercise for a Healthier Heart  You may wonder how you can improve the health of your heart. If you re thinking about exercise, you re on the right track. You don t need to become an athlete. But you do need a certain amount of brisk exercise to help strengthen your heart. If you have been diagnosed with a heart condition, your healthcare provider may advise exercise to help your condition. To help make exercise a habit, choose safe, fun activities.      Exercise with a friend. When activity is fun, you're more likely to stick with it.      Before you start  Check with your healthcare provider before starting an exercise program. This is especially important if you haven't been active for a while. It's also important if you have a long-term (chronic) health problem such as heart disease, diabetes, or obesity. Also check with your provider if you're at high risk for having these problems.   Why exercise?  Exercising regularly offers many healthy rewards. It can help you do all of these:   Improve your blood cholesterol level to help prevent further heart trouble.  Lower your blood pressure to help prevent a stroke or heart attack.  Control diabetes or reduce your risk of getting this disease.  Improve your heart and lung function.  Reach and stay at a healthy weight.  Make your muscles stronger so you can stay active.  Prevent falls and fractures by slowing the loss of bone mass (osteoporosis).  Manage stress better.  Improve your sense of self and your body image.  Exercise tips    Ease into your routine. Set small goals. Then build on them. Talk with your healthcare provider first before starting an exercise routine if you're not sure what your activity level should be.  Exercise on most days. Aim for a total of at least 150 minutes (2 hours and 30 minutes) or more of moderate-intensity aerobic activity each week. You could also do 75 minutes (1 hour and 15 minutes) or more of vigorous-intensity aerobic activity each week. Or try for a combination of both. Moderate activity means that you breathe heavier and your heart rate increases, but you can still talk. Think about doing at least 30 minutes of moderate exercise, 5 times a week. It's OK to work up to the 30-minute period over time. Examples of moderate-intensity activity are brisk walking, gardening, and water aerobics.  Step up your daily activity level.  Along with your exercise program, try being more active the whole day. Walk instead of drive. Or park further away so that you take more  steps each day. Do more household tasks or yard work. You may not be able to meet the advised amount of physical activity. But doing some moderate- or vigorous-intensity aerobic activity can help reduce your risk for heart disease. Your healthcare provider can help you figure out what is best for you.  Choose 1 or more activities you enjoy.  Walking is one of the easiest things you can do. You can also try swimming, riding a bike, dancing, or taking an exercise class.    Call 911  Call 911 right away if any of these occur:   Chest pain that doesn't go away quickly with rest  New burning, tightness, pressure, or heaviness in your chest, neck, shoulders, back, or arms  Abnormal or severe shortness of breath  A very fast or irregular heartbeat (palpitations)  Fainting  When to call your healthcare provider  Call your healthcare provider if you have any of these:   Dizziness or lightheadedness  Mild shortness of breath or chest pain  Increased or new joint or muscle pain    [employer] last reviewed this educational content on 7/1/2022 2000-2023 [employer]. All rights reserved. This information is not intended as a substitute for professional medical care. Always follow your healthcare professional's instructions.          Understanding USDA MyPlate  The USDA has guidelines to help you make healthy food choices. These are called MyPlate. MyPlate shows the food groups that make up healthy meals using the image of a place setting. Before you eat, think about the healthiest choices for what to put on your plate or in your cup or bowl. To learn more about building a healthy plate, visit www.myplate.gov.     The food groups  Fruits. Any fruit or 100% fruit juice counts as part of the Fruit Group. Fruits may be fresh, canned, frozen, or dried, and may be whole, cut-up, or pureed. Make 1/2 of your plate fruits and vegetables.  Vegetables. Any vegetable or 100% vegetable juice counts as a member of the Vegetable  Group. Vegetables may be fresh, frozen, canned, or dried. They can be served raw or cooked and may be whole, cut-up, or mashed. Make 1/2 of your plate fruits and vegetables.  Grains. All foods made from grains are part of the Grains Group. These include wheat, rice, oats, cornmeal, and barley. Grains are often used to make foods such as bread, pasta, oatmeal, cereal, tortillas, and grits. Grains should be no more than 1/4 of your plate. At least half of your grains should be whole grains.  Protein. This group includes meat, poultry, seafood, beans and peas, eggs, processed soy products (such as tofu), nuts (including nut butters), and seeds. Make protein choices no more than 1/4 of your plate. Meat and poultry choices should be lean or low fat.  Dairy. The Dairy Group includes all fluid milk products and foods made from milk that contain calcium, such as yogurt and cheese. (Foods that have little calcium, such as cream, butter, and cream cheese, are not part of this group.) Most dairy choices should be low-fat or fat-free.  Things to limit  Eating healthy also means limiting these things in your diet:  Oils. Oils aren't a food group, but they do contain essential nutrients. These are fats that are liquid at room temperature. Including small amounts of oils in your diet is fine and can even be good for you. But it's important to watch how much oil you include in your diet. Oils include avocado, canola, corn, olive, soybean, vegetable, and sunflower. Foods that are mainly oil include mayonnaise, certain salad dressings, and soft margarines. You likely already get your daily oil allowance from the foods you eat.  Salt (sodium).  Many processed foods have a lot of sodium. To keep sodium intake down, eat fresh vegetables, meats, poultry, and seafood when possible. Buy low-sodium, reduced-sodium, or no-salt-added food products at the store. And don't add salt to your meals at home. Instead, season them with herbs and  spices such as dill, oregano, cumin, and paprika. Or try adding flavor with vinegar, onion, garlic, or lemon or lime zest and juice.  Saturated fat . Saturated fats are most often found in animal products such as beef, pork, and chicken. They are often solid at room temperature, such as butter. To reduce your saturated fat intake, choose leaner cuts of meat and poultry. And try healthier cooking methods such as grilling, broiling, roasting, or baking. For a simple lower-fat swap, use plain nonfat yogurt instead of mayonnaise when making potato salad or macaroni salad.  Added sugars.  These are sugars added to foods. They are in foods such as ice cream, candy, soda, fruit drinks, sports drinks, energy drinks, cookies, pastries, jams, and syrups. Cut down on added sugars by sharing sweet treats with a family member or friend. You can also choose fruit for dessert, and drink water or other unsweetened beverages.  Alcohol. Alcohol contains calories but few nutrients. If you don't drink alcohol, don't start drinking for any reason. But if you do choose to drink alcohol, do so only in moderation. This means no more than 2 drinks in a day for men and no more than 1 drink per day for women, on days when you have alcohol.  [employer] last reviewed this educational content on 1/1/2023 2000-2023 [employer]. All rights reserved. This information is not intended as a substitute for professional medical care. Always follow your healthcare professional's instructions.             MD [facility]    Ryanne Meyers is a 67 year old, presenting for the following health issues:  Leg Problem (Calf cramping and leg drop x 3 weeks - right) and Eye Problem (Left eye bump - getting bigger)        7/19/2023    10:43 AM   Additional Questions   Roomed by Lucy FONTANA   Accompanied by Self         7/19/2023    10:43 AM   Patient Reported Additional Medications   Patient reports taking  the following new medications NOT taking ellipta inhaler or vitamins     HPI     Right leg/calf  3 weeks ago  Had pain in right calf - not much pain anymore  Having numbness in right calf  With the pain came leg weakness - pt leg will drag if he doesn't lift leg to walk  Patient denies ipsilateral upper extremity symptoms at the time.  No other symptpoms at the time.    Left eye Mass - lateral upper eyelid  Has had for several months but now has gotten larger  Tried to squeeze lump (no discharge)  Now lump is larger and seems inflamed     Annual Wellness Visit    Patient has been advised of split billing requirements and indicates understanding: Yes     Are you in the first 12 months of your Medicare Part B coverage?  No    Physical Health:  In general, how would you rate your overall physical health? good  Outside of work, how many days during the week do you exercise?none  Outside of work, approximately how many minutes a day do you exercise?not applicable  If you drink alcohol do you typically have >3 drinks per day or >7 drinks per week? Yes - AUDIT SCORE:         10/12/2022    11:28 AM   AUDIT - Alcohol Use Disorders Identification Test - Reproduced from the World Health Organization Audit 2001 (Second Edition)   1.  How often do you have a drink containing alcohol? 2 to 3 times a week   2.  How many drinks containing alcohol do you have on a typical day when you are drinking? 3 or 4   3.  How often do you have five or more drinks on one occasion? Weekly   4.  How often during the last year have you found that you were not able to stop drinking once you had started? Weekly   5.  How often during the last year have you failed to do what was normally expected of you because of drinking? Less than monthly   6.  How often during the last year have you needed a first drink in the morning to get yourself going after a heavy drinking session? Less than monthly   7.  How often during the last year have you had a  "feeling of guilt or remorse after drinking? Weekly   8.  How often during the last year have you been unable to remember what happened the night before because of your drinking? Monthly   9.  Have you or someone else been injured because of your drinking? No   10. Has a relative, friend, doctor or other health care worker been concerned about your drinking or suggested you cut down? Yes, during the last year   TOTAL SCORE 21     Do you usually eat at least 4 servings of fruit and vegetables a day, include whole grains & fiber and avoid regularly eating high fat or \"junk\" foods? No  Do you have any problems taking medications regularly? No  Do you have any side effects from medications? none  Needs assistance for the following daily activities: no assistance needed  Which of the following safety concerns are present in your home?  none identified   Hearing impairment: No  In the past 6 months, have you been bothered by leaking of urine? no    Mental Health:  In general, how would you rate your overall mental or emotional health? good  PHQ-2 Score: 0    Do you feel safe in your environment? Yes    Have you ever done Advance Care Planning? (For example, a Health Directive, POLST, or a discussion with a medical provider or your loved ones about your wishes)? No, advance care planning information given to patient to review.  Patient declined advance care planning discussion at this time.    Fall risk:  Fallen 2 or more times in the past year?: No  Any fall with injury in the past year?: No    Cognitive Screenin) Repeat 3 items (Leader, Season, Table)    2) Clock draw:   3) 3 item recall: Recalls 3 objects  Results: 3 items recalled: COGNITIVE IMPAIRMENT LESS LIKELY    Mini-CogTM Copyright MICHELE [employer]. Licensed by the author for use in [facility]; reprinted with permission (blas@.[email or web address]). All rights reserved.      Do you have sleep apnea, excessive snoring or daytime drowsiness?: no    Social History " "    Tobacco Use    Smoking status: Every Day     Packs/day: 0.50     Years: 45.00     Pack years: 22.50     Types: Cigarettes     Start date: 1976    Smokeless tobacco: Never   Substance Use Topics    Alcohol use: Yes     Comment: 2-3 drinks daily           10/12/2022    11:28 AM   Alcohol Use   AUDIT SCORE  21     Do you have a current opioid prescription? No  Do you use any other controlled substances or medications that are not prescribed by a provider? Alcohol    Current providers sharing in care for this patient include:   Patient Care Team:  Gatito Ware MD as PCP - General ([facility])  Gatito Ware MD as Assigned PCP    Patient has been advised of split billing requirements and indicates understanding: No  I have reviewed Opioid Use Disorder and Substance Use Disorder risk factors and made any needed referrals.       Review of Systems   Constitutional, HEENT, cardiovascular, pulmonary, GI, , musculoskeletal, neuro, skin, endocrine and psych systems are negative, except as otherwise noted.      Objective    BP 94/82   Pulse 113   Temp 98.3  F (36.8  C) (Tympanic)   Resp 24   Ht 1.778 m (5' 10\")   Wt 72.1 kg (159 lb)   SpO2 96%   BMI 22.81 kg/m    Body mass index is 22.81 kg/m .  Physical Exam     GENERAL: alert and no distress, ambulatory w/o assist but with ataxia (lifts right leg)  NECK: no tenderness, no adenopathy,  Thyroid not enlarged  RESP: lungs clear to auscultation - no rales, no rhonchi, no wheezes  CV: tachycardic, regular rhythm, no murmur  MS: no edema  SKIN: no suspicious lesions, no rashes  NEURO: strength and tone- normal, sensory exam- grossly normal, mentation- intact, speech- normal, reflexes- symmetric  ABD:  nontender   Neurological:  Mental: awake, follows commands, no aphasia or dysarthria. Fund of knowledge is not diminished for age.  Cranial Nerves:  CN II: visual acuity - is able to accurately count fingers with each eye. Visual fields " grossly intact  CN III, IV, VI: EOM intact, pupils equal and reactive  CN V: facial sensation grossly intact  CN VII: face symmetric, no facial droop  CN VIII: hearing grossly intact bilaterally  CN IX: palate elevation symmetric, uvula at midline  CN XI SCM normal, shoulder shrug nl  CN XII: tongue midline  Motor: Strength: 5/5 in all major groups of all extremities. Normal tone.  There is mild hand tremor that does not worsen with movements.   No pronator drift b/l.  Reflexes: Triceps, biceps, brachioradialis, patellar, and achilles reflexes normal and symmetric. No clonus noted. Toes are down-going b/l.   Sensory: hypoesthesia on right foot but otherwises intact grossly. Romberg: slightly leans to right.  Coordination: no gross dysdiadochokinesia with rapid alternating movements.  Gait:  ataxic    Office Visit on 11/02/2022   Component Date Value Ref Range Status    Sodium 11/02/2022 139  136 - 145 mmol/L Final    Potassium 11/02/2022 4.6  3.4 - 5.3 mmol/L Final    Chloride 11/02/2022 104  98 - 107 mmol/L Final    Carbon Dioxide (CO2) 11/02/2022 26  22 - 29 mmol/L Final    Anion Gap 11/02/2022 9  7 - 15 mmol/L Final    Urea Nitrogen 11/02/2022 12.5  8.0 - 23.0 mg/dL Final    Creatinine 11/02/2022 1.28 (H)  0.67 - 1.17 mg/dL Final    Calcium 11/02/2022 9.8  8.8 - 10.2 mg/dL Final    Glucose 11/02/2022 95  70 - 99 mg/dL Final    GFR Estimate 11/02/2022 62  >60 mL/min/1.73m2 Final    Effective December 21, 2021 eGFRcr in adults is calculated using the 2021 CKD-EPI creatinine equation which includes age and gender (Elisa et al., NEJ, DOI: 10.1056/NQHAhs5721560)    WBC Count 11/02/2022 8.0  4.0 - 11.0 10e3/uL Final    RBC Count 11/02/2022 3.53 (L)  4.40 - 5.90 10e6/uL Final    Hemoglobin 11/02/2022 10.9 (L)  13.3 - 17.7 g/dL Final    Hematocrit 11/02/2022 34.1 (L)  40.0 - 53.0 % Final    MCV 11/02/2022 97  78 - 100 fL Final    MCH 11/02/2022 30.9  26.5 - 33.0 pg Final    MCHC 11/02/2022 32.0  31.5 - 36.5 g/dL Final     RDW 11/02/2022 17.8 (H)  10.0 - 15.0 % Final    Platelet Count 11/02/2022 645 (H)  150 - 450 10e3/uL Final    % Neutrophils 11/02/2022 49  % Final    % Lymphocytes 11/02/2022 36  % Final    % Monocytes 11/02/2022 10  % Final    % Eosinophils 11/02/2022 2  % Final    % Basophils 11/02/2022 2  % Final    % Immature Granulocytes 11/02/2022 1  % Final    NRBCs per 100 WBC 11/02/2022 0  <1 /100 Final    Absolute Neutrophils 11/02/2022 3.9  1.6 - 8.3 10e3/uL Final    Absolute Lymphocytes 11/02/2022 2.9  0.8 - 5.3 10e3/uL Final    Absolute Monocytes 11/02/2022 0.8  0.0 - 1.3 10e3/uL Final    Absolute Eosinophils 11/02/2022 0.2  0.0 - 0.7 10e3/uL Final    Absolute Basophils 11/02/2022 0.2  0.0 - 0.2 10e3/uL Final    Absolute Immature Granulocytes 11/02/2022 0.0  <=0.4 10e3/uL Final    Absolute NRBCs 11/02/2022 0.0  10e3/uL Final             He is at risk for lack of exercise and has been provided with information to increase physical activity for the benefit of his well-being.  The patient was counseled and encouraged to consider modifying their diet and eating habits. He was provided with information on recommended healthy diet options.

## 2023-07-20 ENCOUNTER — TELEPHONE (OUTPATIENT)
Dept: DERMATOLOGY | Facility: CLINIC | Age: 68
End: 2023-07-20
Payer: MEDICARE

## 2023-07-20 NOTE — TELEPHONE ENCOUNTER
This encounter is being sent to inform the clinic that this patient has a referral from Gatito Ware MD in [facility] for the diagnoses of Lesion of left upper eyelid,  and has requested that this patient be seen within Priority: 1-2 Weeks. Based on the availability of our provider(s), we are unable to accommodate this request.      Were all sites offered this patient?  Yes      Does scheduling algorithm request to schedule next available?  Patient has been scheduled for the first available opening with Luigi Najera MD on 1/2/2024.  We have informed the patient that the clinic will review their referral and reach out if a sooner appointment is medically necessary.

## 2023-07-21 NOTE — TELEPHONE ENCOUNTER
Please call patient and make appt. Urgent referral was placed by PCP. Please offer appt with Dr. Najera on July 24th anytime between 1-2 pm., August 25th at 1:30, or Aug 2nd at 1:45. Thank you.  Reyna BIRMINGHAM RN BSN PHN  Specialty Clinics

## 2023-07-24 ENCOUNTER — OFFICE VISIT (OUTPATIENT)
Dept: DERMATOLOGY | Facility: CLINIC | Age: 68
End: 2023-07-24
Payer: MEDICARE

## 2023-07-24 DIAGNOSIS — L72.0 EPIDERMAL CYST: Primary | ICD-10-CM

## 2023-07-24 PROCEDURE — 99202 OFFICE O/P NEW SF 15 MIN: CPT | Performed by: DERMATOLOGY

## 2023-07-24 ASSESSMENT — PAIN SCALES - GENERAL: PAINLEVEL: NO PAIN (0)

## 2023-07-24 NOTE — PROGRESS NOTES
Luigi Vieyra , a 67 year old year old male patient, I was asked to see by Dr. Ware for spot on eyelid.  Patient states this has been present for a while.  Patient reports the following symptoms:  popped and drained.  Patient has no other skin complaints today.  Remainder of the HPI, Meds, PMH, Allergies, FH, and SH was reviewed in chart.      Past Medical History:   Diagnosis Date    Asthma        Past Surgical History:   Procedure Laterality Date    ESOPHAGOSCOPY, GASTROSCOPY, DUODENOSCOPY (EGD), COMBINED N/A 3/21/2022    Procedure: ESOPHAGOGASTRODUODENOSCOPY, WITH BIOPSY;  Surgeon: Eunice Rubin MD;  Location: [facility]    ESOPHAGOSCOPY, GASTROSCOPY, DUODENOSCOPY (EGD), COMBINED N/A 10/14/2022    Procedure: ESOPHAGOGASTRODUODENOSCOPY (EGD) with epi injection and clip placement;  Surgeon: Cash Oconnor MD;  Location: [facility]        Family History   Problem Relation Age of Onset    Asthma Mother     Cerebrovascular Disease Mother     C.A.D. No family hx of     Diabetes No family hx of     Hypertension No family hx of     Breast Cancer No family hx of     Cancer - colorectal No family hx of     Prostate Cancer No family hx of        Social History     Socioeconomic History    Marital status:      Spouse name: Not on file    Number of children: Not on file    Years of education: Not on file    Highest education level: Not on file   Occupational History    Not on file   Tobacco Use    Smoking status: Every Day     Packs/day: 0.50     Years: 45.00     Pack years: 22.50     Types: Cigarettes     Start date: 1976    Smokeless tobacco: Never   Vaping Use    Vaping Use: Never used   Substance and Sexual Activity    Alcohol use: Yes     Comment: 2-3 drinks daily    Drug use: No    Sexual activity: Yes     Partners: Female   Other Topics Concern    Parent/sibling w/ CABG, MI or angioplasty before 65F 55M? Not Asked   Social History Narrative    Not on file     Social Determinants of Health     Financial  Resource Strain: Not on file   Food Insecurity: Not on file   Transportation Needs: Not on file   Physical Activity: Not on file   Stress: Not on file   Social Connections: Not on file   Intimate Partner Violence: Not on file   Housing Stability: Not on file       Outpatient Encounter Medications as of 7/24/2023   Medication Sig Dispense Refill    albuterol (PROAIR HFA/PROVENTIL HFA/VENTOLIN HFA) 108 (90 Base) MCG/ACT inhaler Inhale 2 puffs into the lungs every 4 hours as needed for shortness of breath, wheezing or cough SEE PROVIDER BEFORE OTHER REFILLS ARE GIVEN. 8.5 g 0    fluticasone (ARNUITY ELLIPTA) 100 MCG/ACT inhaler Inhale 1 puff into the lungs daily (Patient not taking: Reported on 7/19/2023) 60 each 1    folic acid (FOLVITE) 1 MG tablet Take 1 tablet (1 mg) by mouth daily (Patient not taking: Reported on 7/19/2023) 30 tablet 0    losartan (COZAAR) 25 MG tablet Take 1 tablet (25 mg) by mouth every morning 90 tablet 3    magnesium oxide (MAG-OX) 400 MG tablet Take 1 tablet (400 mg) by mouth daily 30 tablet 0    metoprolol succinate ER (TOPROL XL) 25 MG 24 hr tablet Take 1 tablet (25 mg) by mouth every morning 90 tablet 3    multivitamin (ONE-DAILY) tablet Take 1 tablet by mouth daily (Patient not taking: Reported on 7/19/2023) 30 tablet 0    order for DME Equipment being ordered: Digital home blood pressure monitor kit 1 each 0    pantoprazole (PROTONIX) 40 MG EC tablet Take 1 tablet (40 mg) by mouth every morning (before breakfast) (Patient not taking: Reported on 7/19/2023) 30 tablet 1    spacer (OPTICHAMBER YIMI) holding chamber Use with inhaler (albuterol and beclomethasone) (Patient not taking: Reported on 7/19/2023) 1 each 0    thiamine (B-1) 100 MG tablet Take 1 tablet (100 mg) by mouth daily (Patient not taking: Reported on 7/19/2023) 90 tablet 3     No facility-administered encounter medications on file as of 7/24/2023.             Review Of Systems  Skin: As above  Eyes:  negative  Ears/Nose/Throat: negative  Respiratory: No shortness of breath, dyspnea on exertion, cough, or hemoptysis  Cardiovascular: negative  Gastrointestinal: negative  Genitourinary: negative  Musculoskeletal: negative  Neurologic: negative  Psychiatric: negative  Hematologic/Lymphatic/Immunologic: negative  Endocrine: negative      O:   NAD, WDWN, Alert & Oriented, Mood & Affect wnl, Vitals stable   Here today alone   General appearance trevor ii   Vitals stable   Alert, oriented and in no acute distress   L lateral brow nodule with comedone and keratin expressed      Eyes: Conjunctivae/lids:Normal     ENT: Lips, buccal mucosa, tongue: normal    MSK:Normal    Cardiovascular: peripheral edema none    Pulm: Breathing Normal    Neuro/Psych: Orientation:Normal; Mood/Affect:Normal      A/P:  Epidermal cyst  Excision discussed with patient   Pathophysiology discussed with pateint   He declines  It was a pleasure speaking to Luigi Vieyra today.  Previous clinic  notes and pertinent laboratory tests were reviewed prior to Luigi Vieyra's visit.  Nature and genetics of benign skin lesions dicussed with patient.  Mychart us if things get worse

## 2023-07-24 NOTE — LETTER
7/24/2023         RE: Luigi Vieyra  [address] [id number]        Dear Colleague,    Thank you for referring your patient, Luigi Vieyra, to the [facility]. Please see a copy of my visit note below.    Luigi Vieyra , a 67 year old year old male patient, I was asked to see by Dr. Ware for spot on eyelid.  Patient states this has been present for a while.  Patient reports the following symptoms:  popped and drained.  Patient has no other skin complaints today.  Remainder of the HPI, Meds, PMH, Allergies, FH, and SH was reviewed in chart.      Past Medical History:   Diagnosis Date     Asthma        Past Surgical History:   Procedure Laterality Date     ESOPHAGOSCOPY, GASTROSCOPY, DUODENOSCOPY (EGD), COMBINED N/A 3/21/2022    Procedure: ESOPHAGOGASTRODUODENOSCOPY, WITH BIOPSY;  Surgeon: Eunice Rubin MD;  Location: [facility]     ESOPHAGOSCOPY, GASTROSCOPY, DUODENOSCOPY (EGD), COMBINED N/A 10/14/2022    Procedure: ESOPHAGOGASTRODUODENOSCOPY (EGD) with epi injection and clip placement;  Surgeon: Cash Oconnor MD;  Location: [facility]        Family History   Problem Relation Age of Onset     Asthma Mother      Cerebrovascular Disease Mother      C.A.D. No family hx of      Diabetes No family hx of      Hypertension No family hx of      Breast Cancer No family hx of      Cancer - colorectal No family hx of      Prostate Cancer No family hx of        Social History     Socioeconomic History     Marital status:      Spouse name: Not on file     Number of children: Not on file     Years of education: Not on file     Highest education level: Not on file   Occupational History     Not on file   Tobacco Use     Smoking status: Every Day     Packs/day: 0.50     Years: 45.00     Pack years: 22.50     Types: Cigarettes     Start date: 1976     Smokeless tobacco: Never   Vaping Use     Vaping Use: Never used   Substance and Sexual Activity     Alcohol use: Yes     Comment:  2-3 drinks daily     Drug use: No     Sexual activity: Yes     Partners: Female   Other Topics Concern     Parent/sibling w/ CABG, MI or angioplasty before 65F 55M? Not Asked   Social History Narrative     Not on file     Social Determinants of Health     Financial Resource Strain: Not on file   Food Insecurity: Not on file   Transportation Needs: Not on file   Physical Activity: Not on file   Stress: Not on file   Social Connections: Not on file   Intimate Partner Violence: Not on file   Housing Stability: Not on file       Outpatient Encounter Medications as of 7/24/2023   Medication Sig Dispense Refill     albuterol (PROAIR HFA/PROVENTIL HFA/VENTOLIN HFA) 108 (90 Base) MCG/ACT inhaler Inhale 2 puffs into the lungs every 4 hours as needed for shortness of breath, wheezing or cough SEE PROVIDER BEFORE OTHER REFILLS ARE GIVEN. 8.5 g 0     fluticasone (ARNUITY ELLIPTA) 100 MCG/ACT inhaler Inhale 1 puff into the lungs daily (Patient not taking: Reported on 7/19/2023) 60 each 1     folic acid (FOLVITE) 1 MG tablet Take 1 tablet (1 mg) by mouth daily (Patient not taking: Reported on 7/19/2023) 30 tablet 0     losartan (COZAAR) 25 MG tablet Take 1 tablet (25 mg) by mouth every morning 90 tablet 3     magnesium oxide (MAG-OX) 400 MG tablet Take 1 tablet (400 mg) by mouth daily 30 tablet 0     metoprolol succinate ER (TOPROL XL) 25 MG 24 hr tablet Take 1 tablet (25 mg) by mouth every morning 90 tablet 3     multivitamin (ONE-DAILY) tablet Take 1 tablet by mouth daily (Patient not taking: Reported on 7/19/2023) 30 tablet 0     order for DME Equipment being ordered: Digital home blood pressure monitor kit 1 each 0     pantoprazole (PROTONIX) 40 MG EC tablet Take 1 tablet (40 mg) by mouth every morning (before breakfast) (Patient not taking: Reported on 7/19/2023) 30 tablet 1     spacer (OPTICHAMBER YIMI) holding chamber Use with inhaler (albuterol and beclomethasone) (Patient not taking: Reported on 7/19/2023) 1 each 0      thiamine (B-1) 100 MG tablet Take 1 tablet (100 mg) by mouth daily (Patient not taking: Reported on 7/19/2023) 90 tablet 3     No facility-administered encounter medications on file as of 7/24/2023.             Review Of Systems  Skin: As above  Eyes: negative  Ears/Nose/Throat: negative  Respiratory: No shortness of breath, dyspnea on exertion, cough, or hemoptysis  Cardiovascular: negative  Gastrointestinal: negative  Genitourinary: negative  Musculoskeletal: negative  Neurologic: negative  Psychiatric: negative  Hematologic/Lymphatic/Immunologic: negative  Endocrine: negative      O:   NAD, WDWN, Alert & Oriented, Mood & Affect wnl, Vitals stable   Here today alone   General appearance trevor ii   Vitals stable   Alert, oriented and in no acute distress   L lateral brow nodule with comedone and keratin expressed      Eyes: Conjunctivae/lids:Normal     ENT: Lips, buccal mucosa, tongue: normal    MSK:Normal    Cardiovascular: peripheral edema none    Pulm: Breathing Normal    Neuro/Psych: Orientation:Normal; Mood/Affect:Normal      A/P:  Epidermal cyst  Excision discussed with patient   Pathophysiology discussed with pateint   He declines  It was a pleasure speaking to Luigi Vieyra today.  Previous clinic  notes and pertinent laboratory tests were reviewed prior to Luigi Vieyra's visit.  Nature and genetics of benign skin lesions dicussed with patient.  Mychart us if things get worse      Again, thank you for allowing me to participate in the care of your patient.        Sincerely,        Luigi Najera MD

## 2023-07-24 NOTE — NURSING NOTE
Luigi Vieyra's chief complaint for this visit includes:  Chief Complaint   Patient presents with    Derm Problem     New lesion that opened and oozed X 3 days ago on left eyelid.     PCP: Gatito Ware    Referring Provider:  Gatito Ware MD  [address] [place] [address]    There were no vitals taken for this visit.  No Pain (0)      No Known Allergies      Do you need any medication refills at today's visit? No    Rina Hurst MA

## 2023-07-25 ENCOUNTER — TELEPHONE (OUTPATIENT)
Dept: FAMILY MEDICINE | Facility: CLINIC | Age: 68
End: 2023-07-25
Payer: MEDICARE

## 2023-07-25 DIAGNOSIS — R27.0 ATAXIA: Primary | ICD-10-CM

## 2023-07-25 DIAGNOSIS — F10.10 ALCOHOL ABUSE: ICD-10-CM

## 2023-07-25 NOTE — TELEPHONE ENCOUNTER
It is suspected that his leg symptoms are due to nervous system damage from alcohol.  Pursue the brain and abdomen imaging.  I have placed a referral to neurology as well as patient likely will need to see a nerurologist if he does have alcohol related neurologic condition.    If he has rapidly progressing ataxia or difficulty with walking, confusion or sudden jaundice, he should be brought to the ER.

## 2023-07-25 NOTE — TELEPHONE ENCOUNTER
"Pt call was returned regarding his \"leg dragging\", pt says it was present when he saw Dr Ware 6 days ago and it was discussed with Dr Ware. The reason for the MRI was explained to pt that sometimes problems in the brain can cause problems with the legs. Pt said it all started with a cramp in his Pt has US and MRI scheduled for 7/28/2023. Message forwarded to PCP per pt request for any further recommendations. Rachel Juárez RN     "

## 2023-07-25 NOTE — TELEPHONE ENCOUNTER
Symptoms    Describe your symptoms: Patient's right leg from knee down is dragging    Any pain: No    How long have you been having symptoms: For awhile    Have you been seen for this:  Yes: at 7/19/23 OV, he mentioned it to Dr. Ware    Appointment offered?: No    Triage offered?: Yes:     Home remedies tried: None    Preferred Pharmacy:     [facility] [address]  [address] [address]  Phone: [phone] Fax: [phone] Alternate Fax: [phone], [phone]    Okay to leave a detailed message?: Yes at Home number on file [phone] (home)

## 2023-07-26 NOTE — TELEPHONE ENCOUNTER
Called pt and gave provider information as well as neuro phone number to schedule. Pt has mri and us on Friday. Adv to go to ed with worsening or new symptoms such as jaundice, confusion or worsening leg dragging.       Ovidio Tolentino RN

## 2023-07-28 ENCOUNTER — HOSPITAL ENCOUNTER (OUTPATIENT)
Dept: ULTRASOUND IMAGING | Facility: CLINIC | Age: 68
Discharge: HOME | End: 2023-07-28
Attending: FAMILY MEDICINE
Payer: MEDICARE

## 2023-07-28 ENCOUNTER — HOSPITAL ENCOUNTER (OUTPATIENT)
Dept: MRI IMAGING | Facility: CLINIC | Age: 68
Discharge: HOME | End: 2023-07-28
Attending: FAMILY MEDICINE
Payer: MEDICARE

## 2023-07-28 DIAGNOSIS — R27.0 ATAXIA: ICD-10-CM

## 2023-07-28 DIAGNOSIS — R74.01 ELEVATED AST (SGOT): ICD-10-CM

## 2023-07-28 DIAGNOSIS — F10.10 ALCOHOL ABUSE: ICD-10-CM

## 2023-07-28 PROCEDURE — A9585 GADOBUTROL INJECTION: HCPCS | Performed by: FAMILY MEDICINE

## 2023-07-28 PROCEDURE — 76705 ECHO EXAM OF ABDOMEN: CPT

## 2023-07-28 PROCEDURE — G1010 CDSM STANSON: HCPCS

## 2023-07-28 PROCEDURE — 255N000002 HC RX 255 OP 636: Performed by: FAMILY MEDICINE

## 2023-07-28 PROCEDURE — 70553 MRI BRAIN STEM W/O & W/DYE: CPT | Mod: MG

## 2023-07-28 RX ORDER — GADOBUTROL 604.72 MG/ML
7 INJECTION INTRAVENOUS ONCE
Status: COMPLETED | OUTPATIENT
Start: 2023-07-28 | End: 2023-07-28

## 2023-07-28 RX ADMIN — GADOBUTROL 7 ML: 604.72 INJECTION INTRAVENOUS at 12:46

## 2023-08-09 ENCOUNTER — LAB (OUTPATIENT)
Dept: LAB | Facility: CLINIC | Age: 68
End: 2023-08-09
Payer: MEDICARE

## 2023-08-09 DIAGNOSIS — E83.42 HYPOMAGNESEMIA: ICD-10-CM

## 2023-08-09 LAB — MAGNESIUM SERPL-MCNC: 1.6 MG/DL (ref 1.7–2.3)

## 2023-08-09 PROCEDURE — 36415 COLL VENOUS BLD VENIPUNCTURE: CPT

## 2023-08-09 PROCEDURE — 83735 ASSAY OF MAGNESIUM: CPT

## 2023-08-11 ENCOUNTER — TELEPHONE (OUTPATIENT)
Dept: FAMILY MEDICINE | Facility: CLINIC | Age: 68
End: 2023-08-11
Payer: MEDICARE

## 2023-08-11 NOTE — TELEPHONE ENCOUNTER
New referrals have been signed with priority.  Patient should be called by  in ntext 1-2 business days.  If no call by end of Monday net week, patient is advised to contact care team again.

## 2023-08-11 NOTE — TELEPHONE ENCOUNTER
"Pt was notified that the Neurology referral has been changed to \"high priority\".  He does have an appt with them and will wait to hear if he can be seen sooner.  "

## 2023-08-11 NOTE — TELEPHONE ENCOUNTER
Patient calls to see what can be done to get into neurology sooner. Referral recently placed by PCP, but when patient called, the soonest he could get in at any [facility] location is February, which is too long for him.  Current referral is normal/first available opening priority. Would PCP like to change priority to a sooner timeline, or refer to another neurology?    Luly Mckinney RN  [facility]

## 2023-09-21 DIAGNOSIS — J45.30 MILD PERSISTENT ASTHMA WITHOUT COMPLICATION: ICD-10-CM

## 2023-09-21 RX ORDER — ALBUTEROL SULFATE 90 UG/1
2 AEROSOL, METERED RESPIRATORY (INHALATION) EVERY 4 HOURS PRN
Qty: 8.5 G | Refills: 4 | Status: SHIPPED | OUTPATIENT
Start: 2023-09-21

## 2023-11-06 ENCOUNTER — TELEPHONE (OUTPATIENT)
Dept: FAMILY MEDICINE | Facility: CLINIC | Age: 68
End: 2023-11-06
Payer: MEDICARE

## 2023-11-06 NOTE — TELEPHONE ENCOUNTER
Patient Quality Outreach    Patient is due for the following:   Colon Cancer Screening    Next Steps:   Schedule appointment for colon cancer screening    Type of outreach:    Sent letter.      Questions for provider review:    None           Trina Archibald MA

## 2023-11-06 NOTE — LETTER
November 6, 2023    To  Luigi Vieyra  [facility]  [address]    [place] [address]    Your team at [facility] cares about your health. We have reviewed your chart and based on our findings; we are making the following recommendations to better manage your health.     You are in particular need of attention regarding the following:     Call or MyChart message your clinic to schedule a colonoscopy, schedule/ a FIT Test, or order a Cologuard test. If you are unsure what type of test you need, please call your clinic and speak to clinic staff.   Colon cancer is now the second leading cause of cancer-related deaths in the [facility] for both men and women and there are over 130,000 new cases and 50,000 deaths per year from colon cancer. Colonoscopies can prevent 90-95% of these deaths. Problem lesions can be removed before they ever become cancer. This test is not only looking for cancer, but also getting rid of precancerous lesions.   If you are under/uninsured, we recommend you contact the [employer] Program.[employer] is a free colorectal cancer screening program that provides colonoscopies for eligible under/uninsured [place] men and women. If you are interested in receiving a free colonoscopy, please call [employer] at t [phone] (mention code [employer]) to see if you're eligible. Please have them send us the results.   Please call [phone] to schedule a colonoscopy.    If you have already completed these items, please contact the clinic via phone or   [employer] so your care team can review and update your records. Thank you for   choosing [facility] for your healthcare needs. For any questions,   concerns, or to schedule an appointment please contact our clinic.    Healthy Regards,      Your [facility] Care Team

## 2023-12-05 ENCOUNTER — OFFICE VISIT (OUTPATIENT)
Dept: NEUROLOGY | Facility: CLINIC | Age: 68
End: 2023-12-05
Attending: FAMILY MEDICINE
Payer: MEDICARE

## 2023-12-05 VITALS
HEART RATE: 100 BPM | DIASTOLIC BLOOD PRESSURE: 88 MMHG | RESPIRATION RATE: 18 BRPM | BODY MASS INDEX: 22.96 KG/M2 | WEIGHT: 160 LBS | SYSTOLIC BLOOD PRESSURE: 128 MMHG

## 2023-12-05 DIAGNOSIS — R29.2 HYPERREFLEXIA: ICD-10-CM

## 2023-12-05 DIAGNOSIS — M21.371 RIGHT FOOT DROP: Primary | ICD-10-CM

## 2023-12-05 DIAGNOSIS — F10.10 ALCOHOL ABUSE: ICD-10-CM

## 2023-12-05 DIAGNOSIS — R29.898 WEAKNESS OF BOTH LOWER EXTREMITIES: ICD-10-CM

## 2023-12-05 PROCEDURE — 99205 OFFICE O/P NEW HI 60 MIN: CPT | Performed by: PSYCHIATRY & NEUROLOGY

## 2023-12-05 NOTE — LETTER
12/5/2023         RE: Luigi Vieyra  [place]  [address]  [address]  [place] [address]        Dear Colleague,    Thank you for referring your patient, Luigi Vieyra, to the [facility] [place]. Please see a copy of my visit note below.    NEUROLOGY OUTPATIENT CONSULT NOTE   Dec 5, 2023     CHIEF COMPLAINT/REASON FOR VISIT/REASON FOR CONSULT  Patient presents with:  Consult: Ataxia     REASON FOR CONSULTATION- Leg weakness/pain    REFERRAL SOURCE  Dr. Gatito Ware  CC Dr. Gatito Ware    HISTORY OF PRESENT ILLNESS  Luigi Vieyra is a 68 year old male seen today for evaluation of right leg pain/weakness.  He reports that about 5 months ago his symptoms came on.  Denies anything that might have provoked the symptoms to come on.  Was not sitting in any awkward positions.  Reports some cramping in the gastrocnemius muscle on the right side and then subsequently having some right foot pain and right foot drop.  The right foot drop is not very severe though feels that his foot will get stuck when he is trying to walk.  Denies any back pain.  Nuys any shooting pain down the leg.  No symptoms on the left side.  Has not done any physical therapy.  No symptoms in his hands.  Does have a history of extensive alcohol abuse over the last 3040 years.  No history of diabetes.  Denies any ataxia symptoms or cognitive issues.  Does complain of some mild balance issues.  No falls.    Previous history is reviewed and this is unchanged.    PAST MEDICAL/SURGICAL HISTORY  Past Medical History:   Diagnosis Date     Asthma      Patient Active Problem List   Diagnosis     Mild persistent asthma     Alcohol abuse     Tobacco abuse     CARDIOVASCULAR SCREENING; LDL GOAL LESS THAN 130     Seasonal allergic rhinitis     Uncontrolled hypertension     Mixed hyperlipidemia     Hyponatremia     Anemia, unspecified type     Alcoholic gastritis, presence of bleeding unspecified,  unspecified chronicity     Hypotension due to hypovolemia     MAIA (acute kidney injury) (H24)     Elevated lactic acid level     Transaminitis     Syncope     Thrombocytosis     Leukocytosis     Acute blood loss anemia     Stage 3a chronic kidney disease (CKD) (H)   Reviewed and negative    FAMILY HISTORY  Family History   Problem Relation Age of Onset     Asthma Mother      Cerebrovascular Disease Mother      C.A.D. No family hx of      Diabetes No family hx of      Hypertension No family hx of      Breast Cancer No family hx of      Cancer - colorectal No family hx of      Prostate Cancer No family hx of    Negative for neurological problems.    SOCIAL HISTORY  Social History     Tobacco Use     Smoking status: Every Day     Packs/day: 0.50     Years: 45.00     Additional pack years: 0.00     Total pack years: 22.50     Types: Cigarettes     Start date: 1976     Smokeless tobacco: Never   Vaping Use     Vaping Use: Never used   Substance Use Topics     Alcohol use: Yes     Comment: 2-3 drinks daily     Drug use: No       SYSTEMS REVIEW  Twelve-system ROS was done and other than the HPI this was negative except for arm and leg pain, numbness/tingling, difficulty walking.    MEDICATIONS  losartan (COZAAR) 25 MG tablet, TAKE 1 TABLET BY MOUTH EVERY MORNING  magnesium oxide (MAG-OX) 400 MG tablet, Take 1 tablet (400 mg) by mouth daily  metoprolol succinate ER (TOPROL XL) 25 MG 24 hr tablet, TAKE 1 TABLET BY MOUTH EVERY MORNING  albuterol (PROAIR HFA/PROVENTIL HFA/VENTOLIN HFA) 108 (90 Base) MCG/ACT inhaler, INHALE 2 PUFFS INTO THE LUNGS EVERY 4 HOURS AS NEEDED FOR SHORTNESS OF BREATH, WHEEZING OR COUGH SEE PROVIDER BEFORE OTHER REFILLS ARE GIVEN. (Patient not taking: Reported on 12/5/2023)  fluticasone (ARNUITY ELLIPTA) 100 MCG/ACT inhaler, Inhale 1 puff into the lungs daily (Patient not taking: Reported on 7/19/2023)  folic acid (FOLVITE) 1 MG tablet, Take 1 tablet (1 mg) by mouth daily (Patient not taking: Reported  on 7/19/2023)  multivitamin (ONE-DAILY) tablet, Take 1 tablet by mouth daily (Patient not taking: Reported on 7/19/2023)  order for DME, Equipment being ordered: Digital home blood pressure monitor kit (Patient not taking: Reported on 12/5/2023)  pantoprazole (PROTONIX) 40 MG EC tablet, Take 1 tablet (40 mg) by mouth every morning (before breakfast) (Patient not taking: Reported on 7/19/2023)  spacer (OPTICHAMBER YIMI) holding chamber, Use with inhaler (albuterol and beclomethasone) (Patient not taking: Reported on 7/19/2023)  thiamine (B-1) 100 MG tablet, Take 1 tablet (100 mg) by mouth daily (Patient not taking: Reported on 7/19/2023)    No current facility-administered medications on file prior to visit.       PHYSICAL EXAMINATION  VITALS: /88   Pulse 100   Resp 18   Wt 72.6 kg (160 lb)   BMI 22.96 kg/m    GENERAL: Healthy appearing, alert, no acute distress, normal habitus.  CARDIOVASCULAR: Extremities warm and well perfused. Pulses present.   NEUROLOGICAL:  Patient is awake and oriented to self, place and time.  Attention span is normal.  Memory is grossly intact.  Language is fluent and follows commands appropriately.  Appropriate fund of knowledge. Cranial nerves 2-12 are intact. There is no pronator drift.  Motor exam shows 5/5 strength in all extremities except mild right greater than left bilateral hip flexion weakness.  Questionable right foot drop.  Tone is symmetric bilaterally in upper and lower extremities.  Reflexes are symmetric and 2+ in upper extremities and lower extremities.  Jerks are present.  Sensory exam is grossly intact to light touch, pin prick and vibration.  Finger to nose and heel to shin is without dysmetria.  Romberg is negative.  Gait is slight wide-based and the patient is able to do tandem walk and walk on toes and heels with difficulty and needing to hold onto the wall.    DIAGNOSTICS  MRI images reviewed.  No acute structural lesions.  IMPRESSION:  Diffuse cerebral  volume loss and cerebral white matter  changes consistent with chronic small vessel ischemic disease. No  evidence for acute intracranial pathology.    HEAD CT:  1.  No CT evidence for acute intracranial process.  2.  Brain atrophy and presumed chronic microvascular ischemic changes as above.     CERVICAL SPINE CT:  1.  No CT evidence for acute fracture or post traumatic subluxation.    RELEVANT LABS  Component      Latest Ref [facility] 7/19/2023  11:53 AM   WBC      4.0 - 11.0 10e3/uL 14.1 (H)    RBC Count      4.40 - 5.90 10e6/uL 3.89 (L)    Hemoglobin      13.3 - 17.7 g/dL 14.0    Hematocrit      40.0 - 53.0 % 40.3    MCV      78 - 100 fL 104 (H)    MCH      26.5 - 33.0 pg 36.0 (H)    MCHC      31.5 - 36.5 g/dL 34.7    RDW      10.0 - 15.0 % 14.3    Platelet Count      150 - 450 10e3/uL 379    % Neutrophils      % 70    % Lymphocytes      % 17    % Monocytes      % 11    % Eosinophils      % 1    % Basophils      % 1    % Immature Granulocytes      % 1    Absolute Neutrophils      1.6 - 8.3 10e3/uL 9.8 (H)    Absolute Lymphocytes      0.8 - 5.3 10e3/uL 2.3    Absolute Monocytes      0.0 - 1.3 10e3/uL 1.6 (H)    Absolute Eosinophils      0.0 - 0.7 10e3/uL 0.2    Absolute Basophils      0.0 - 0.2 10e3/uL 0.1    Absolute Immature Granulocytes      <=0.4 10e3/uL 0.1    Sodium      136 - 145 mmol/L 132 (L)    Potassium      3.4 - 5.3 mmol/L 4.0    Chloride      98 - 107 mmol/L 93 (L)    Carbon Dioxide (CO2)      22 - 29 mmol/L 24    Anion Gap      7 - 15 mmol/L 15    Urea Nitrogen      8.0 - 23.0 mg/dL 22.6    Creatinine      0.67 - 1.17 mg/dL 1.71 (H)    Calcium      8.8 - 10.2 mg/dL 10.1    Glucose      70 - 99 mg/dL 119 (H)    Alkaline Phosphatase      40 - 129 U/L 126    AST      0 - 45 U/L 153 (H)    ALT      0 - 70 U/L 29    Protein Total      6.4 - 8.3 g/dL 7.7    Albumin      3.5 - 5.2 g/dL 4.3    Bilirubin Total      <=1.2 mg/dL 1.1    GFR Estimate      >60 mL/min/1.73m2 44 (L)    Vitamin B12      232 - 1,245 pg/mL  1,143    Magnesium      1.7 - 2.3 mg/dL 1.2 (L)       Legend:  (H) High  (L) Low    OUTSIDE RECORDS  Outside referral notes and chart notes were reviewed and pertinent information has been summarized (in addition to the HPI):-        IMPRESSION/REPORT/PLAN  Subjective complaints of right foot drop/right leg pain  History of alcohol abuse  Weakness of both lower extremities  Hyperreflexia    This is a 68 year old male with complaints of right leg pain and right foot drop.  On exam he has minimal right dorsiflexion weakness.  Reflexes are intact.  He has extensive history of alcohol abuse.  Could be a peroneal neuropathy.  We will check an EMG to further evaluate.  MRI brain has been noncontributory.    On exam he further has bilateral hip flexion weakness with reflexes present.  Does have some mild balance issues as well.  Could be related to a peripheral neuropathy and we will see what the EMG shows.  Other possibilities could be a cervical spine stenosis affecting his legs.  We will check an MRI of the C-spine.  MRI brain has been noncontributory.    Basic labs through primary care have been noncontributory.  Could consider more advanced testing depending on results of the testing.  Encourage alcohol cessation.    I can see him back in 6 weeks after testing.    -     EMG; Future  -     MR Cervical Spine w/o Contrast; Future    Return in about 6 weeks (around 1/16/2024) for In-Clinic Visit (must), Add on PAOR, After testing.    Over 60 minutes were spent coordinating the care for the patient on the day of the encounter.  This includes previsit, during visit and post visit activities as documented above.  Counseling patient.  MRI images.  Testing ordered.  Multiple problems reviewed/addressed.  High risk.  (Activities include but not inclusive of reviewing chart, reviewing outside records, reviewing labs and imaging study results as well as the images, patient visit time including getting history and exam,   use if applicable, review of test results with the patient and coming up with a plan in a shared model, counseling patient and family, education and answering patient questions, EMR , EMR diagnosis entry and problem list management, medication reconciliation and prescription management if applicable, paperwork if applicable, printing documents and documentation of the visit activities.)        Sami Patterson MD  Neurologist  [facility]rology [facility]  Tel:- [phone]    This note was dictated using voice recognition software.  Any grammatical or context distortions are unintentional and inherent to the software.      Again, thank you for allowing me to participate in the care of your patient.        Sincerely,        Sami Patterson MD

## 2023-12-05 NOTE — PROGRESS NOTES
NEUROLOGY OUTPATIENT CONSULT NOTE   Dec 5, 2023     CHIEF COMPLAINT/REASON FOR VISIT/REASON FOR CONSULT  Patient presents with:  Consult: Ataxia     REASON FOR CONSULTATION- Leg weakness/pain    REFERRAL SOURCE  Dr. Gatito Ware  CC Dr. Gatito Ware    HISTORY OF PRESENT ILLNESS  Luigi Vieyra is a 68 year old male seen today for evaluation of right leg pain/weakness.  He reports that about 5 months ago his symptoms came on.  Denies anything that might have provoked the symptoms to come on.  Was not sitting in any awkward positions.  Reports some cramping in the gastrocnemius muscle on the right side and then subsequently having some right foot pain and right foot drop.  The right foot drop is not very severe though feels that his foot will get stuck when he is trying to walk.  Denies any back pain.  Nuys any shooting pain down the leg.  No symptoms on the left side.  Has not done any physical therapy.  No symptoms in his hands.  Does have a history of extensive alcohol abuse over the last 3040 years.  No history of diabetes.  Denies any ataxia symptoms or cognitive issues.  Does complain of some mild balance issues.  No falls.    Previous history is reviewed and this is unchanged.    PAST MEDICAL/SURGICAL HISTORY  Past Medical History:   Diagnosis Date    Asthma      Patient Active Problem List   Diagnosis    Mild persistent asthma    Alcohol abuse    Tobacco abuse    CARDIOVASCULAR SCREENING; LDL GOAL LESS THAN 130    Seasonal allergic rhinitis    Uncontrolled hypertension    Mixed hyperlipidemia    Hyponatremia    Anemia, unspecified type    Alcoholic gastritis, presence of bleeding unspecified, unspecified chronicity    Hypotension due to hypovolemia    MAIA (acute kidney injury) (H24)    Elevated lactic acid level    Transaminitis    Syncope    Thrombocytosis    Leukocytosis    Acute blood loss anemia    Stage 3a chronic kidney disease (CKD) (H)   Reviewed and negative    FAMILY  HISTORY  Family History   Problem Relation Age of Onset    Asthma Mother     Cerebrovascular Disease Mother     C.A.D. No family hx of     Diabetes No family hx of     Hypertension No family hx of     Breast Cancer No family hx of     Cancer - colorectal No family hx of     Prostate Cancer No family hx of    Negative for neurological problems.    SOCIAL HISTORY  Social History     Tobacco Use    Smoking status: Every Day     Packs/day: 0.50     Years: 45.00     Additional pack years: 0.00     Total pack years: 22.50     Types: Cigarettes     Start date: 1976    Smokeless tobacco: Never   Vaping Use    Vaping Use: Never used   Substance Use Topics    Alcohol use: Yes     Comment: 2-3 drinks daily    Drug use: No       SYSTEMS REVIEW  Twelve-system ROS was done and other than the HPI this was negative except for arm and leg pain, numbness/tingling, difficulty walking.    MEDICATIONS  losartan (COZAAR) 25 MG tablet, TAKE 1 TABLET BY MOUTH EVERY MORNING  magnesium oxide (MAG-OX) 400 MG tablet, Take 1 tablet (400 mg) by mouth daily  metoprolol succinate ER (TOPROL XL) 25 MG 24 hr tablet, TAKE 1 TABLET BY MOUTH EVERY MORNING  albuterol (PROAIR HFA/PROVENTIL HFA/VENTOLIN HFA) 108 (90 Base) MCG/ACT inhaler, INHALE 2 PUFFS INTO THE LUNGS EVERY 4 HOURS AS NEEDED FOR SHORTNESS OF BREATH, WHEEZING OR COUGH SEE PROVIDER BEFORE OTHER REFILLS ARE GIVEN. (Patient not taking: Reported on 12/5/2023)  fluticasone (ARNUITY ELLIPTA) 100 MCG/ACT inhaler, Inhale 1 puff into the lungs daily (Patient not taking: Reported on 7/19/2023)  folic acid (FOLVITE) 1 MG tablet, Take 1 tablet (1 mg) by mouth daily (Patient not taking: Reported on 7/19/2023)  multivitamin (ONE-DAILY) tablet, Take 1 tablet by mouth daily (Patient not taking: Reported on 7/19/2023)  order for DME, Equipment being ordered: Digital home blood pressure monitor kit (Patient not taking: Reported on 12/5/2023)  pantoprazole (PROTONIX) 40 MG EC tablet, Take 1 tablet (40 mg) by  mouth every morning (before breakfast) (Patient not taking: Reported on 7/19/2023)  spacer (OPTICHAMBER YIMI) holding chamber, Use with inhaler (albuterol and beclomethasone) (Patient not taking: Reported on 7/19/2023)  thiamine (B-1) 100 MG tablet, Take 1 tablet (100 mg) by mouth daily (Patient not taking: Reported on 7/19/2023)    No current facility-administered medications on file prior to visit.       PHYSICAL EXAMINATION  VITALS: /88   Pulse 100   Resp 18   Wt 72.6 kg (160 lb)   BMI 22.96 kg/m    GENERAL: Healthy appearing, alert, no acute distress, normal habitus.  CARDIOVASCULAR: Extremities warm and well perfused. Pulses present.   NEUROLOGICAL:  Patient is awake and oriented to self, place and time.  Attention span is normal.  Memory is grossly intact.  Language is fluent and follows commands appropriately.  Appropriate fund of knowledge. Cranial nerves 2-12 are intact. There is no pronator drift.  Motor exam shows 5/5 strength in all extremities except mild right greater than left bilateral hip flexion weakness.  Questionable right foot drop.  Tone is symmetric bilaterally in upper and lower extremities.  Reflexes are symmetric and 2+ in upper extremities and lower extremities.  Jerks are present.  Sensory exam is grossly intact to light touch, pin prick and vibration.  Finger to nose and heel to shin is without dysmetria.  Romberg is negative.  Gait is slight wide-based and the patient is able to do tandem walk and walk on toes and heels with difficulty and needing to hold onto the wall.    DIAGNOSTICS  MRI images reviewed.  No acute structural lesions.  IMPRESSION:  Diffuse cerebral volume loss and cerebral white matter  changes consistent with chronic small vessel ischemic disease. No  evidence for acute intracranial pathology.    HEAD CT:  1.  No CT evidence for acute intracranial process.  2.  Brain atrophy and presumed chronic microvascular ischemic changes as above.     CERVICAL SPINE  CT:  1.  No CT evidence for acute fracture or post traumatic subluxation.    RELEVANT LABS  Component      Latest Ref [facility] 7/19/2023  11:53 AM   WBC      4.0 - 11.0 10e3/uL 14.1 (H)    RBC Count      4.40 - 5.90 10e6/uL 3.89 (L)    Hemoglobin      13.3 - 17.7 g/dL 14.0    Hematocrit      40.0 - 53.0 % 40.3    MCV      78 - 100 fL 104 (H)    MCH      26.5 - 33.0 pg 36.0 (H)    MCHC      31.5 - 36.5 g/dL 34.7    RDW      10.0 - 15.0 % 14.3    Platelet Count      150 - 450 10e3/uL 379    % Neutrophils      % 70    % Lymphocytes      % 17    % Monocytes      % 11    % Eosinophils      % 1    % Basophils      % 1    % Immature Granulocytes      % 1    Absolute Neutrophils      1.6 - 8.3 10e3/uL 9.8 (H)    Absolute Lymphocytes      0.8 - 5.3 10e3/uL 2.3    Absolute Monocytes      0.0 - 1.3 10e3/uL 1.6 (H)    Absolute Eosinophils      0.0 - 0.7 10e3/uL 0.2    Absolute Basophils      0.0 - 0.2 10e3/uL 0.1    Absolute Immature Granulocytes      <=0.4 10e3/uL 0.1    Sodium      136 - 145 mmol/L 132 (L)    Potassium      3.4 - 5.3 mmol/L 4.0    Chloride      98 - 107 mmol/L 93 (L)    Carbon Dioxide (CO2)      22 - 29 mmol/L 24    Anion Gap      7 - 15 mmol/L 15    Urea Nitrogen      8.0 - 23.0 mg/dL 22.6    Creatinine      0.67 - 1.17 mg/dL 1.71 (H)    Calcium      8.8 - 10.2 mg/dL 10.1    Glucose      70 - 99 mg/dL 119 (H)    Alkaline Phosphatase      40 - 129 U/L 126    AST      0 - 45 U/L 153 (H)    ALT      0 - 70 U/L 29    Protein Total      6.4 - 8.3 g/dL 7.7    Albumin      3.5 - 5.2 g/dL 4.3    Bilirubin Total      <=1.2 mg/dL 1.1    GFR Estimate      >60 mL/min/1.73m2 44 (L)    Vitamin B12      232 - 1,245 pg/mL 1,143    Magnesium      1.7 - 2.3 mg/dL 1.2 (L)       Legend:  (H) High  (L) Low    OUTSIDE RECORDS  Outside referral notes and chart notes were reviewed and pertinent information has been summarized (in addition to the HPI):-        IMPRESSION/REPORT/PLAN  Subjective complaints of right foot drop/right leg  pain  History of alcohol abuse  Weakness of both lower extremities  Hyperreflexia    This is a 68 year old male with complaints of right leg pain and right foot drop.  On exam he has minimal right dorsiflexion weakness.  Reflexes are intact.  He has extensive history of alcohol abuse.  Could be a peroneal neuropathy.  We will check an EMG to further evaluate.  MRI brain has been noncontributory.    On exam he further has bilateral hip flexion weakness with reflexes present.  Does have some mild balance issues as well.  Could be related to a peripheral neuropathy and we will see what the EMG shows.  Other possibilities could be a cervical spine stenosis affecting his legs.  We will check an MRI of the C-spine.  MRI brain has been noncontributory.    Basic labs through primary care have been noncontributory.  Could consider more advanced testing depending on results of the testing.  Encourage alcohol cessation.    I can see him back in 6 weeks after testing.    -     EMG; Future  -     MR Cervical Spine w/o Contrast; Future    Return in about 6 weeks (around 1/16/2024) for In-Clinic Visit (must), Add on PAOR, After testing.    Over 60 minutes were spent coordinating the care for the patient on the day of the encounter.  This includes previsit, during visit and post visit activities as documented above.  Counseling patient.  MRI images.  Testing ordered.  Multiple problems reviewed/addressed.  High risk.  (Activities include but not inclusive of reviewing chart, reviewing outside records, reviewing labs and imaging study results as well as the images, patient visit time including getting history and exam,  use if applicable, review of test results with the patient and coming up with a plan in a shared model, counseling patient and family, education and answering patient questions, EMR , EMR diagnosis entry and problem list management, medication reconciliation and prescription management if  applicable, paperwork if applicable, printing documents and documentation of the visit activities.)        Sami Patterson MD  Neurologist  [facility] Neurology [facility]  Tel:- [phone]    This note was dictated using voice recognition software.  Any grammatical or context distortions are unintentional and inherent to the software.

## 2023-12-05 NOTE — NURSING NOTE
Chief Complaint   Patient presents with    Consult     Ataxia     Trina Daugherty MA,CMA,10:25 AM

## 2024-01-01 NOTE — TELEPHONE ENCOUNTER
Pt called & states he received a letter in mail re: colon cancer screening & cologuard. Asking about cologuard kit.  Pt has appt 11/2. Told pt he can get the kit at his appt.  No further questions.    Lizbeth Olmos RN     Walk in (3) adequate

## 2024-02-07 ENCOUNTER — TELEPHONE (OUTPATIENT)
Dept: FAMILY MEDICINE | Facility: CLINIC | Age: 69
End: 2024-02-07
Payer: COMMERCIAL

## 2024-02-07 NOTE — TELEPHONE ENCOUNTER
"Spoke with pt and shared provider's instructions.  Pt has not seen Dr Ware since July and lab work was completed in Aug 2023.  Offered to make appt with pt to see Dr Ware but pt declines stating \"I will do it tomorrow.  I am laying in bed.\"    Jolie Adams RN    "

## 2024-02-07 NOTE — TELEPHONE ENCOUNTER
Yes, per Dr Ware lab result note he recommended to continue Magnesium. Recommend patient to schedule follow up office visit with PCP to discuss if Dr Ware would recommend to repeat labs again.     DEYSI Mondragon CNP

## 2024-02-07 NOTE — TELEPHONE ENCOUNTER
S-(situation): the patient is wondering if he needs to continue with taking his Magnesium.     B-(background): the patient has labs done in August and recommend to continue with medication.     A-(assessment): the patient is wondering if he still needs to continue with the magnesium.        R-(recommendations): will send to provider to review.    Thank you    Lois JUNE RN

## 2024-03-11 ENCOUNTER — OFFICE VISIT (OUTPATIENT)
Dept: NEUROLOGY | Facility: CLINIC | Age: 69
End: 2024-03-11
Attending: PSYCHIATRY & NEUROLOGY
Payer: COMMERCIAL

## 2024-03-11 VITALS
WEIGHT: 160 LBS | RESPIRATION RATE: 18 BRPM | HEART RATE: 68 BPM | DIASTOLIC BLOOD PRESSURE: 60 MMHG | BODY MASS INDEX: 22.96 KG/M2 | SYSTOLIC BLOOD PRESSURE: 104 MMHG

## 2024-03-11 DIAGNOSIS — Z78.9 ALCOHOL USE: ICD-10-CM

## 2024-03-11 DIAGNOSIS — R29.898 WEAKNESS OF BOTH LOWER EXTREMITIES: ICD-10-CM

## 2024-03-11 DIAGNOSIS — R73.9 HYPERGLYCEMIA: ICD-10-CM

## 2024-03-11 DIAGNOSIS — M21.371 RIGHT FOOT DROP: ICD-10-CM

## 2024-03-11 DIAGNOSIS — G57.31 PERONEAL NEUROPATHY, RIGHT: Primary | ICD-10-CM

## 2024-03-11 DIAGNOSIS — G62.9 NEUROPATHY: ICD-10-CM

## 2024-03-11 PROCEDURE — 95886 MUSC TEST DONE W/N TEST COMP: CPT | Mod: RT | Performed by: PSYCHIATRY & NEUROLOGY

## 2024-03-11 PROCEDURE — 95910 NRV CNDJ TEST 7-8 STUDIES: CPT | Performed by: PSYCHIATRY & NEUROLOGY

## 2024-03-11 NOTE — PROGRESS NOTES
NEUROLOGY OUTPATIENT PROGRESS NOTE   Mar 11, 2024     CHIEF COMPLAINT/REASON FOR VISIT/REASON FOR CONSULT  Patient presents with:  Follow Up    REASON FOR CONSULTATION- Leg weakness/pain    REFERRAL SOURCE  Dr. Gatito Ware  CC Dr. Gatito Ware    HISTORY OF PRESENT ILLNESS  Luigi Vieyra is a 68 year old male seen today for evaluation of right leg pain/weakness.  He reports that about 5 months ago his symptoms came on.  Denies anything that might have provoked the symptoms to come on.  Was not sitting in any awkward positions.  Reports some cramping in the gastrocnemius muscle on the right side and then subsequently having some right foot pain and right foot drop.  The right foot drop is not very severe though feels that his foot will get stuck when he is trying to walk.  Denies any back pain.  Nuys any shooting pain down the leg.  No symptoms on the left side.  Has not done any physical therapy.  No symptoms in his hands.  Does have a history of extensive alcohol abuse over the last 3040 years.  No history of diabetes.  Denies any ataxia symptoms or cognitive issues.  Does complain of some mild balance issues.  No falls.    3/11/24  Patient returns today.  Reports that his symptoms are somewhat improved compared to before.  He reports no longer having any leg weakness and as result he did not do the MRI of the C-spine.  His EMG suggesting a peroneal neuropathy though he is not sure how the cramp might have caused the neuropathy.  Denies any trauma to the legs or the knees.  Continues to have some mild balance issues.    Previous history is reviewed and this is unchanged.    PAST MEDICAL/SURGICAL HISTORY  Past Medical History:   Diagnosis Date    Asthma      Patient Active Problem List   Diagnosis    Mild persistent asthma    Alcohol abuse    Tobacco abuse    CARDIOVASCULAR SCREENING; LDL GOAL LESS THAN 130    Seasonal allergic rhinitis    Uncontrolled hypertension    Mixed hyperlipidemia     Hyponatremia    Anemia, unspecified type    Alcoholic gastritis, presence of bleeding unspecified, unspecified chronicity    Hypotension due to hypovolemia    MAIA (acute kidney injury) (H24)    Elevated lactic acid level    Transaminitis    Syncope    Thrombocytosis    Leukocytosis    Acute blood loss anemia    Stage 3a chronic kidney disease (CKD) (H)   Reviewed and negative    FAMILY HISTORY  Family History   Problem Relation Age of Onset    Asthma Mother     Cerebrovascular Disease Mother     C.A.D. No family hx of     Diabetes No family hx of     Hypertension No family hx of     Breast Cancer No family hx of     Cancer - colorectal No family hx of     Prostate Cancer No family hx of    Negative for neurological problems.    SOCIAL HISTORY  Social History     Tobacco Use    Smoking status: Every Day     Packs/day: 0.50     Years: 45.00     Additional pack years: 0.00     Total pack years: 22.50     Types: Cigarettes     Start date: 1976    Smokeless tobacco: Never   Vaping Use    Vaping Use: Never used   Substance Use Topics    Alcohol use: Yes     Comment: 2-3 drinks daily    Drug use: No       SYSTEMS REVIEW  Twelve-system ROS was done and other than the HPI this was negative except for arm and leg pain, numbness/tingling, difficulty walking.  No new concerns/issues.    MEDICATIONS  losartan (COZAAR) 25 MG tablet, TAKE 1 TABLET BY MOUTH EVERY MORNING  magnesium oxide (MAG-OX) 400 MG tablet, Take 1 tablet (400 mg) by mouth daily  metoprolol succinate ER (TOPROL XL) 25 MG 24 hr tablet, TAKE 1 TABLET BY MOUTH EVERY MORNING  albuterol (PROAIR HFA/PROVENTIL HFA/VENTOLIN HFA) 108 (90 Base) MCG/ACT inhaler, INHALE 2 PUFFS INTO THE LUNGS EVERY 4 HOURS AS NEEDED FOR SHORTNESS OF BREATH, WHEEZING OR COUGH SEE PROVIDER BEFORE OTHER REFILLS ARE GIVEN. (Patient not taking: Reported on 12/5/2023)  fluticasone (ARNUITY ELLIPTA) 100 MCG/ACT inhaler, Inhale 1 puff into the lungs daily (Patient not taking: Reported on  7/19/2023)  folic acid (FOLVITE) 1 MG tablet, Take 1 tablet (1 mg) by mouth daily (Patient not taking: Reported on 7/19/2023)  multivitamin (ONE-DAILY) tablet, Take 1 tablet by mouth daily (Patient not taking: Reported on 7/19/2023)  order for DME, Equipment being ordered: Digital home blood pressure monitor kit (Patient not taking: Reported on 12/5/2023)  pantoprazole (PROTONIX) 40 MG EC tablet, Take 1 tablet (40 mg) by mouth every morning (before breakfast) (Patient not taking: Reported on 7/19/2023)  spacer (OPTICHAMBER YIMI) holding chamber, Use with inhaler (albuterol and beclomethasone) (Patient not taking: Reported on 7/19/2023)  thiamine (B-1) 100 MG tablet, Take 1 tablet (100 mg) by mouth daily (Patient not taking: Reported on 7/19/2023)    No current facility-administered medications on file prior to visit.       PHYSICAL EXAMINATION  VITALS: /60   Pulse 68   Resp 18   Wt 72.6 kg (160 lb)   BMI 22.96 kg/m    GENERAL: Healthy appearing, alert, no acute distress, normal habitus.  CARDIOVASCULAR: Extremities warm and well perfused. Pulses present.   NEUROLOGICAL:  Patient is awake and oriented to self, place and time.  Attention span is normal.  Memory is grossly intact.  Language is fluent and follows commands appropriately.  Appropriate fund of knowledge. Cranial nerves 2-12 are intact. There is no pronator drift.  Motor exam shows 5/5 strength in all extremities except mild right greater than left bilateral hip flexion weakness.  Questionable right foot drop.  Tone is symmetric bilaterally in upper and lower extremities.  Reflexes are symmetric and 2+ in upper extremities and lower extremities.  Jerks are present.  Sensory exam is grossly intact to light touch, pin prick and vibration.  Finger to nose and heel to shin is without dysmetria.  Romberg is negative.  Gait is slight wide-based and the patient is able to do tandem walk and walk on toes and heels with difficulty and needing to hold  onto the wall.  The right foot drop and the hip flexion weakness has now improved.  Exam otherwise stable.    DIAGNOSTICS  MRI images reviewed.  No acute structural lesions.  IMPRESSION:  Diffuse cerebral volume loss and cerebral white matter  changes consistent with chronic small vessel ischemic disease. No  evidence for acute intracranial pathology.    HEAD CT:  1.  No CT evidence for acute intracranial process.  2.  Brain atrophy and presumed chronic microvascular ischemic changes as above.     CERVICAL SPINE CT:  1.  No CT evidence for acute fracture or post traumatic subluxation.    RELEVANT LABS  Component      Latest Ref [facility] 7/19/2023  11:53 AM   WBC      4.0 - 11.0 10e3/uL 14.1 (H)    RBC Count      4.40 - 5.90 10e6/uL 3.89 (L)    Hemoglobin      13.3 - 17.7 g/dL 14.0    Hematocrit      40.0 - 53.0 % 40.3    MCV      78 - 100 fL 104 (H)    MCH      26.5 - 33.0 pg 36.0 (H)    MCHC      31.5 - 36.5 g/dL 34.7    RDW      10.0 - 15.0 % 14.3    Platelet Count      150 - 450 10e3/uL 379    % Neutrophils      % 70    % Lymphocytes      % 17    % Monocytes      % 11    % Eosinophils      % 1    % Basophils      % 1    % Immature Granulocytes      % 1    Absolute Neutrophils      1.6 - 8.3 10e3/uL 9.8 (H)    Absolute Lymphocytes      0.8 - 5.3 10e3/uL 2.3    Absolute Monocytes      0.0 - 1.3 10e3/uL 1.6 (H)    Absolute Eosinophils      0.0 - 0.7 10e3/uL 0.2    Absolute Basophils      0.0 - 0.2 10e3/uL 0.1    Absolute Immature Granulocytes      <=0.4 10e3/uL 0.1    Sodium      136 - 145 mmol/L 132 (L)    Potassium      3.4 - 5.3 mmol/L 4.0    Chloride      98 - 107 mmol/L 93 (L)    Carbon Dioxide (CO2)      22 - 29 mmol/L 24    Anion Gap      7 - 15 mmol/L 15    Urea Nitrogen      8.0 - 23.0 mg/dL 22.6    Creatinine      0.67 - 1.17 mg/dL 1.71 (H)    Calcium      8.8 - 10.2 mg/dL 10.1    Glucose      70 - 99 mg/dL 119 (H)    Alkaline Phosphatase      40 - 129 U/L 126    AST      0 - 45 U/L 153 (H)    ALT      0 - 70  U/L 29    Protein Total      6.4 - 8.3 g/dL 7.7    Albumin      3.5 - 5.2 g/dL 4.3    Bilirubin Total      <=1.2 mg/dL 1.1    GFR Estimate      >60 mL/min/1.73m2 44 (L)    Vitamin B12      232 - 1,245 pg/mL 1,143    Magnesium      1.7 - 2.3 mg/dL 1.2 (L)       Legend:  (H) High  (L) Low    OUTSIDE RECORDS  Outside referral notes and chart notes were reviewed and pertinent information has been summarized (in addition to the HPI):-          EMG  CLINICAL INTERPRETATION:  This is an abnormal nerve conduction and EMG study.  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  The decreased amplitude of bilateral tibial nerves could also suggest a mild sensorimotor polyneuropathy or could be artifactual.  Further clinical correlation is needed.  Component      Latest Ref Rng 10/14/2022  1:35 AM   TSH      0.30 - 4.20 uIU/mL 0.58            IMPRESSION/REPORT/PLAN  Subjective complaints of right foot drop/right leg pain-right peroneal neuropathy  History of alcohol abuse  Weakness of both lower extremities  Hyperreflexia    This is a 68 year old male with complaints of right leg pain and right foot drop.  On exam he has minimal right dorsiflexion weakness.  Reflexes are intact.  He has extensive history of alcohol abuse.  MRI brain has been noncontributory.  EMG suggested a right peroneal neuropathy which would fit with his exam.  Treatment would be physical therapy though it is unclear how he ended up with the injury.  Might have had some trauma after intoxication.  Will set him up with physical therapy for further treatment.    EMG suggests a questionable sensorimotor polyneuropathy though blood work so far has been noncontributory.  Alcohol could be risk factor.  His blood sugars have been slightly elevated though it is unclear if these are fasting or not.  Should work with primary care to evaluate for diabetes.  Should also work on alcohol cessation.    Previously he had reflexes  with bilateral hip flexion weakness.  MRI C-spine was recommended though his symptoms have improved and he wants to hold off on the MRI C-spine.    Return back on as-needed basis.    -     Physical Therapy  Referral; Future    Return if symptoms worsen or fail to improve, for In-Clinic Visit (must).    Over 30 minutes were spent coordinating the care for the patient on the day of the encounter.  This includes previsit, during visit and post visit activities as documented above.  Counseling patient.  Reviewing testing/multiple problems.  (Activities include but not inclusive of reviewing chart, reviewing outside records, reviewing labs and imaging study results as well as the images, patient visit time including getting history and exam,  use if applicable, review of test results with the patient and coming up with a plan in a shared model, counseling patient and family, education and answering patient questions, EMR , EMR diagnosis entry and problem list management, medication reconciliation and prescription management if applicable, paperwork if applicable, printing documents and documentation of the visit activities.)        Sami Patterson MD  Neurologist  [facility] Neurology [facility]  Tel:- [phone]    This note was dictated using voice recognition software.  Any grammatical or context distortions are unintentional and inherent to the software.

## 2024-03-11 NOTE — LETTER
3/11/2024         RE: Luigi Vieyra  [address]  [place] [address]        Dear Colleague,    Thank you for referring your patient, Luigi Vieyra, to the [facility]UROLOG[facility] [place]. Please see a copy of my visit note below.    NEUROLOGY OUTPATIENT PROGRESS NOTE   Mar 11, 2024     CHIEF COMPLAINT/REASON FOR VISIT/REASON FOR CONSULT  Patient presents with:  Follow Up    REASON FOR CONSULTATION- Leg weakness/pain    REFERRAL SOURCE  Dr. Gatito Ware  CC Dr. Gatito Ware    HISTORY OF PRESENT ILLNESS  Luigi Vieyra is a 68 year old male seen today for evaluation of right leg pain/weakness.  He reports that about 5 months ago his symptoms came on.  Denies anything that might have provoked the symptoms to come on.  Was not sitting in any awkward positions.  Reports some cramping in the gastrocnemius muscle on the right side and then subsequently having some right foot pain and right foot drop.  The right foot drop is not very severe though feels that his foot will get stuck when he is trying to walk.  Denies any back pain.  Nuys any shooting pain down the leg.  No symptoms on the left side.  Has not done any physical therapy.  No symptoms in his hands.  Does have a history of extensive alcohol abuse over the last 3040 years.  No history of diabetes.  Denies any ataxia symptoms or cognitive issues.  Does complain of some mild balance issues.  No falls.    3/11/24  Patient returns today.  Reports that his symptoms are somewhat improved compared to before.  He reports no longer having any leg weakness and as result he did not do the MRI of the C-spine.  His EMG suggesting a peroneal neuropathy though he is not sure how the cramp might have caused the neuropathy.  Denies any trauma to the legs or the knees.  Continues to have some mild balance issues.    Previous history is reviewed and this is unchanged.    PAST MEDICAL/SURGICAL HISTORY  Past Medical History:   Diagnosis  Date     Asthma      Patient Active Problem List   Diagnosis     Mild persistent asthma     Alcohol abuse     Tobacco abuse     CARDIOVASCULAR SCREENING; LDL GOAL LESS THAN 130     Seasonal allergic rhinitis     Uncontrolled hypertension     Mixed hyperlipidemia     Hyponatremia     Anemia, unspecified type     Alcoholic gastritis, presence of bleeding unspecified, unspecified chronicity     Hypotension due to hypovolemia     MAIA (acute kidney injury) (H24)     Elevated lactic acid level     Transaminitis     Syncope     Thrombocytosis     Leukocytosis     Acute blood loss anemia     Stage 3a chronic kidney disease (CKD) (H)   Reviewed and negative    FAMILY HISTORY  Family History   Problem Relation Age of Onset     Asthma Mother      Cerebrovascular Disease Mother      C.A.D. No family hx of      Diabetes No family hx of      Hypertension No family hx of      Breast Cancer No family hx of      Cancer - colorectal No family hx of      Prostate Cancer No family hx of    Negative for neurological problems.    SOCIAL HISTORY  Social History     Tobacco Use     Smoking status: Every Day     Packs/day: 0.50     Years: 45.00     Additional pack years: 0.00     Total pack years: 22.50     Types: Cigarettes     Start date: 1976     Smokeless tobacco: Never   Vaping Use     Vaping Use: Never used   Substance Use Topics     Alcohol use: Yes     Comment: 2-3 drinks daily     Drug use: No       SYSTEMS REVIEW  Twelve-system ROS was done and other than the HPI this was negative except for arm and leg pain, numbness/tingling, difficulty walking.  No new concerns/issues.    MEDICATIONS  losartan (COZAAR) 25 MG tablet, TAKE 1 TABLET BY MOUTH EVERY MORNING  magnesium oxide (MAG-OX) 400 MG tablet, Take 1 tablet (400 mg) by mouth daily  metoprolol succinate ER (TOPROL XL) 25 MG 24 hr tablet, TAKE 1 TABLET BY MOUTH EVERY MORNING  albuterol (PROAIR HFA/PROVENTIL HFA/VENTOLIN HFA) 108 (90 Base) MCG/ACT inhaler, INHALE 2 PUFFS INTO THE  LUNGS EVERY 4 HOURS AS NEEDED FOR SHORTNESS OF BREATH, WHEEZING OR COUGH SEE PROVIDER BEFORE OTHER REFILLS ARE GIVEN. (Patient not taking: Reported on 12/5/2023)  fluticasone (ARNUITY ELLIPTA) 100 MCG/ACT inhaler, Inhale 1 puff into the lungs daily (Patient not taking: Reported on 7/19/2023)  folic acid (FOLVITE) 1 MG tablet, Take 1 tablet (1 mg) by mouth daily (Patient not taking: Reported on 7/19/2023)  multivitamin (ONE-DAILY) tablet, Take 1 tablet by mouth daily (Patient not taking: Reported on 7/19/2023)  order for DME, Equipment being ordered: Digital home blood pressure monitor kit (Patient not taking: Reported on 12/5/2023)  pantoprazole (PROTONIX) 40 MG EC tablet, Take 1 tablet (40 mg) by mouth every morning (before breakfast) (Patient not taking: Reported on 7/19/2023)  spacer (OPTICHAMBER YIMI) holding chamber, Use with inhaler (albuterol and beclomethasone) (Patient not taking: Reported on 7/19/2023)  thiamine (B-1) 100 MG tablet, Take 1 tablet (100 mg) by mouth daily (Patient not taking: Reported on 7/19/2023)    No current facility-administered medications on file prior to visit.       PHYSICAL EXAMINATION  VITALS: /60   Pulse 68   Resp 18   Wt 72.6 kg (160 lb)   BMI 22.96 kg/m    GENERAL: Healthy appearing, alert, no acute distress, normal habitus.  CARDIOVASCULAR: Extremities warm and well perfused. Pulses present.   NEUROLOGICAL:  Patient is awake and oriented to self, place and time.  Attention span is normal.  Memory is grossly intact.  Language is fluent and follows commands appropriately.  Appropriate fund of knowledge. Cranial nerves 2-12 are intact. There is no pronator drift.  Motor exam shows 5/5 strength in all extremities except mild right greater than left bilateral hip flexion weakness.  Questionable right foot drop.  Tone is symmetric bilaterally in upper and lower extremities.  Reflexes are symmetric and 2+ in upper extremities and lower extremities.  Jerks are present.   Sensory exam is grossly intact to light touch, pin prick and vibration.  Finger to nose and heel to shin is without dysmetria.  Romberg is negative.  Gait is slight wide-based and the patient is able to do tandem walk and walk on toes and heels with difficulty and needing to hold onto the wall.  The right foot drop and the hip flexion weakness has now improved.  Exam otherwise stable.    DIAGNOSTICS  MRI images reviewed.  No acute structural lesions.  IMPRESSION:  Diffuse cerebral volume loss and cerebral white matter  changes consistent with chronic small vessel ischemic disease. No  evidence for acute intracranial pathology.    HEAD CT:  1.  No CT evidence for acute intracranial process.  2.  Brain atrophy and presumed chronic microvascular ischemic changes as above.     CERVICAL SPINE CT:  1.  No CT evidence for acute fracture or post traumatic subluxation.    RELEVANT LABS  Component      Latest Ref [facility] 7/19/2023  11:53 AM   WBC      4.0 - 11.0 10e3/uL 14.1 (H)    RBC Count      4.40 - 5.90 10e6/uL 3.89 (L)    Hemoglobin      13.3 - 17.7 g/dL 14.0    Hematocrit      40.0 - 53.0 % 40.3    MCV      78 - 100 fL 104 (H)    MCH      26.5 - 33.0 pg 36.0 (H)    MCHC      31.5 - 36.5 g/dL 34.7    RDW      10.0 - 15.0 % 14.3    Platelet Count      150 - 450 10e3/uL 379    % Neutrophils      % 70    % Lymphocytes      % 17    % Monocytes      % 11    % Eosinophils      % 1    % Basophils      % 1    % Immature Granulocytes      % 1    Absolute Neutrophils      1.6 - 8.3 10e3/uL 9.8 (H)    Absolute Lymphocytes      0.8 - 5.3 10e3/uL 2.3    Absolute Monocytes      0.0 - 1.3 10e3/uL 1.6 (H)    Absolute Eosinophils      0.0 - 0.7 10e3/uL 0.2    Absolute Basophils      0.0 - 0.2 10e3/uL 0.1    Absolute Immature Granulocytes      <=0.4 10e3/uL 0.1    Sodium      136 - 145 mmol/L 132 (L)    Potassium      3.4 - 5.3 mmol/L 4.0    Chloride      98 - 107 mmol/L 93 (L)    Carbon Dioxide (CO2)      22 - 29 mmol/L 24    Anion Gap       7 - 15 mmol/L 15    Urea Nitrogen      8.0 - 23.0 mg/dL 22.6    Creatinine      0.67 - 1.17 mg/dL 1.71 (H)    Calcium      8.8 - 10.2 mg/dL 10.1    Glucose      70 - 99 mg/dL 119 (H)    Alkaline Phosphatase      40 - 129 U/L 126    AST      0 - 45 U/L 153 (H)    ALT      0 - 70 U/L 29    Protein Total      6.4 - 8.3 g/dL 7.7    Albumin      3.5 - 5.2 g/dL 4.3    Bilirubin Total      <=1.2 mg/dL 1.1    GFR Estimate      >60 mL/min/1.73m2 44 (L)    Vitamin B12      232 - 1,245 pg/mL 1,143    Magnesium      1.7 - 2.3 mg/dL 1.2 (L)       Legend:  (H) High  (L) Low    OUTSIDE RECORDS  Outside referral notes and chart notes were reviewed and pertinent information has been summarized (in addition to the HPI):-          EMG  CLINICAL INTERPRETATION:  This is an abnormal nerve conduction and EMG study.  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  The decreased amplitude of bilateral tibial nerves could also suggest a mild sensorimotor polyneuropathy or could be artifactual.  Further clinical correlation is needed.  Component      Latest Ref Rng 10/14/2022  1:35 AM   TSH      0.30 - 4.20 uIU/mL 0.58            IMPRESSION/REPORT/PLAN  Subjective complaints of right foot drop/right leg pain-right peroneal neuropathy  History of alcohol abuse  Weakness of both lower extremities  Hyperreflexia    This is a 68 year old male with complaints of right leg pain and right foot drop.  On exam he has minimal right dorsiflexion weakness.  Reflexes are intact.  He has extensive history of alcohol abuse.  MRI brain has been noncontributory.  EMG suggested a right peroneal neuropathy which would fit with his exam.  Treatment would be physical therapy though it is unclear how he ended up with the injury.  Might have had some trauma after intoxication.  Will set him up with physical therapy for further treatment.    EMG suggests a questionable sensorimotor polyneuropathy though blood work so far has  been noncontributory.  Alcohol could be risk factor.  His blood sugars have been slightly elevated though it is unclear if these are fasting or not.  Should work with primary care to evaluate for diabetes.  Should also work on alcohol cessation.    Previously he had reflexes with bilateral hip flexion weakness.  MRI C-spine was recommended though his symptoms have improved and he wants to hold off on the MRI C-spine.    Return back on as-needed basis.    -     Physical Therapy  Referral; Future    Return if symptoms worsen or fail to improve, for In-Clinic Visit (must).    Over 30 minutes were spent coordinating the care for the patient on the day of the encounter.  This includes previsit, during visit and post visit activities as documented above.  Counseling patient.  Reviewing testing/multiple problems.  (Activities include but not inclusive of reviewing chart, reviewing outside records, reviewing labs and imaging study results as well as the images, patient visit time including getting history and exam,  use if applicable, review of test results with the patient and coming up with a plan in a shared model, counseling patient and family, education and answering patient questions, EMR , EMR diagnosis entry and problem list management, medication reconciliation and prescription management if applicable, paperwork if applicable, printing documents and documentation of the visit activities.)        Sami Patterson MD  Neurologist  [facility] Neurology [facility]  Tel:- [phone]    This note was dictated using voice recognition software.  Any grammatical or context distortions are unintentional and inherent to the software.      Again, thank you for allowing me to participate in the care of your patient.        Sincerely,        Sami Patterson MD

## 2024-03-11 NOTE — LETTER
3/11/2024         RE: Luigi Vieyra  [id number] [address]        Dear Colleague,    Thank you for referring your patient, Luigi Vieyra, to the [facility]UROLOG[facility] [place]. Please see a copy of my visit note below.    See procedure note.       Again, thank you for allowing me to participate in the care of your patient.        Sincerely,        Sami Patterson MD

## 2024-03-11 NOTE — PROCEDURES
[facility]    Formerly [facility]  [address] [place] [address], [address]  [place] [address]  Tel: [phone]  Fax: [phone]          Full Name: Luigi Vieyra Gender: Male  Patient ID: [id number] YOB: 1955      Visit Date: 3/11/2024 12:01  Age: 68 Years 4 Months Old  Interpreted By: Sami Patterson MD   Ref Dr.: Gatito Ware MD  Height: 5 feet 11 inch  Reason for referral: Evaluate bilateral lowers. c/o numbness in right leg/foot > 8 months Right > Left.  No complaint on left leg at this time.      Motor NCS      Nerve / Sites Lat Amp Dist Dre    ms mV cm m/s   R Peroneal - EDB      Ankle 6.56 0.4 8       Fib head 16.98 0.3 35.5 34      Pop fossa 20.10 0.3 11 35   L Peroneal - EDB      Ankle 4.43 3.5 8       Fib head 13.39 2.9 35.5 40      Pop fossa 16.15 2.9 11 40   R Tibial - AH      Ankle 3.91 3.8 8       Pop fossa 13.54 3.1 42 44   L Tibial - AH      Ankle 4.43 5.5 8       Pop fossa 14.17 4.7 39 40       F  Wave      Nerve Fmin    ms   R Peroneal - EDB 69.74   R Tibial - AH 53.80   L Peroneal - EDB 56.35   L Tibial - AH 56.46       Sensory NCS      Nerve / Sites Onset Lat Pk Lat Amp.2-3 Dist Dre    ms ms  V cm m/s   R Sural - Ankle (Calf)      Calf 3.07 3.80 11.9 14 46   L Sural - Ankle (Calf)      Calf 3.07 3.96 7.2 14 46   R Superficial peroneal - Ankle      Lat leg NR NR NR 12 NR   L Superficial peroneal - Ankle      Lat leg 3.39 3.91 5.8 12 35       EMG Summary Table     Spontaneous MUAP Rcmt Note   Muscle Fib PSW Fasc IA # Amp Dur PPP Rate Type   R. Gluteus medius None None None N N N N N N N   R. Gluteus shantelle None None None N N N N N N N   R. L3 paraspinal None None None N N N N N N N   R. L4 paraspinal Occ Occ None N N N N N N N   R. L5 paraspinal Occ Occ None N N N N N N N   R. S1 paraspinal Occ Occ None N N N N N N N   R. Adductor janae None None None N N N N N N N   R. Quadriceps None None None N N N N N N N   R. Tibialis  anterior 1+ 1+ None N Sl Mod Red Incr Incr Poly N N   R. Peroneus longus 1+ 1+ None N Sl Mod Red Incr Incr Poly N N   R. Tibialis posterior 1+ 1+ None N Sl Mod Red Incr Incr Poly N N   R. Gastrocnemius (Medial head) None None None N N N N N N N   R. Gastrocnemius (Lateral head) None None None N N N N N N N   L. Upper paraspinal None None None N N N N N N N   L. Middle paraspinal None None None N N N N N N N   L. Lower paraspinal None None None N N N N N N N   L. Adductor janae None None None N N N N N N N   L. Quadriceps None None None N N N N N N N   L. Tibialis anterior None None None N N N N N N N   L. Peroneus longus None None None N N N N N N N   L. Tibialis posterior None None None N N N N N N N   L. Gastrocnemius (Medial head) None None None N N N N N N N   L. Gastrocnemius (Lateral head) None None None N N N N N N N     SUMMARY  Nerve conduction and EMG study of bilateral lower extremities shows:    Prolonged right peroneal distal motor latency with decreased amplitude and decreased conduction velocity.  Normal left peroneal distal motor latency, amplitude and conduction velocity.  Normal right tibial distal motor latency with decreased amplitude and normal conduction velocity.  Normal left tibial distal motor latency with decreased amplitude and normal conduction velocity.  Normal bilateral sural and absent right superficial peroneal sensory SNAP and normal left superficial peroneal sensory SNAP.  Monopolar needle exam as above.    CLINICAL INTERPRETATION:  This is an abnormal nerve conduction and EMG study.  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  The decreased amplitude of bilateral tibial nerves could also suggest a mild sensorimotor polyneuropathy or could be artifactual.  Further clinical correlation is needed.     Sami Patterson MD  Neurologist  [facility]  Tel:- [phone]

## 2024-03-18 ENCOUNTER — TELEPHONE (OUTPATIENT)
Dept: FAMILY MEDICINE | Facility: CLINIC | Age: 69
End: 2024-03-18
Payer: COMMERCIAL

## 2024-03-18 NOTE — LETTER
March 18, 2024    To  Luigi Vieyra  [address] [facility] [address]    Your team at [facility] cares about your health. We have reviewed your chart and based on our findings; we are making the following recommendations to better manage your health.     You are in particular need of attention regarding the following:     Call or MyChart message your clinic to schedule a colonoscopy, schedule/ a FIT Test, or order a Cologuard test. If you are unsure what type of test you need, please call your clinic and speak to clinic staff.   Colon cancer is now the second leading cause of cancer-related deaths in the [facility] for both men and women and there are over 130,000 new cases and 50,000 deaths per year from colon cancer. Colonoscopies can prevent 90-95% of these deaths. Problem lesions can be removed before they ever become cancer. This test is not only looking for cancer, but also getting rid of precancerous lesions.   If you are under/uninsured, we recommend you contact the [employer] Program.[employer] is a free colorectal cancer screening program that provides colonoscopies for eligible under/uninsured [place] men and women. If you are interested in receiving a free colonoscopy, please call [employer] at t [phone] (mention code [employer]) to see if you're eligible. Please have them send us the results.   Please call [phone] to schedule a colonoscopy.    If you have already completed these items, please contact the clinic via phone or   [email or web address] so your care team can review and update your records. Thank you for   choosing [facility] for your healthcare needs. For any questions,   concerns, or to schedule an appointment please contact our clinic.    Healthy Regards,      Your [facility] Care Team

## 2024-03-18 NOTE — TELEPHONE ENCOUNTER
Patient Quality Outreach    Patient is due for the following:   Colon Cancer Screening    Next Steps:   Pt needs to schedule a visit to get a colon cancer screening referral.      Type of outreach:    Sent letter.      Questions for provider review:    None           Trina Archibald MA

## 2024-04-18 DIAGNOSIS — I10 BENIGN ESSENTIAL HYPERTENSION: ICD-10-CM

## 2024-04-18 RX ORDER — LOSARTAN POTASSIUM 25 MG/1
25 TABLET ORAL EVERY MORNING
Qty: 90 TABLET | Refills: 3 | OUTPATIENT
Start: 2024-04-18

## 2024-04-18 RX ORDER — METOPROLOL SUCCINATE 25 MG/1
25 TABLET, EXTENDED RELEASE ORAL EVERY MORNING
Qty: 90 TABLET | Refills: 3 | OUTPATIENT
Start: 2024-04-18

## 2024-05-26 ENCOUNTER — HOSPITAL ENCOUNTER (EMERGENCY)
Facility: CLINIC | Age: 69
Discharge: ANOTHER HOSPITAL | End: 2024-05-26
Attending: EMERGENCY MEDICINE | Admitting: EMERGENCY MEDICINE
Payer: COMMERCIAL

## 2024-05-26 ENCOUNTER — APPOINTMENT (OUTPATIENT)
Dept: GENERAL RADIOLOGY | Facility: CLINIC | Age: 69
DRG: 871 | End: 2024-05-26
Attending: STUDENT IN AN ORGANIZED HEALTH CARE EDUCATION/TRAINING PROGRAM
Payer: COMMERCIAL

## 2024-05-26 ENCOUNTER — APPOINTMENT (OUTPATIENT)
Dept: CT IMAGING | Facility: CLINIC | Age: 69
End: 2024-05-26
Attending: EMERGENCY MEDICINE
Payer: COMMERCIAL

## 2024-05-26 ENCOUNTER — APPOINTMENT (OUTPATIENT)
Dept: CT IMAGING | Facility: CLINIC | Age: 69
DRG: 871 | End: 2024-05-26
Attending: STUDENT IN AN ORGANIZED HEALTH CARE EDUCATION/TRAINING PROGRAM
Payer: COMMERCIAL

## 2024-05-26 ENCOUNTER — APPOINTMENT (OUTPATIENT)
Dept: MRI IMAGING | Facility: CLINIC | Age: 69
End: 2024-05-26
Attending: EMERGENCY MEDICINE
Payer: COMMERCIAL

## 2024-05-26 ENCOUNTER — HOSPITAL ENCOUNTER (INPATIENT)
Facility: CLINIC | Age: 69
LOS: 7 days | Discharge: NURSING, REHAB OR LONG-TERM CARE | DRG: 871 | End: 2024-06-03
Attending: STUDENT IN AN ORGANIZED HEALTH CARE EDUCATION/TRAINING PROGRAM | Admitting: SURGERY
Payer: COMMERCIAL

## 2024-05-26 VITALS
HEART RATE: 98 BPM | BODY MASS INDEX: 22.96 KG/M2 | DIASTOLIC BLOOD PRESSURE: 52 MMHG | OXYGEN SATURATION: 96 % | HEIGHT: 70 IN | TEMPERATURE: 98 F | SYSTOLIC BLOOD PRESSURE: 90 MMHG | RESPIRATION RATE: 26 BRPM

## 2024-05-26 DIAGNOSIS — N17.9 AKI (ACUTE KIDNEY INJURY) (H): ICD-10-CM

## 2024-05-26 DIAGNOSIS — F33.41 RECURRENT MAJOR DEPRESSIVE DISORDER, IN PARTIAL REMISSION (H): ICD-10-CM

## 2024-05-26 DIAGNOSIS — S32.019A CLOSED FRACTURE OF FIRST LUMBAR VERTEBRA, UNSPECIFIED FRACTURE MORPHOLOGY, INITIAL ENCOUNTER (H): ICD-10-CM

## 2024-05-26 DIAGNOSIS — F10.20 SEVERE ALCOHOL USE DISORDER (H): ICD-10-CM

## 2024-05-26 DIAGNOSIS — S06.5XAA SUBDURAL HEMATOMA (H): ICD-10-CM

## 2024-05-26 DIAGNOSIS — R79.89 HYPOURICEMIA: ICD-10-CM

## 2024-05-26 DIAGNOSIS — R29.2 HYPERREFLEXIA: Primary | ICD-10-CM

## 2024-05-26 DIAGNOSIS — N18.30 STAGE 3 CHRONIC KIDNEY DISEASE, UNSPECIFIED WHETHER STAGE 3A OR 3B CKD (H): ICD-10-CM

## 2024-05-26 DIAGNOSIS — F10.239 ALCOHOL DEPENDENCE WITH WITHDRAWAL WITH COMPLICATION (H): ICD-10-CM

## 2024-05-26 DIAGNOSIS — S06.5XAA: ICD-10-CM

## 2024-05-26 DIAGNOSIS — S32.010A CLOSED COMPRESSION FRACTURE OF BODY OF L1 VERTEBRA (H): ICD-10-CM

## 2024-05-26 DIAGNOSIS — S22.028A: ICD-10-CM

## 2024-05-26 DIAGNOSIS — R53.1 LEFT-SIDED WEAKNESS: ICD-10-CM

## 2024-05-26 DIAGNOSIS — N17.9 ACUTE RENAL FAILURE, UNSPECIFIED ACUTE RENAL FAILURE TYPE (H): ICD-10-CM

## 2024-05-26 DIAGNOSIS — F17.210 CIGARETTE SMOKER: ICD-10-CM

## 2024-05-26 DIAGNOSIS — I12.9 MALIGNANT HYPERTENSIVE KIDNEY DISEASE WITH CHRONIC KIDNEY DISEASE STAGE I THROUGH STAGE IV, OR UNSPECIFIED(403.00): ICD-10-CM

## 2024-05-26 DIAGNOSIS — I62.00 SUBDURAL HEMORRHAGE (H): ICD-10-CM

## 2024-05-26 DIAGNOSIS — F10.930 ALCOHOL WITHDRAWAL SYNDROME WITHOUT COMPLICATION (H): ICD-10-CM

## 2024-05-26 DIAGNOSIS — E83.42 HYPOMAGNESEMIA: ICD-10-CM

## 2024-05-26 DIAGNOSIS — F10.29 ALCOHOL DEPENDENCE WITH UNSPECIFIED ALCOHOL-INDUCED DISORDER (H): ICD-10-CM

## 2024-05-26 DIAGNOSIS — S22.028A OTHER CLOSED FRACTURE OF SECOND THORACIC VERTEBRA, INITIAL ENCOUNTER (H): ICD-10-CM

## 2024-05-26 DIAGNOSIS — R53.1 WEAKNESS GENERALIZED: ICD-10-CM

## 2024-05-26 DIAGNOSIS — R79.89 ELEVATED TROPONIN: ICD-10-CM

## 2024-05-26 LAB
ABO/RH(D): NORMAL
ALBUMIN SERPL BCG-MCNC: 3.3 G/DL (ref 3.5–5.2)
ALBUMIN SERPL BCG-MCNC: 4.1 G/DL (ref 3.5–5.2)
ALBUMIN UR-MCNC: 20 MG/DL
ALBUMIN UR-MCNC: NEGATIVE MG/DL
ALP SERPL-CCNC: 108 U/L (ref 40–150)
ALP SERPL-CCNC: 144 U/L (ref 40–150)
ALT SERPL W P-5'-P-CCNC: 15 U/L (ref 0–70)
ALT SERPL W P-5'-P-CCNC: 19 U/L (ref 0–70)
AMMONIA PLAS-SCNC: 24 UMOL/L (ref 16–60)
AMPHETAMINES UR QL SCN: NORMAL
ANION GAP SERPL CALCULATED.3IONS-SCNC: 17 MMOL/L (ref 7–15)
ANION GAP SERPL CALCULATED.3IONS-SCNC: 25 MMOL/L (ref 7–15)
ANTIBODY SCREEN: NEGATIVE
APPEARANCE UR: CLEAR
APPEARANCE UR: CLEAR
APTT PPP: 27 SECONDS (ref 22–38)
APTT PPP: 29 SECONDS (ref 22–38)
AST SERPL W P-5'-P-CCNC: 36 U/L (ref 0–45)
AST SERPL W P-5'-P-CCNC: 44 U/L (ref 0–45)
AST SERPL W P-5'-P-CCNC: ABNORMAL U/L
BARBITURATES UR QL SCN: NORMAL
BASOPHILS # BLD AUTO: 0.1 10E3/UL (ref 0–0.2)
BASOPHILS # BLD MANUAL: 0.3 10E3/UL (ref 0–0.2)
BASOPHILS NFR BLD AUTO: 1 %
BASOPHILS NFR BLD MANUAL: 2 %
BENZODIAZ UR QL SCN: NORMAL
BILIRUB DIRECT SERPL-MCNC: ABNORMAL MG/DL
BILIRUB SERPL-MCNC: 0.9 MG/DL
BILIRUB SERPL-MCNC: 1.3 MG/DL
BILIRUB UR QL STRIP: NEGATIVE
BILIRUB UR QL STRIP: NEGATIVE
BUN SERPL-MCNC: 60.3 MG/DL (ref 8–23)
BUN SERPL-MCNC: 65.5 MG/DL (ref 8–23)
BURR CELLS BLD QL SMEAR: SLIGHT
BZE UR QL SCN: NORMAL
CALCIUM SERPL-MCNC: 10 MG/DL (ref 8.8–10.2)
CALCIUM SERPL-MCNC: 8.8 MG/DL (ref 8.8–10.2)
CANNABINOIDS UR QL SCN: NORMAL
CHLORIDE SERPL-SCNC: 80 MMOL/L (ref 98–107)
CHLORIDE SERPL-SCNC: 88 MMOL/L (ref 98–107)
CK SERPL-CCNC: 713 U/L (ref 39–308)
COLOR UR AUTO: ABNORMAL
COLOR UR AUTO: YELLOW
CREAT SERPL-MCNC: 3.68 MG/DL (ref 0.67–1.17)
CREAT SERPL-MCNC: 4.7 MG/DL (ref 0.67–1.17)
DEPRECATED HCO3 PLAS-SCNC: 24 MMOL/L (ref 22–29)
DEPRECATED HCO3 PLAS-SCNC: 25 MMOL/L (ref 22–29)
EGFRCR SERPLBLD CKD-EPI 2021: 13 ML/MIN/1.73M2
EGFRCR SERPLBLD CKD-EPI 2021: 17 ML/MIN/1.73M2
EOSINOPHIL # BLD AUTO: 0 10E3/UL (ref 0–0.7)
EOSINOPHIL # BLD MANUAL: 0 10E3/UL (ref 0–0.7)
EOSINOPHIL NFR BLD AUTO: 0 %
EOSINOPHIL NFR BLD MANUAL: 0 %
ERYTHROCYTE [DISTWIDTH] IN BLOOD BY AUTOMATED COUNT: 15.6 % (ref 10–15)
ERYTHROCYTE [DISTWIDTH] IN BLOOD BY AUTOMATED COUNT: 15.6 % (ref 10–15)
ETHANOL SERPL-MCNC: <0.01 G/DL
ETHANOL SERPL-MCNC: <0.01 G/DL
FENTANYL UR QL: NORMAL
FRAGMENTS BLD QL SMEAR: SLIGHT
GLUCOSE BLDC GLUCOMTR-MCNC: 119 MG/DL (ref 70–99)
GLUCOSE SERPL-MCNC: 104 MG/DL (ref 70–99)
GLUCOSE SERPL-MCNC: 112 MG/DL (ref 70–99)
GLUCOSE UR STRIP-MCNC: 70 MG/DL
GLUCOSE UR STRIP-MCNC: NEGATIVE MG/DL
HCT VFR BLD AUTO: 30 % (ref 40–53)
HCT VFR BLD AUTO: 36.9 % (ref 40–53)
HGB BLD-MCNC: 10.3 G/DL (ref 13.3–17.7)
HGB BLD-MCNC: 12.5 G/DL (ref 13.3–17.7)
HGB UR QL STRIP: ABNORMAL
HGB UR QL STRIP: ABNORMAL
HOLD SPECIMEN: NORMAL
HYALINE CASTS: 17 /LPF
HYALINE CASTS: 5 /LPF
IMM GRANULOCYTES # BLD: 0.1 10E3/UL
IMM GRANULOCYTES NFR BLD: 1 %
INR PPP: 1 (ref 0.85–1.15)
INR PPP: 1.08 (ref 0.85–1.15)
KETONES UR STRIP-MCNC: 5 MG/DL
KETONES UR STRIP-MCNC: NEGATIVE MG/DL
LACTATE SERPL-SCNC: 1.9 MMOL/L (ref 0.7–2)
LEUKOCYTE ESTERASE UR QL STRIP: NEGATIVE
LEUKOCYTE ESTERASE UR QL STRIP: NEGATIVE
LIPASE SERPL-CCNC: 29 U/L (ref 13–60)
LYMPHOCYTES # BLD AUTO: 1.9 10E3/UL (ref 0.8–5.3)
LYMPHOCYTES # BLD MANUAL: 1.8 10E3/UL (ref 0.8–5.3)
LYMPHOCYTES NFR BLD AUTO: 13 %
LYMPHOCYTES NFR BLD MANUAL: 11 %
MAGNESIUM SERPL-MCNC: 1.6 MG/DL (ref 1.7–2.3)
MAGNESIUM SERPL-MCNC: 2.1 MG/DL (ref 1.7–2.3)
MCH RBC QN AUTO: 32 PG (ref 26.5–33)
MCH RBC QN AUTO: 32.3 PG (ref 26.5–33)
MCHC RBC AUTO-ENTMCNC: 33.9 G/DL (ref 31.5–36.5)
MCHC RBC AUTO-ENTMCNC: 34.3 G/DL (ref 31.5–36.5)
MCV RBC AUTO: 94 FL (ref 78–100)
MCV RBC AUTO: 94 FL (ref 78–100)
MONOCYTES # BLD AUTO: 1.5 10E3/UL (ref 0–1.3)
MONOCYTES # BLD MANUAL: 0.7 10E3/UL (ref 0–1.3)
MONOCYTES NFR BLD AUTO: 11 %
MONOCYTES NFR BLD MANUAL: 4 %
MUCOUS THREADS #/AREA URNS LPF: PRESENT /LPF
NEUTROPHILS # BLD AUTO: 11.1 10E3/UL (ref 1.6–8.3)
NEUTROPHILS # BLD MANUAL: 13.9 10E3/UL (ref 1.6–8.3)
NEUTROPHILS NFR BLD AUTO: 74 %
NEUTROPHILS NFR BLD MANUAL: 83 %
NITRATE UR QL: NEGATIVE
NITRATE UR QL: NEGATIVE
NRBC # BLD AUTO: 0 10E3/UL
NRBC # BLD AUTO: 0 10E3/UL
NRBC BLD AUTO-RTO: 0 /100
NRBC BLD AUTO-RTO: 0 /100
OPIATES UR QL SCN: NORMAL
PCP QUAL URINE (ROCHE): NORMAL
PH UR STRIP: 5 [PH] (ref 5–7)
PH UR STRIP: 6 [PH] (ref 5–7)
PHOSPHATE SERPL-MCNC: 3.9 MG/DL (ref 2.5–4.5)
PLAT MORPH BLD: ABNORMAL
PLATELET # BLD AUTO: 451 10E3/UL (ref 150–450)
PLATELET # BLD AUTO: 544 10E3/UL (ref 150–450)
POTASSIUM SERPL-SCNC: 3.6 MMOL/L (ref 3.4–5.3)
POTASSIUM SERPL-SCNC: 3.7 MMOL/L (ref 3.4–5.3)
PROT SERPL-MCNC: 6.5 G/DL (ref 6.4–8.3)
PROT SERPL-MCNC: 8.3 G/DL (ref 6.4–8.3)
RADIOLOGIST FLAGS: ABNORMAL
RADIOLOGIST FLAGS: ABNORMAL
RBC # BLD AUTO: 3.19 10E6/UL (ref 4.4–5.9)
RBC # BLD AUTO: 3.91 10E6/UL (ref 4.4–5.9)
RBC MORPH BLD: ABNORMAL
RBC URINE: 0 /HPF
RBC URINE: <1 /HPF
SARS-COV-2 RNA RESP QL NAA+PROBE: NEGATIVE
SODIUM SERPL-SCNC: 129 MMOL/L (ref 135–145)
SODIUM SERPL-SCNC: 130 MMOL/L (ref 135–145)
SP GR UR STRIP: 1.01 (ref 1–1.03)
SP GR UR STRIP: 1.02 (ref 1–1.03)
SPECIMEN EXPIRATION DATE: NORMAL
SQUAMOUS EPITHELIAL: <1 /HPF
TROPONIN T SERPL HS-MCNC: 49 NG/L
TSH SERPL DL<=0.005 MIU/L-ACNC: 0.52 UIU/ML (ref 0.3–4.2)
UROBILINOGEN UR STRIP-MCNC: NORMAL MG/DL
UROBILINOGEN UR STRIP-MCNC: NORMAL MG/DL
WBC # BLD AUTO: 14.7 10E3/UL (ref 4–11)
WBC # BLD AUTO: 16.8 10E3/UL (ref 4–11)
WBC URINE: 1 /HPF
WBC URINE: <1 /HPF

## 2024-05-26 PROCEDURE — 72128 CT CHEST SPINE W/O DYE: CPT | Mod: 26 | Performed by: STUDENT IN AN ORGANIZED HEALTH CARE EDUCATION/TRAINING PROGRAM

## 2024-05-26 PROCEDURE — 82077 ASSAY SPEC XCP UR&BREATH IA: CPT | Performed by: STUDENT IN AN ORGANIZED HEALTH CARE EDUCATION/TRAINING PROGRAM

## 2024-05-26 PROCEDURE — 72131 CT LUMBAR SPINE W/O DYE: CPT

## 2024-05-26 PROCEDURE — 86900 BLOOD TYPING SEROLOGIC ABO: CPT | Performed by: STUDENT IN AN ORGANIZED HEALTH CARE EDUCATION/TRAINING PROGRAM

## 2024-05-26 PROCEDURE — 84100 ASSAY OF PHOSPHORUS: CPT | Performed by: STUDENT IN AN ORGANIZED HEALTH CARE EDUCATION/TRAINING PROGRAM

## 2024-05-26 PROCEDURE — 81001 URINALYSIS AUTO W/SCOPE: CPT | Performed by: STUDENT IN AN ORGANIZED HEALTH CARE EDUCATION/TRAINING PROGRAM

## 2024-05-26 PROCEDURE — 83605 ASSAY OF LACTIC ACID: CPT | Performed by: STUDENT IN AN ORGANIZED HEALTH CARE EDUCATION/TRAINING PROGRAM

## 2024-05-26 PROCEDURE — 82077 ASSAY SPEC XCP UR&BREATH IA: CPT | Performed by: EMERGENCY MEDICINE

## 2024-05-26 PROCEDURE — 85730 THROMBOPLASTIN TIME PARTIAL: CPT | Performed by: STUDENT IN AN ORGANIZED HEALTH CARE EDUCATION/TRAINING PROGRAM

## 2024-05-26 PROCEDURE — 250N000011 HC RX IP 250 OP 636: Performed by: EMERGENCY MEDICINE

## 2024-05-26 PROCEDURE — 83735 ASSAY OF MAGNESIUM: CPT | Performed by: EMERGENCY MEDICINE

## 2024-05-26 PROCEDURE — 99291 CRITICAL CARE FIRST HOUR: CPT | Performed by: EMERGENCY MEDICINE

## 2024-05-26 PROCEDURE — 250N000011 HC RX IP 250 OP 636: Performed by: STUDENT IN AN ORGANIZED HEALTH CARE EDUCATION/TRAINING PROGRAM

## 2024-05-26 PROCEDURE — 36415 COLL VENOUS BLD VENIPUNCTURE: CPT | Performed by: EMERGENCY MEDICINE

## 2024-05-26 PROCEDURE — 81001 URINALYSIS AUTO W/SCOPE: CPT | Performed by: EMERGENCY MEDICINE

## 2024-05-26 PROCEDURE — 82140 ASSAY OF AMMONIA: CPT | Performed by: EMERGENCY MEDICINE

## 2024-05-26 PROCEDURE — 84450 TRANSFERASE (AST) (SGOT): CPT | Performed by: STUDENT IN AN ORGANIZED HEALTH CARE EDUCATION/TRAINING PROGRAM

## 2024-05-26 PROCEDURE — 84300 ASSAY OF URINE SODIUM: CPT | Performed by: HOSPITALIST

## 2024-05-26 PROCEDURE — 70450 CT HEAD/BRAIN W/O DYE: CPT | Mod: MA

## 2024-05-26 PROCEDURE — 85025 COMPLETE CBC W/AUTO DIFF WBC: CPT | Performed by: STUDENT IN AN ORGANIZED HEALTH CARE EDUCATION/TRAINING PROGRAM

## 2024-05-26 PROCEDURE — 83735 ASSAY OF MAGNESIUM: CPT | Performed by: STUDENT IN AN ORGANIZED HEALTH CARE EDUCATION/TRAINING PROGRAM

## 2024-05-26 PROCEDURE — 82962 GLUCOSE BLOOD TEST: CPT

## 2024-05-26 PROCEDURE — 72131 CT LUMBAR SPINE W/O DYE: CPT | Mod: 26 | Performed by: STUDENT IN AN ORGANIZED HEALTH CARE EDUCATION/TRAINING PROGRAM

## 2024-05-26 PROCEDURE — 99207 PR NO BILLABLE SERVICE THIS VISIT: CPT

## 2024-05-26 PROCEDURE — 85025 COMPLETE CBC W/AUTO DIFF WBC: CPT | Performed by: EMERGENCY MEDICINE

## 2024-05-26 PROCEDURE — 70450 CT HEAD/BRAIN W/O DYE: CPT | Mod: XE

## 2024-05-26 PROCEDURE — 80048 BASIC METABOLIC PNL TOTAL CA: CPT | Performed by: STUDENT IN AN ORGANIZED HEALTH CARE EDUCATION/TRAINING PROGRAM

## 2024-05-26 PROCEDURE — 36415 COLL VENOUS BLD VENIPUNCTURE: CPT | Performed by: STUDENT IN AN ORGANIZED HEALTH CARE EDUCATION/TRAINING PROGRAM

## 2024-05-26 PROCEDURE — 85007 BL SMEAR W/DIFF WBC COUNT: CPT | Performed by: EMERGENCY MEDICINE

## 2024-05-26 PROCEDURE — 83935 ASSAY OF URINE OSMOLALITY: CPT | Performed by: HOSPITALIST

## 2024-05-26 PROCEDURE — 96365 THER/PROPH/DIAG IV INF INIT: CPT | Mod: 59 | Performed by: EMERGENCY MEDICINE

## 2024-05-26 PROCEDURE — A9585 GADOBUTROL INJECTION: HCPCS | Performed by: EMERGENCY MEDICINE

## 2024-05-26 PROCEDURE — 71046 X-RAY EXAM CHEST 2 VIEWS: CPT | Mod: 26 | Performed by: RADIOLOGY

## 2024-05-26 PROCEDURE — 72128 CT CHEST SPINE W/O DYE: CPT

## 2024-05-26 PROCEDURE — 96366 THER/PROPH/DIAG IV INF ADDON: CPT | Performed by: EMERGENCY MEDICINE

## 2024-05-26 PROCEDURE — 85730 THROMBOPLASTIN TIME PARTIAL: CPT | Performed by: EMERGENCY MEDICINE

## 2024-05-26 PROCEDURE — 84075 ASSAY ALKALINE PHOSPHATASE: CPT | Performed by: EMERGENCY MEDICINE

## 2024-05-26 PROCEDURE — 72125 CT NECK SPINE W/O DYE: CPT | Mod: 26 | Performed by: STUDENT IN AN ORGANIZED HEALTH CARE EDUCATION/TRAINING PROGRAM

## 2024-05-26 PROCEDURE — 82550 ASSAY OF CK (CPK): CPT | Performed by: EMERGENCY MEDICINE

## 2024-05-26 PROCEDURE — 96365 THER/PROPH/DIAG IV INF INIT: CPT | Performed by: STUDENT IN AN ORGANIZED HEALTH CARE EDUCATION/TRAINING PROGRAM

## 2024-05-26 PROCEDURE — 85610 PROTHROMBIN TIME: CPT | Performed by: STUDENT IN AN ORGANIZED HEALTH CARE EDUCATION/TRAINING PROGRAM

## 2024-05-26 PROCEDURE — 96368 THER/DIAG CONCURRENT INF: CPT | Performed by: EMERGENCY MEDICINE

## 2024-05-26 PROCEDURE — 83690 ASSAY OF LIPASE: CPT | Performed by: STUDENT IN AN ORGANIZED HEALTH CARE EDUCATION/TRAINING PROGRAM

## 2024-05-26 PROCEDURE — 93005 ELECTROCARDIOGRAM TRACING: CPT | Performed by: STUDENT IN AN ORGANIZED HEALTH CARE EDUCATION/TRAINING PROGRAM

## 2024-05-26 PROCEDURE — 84540 ASSAY OF URINE/UREA-N: CPT | Performed by: HOSPITALIST

## 2024-05-26 PROCEDURE — 72170 X-RAY EXAM OF PELVIS: CPT | Mod: 26 | Performed by: RADIOLOGY

## 2024-05-26 PROCEDURE — 93010 ELECTROCARDIOGRAM REPORT: CPT | Performed by: STUDENT IN AN ORGANIZED HEALTH CARE EDUCATION/TRAINING PROGRAM

## 2024-05-26 PROCEDURE — 258N000003 HC RX IP 258 OP 636: Performed by: EMERGENCY MEDICINE

## 2024-05-26 PROCEDURE — 99291 CRITICAL CARE FIRST HOUR: CPT | Performed by: STUDENT IN AN ORGANIZED HEALTH CARE EDUCATION/TRAINING PROGRAM

## 2024-05-26 PROCEDURE — 250N000009 HC RX 250: Performed by: EMERGENCY MEDICINE

## 2024-05-26 PROCEDURE — 72125 CT NECK SPINE W/O DYE: CPT

## 2024-05-26 PROCEDURE — 80307 DRUG TEST PRSMV CHEM ANLYZR: CPT | Performed by: STUDENT IN AN ORGANIZED HEALTH CARE EDUCATION/TRAINING PROGRAM

## 2024-05-26 PROCEDURE — 72170 X-RAY EXAM OF PELVIS: CPT

## 2024-05-26 PROCEDURE — 84484 ASSAY OF TROPONIN QUANT: CPT | Performed by: STUDENT IN AN ORGANIZED HEALTH CARE EDUCATION/TRAINING PROGRAM

## 2024-05-26 PROCEDURE — 85610 PROTHROMBIN TIME: CPT | Performed by: EMERGENCY MEDICINE

## 2024-05-26 PROCEDURE — 99292 CRITICAL CARE ADDL 30 MIN: CPT | Performed by: EMERGENCY MEDICINE

## 2024-05-26 PROCEDURE — 71046 X-RAY EXAM CHEST 2 VIEWS: CPT

## 2024-05-26 PROCEDURE — 99291 CRITICAL CARE FIRST HOUR: CPT | Mod: 25 | Performed by: STUDENT IN AN ORGANIZED HEALTH CARE EDUCATION/TRAINING PROGRAM

## 2024-05-26 PROCEDURE — 250N000013 HC RX MED GY IP 250 OP 250 PS 637: Performed by: STUDENT IN AN ORGANIZED HEALTH CARE EDUCATION/TRAINING PROGRAM

## 2024-05-26 PROCEDURE — 84443 ASSAY THYROID STIM HORMONE: CPT | Performed by: EMERGENCY MEDICINE

## 2024-05-26 PROCEDURE — 255N000002 HC RX 255 OP 636: Performed by: EMERGENCY MEDICINE

## 2024-05-26 PROCEDURE — 87635 SARS-COV-2 COVID-19 AMP PRB: CPT | Performed by: STUDENT IN AN ORGANIZED HEALTH CARE EDUCATION/TRAINING PROGRAM

## 2024-05-26 PROCEDURE — 70553 MRI BRAIN STEM W/O & W/DYE: CPT

## 2024-05-26 PROCEDURE — 84450 TRANSFERASE (AST) (SGOT): CPT | Performed by: EMERGENCY MEDICINE

## 2024-05-26 PROCEDURE — 99285 EMERGENCY DEPT VISIT HI MDM: CPT | Mod: 25 | Performed by: EMERGENCY MEDICINE

## 2024-05-26 PROCEDURE — 70496 CT ANGIOGRAPHY HEAD: CPT | Mod: MA

## 2024-05-26 RX ORDER — IOPAMIDOL 755 MG/ML
67 INJECTION, SOLUTION INTRAVASCULAR ONCE
Status: COMPLETED | OUTPATIENT
Start: 2024-05-26 | End: 2024-05-26

## 2024-05-26 RX ORDER — FLUMAZENIL 0.1 MG/ML
0.2 INJECTION, SOLUTION INTRAVENOUS
Status: DISCONTINUED | OUTPATIENT
Start: 2024-05-26 | End: 2024-05-29

## 2024-05-26 RX ORDER — MAGNESIUM SULFATE HEPTAHYDRATE 500 MG/ML
2 INJECTION, SOLUTION INTRAMUSCULAR; INTRAVENOUS ONCE
Status: DISCONTINUED | OUTPATIENT
Start: 2024-05-26 | End: 2024-05-26

## 2024-05-26 RX ORDER — MULTIPLE VITAMINS W/ MINERALS TAB 9MG-400MCG
1 TAB ORAL DAILY
Status: DISCONTINUED | OUTPATIENT
Start: 2024-05-27 | End: 2024-06-03 | Stop reason: HOSPADM

## 2024-05-26 RX ORDER — THIAMINE HYDROCHLORIDE 100 MG/ML
500 INJECTION, SOLUTION INTRAMUSCULAR; INTRAVENOUS 3 TIMES DAILY
Qty: 30 ML | Refills: 0 | Status: COMPLETED | OUTPATIENT
Start: 2024-05-26 | End: 2024-05-28

## 2024-05-26 RX ORDER — OXYCODONE HYDROCHLORIDE 5 MG/1
5 TABLET ORAL ONCE
Status: COMPLETED | OUTPATIENT
Start: 2024-05-26 | End: 2024-05-26

## 2024-05-26 RX ORDER — DIAZEPAM 10 MG/2ML
5-10 INJECTION, SOLUTION INTRAMUSCULAR; INTRAVENOUS EVERY 30 MIN PRN
Status: DISCONTINUED | OUTPATIENT
Start: 2024-05-26 | End: 2024-05-29

## 2024-05-26 RX ORDER — MAGNESIUM SULFATE HEPTAHYDRATE 40 MG/ML
2 INJECTION, SOLUTION INTRAVENOUS ONCE
Status: COMPLETED | OUTPATIENT
Start: 2024-05-26 | End: 2024-05-26

## 2024-05-26 RX ORDER — THIAMINE HYDROCHLORIDE 100 MG/ML
250 INJECTION, SOLUTION INTRAMUSCULAR; INTRAVENOUS DAILY
Qty: 12.5 ML | Refills: 0 | Status: COMPLETED | OUTPATIENT
Start: 2024-05-29 | End: 2024-06-02

## 2024-05-26 RX ORDER — FOLIC ACID 1 MG/1
1 TABLET ORAL DAILY
Status: DISCONTINUED | OUTPATIENT
Start: 2024-05-27 | End: 2024-06-03 | Stop reason: HOSPADM

## 2024-05-26 RX ORDER — DIAZEPAM 5 MG
10 TABLET ORAL EVERY 30 MIN PRN
Status: DISCONTINUED | OUTPATIENT
Start: 2024-05-26 | End: 2024-05-29

## 2024-05-26 RX ORDER — FOLIC ACID 1 MG/1
1 TABLET ORAL ONCE
Status: COMPLETED | OUTPATIENT
Start: 2024-05-26 | End: 2024-05-26

## 2024-05-26 RX ORDER — PIPERACILLIN SODIUM, TAZOBACTAM SODIUM 4; .5 G/20ML; G/20ML
4.5 INJECTION, POWDER, LYOPHILIZED, FOR SOLUTION INTRAVENOUS ONCE
Qty: 20 ML | Refills: 0 | Status: COMPLETED | OUTPATIENT
Start: 2024-05-26 | End: 2024-05-27

## 2024-05-26 RX ORDER — ACETAMINOPHEN 500 MG
1000 TABLET ORAL ONCE
Status: COMPLETED | OUTPATIENT
Start: 2024-05-26 | End: 2024-05-26

## 2024-05-26 RX ORDER — GADOBUTROL 604.72 MG/ML
7 INJECTION INTRAVENOUS ONCE
Status: COMPLETED | OUTPATIENT
Start: 2024-05-26 | End: 2024-05-26

## 2024-05-26 RX ADMIN — SODIUM CHLORIDE 100 ML: 9 INJECTION, SOLUTION INTRAVENOUS at 15:43

## 2024-05-26 RX ADMIN — THIAMINE HYDROCHLORIDE: 100 INJECTION, SOLUTION INTRAMUSCULAR; INTRAVENOUS at 16:08

## 2024-05-26 RX ADMIN — MAGNESIUM SULFATE HEPTAHYDRATE 2 G: 2 INJECTION, SOLUTION INTRAVENOUS at 18:21

## 2024-05-26 RX ADMIN — SODIUM CHLORIDE, POTASSIUM CHLORIDE, SODIUM LACTATE AND CALCIUM CHLORIDE 1000 ML: 600; 310; 30; 20 INJECTION, SOLUTION INTRAVENOUS at 20:35

## 2024-05-26 RX ADMIN — IOPAMIDOL 67 ML: 755 INJECTION, SOLUTION INTRAVENOUS at 15:43

## 2024-05-26 RX ADMIN — SODIUM CHLORIDE, POTASSIUM CHLORIDE, SODIUM LACTATE AND CALCIUM CHLORIDE 1000 ML: 600; 310; 30; 20 INJECTION, SOLUTION INTRAVENOUS at 15:49

## 2024-05-26 RX ADMIN — ACETAMINOPHEN 1000 MG: 500 TABLET ORAL at 23:52

## 2024-05-26 RX ADMIN — FOLIC ACID 1 MG: 1 TABLET ORAL at 23:52

## 2024-05-26 RX ADMIN — PIPERACILLIN SODIUM AND TAZOBACTAM SODIUM 4.5 G: 4; .5 INJECTION, POWDER, LYOPHILIZED, FOR SOLUTION INTRAVENOUS at 23:01

## 2024-05-26 RX ADMIN — GADOBUTROL 7 ML: 604.72 INJECTION INTRAVENOUS at 17:09

## 2024-05-26 RX ADMIN — OXYCODONE HYDROCHLORIDE 5 MG: 5 TABLET ORAL at 23:53

## 2024-05-26 ASSESSMENT — VISUAL ACUITY
OU: BASELINE
OU: 1
OU: BASELINE

## 2024-05-26 ASSESSMENT — ACTIVITIES OF DAILY LIVING (ADL)
ADLS_ACUITY_SCORE: 38

## 2024-05-26 ASSESSMENT — COLUMBIA-SUICIDE SEVERITY RATING SCALE - C-SSRS
2. HAVE YOU ACTUALLY HAD ANY THOUGHTS OF KILLING YOURSELF IN THE PAST MONTH?: NO
6. HAVE YOU EVER DONE ANYTHING, STARTED TO DO ANYTHING, OR PREPARED TO DO ANYTHING TO END YOUR LIFE?: NO
6. HAVE YOU EVER DONE ANYTHING, STARTED TO DO ANYTHING, OR PREPARED TO DO ANYTHING TO END YOUR LIFE?: NO
1. IN THE PAST MONTH, HAVE YOU WISHED YOU WERE DEAD OR WISHED YOU COULD GO TO SLEEP AND NOT WAKE UP?: NO
1. IN THE PAST MONTH, HAVE YOU WISHED YOU WERE DEAD OR WISHED YOU COULD GO TO SLEEP AND NOT WAKE UP?: NO
2. HAVE YOU ACTUALLY HAD ANY THOUGHTS OF KILLING YOURSELF IN THE PAST MONTH?: NO

## 2024-05-26 NOTE — MEDICATION SCRIBE - ADMISSION MEDICATION HISTORY
Medication Scribe Admission Medication History    Admission medication history is complete. The information provided in this note is only as accurate as the sources available at the time of the update.    Information Source(s): Patient via in-person    Pertinent Information:     Changes made to PTA medication list:  Added: None  Deleted: arnuity ellipta, folic acid, magnesium, multi vit, pantoprazole, thiamine  Changed: None    Allergies reviewed with patient and updates made in EHR: yes    Medication History Completed By: Ximena Ball 5/26/2024 6:43 PM    PTA Med List   Medication Sig Last Dose    albuterol (PROAIR HFA/PROVENTIL HFA/VENTOLIN HFA) 108 (90 Base) MCG/ACT inhaler INHALE 2 PUFFS INTO THE LUNGS EVERY 4 HOURS AS NEEDED FOR SHORTNESS OF BREATH, WHEEZING OR COUGH SEE PROVIDER BEFORE OTHER REFILLS ARE GIVEN. Past Week at unknown    losartan (COZAAR) 25 MG tablet TAKE 1 TABLET BY MOUTH EVERY MORNING 5/26/2024 at am    metoprolol succinate ER (TOPROL XL) 25 MG 24 hr tablet TAKE 1 TABLET BY MOUTH EVERY MORNING 5/26/2024 at am

## 2024-05-26 NOTE — ED NOTES
Spoke with patient sister Dianne and updated on patient status.  Dianne (sister) is only person to make medical decisions. Wants to be informed if patient is to transfer to another facility or any changes take place.

## 2024-05-26 NOTE — ED PROVIDER NOTES
History     Chief Complaint   Patient presents with    Stroke Symptoms     LKW last night.     HPI  History per patient, EMS, his sister who lives in [place], review of [facility] EMR and Care Everywhere EMR.    Luigi Vieyra is a 68 year old alcoholic male who presents emergency department with 5 days of left upper extremity and hand weakness, and left leg weakness.  He has been unable to ambulate or attend activities of daily living and states he has been in bed for 2 or 3 days.  His wife called EMS today and reported that he has been falling in the past week had him transported to the emergency department for eval.  Luigi reports he was in bed when EMS arrived to transport him here.  He is a poor and unreliable historian and denies any recent fall, head injury or trauma.  He has no headache, visual or sensory deficit.  He has persistent profound left upper extremity weakness and improved left lower extremity weakness.  He denies passing out on his left side or history of to suggest neuropraxia/peripheral nerve injury to cause of symptoms.  He denies alcohol use since yesterday afternoon and denies signs or symptoms of alcohol withdrawal.  His wife and he apparently are /she is  him and it is unclear as to how much she observes or monitors or cares for him.  He has a sister in [place] who is involved with his care.    Allergies:  No Known Allergies    Problem List:    Patient Active Problem List    Diagnosis Date Noted    Subdural hemorrhage (H) 05/27/2024     Priority: Medium    Elevated troponin 05/27/2024     Priority: Medium    Left-sided weakness 05/27/2024     Priority: Medium    Severe alcohol use disorder (H) 05/27/2024     Priority: Medium    Alcohol withdrawal syndrome without complication (H) 05/27/2024     Priority: Medium    Other closed fracture of second thoracic vertebra, initial encounter (H) 05/27/2024     Priority: Medium    Closed compression fracture of body of L1 vertebra  (H) 05/27/2024     Priority: Medium    Traumatic subdural hematoma (H) 05/27/2024     Priority: Medium    Stage 3a chronic kidney disease (CKD) (H) 11/02/2022     Priority: Medium    Acute blood loss anemia 10/14/2022     Priority: Medium    Hyponatremia 03/20/2022     Priority: Medium    Anemia, unspecified type 03/20/2022     Priority: Medium    Alcoholic gastritis, presence of bleeding unspecified, unspecified chronicity 03/20/2022     Priority: Medium    Hypotension due to hypovolemia 03/20/2022     Priority: Medium    MAIA (acute kidney injury) (H24) 03/20/2022     Priority: Medium    Elevated lactic acid level 03/20/2022     Priority: Medium    Transaminitis 03/20/2022     Priority: Medium    Syncope 03/20/2022     Priority: Medium    Thrombocytosis 03/20/2022     Priority: Medium    Leukocytosis 03/20/2022     Priority: Medium    Mixed hyperlipidemia 03/09/2020     Priority: Medium    Uncontrolled hypertension 01/02/2019     Priority: Medium    Seasonal allergic rhinitis 06/13/2017     Priority: Medium    CARDIOVASCULAR SCREENING; LDL GOAL LESS THAN 130 03/31/2014     Priority: Medium    Tobacco abuse 03/25/2014     Priority: Medium    Mild persistent asthma 05/18/2012     Priority: Medium    Alcohol abuse 05/18/2012     Priority: Medium        Past Medical History:    Past Medical History:   Diagnosis Date    Asthma        Past Surgical History:    Past Surgical History:   Procedure Laterality Date    ESOPHAGOSCOPY, GASTROSCOPY, DUODENOSCOPY (EGD), COMBINED N/A 3/21/2022    Procedure: ESOPHAGOGASTRODUODENOSCOPY, WITH BIOPSY;  Surgeon: Eunice Rubin MD;  Location: [facility]    ESOPHAGOSCOPY, GASTROSCOPY, DUODENOSCOPY (EGD), COMBINED N/A 10/14/2022    Procedure: ESOPHAGOGASTRODUODENOSCOPY (EGD) with epi injection and clip placement;  Surgeon: Cash Oconnor MD;  Location: [facility]       Family History:    Family History   Problem Relation Age of Onset    Asthma Mother     Cerebrovascular Disease Mother   "   C.A.D. No family hx of     Diabetes No family hx of     Hypertension No family hx of     Breast Cancer No family hx of     Cancer - colorectal No family hx of     Prostate Cancer No family hx of        Social History:  Marital Status:   [2]  Social History     Tobacco Use    Smoking status: Every Day     Current packs/day: 0.50     Average packs/day: 0.5 packs/day for 48.4 years (24.2 ttl pk-yrs)     Types: Cigarettes     Start date: 1976    Smokeless tobacco: Never   Vaping Use    Vaping status: Never Used   Substance Use Topics    Alcohol use: Yes     Comment: 2-3 drinks daily    Drug use: No        Medications:    albuterol (PROAIR HFA/PROVENTIL HFA/VENTOLIN HFA) 108 (90 Base) MCG/ACT inhaler  losartan (COZAAR) 25 MG tablet  metoprolol succinate ER (TOPROL XL) 25 MG 24 hr tablet  order for DME      Review of Systems  As mentioned in the HPI, in addition focused review of systems was negative.    Physical Exam   BP: 101/64  Pulse: 87  Temp: 98  F (36.7  C)  Resp: 20  Height: 177.8 cm (5' 10\")  SpO2: 95 %      Physical Exam  Vitals and nursing note reviewed.   Constitutional:       General: He is not in acute distress.     Appearance: He is well-developed. He is not ill-appearing, toxic-appearing or diaphoretic.      Comments: Disheveled.   HENT:      Head: Normocephalic and atraumatic. No raccoon eyes, Epps's sign, abrasion or contusion.      Right Ear: Tympanic membrane, ear canal and external ear normal.      Left Ear: Tympanic membrane, ear canal and external ear normal.      Nose: Nose normal.      Mouth/Throat:      Mouth: Mucous membranes are dry.   Eyes:      General: Vision grossly intact. Gaze aligned appropriately. No scleral icterus.     Extraocular Movements: Extraocular movements intact.      Conjunctiva/sclera: Conjunctivae normal.      Pupils: Pupils are equal, round, and reactive to light.   Neck:      Trachea: No tracheal deviation.   Cardiovascular:      Rate and Rhythm: Normal rate " and regular rhythm.      Heart sounds: Normal heart sounds. No murmur heard.     No friction rub. No gallop.   Pulmonary:      Effort: Pulmonary effort is normal. No respiratory distress.      Breath sounds: Normal breath sounds. No wheezing, rhonchi or rales.   Abdominal:      General: There is no distension.      Palpations: Abdomen is soft.      Tenderness: There is no abdominal tenderness.   Musculoskeletal:         General: No swelling or tenderness. Normal range of motion.      Cervical back: Normal range of motion and neck supple.      Right lower leg: No edema.      Left lower leg: No edema.   Skin:     General: Skin is warm and dry.      Coloration: Skin is not pale.      Findings: No erythema or rash.   Neurological:      General: No focal deficit present.      Mental Status: He is alert and oriented to person, place, and time.      Cranial Nerves: No cranial nerve deficit.      Sensory: No sensory deficit.      Motor: Weakness present.      Comments: Significant left upper extremity motor deficit, unable to lift the arm off the bed independently and weak hand grasp.  4-5 left lower extremity weakness with drift.   Psychiatric:         Mood and Affect: Mood normal.         Behavior: Behavior normal.         ED Course        Procedures              EKG Interpretation:      Interpreted by Jose Alberto Villarreal MD  Time reviewed: Upon completion  Symptoms at time of EKG: Stroke symptoms  Rhythm: normal sinus   Rate: normal  Axis: normal  Ectopy: none  Conduction: normal  ST Segments/ T Waves: No ST-T wave changes.  Q Waves: none  Comparison to prior: 10/13/2022  Clinical Impression: normal EKG           Results for orders placed or performed during the hospital encounter of 05/26/24 (from the past 24 hour(s))   Dairy Draw    Narrative    The following orders were created for panel order Dairy Draw.  Procedure                               Abnormality         Status                     ---------                                -----------         ------                     Extra Blue Top Tube[[id number]]                              Final result               Extra Red Top Tube[[id number]]                               Final result               Extra Green Top (Lithium...[[id number]]                      Final result               Extra Purple Top Tube[[id number]]                            Final result                 Please view results for these tests on the individual orders.   Extra Blue Top Tube   Result Value Ref Range    Hold Specimen JIC    Extra Red Top Tube   Result Value Ref Range    Hold Specimen JIC    Extra Green Top (Lithium Heparin) Tube   Result Value Ref Range    Hold Specimen JIC    Extra Purple Top Tube   Result Value Ref Range    Hold Specimen JIC    CBC with platelets differential    Narrative    The following orders were created for panel order CBC with platelets differential.  Procedure                               Abnormality         Status                     ---------                               -----------         ------                     CBC with platelets and d...[[id number]]  Abnormal            Final result               Manual Differential[[id number]]          Abnormal            Final result                 Please view results for these tests on the individual orders.   Partial thromboplastin time   Result Value Ref Range    aPTT 29 22 - 38 Seconds   INR   Result Value Ref Range    INR 1.00 0.85 - 1.15   Comprehensive metabolic panel   Result Value Ref Range    Sodium 129 (L) 135 - 145 mmol/L    Potassium 3.7 3.4 - 5.3 mmol/L    Carbon Dioxide (CO2) 24 22 - 29 mmol/L    Anion Gap 25 (H) 7 - 15 mmol/L    Urea Nitrogen 65.5 (H) 8.0 - 23.0 mg/dL    Creatinine 4.70 (H) 0.67 - 1.17 mg/dL    GFR Estimate 13 (L) >60 mL/min/1.73m2    Calcium 10.0 8.8 - 10.2 mg/dL    Chloride 80 (L) 98 - 107 mmol/L    Glucose 104 (H) 70 - 99 mg/dL    Alkaline Phosphatase 144 40 - 150 U/L    AST      ALT 19 0 - 70  U/L    Protein Total 8.3 6.4 - 8.3 g/dL    Albumin 4.1 3.5 - 5.2 g/dL    Bilirubin Total 1.3 (H) <=1.2 mg/dL   Magnesium   Result Value Ref Range    Magnesium 1.6 (L) 1.7 - 2.3 mg/dL   TSH with free T4 reflex   Result Value Ref Range    TSH 0.52 0.30 - 4.20 uIU/mL   Ethyl Alcohol Level   Result Value Ref Range    Alcohol ethyl <0.01 <=0.01 g/dL   CK total   Result Value Ref Range     (H) 39 - 308 U/L   CBC with platelets and differential   Result Value Ref Range    WBC Count 16.8 (H) 4.0 - 11.0 10e3/uL    RBC Count 3.91 (L) 4.40 - 5.90 10e6/uL    Hemoglobin 12.5 (L) 13.3 - 17.7 g/dL    Hematocrit 36.9 (L) 40.0 - 53.0 %    MCV 94 78 - 100 fL    MCH 32.0 26.5 - 33.0 pg    MCHC 33.9 31.5 - 36.5 g/dL    RDW 15.6 (H) 10.0 - 15.0 %    Platelet Count 544 (H) 150 - 450 10e3/uL    NRBCs per 100 WBC 0 <1 /100    Absolute NRBCs 0.0 10e3/uL   Manual Differential   Result Value Ref Range    % Neutrophils 83 %    % Lymphocytes 11 %    % Monocytes 4 %    % Eosinophils 0 %    % Basophils 2 %    Absolute Neutrophils 13.9 (H) 1.6 - 8.3 10e3/uL    Absolute Lymphocytes 1.8 0.8 - 5.3 10e3/uL    Absolute Monocytes 0.7 0.0 - 1.3 10e3/uL    Absolute Eosinophils 0.0 0.0 - 0.7 10e3/uL    Absolute Basophils 0.3 (H) 0.0 - 0.2 10e3/uL    RBC Morphology Confirmed RBC Indices     Platelet Assessment (A) Automated Count Confirmed. Platelet morphology is normal.     Automated Count Confirmed. Giant platelets are present.    Terra Cells Slight (A) None Seen    RBC Fragments Slight (A) None Seen   Glucose by meter   Result Value Ref Range    GLUCOSE BY METER POCT 119 (H) 70 - 99 mg/dL   CT Head w/o Contrast   Result Value Ref Range    Radiologist flags Acute intracranial hemorrhage (AA)     Narrative    EXAM: CT HEAD W/O CONTRAST, CTA HEAD NECK W CONTRAST  LOCATION: [facility]  DATE: 5/26/2024    INDICATION: Left sided weakness  COMPARISON: Chest CT October 13, 2022.  CONTRAST:  Omni 370 - 68 mL  TECHNIQUE: Head and  neck CT angiogram with IV contrast. Noncontrast head CT followed by axial helical CT images of the head and neck vessels obtained during the arterial phase of intravenous contrast administration. Axial 2D reconstructed images and   multiplanar 3D MIP reconstructed images of the head and neck vessels were performed by the technologist. Dose reduction techniques were used. All stenosis measurements made according to NASCET criteria unless otherwise specified.    FINDINGS:   NONCONTRAST HEAD CT:   INTRACRANIAL CONTENTS: Thin subdural hematoma along the right tentorial leaflet measuring up to 4 mm. No additional intracranial hemorrhage is noted. No mass effect or midline shift. No CT evidence of acute infarct. Mild presumed chronic small vessel   ischemic changes. Mild to moderate generalized volume loss. No hydrocephalus.     VISUALIZED ORBITS/SINUSES/MASTOIDS: Prior bilateral cataract surgery. Visualized portions of the orbits are otherwise unremarkable. No paranasal sinus mucosal disease. No middle ear or mastoid effusion.    BONES/SOFT TISSUES: No acute abnormality.    HEAD CTA:  ANTERIOR CIRCULATION: No stenosis/occlusion, aneurysm, or high flow vascular malformation. Standard [place] of [place] anatomy.    POSTERIOR CIRCULATION: No stenosis/occlusion, aneurysm, or high flow vascular malformation. Balanced vertebral arteries supply a normal basilar artery.     DURAL VENOUS SINUSES: Not well evaluated on a technical basis.    NECK CTA:  RIGHT CAROTID: Atherosclerotic plaque results in less than 50% stenosis in the right ICA. No dissection.    LEFT CAROTID: Atherosclerotic plaque results in less than 50% stenosis in the left ICA. No dissection.    VERTEBRAL ARTERIES: No focal stenosis or dissection. Balanced vertebral arteries.    AORTIC ARCH: Classic aortic arch anatomy with no significant stenosis at the origin of the great vessels.    NONVASCULAR STRUCTURES: Mild, chronic anterior compression deformities of the T2,  T3 and T4 vertebra, unchanged.      Impression    IMPRESSION:   HEAD CT:  1.  Thin subdural hematoma along the right tentorial leaflet.  2.  No mass effect or midline shift.  3.  Age-related changes as above.    HEAD CTA:   1.  No significant stenosis, aneurysm, or high flow vascular malformation identified.    NECK CTA:  1.  No hemodynamically significant stenosis within the vessels of the neck.      [Critical Result: Acute intracranial hemorrhage]    Finding was identified on 5/26/2024 3:46 PM CDT.     DR DHILLON was contacted by me on 5/26/2024 3:49 PM CDT and verbalized understanding of the critical result.     CTA Head Neck w Contrast   Result Value Ref Range    Radiologist flags Acute intracranial hemorrhage (AA)     Narrative    EXAM: CT HEAD W/O CONTRAST, CTA HEAD NECK W CONTRAST  LOCATION: [facility]  DATE: 5/26/2024    INDICATION: Left sided weakness  COMPARISON: Chest CT October 13, 2022.  CONTRAST:  Omni 370 - 68 mL  TECHNIQUE: Head and neck CT angiogram with IV contrast. Noncontrast head CT followed by axial helical CT images of the head and neck vessels obtained during the arterial phase of intravenous contrast administration. Axial 2D reconstructed images and   multiplanar 3D MIP reconstructed images of the head and neck vessels were performed by the technologist. Dose reduction techniques were used. All stenosis measurements made according to NASCET criteria unless otherwise specified.    FINDINGS:   NONCONTRAST HEAD CT:   INTRACRANIAL CONTENTS: Thin subdural hematoma along the right tentorial leaflet measuring up to 4 mm. No additional intracranial hemorrhage is noted. No mass effect or midline shift. No CT evidence of acute infarct. Mild presumed chronic small vessel   ischemic changes. Mild to moderate generalized volume loss. No hydrocephalus.     VISUALIZED ORBITS/SINUSES/MASTOIDS: Prior bilateral cataract surgery. Visualized portions of the orbits are otherwise  unremarkable. No paranasal sinus mucosal disease. No middle ear or mastoid effusion.    BONES/SOFT TISSUES: No acute abnormality.    HEAD CTA:  ANTERIOR CIRCULATION: No stenosis/occlusion, aneurysm, or high flow vascular malformation. Standard [place] of [place] anatomy.    POSTERIOR CIRCULATION: No stenosis/occlusion, aneurysm, or high flow vascular malformation. Balanced vertebral arteries supply a normal basilar artery.     DURAL VENOUS SINUSES: Not well evaluated on a technical basis.    NECK CTA:  RIGHT CAROTID: Atherosclerotic plaque results in less than 50% stenosis in the right ICA. No dissection.    LEFT CAROTID: Atherosclerotic plaque results in less than 50% stenosis in the left ICA. No dissection.    VERTEBRAL ARTERIES: No focal stenosis or dissection. Balanced vertebral arteries.    AORTIC ARCH: Classic aortic arch anatomy with no significant stenosis at the origin of the great vessels.    NONVASCULAR STRUCTURES: Mild, chronic anterior compression deformities of the T2, T3 and T4 vertebra, unchanged.      Impression    IMPRESSION:   HEAD CT:  1.  Thin subdural hematoma along the right tentorial leaflet.  2.  No mass effect or midline shift.  3.  Age-related changes as above.    HEAD CTA:   1.  No significant stenosis, aneurysm, or high flow vascular malformation identified.    NECK CTA:  1.  No hemodynamically significant stenosis within the vessels of the neck.      [Critical Result: Acute intracranial hemorrhage]    Finding was identified on 5/26/2024 3:46 PM CDT.     DR DHILLON was contacted by me on 5/26/2024 3:49 PM CDT and verbalized understanding of the critical result.     Ammonia   Result Value Ref Range    Ammonia 24 16 - 60 umol/L   Extra Tube    Narrative    The following orders were created for panel order Extra Tube.  Procedure                               Abnormality         Status                     ---------                               -----------         ------                      Extra Green Top (Lithium...[[id number]]                      Final result                 Please view results for these tests on the individual orders.   Extra Green Top (Lithium Heparin) Tube   Result Value Ref Range    Hold Specimen JIC    AST   Result Value Ref Range    AST 36 0 - 45 U/L   UA with Microscopic reflex to Culture    Specimen: Urine, Clean Catch   Result Value Ref Range    Color Urine Yellow Colorless, Straw, Light Yellow, Yellow    Appearance Urine Clear Clear    Glucose Urine Negative Negative mg/dL    Bilirubin Urine Negative Negative    Ketones Urine 5 (A) Negative mg/dL    Specific Gravity Urine 1.014 1.003 - 1.035    Blood Urine Moderate (A) Negative    pH Urine 5.0 5.0 - 7.0    Protein Albumin Urine Negative Negative mg/dL    Urobilinogen Urine Normal Normal, 2.0 mg/dL    Nitrite Urine Negative Negative    Leukocyte Esterase Urine Negative Negative    RBC Urine <1 <=2 /HPF    WBC Urine 1 <=5 /HPF    Squamous Epithelials Urine <1 <=1 /HPF    Hyaline Casts Urine 17 (H) <=2 /LPF    Narrative    Urine Culture not indicated   MR Brain w/o & w Contrast    Narrative    EXAM: MR BRAIN W/O and W CONTRAST  LOCATION: [facility]  DATE: 5/26/2024    INDICATION: 5 days of left sided weakness  COMPARISON: CT head 5/26/2024, MRI 7/20/2023  CONTRAST: gadavist 7 mL  TECHNIQUE: Routine multiplanar multisequence head MRI without and with intravenous contrast.    FINDINGS:  INTRACRANIAL CONTENTS: No acute or subacute infarct. Small acute subdural hematoma along right tentorium, similar to prior CT. Scattered nonspecific T2/FLAIR hyperintensities within the cerebral white matter most consistent with mild chronic   microvascular ischemic change. Small amount of chronic encephalomalacia in the region of the vermis and adjacent cerebellum, similar to prior exams. Moderate generalized cerebral atrophy. Atrophy is more prominent in hippocampal regions, more so on left.   This is a  nonspecific finding, similar finding could be observed in dementia of Alzheimer's type. No hydrocephalus. Normal position of the cerebellar tonsils. Thin smooth enhancement of dura, right more than left; nonspecific.    SELLA: No abnormality accounting for technique.    OSSEOUS STRUCTURES/SOFT TISSUES: Normal marrow signal. The major intracranial vascular flow voids are maintained.     ORBITS: No abnormality accounting for technique.     SINUSES/MASTOIDS: No paranasal sinus mucosal disease. No middle ear or mastoid effusion.       Impression    IMPRESSION:  1.  No acute infarct, mass, or mass effect.  2.  Small acute subdural hematoma along right tentorium, similar to prior CT.  3.  Mild chronic small vessel ischemia.  4.  Small amount of chronic encephalomalacia in the region of the vermis and adjacent cerebellum, similar to prior exams.  5.  Moderate atrophy.       Medications   lactated ringers BOLUS 1,000 mL (1,000 mLs Intravenous $New Bag 5/26/24 1549)   lactated ringers 1,000 mL with Infuvite Adult 10 mL, thiamine 100 mg, folic acid 1 mg infusion ( Intravenous Stopped 5/26/24 1914)   iopamidol (ISOVUE-370) solution 67 mL (67 mLs Intravenous $Given 5/26/24 1543)   sodium chloride 0.9 % bag 500mL for CT scan flush use (100 mLs Intravenous $Given 5/26/24 1543)   gadobutrol (GADAVIST) injection 7 mL (7 mLs Intravenous $Given 5/26/24 1709)   magnesium sulfate 2 g in 50 mL sterile water intermittent infusion (0 g Intravenous Stopped 5/26/24 1914)   lactated ringers BOLUS 1,000 mL (1,000 mLs Intravenous $New Bag 5/26/24 2035)     3:23 PM - After evaluating him a Tier 2 stroke evaluation was initiated.    3:26 PM -I reviewed the case and consulted with the Stroke Neurology service will be involved in the patient's evaluation and care.    3:49 PM -I reviewed the CT results with the interpreting Radiologist: thin 4 mm SDH along the right tentorial leaflet, no mass affect.  Will consult Neurosurgery.    4:10 PM - I  reviewed the CT results with Stroke Neurology.    4:12 PM - I reviewed the case and consulted with Dr. Skinner, Neurosurgery at [facility].  He does not feel that the small subdural explains his neurologic deficit and symptoms.  He recommended q 1 hour neurologic checks and 6-hour repeat CT head without contrast to ensure stability of his small right subdural hematoma.    I reviewed the case  thus far and results of my consultation with Neurosurgery with the Stroke Neurology service. Will proceed with MRI evaluation reassess his disposition, admission here versus transfer. I will consult our hospitalist service.    4:48 PM - I reviewed the case and consulted with FREDIS Valdovinos and Dr. Briseno of the Hospitalist service.    Luigi remains stable with no changes neurologic deficit or condition.    6:19 PM - MRI resulted and shows no evidence of acute stroke.  Awaiting consultation with Stroke Neurology again. Luigi has remained stable and his neurologic condition is unchanged.  He has continued to deny any signs or symptoms of alcohol withdrawal.    6:36 PM -I reviewed the MRI results with the Stroke Neurology fellow on-call.  We concur with transfer to [facility] where Stroke Neurology, Neurology and Neurosurgical services are available for his continued care and evaluation.  Luigi remains unchanged from presentation and neurologically stable/unchanged.      I reviewed the case with Dr. Li, ED physician at [facility].  Plan is to transfer the patient to the ED there for evaluation prior to admission, due to his history of trauma.  At time of transfer Mr. Vieyra is unchanged from initial presentation in the ED and remains stable.      Assessments & Plan (with Medical Decision Making)    68 year old alcoholic male with 5 days of left upper extremity and hand weakness, and left leg weakness.  He has been unable to ambulate or attend activities of daily living and  states he has been in bed for 2 or 3 days.  His wife called EMS today and reported that he has been falling in the past week had him transported to the emergency department for eval.  Luigi reports he was in bed when EMS arrived to transport him here.  He is a poor and unreliable historian and denies any recent fall, head injury or trauma.    CT/CTA head and neck stroke studies and MRI brain without and with contrast were remarkable for a small right subdural hematoma which the Neurosurgeon on-call does not clearly appear to be the cause of his significant left-sided weakness.  Labs remarkable for hypomagnesemia and mild rhabdomyolysis with CK7 13.  He was aggressively hydrated and given IV magnesium and IV thiamine, folate and multivitamin.  No symptoms of alcohol withdrawal throughout his ED evaluation.  He remained neurologically unchanged and stable throughout his ED evaluation.  Stroke Neurology concurs with transfer to a tertiary care facility for Stroke Neurology, Neurology and Neurosurgery services are available for his continued care and evaluation, and he was transferred [facility].    I have reviewed the nursing notes.    I have reviewed the findings, diagnosis, plan and need for follow up with the patient.    Medical Decision Making  Critical Care:    My initial assessment, based on my review of prehospital provider report, review of nursing observations, review of vital signs, focused history, physical exam, and review of cardiac rhythm monitor, established that patient has ischemic or hemorrhagic stroke, which requires immediate intervention, and therefore She is critically ill.     After the initial assessment, the care team initiated multiple lab tests, initiated IV fluid administration, initiated medication therapy with see MAR, initiated intensive non-invasive respiratory support, and consulted with Stroke Neurologist, Neurosurgeon and the Radiologist  to provide stabilization care.  Due to the critical nature of this patient, I reassessed nursing observations, vital signs, physical exam, review of cardiac rhythm monitor, 12 lead ECG analysis, interpretation of imaging and lab results, mental status, and neurologic status multiple times prior to his disposition.     Time also spent performing documentation, discussion with family to obtain medical information for decision making, reviewing test results, discussion with consultants, and coordination of care.     Critical care time (excluding teaching time and procedures): 80 minutes.       Discharge Medication List as of 5/26/2024  9:29 PM          Final diagnoses:   Subdural hematoma (H)   Left-sided weakness   Alcohol dependence with unspecified alcohol-induced disorder (H)   Hypomagnesemia       5/26/2024   [facility] EMERGENCY DEPT       Jose Alberto Villarreal MD  05/27/24 [id number]

## 2024-05-26 NOTE — CONSULTS
[facility]    Stroke Telephone Note    I was called by Dr. Jose Alberto Villarreal Md on 05/26/24 regarding patient Luigi Vieyra. The patient is a 68 year old male with a PMH significant for alcohol abuse, CKD, HLD, HTN, tobacco use, asthma. History obtained from ED provider. LKW was 5 day PTA. Last alcohol consumption was at 4:00 pm yesterday. On ED providers examination, patient has left upper extremity weakness that immediately drifts down and mild drift in LLE, no facial droop or numbness, NIH 4-5. Presenting BP was 101/64. Not on aspirin or statin PTA.    Vitals  BP: (!) 140/82   Pulse: 94   Resp: 26   Temp: 98  F (36.7  C)        Stroke Code Data (for stroke code without tele)  Stroke code activated 05/26/24  1523   Stroke provider first response 05/26/24  1526   Last known normal 05/21/24   (Symptoms began 5 days PTA)  Unknown   Time of discovery (or onset of symptoms) 05/21/24      Head CT read by Stroke Neuro Provider 05/26/24      Was stroke code de-escalated? No           Imaging Findings  CT head: Thin 4 mm SDH along with the right tentorial leaflet, no mass effect or midline sift.   CTA head/neck: No large vessel occlusion. Bilateral atherosclerotic plaque results in less than 50% stenosis in the right and left ICA.     Intravenous Thrombolysis  Not given due to:   - unclear or unfavorable risk-benefit profile for extended window thrombolysis beyond the conventional 4.5 hour time window    Endovascular Treatment  Not initiated due to absence of proximal vessel occlusion    Impression  Left upper extremity and lower extremity weakness x 5 days. CTH revealed 4 mm subdural hematoma along the right tentorial leaflet. However, deficits are profound given the size of the subdural. Recommend MRI brain with and without to evaluate for acute stroke.     Recommendations   - Consider transferring for [facility] or [facility] for neurosurgery evaluation   - Neurochecks and Vital Signs every q 1 hour  -  "Systolic BP Goal 130-150.  - Avoid NSAIDs/Antiplatelet/Anticoagulation  - Head of bed elevated  - Telemetry, EKG  - Imaging:   - Repeat CTH without contrast in 6 hours for stability   - MRI Brain with and without contrast - please page stroke neurology for further recommendations  - Recommend STAT neurosurgery consult   - Initiate Keppra 500 mg twice daily   - Bedside Glucose Monitoring  - A1c, Troponin x 3  - PT/OT/SLP  - Stroke Education  - Euthermia, Euglycemia    Case discussed with vascular neurology attending Dr. Vance.    My recommendations are based on the information provided over the phone by Luigi Vieyra's in-person providers. They are not intended to replace the clinical judgment of his in-person providers. I was not requested to personally see or examine the patient at this time.     Kelly Ruano PA-C  Vascular Neurology    To page me or covering stroke neurology team member, click here: AMCOM  Choose \"On Call\" tab at top, then select \"NEUROLOGY/ALL SITES\" from middle drop-down box, press Enter, then look for \"stroke\" or \"telestroke\" for your site.   "

## 2024-05-26 NOTE — ED NOTES
"Patient sister, Dianne called.  Dianne is listed in patient chart with consent to discuss all medical information.  Dianne states that patient is a \"full blown non-functioning alcoholic and is not able to make medical decisions.\"  Dianne lives in [place], but will fly here asap if needed.  Call Dianne's cell with updates.  "

## 2024-05-27 ENCOUNTER — APPOINTMENT (OUTPATIENT)
Dept: MRI IMAGING | Facility: CLINIC | Age: 69
DRG: 871 | End: 2024-05-27
Attending: STUDENT IN AN ORGANIZED HEALTH CARE EDUCATION/TRAINING PROGRAM
Payer: COMMERCIAL

## 2024-05-27 ENCOUNTER — APPOINTMENT (OUTPATIENT)
Dept: GENERAL RADIOLOGY | Facility: CLINIC | Age: 69
DRG: 871 | End: 2024-05-27
Attending: NURSE PRACTITIONER
Payer: COMMERCIAL

## 2024-05-27 ENCOUNTER — APPOINTMENT (OUTPATIENT)
Dept: MRI IMAGING | Facility: CLINIC | Age: 69
DRG: 871 | End: 2024-05-27
Attending: NURSE PRACTITIONER
Payer: COMMERCIAL

## 2024-05-27 ENCOUNTER — APPOINTMENT (OUTPATIENT)
Dept: CT IMAGING | Facility: CLINIC | Age: 69
DRG: 871 | End: 2024-05-27
Attending: STUDENT IN AN ORGANIZED HEALTH CARE EDUCATION/TRAINING PROGRAM
Payer: COMMERCIAL

## 2024-05-27 PROBLEM — F10.930 ALCOHOL WITHDRAWAL SYNDROME WITHOUT COMPLICATION (H): Status: ACTIVE | Noted: 2024-05-27

## 2024-05-27 PROBLEM — S22.028A: Status: ACTIVE | Noted: 2024-05-27

## 2024-05-27 PROBLEM — S06.5XAA TRAUMATIC SUBDURAL HEMATOMA (H): Status: ACTIVE | Noted: 2024-05-27

## 2024-05-27 PROBLEM — R53.1 LEFT-SIDED WEAKNESS: Status: ACTIVE | Noted: 2024-05-27

## 2024-05-27 PROBLEM — I62.00 SUBDURAL HEMORRHAGE (H): Status: ACTIVE | Noted: 2024-05-27

## 2024-05-27 PROBLEM — F10.20 SEVERE ALCOHOL USE DISORDER (H): Status: ACTIVE | Noted: 2024-05-27

## 2024-05-27 PROBLEM — S32.010A CLOSED COMPRESSION FRACTURE OF BODY OF L1 VERTEBRA (H): Status: ACTIVE | Noted: 2024-05-27

## 2024-05-27 PROBLEM — R79.89 ELEVATED TROPONIN: Status: ACTIVE | Noted: 2024-05-27

## 2024-05-27 LAB
ANION GAP SERPL CALCULATED.3IONS-SCNC: 16 MMOL/L (ref 7–15)
ATRIAL RATE - MUSE: 87 BPM
BUN SERPL-MCNC: 56.3 MG/DL (ref 8–23)
CALCIUM SERPL-MCNC: 8.6 MG/DL (ref 8.8–10.2)
CHLORIDE SERPL-SCNC: 93 MMOL/L (ref 98–107)
CK SERPL-CCNC: 690 U/L (ref 39–308)
CREAT SERPL-MCNC: 3.36 MG/DL (ref 0.67–1.17)
CREAT UR-MCNC: 78.6 MG/DL
DEPRECATED HCO3 PLAS-SCNC: 24 MMOL/L (ref 22–29)
DIASTOLIC BLOOD PRESSURE - MUSE: NORMAL MMHG
EGFRCR SERPLBLD CKD-EPI 2021: 19 ML/MIN/1.73M2
ETHANOL SERPL-MCNC: <0.01 G/DL
FERRITIN SERPL-MCNC: 163 NG/ML (ref 31–409)
FOLATE SERPL-MCNC: >40 NG/ML (ref 4.6–34.8)
FRACT EXCRET NA UR+SERPL-RTO: 1.8 %
GLUCOSE SERPL-MCNC: 108 MG/DL (ref 70–99)
INTERPRETATION ECG - MUSE: NORMAL
IRON BINDING CAPACITY (ROCHE): 224 UG/DL (ref 240–430)
IRON SATN MFR SERPL: 48 % (ref 15–46)
IRON SERPL-MCNC: 108 UG/DL (ref 61–157)
MAGNESIUM SERPL-MCNC: 1.8 MG/DL (ref 1.7–2.3)
MAGNESIUM SERPL-MCNC: 1.9 MG/DL (ref 1.7–2.3)
OSMOLALITY SERPL: 297 MMOL/KG (ref 280–301)
OSMOLALITY UR: 314 MMOL/KG (ref 100–1200)
P AXIS - MUSE: 52 DEGREES
PHOSPHATE SERPL-MCNC: 2.9 MG/DL (ref 2.5–4.5)
PHOSPHATE SERPL-MCNC: 3.4 MG/DL (ref 2.5–4.5)
POTASSIUM SERPL-SCNC: 3.2 MMOL/L (ref 3.4–5.3)
POTASSIUM SERPL-SCNC: 3.3 MMOL/L (ref 3.4–5.3)
PR INTERVAL - MUSE: 188 MS
QRS DURATION - MUSE: 66 MS
QT - MUSE: 364 MS
QTC - MUSE: 438 MS
R AXIS - MUSE: -6 DEGREES
SODIUM SERPL-SCNC: 133 MMOL/L (ref 135–145)
SODIUM UR-SCNC: 51 MMOL/L
SYSTOLIC BLOOD PRESSURE - MUSE: NORMAL MMHG
T AXIS - MUSE: 35 DEGREES
TROPONIN T SERPL HS-MCNC: 42 NG/L
TROPONIN T SERPL HS-MCNC: 50 NG/L
UUN UR-MCNC: 335 MG/DL (ref 801–1666)
VENTRICULAR RATE- MUSE: 87 BPM
VIT B12 SERPL-MCNC: 840 PG/ML (ref 232–1245)

## 2024-05-27 PROCEDURE — 250N000013 HC RX MED GY IP 250 OP 250 PS 637: Performed by: PSYCHIATRY & NEUROLOGY

## 2024-05-27 PROCEDURE — 72156 MRI NECK SPINE W/O & W/DYE: CPT

## 2024-05-27 PROCEDURE — 83550 IRON BINDING TEST: CPT | Performed by: HOSPITALIST

## 2024-05-27 PROCEDURE — 82607 VITAMIN B-12: CPT | Performed by: HOSPITALIST

## 2024-05-27 PROCEDURE — 99223 1ST HOSP IP/OBS HIGH 75: CPT | Mod: GC | Performed by: NEUROLOGICAL SURGERY

## 2024-05-27 PROCEDURE — 84132 ASSAY OF SERUM POTASSIUM: CPT | Performed by: STUDENT IN AN ORGANIZED HEALTH CARE EDUCATION/TRAINING PROGRAM

## 2024-05-27 PROCEDURE — 258N000003 HC RX IP 258 OP 636: Performed by: STUDENT IN AN ORGANIZED HEALTH CARE EDUCATION/TRAINING PROGRAM

## 2024-05-27 PROCEDURE — 83735 ASSAY OF MAGNESIUM: CPT | Performed by: STUDENT IN AN ORGANIZED HEALTH CARE EDUCATION/TRAINING PROGRAM

## 2024-05-27 PROCEDURE — 72141 MRI NECK SPINE W/O DYE: CPT | Mod: 26 | Performed by: RADIOLOGY

## 2024-05-27 PROCEDURE — 83930 ASSAY OF BLOOD OSMOLALITY: CPT | Performed by: HOSPITALIST

## 2024-05-27 PROCEDURE — 250N000013 HC RX MED GY IP 250 OP 250 PS 637: Performed by: SURGERY

## 2024-05-27 PROCEDURE — 99207 PR APP CREDIT; MD BILLING SHARED VISIT: CPT | Performed by: STUDENT IN AN ORGANIZED HEALTH CARE EDUCATION/TRAINING PROGRAM

## 2024-05-27 PROCEDURE — 96375 TX/PRO/DX INJ NEW DRUG ADDON: CPT | Performed by: STUDENT IN AN ORGANIZED HEALTH CARE EDUCATION/TRAINING PROGRAM

## 2024-05-27 PROCEDURE — 70450 CT HEAD/BRAIN W/O DYE: CPT | Mod: 26 | Performed by: RADIOLOGY

## 2024-05-27 PROCEDURE — 250N000013 HC RX MED GY IP 250 OP 250 PS 637: Performed by: STUDENT IN AN ORGANIZED HEALTH CARE EDUCATION/TRAINING PROGRAM

## 2024-05-27 PROCEDURE — 36415 COLL VENOUS BLD VENIPUNCTURE: CPT | Performed by: HOSPITALIST

## 2024-05-27 PROCEDURE — 258N000003 HC RX IP 258 OP 636: Performed by: HOSPITALIST

## 2024-05-27 PROCEDURE — 70450 CT HEAD/BRAIN W/O DYE: CPT

## 2024-05-27 PROCEDURE — 73060 X-RAY EXAM OF HUMERUS: CPT | Mod: LT

## 2024-05-27 PROCEDURE — A9585 GADOBUTROL INJECTION: HCPCS | Performed by: SURGERY

## 2024-05-27 PROCEDURE — 84484 ASSAY OF TROPONIN QUANT: CPT | Performed by: STUDENT IN AN ORGANIZED HEALTH CARE EDUCATION/TRAINING PROGRAM

## 2024-05-27 PROCEDURE — 84100 ASSAY OF PHOSPHORUS: CPT | Performed by: STUDENT IN AN ORGANIZED HEALTH CARE EDUCATION/TRAINING PROGRAM

## 2024-05-27 PROCEDURE — 73030 X-RAY EXAM OF SHOULDER: CPT | Mod: 26 | Performed by: RADIOLOGY

## 2024-05-27 PROCEDURE — 250N000013 HC RX MED GY IP 250 OP 250 PS 637: Performed by: PHYSICIAN ASSISTANT

## 2024-05-27 PROCEDURE — 258N000003 HC RX IP 258 OP 636: Performed by: NURSE PRACTITIONER

## 2024-05-27 PROCEDURE — 73060 X-RAY EXAM OF HUMERUS: CPT | Mod: 26 | Performed by: RADIOLOGY

## 2024-05-27 PROCEDURE — 250N000013 HC RX MED GY IP 250 OP 250 PS 637: Performed by: HOSPITALIST

## 2024-05-27 PROCEDURE — 255N000002 HC RX 255 OP 636: Performed by: SURGERY

## 2024-05-27 PROCEDURE — 999N000128 HC STATISTIC PERIPHERAL IV START W/O US GUIDANCE

## 2024-05-27 PROCEDURE — 250N000011 HC RX IP 250 OP 636: Performed by: STUDENT IN AN ORGANIZED HEALTH CARE EDUCATION/TRAINING PROGRAM

## 2024-05-27 PROCEDURE — 99222 1ST HOSP IP/OBS MODERATE 55: CPT | Performed by: HOSPITALIST

## 2024-05-27 PROCEDURE — 84295 ASSAY OF SERUM SODIUM: CPT | Performed by: NURSE PRACTITIONER

## 2024-05-27 PROCEDURE — 72146 MRI CHEST SPINE W/O DYE: CPT

## 2024-05-27 PROCEDURE — HZ2ZZZZ DETOXIFICATION SERVICES FOR SUBSTANCE ABUSE TREATMENT: ICD-10-PCS | Performed by: NURSE PRACTITIONER

## 2024-05-27 PROCEDURE — 36415 COLL VENOUS BLD VENIPUNCTURE: CPT | Performed by: STUDENT IN AN ORGANIZED HEALTH CARE EDUCATION/TRAINING PROGRAM

## 2024-05-27 PROCEDURE — 82077 ASSAY SPEC XCP UR&BREATH IA: CPT | Performed by: HOSPITALIST

## 2024-05-27 PROCEDURE — 72146 MRI CHEST SPINE W/O DYE: CPT | Mod: 26 | Performed by: RADIOLOGY

## 2024-05-27 PROCEDURE — 72156 MRI NECK SPINE W/O & W/DYE: CPT | Mod: 26 | Performed by: RADIOLOGY

## 2024-05-27 PROCEDURE — 72141 MRI NECK SPINE W/O DYE: CPT

## 2024-05-27 PROCEDURE — 73030 X-RAY EXAM OF SHOULDER: CPT | Mod: LT

## 2024-05-27 PROCEDURE — 99232 SBSQ HOSP IP/OBS MODERATE 35: CPT | Performed by: NURSE PRACTITIONER

## 2024-05-27 PROCEDURE — 96366 THER/PROPH/DIAG IV INF ADDON: CPT | Performed by: STUDENT IN AN ORGANIZED HEALTH CARE EDUCATION/TRAINING PROGRAM

## 2024-05-27 PROCEDURE — 82550 ASSAY OF CK (CPK): CPT | Performed by: HOSPITALIST

## 2024-05-27 PROCEDURE — 82728 ASSAY OF FERRITIN: CPT | Performed by: HOSPITALIST

## 2024-05-27 PROCEDURE — 120N000002 HC R&B MED SURG/OB UMMC

## 2024-05-27 PROCEDURE — 82746 ASSAY OF FOLIC ACID SERUM: CPT | Performed by: HOSPITALIST

## 2024-05-27 RX ORDER — VALPROIC ACID 250 MG/1
500 CAPSULE, LIQUID FILLED ORAL AT BEDTIME
Status: DISCONTINUED | OUTPATIENT
Start: 2024-05-27 | End: 2024-05-27

## 2024-05-27 RX ORDER — SODIUM CHLORIDE, SODIUM LACTATE, POTASSIUM CHLORIDE, CALCIUM CHLORIDE 600; 310; 30; 20 MG/100ML; MG/100ML; MG/100ML; MG/100ML
INJECTION, SOLUTION INTRAVENOUS CONTINUOUS
Status: DISCONTINUED | OUTPATIENT
Start: 2024-05-27 | End: 2024-05-28

## 2024-05-27 RX ORDER — VALPROIC ACID 250 MG/1
250 CAPSULE, LIQUID FILLED ORAL 2 TIMES DAILY
Status: DISCONTINUED | OUTPATIENT
Start: 2024-05-27 | End: 2024-05-27

## 2024-05-27 RX ORDER — GADOBUTROL 604.72 MG/ML
10 INJECTION INTRAVENOUS ONCE
Status: COMPLETED | OUTPATIENT
Start: 2024-05-27 | End: 2024-05-27

## 2024-05-27 RX ORDER — DIAZEPAM 10 MG/2ML
5-10 INJECTION, SOLUTION INTRAMUSCULAR; INTRAVENOUS EVERY 30 MIN PRN
Status: DISCONTINUED | OUTPATIENT
Start: 2024-05-27 | End: 2024-05-27

## 2024-05-27 RX ORDER — DEXTROSE MONOHYDRATE, SODIUM CHLORIDE, AND POTASSIUM CHLORIDE 50; 1.49; 4.5 G/1000ML; G/1000ML; G/1000ML
INJECTION, SOLUTION INTRAVENOUS CONTINUOUS
Status: DISCONTINUED | OUTPATIENT
Start: 2024-05-27 | End: 2024-05-27

## 2024-05-27 RX ORDER — HALOPERIDOL 5 MG/ML
2.5-5 INJECTION INTRAMUSCULAR EVERY 6 HOURS PRN
Status: DISCONTINUED | OUTPATIENT
Start: 2024-05-27 | End: 2024-05-30

## 2024-05-27 RX ORDER — OXYCODONE HYDROCHLORIDE 5 MG/1
5 TABLET ORAL EVERY 4 HOURS PRN
Status: DISCONTINUED | OUTPATIENT
Start: 2024-05-27 | End: 2024-05-28

## 2024-05-27 RX ORDER — ALBUTEROL SULFATE 90 UG/1
2 AEROSOL, METERED RESPIRATORY (INHALATION) EVERY 6 HOURS PRN
Status: DISCONTINUED | OUTPATIENT
Start: 2024-05-27 | End: 2024-06-03 | Stop reason: HOSPADM

## 2024-05-27 RX ORDER — LEVETIRACETAM 500 MG/1
500 TABLET ORAL 2 TIMES DAILY
Status: COMPLETED | OUTPATIENT
Start: 2024-05-27 | End: 2024-06-02

## 2024-05-27 RX ORDER — POTASSIUM CHLORIDE 750 MG/1
20 TABLET, EXTENDED RELEASE ORAL ONCE
Qty: 2 TABLET | Refills: 0 | Status: COMPLETED | OUTPATIENT
Start: 2024-05-27 | End: 2024-05-27

## 2024-05-27 RX ORDER — MULTIPLE VITAMINS W/ MINERALS TAB 9MG-400MCG
1 TAB ORAL DAILY
Status: DISCONTINUED | OUTPATIENT
Start: 2024-05-27 | End: 2024-05-27

## 2024-05-27 RX ORDER — FLUMAZENIL 0.1 MG/ML
0.2 INJECTION, SOLUTION INTRAVENOUS
Status: DISCONTINUED | OUTPATIENT
Start: 2024-05-27 | End: 2024-05-28

## 2024-05-27 RX ORDER — POTASSIUM CHLORIDE 750 MG/1
20 TABLET, EXTENDED RELEASE ORAL ONCE
Status: COMPLETED | OUTPATIENT
Start: 2024-05-27 | End: 2024-05-27

## 2024-05-27 RX ORDER — FOLIC ACID 1 MG/1
1 TABLET ORAL DAILY
Status: DISCONTINUED | OUTPATIENT
Start: 2024-05-27 | End: 2024-05-27

## 2024-05-27 RX ORDER — ONDANSETRON 2 MG/ML
4 INJECTION INTRAMUSCULAR; INTRAVENOUS EVERY 6 HOURS PRN
Status: DISCONTINUED | OUTPATIENT
Start: 2024-05-27 | End: 2024-06-03 | Stop reason: HOSPADM

## 2024-05-27 RX ORDER — ONDANSETRON 4 MG/1
4 TABLET, ORALLY DISINTEGRATING ORAL EVERY 6 HOURS PRN
Status: DISCONTINUED | OUTPATIENT
Start: 2024-05-27 | End: 2024-06-03 | Stop reason: HOSPADM

## 2024-05-27 RX ORDER — OLANZAPINE 5 MG/1
5-10 TABLET, ORALLY DISINTEGRATING ORAL EVERY 6 HOURS PRN
Status: DISCONTINUED | OUTPATIENT
Start: 2024-05-27 | End: 2024-05-30

## 2024-05-27 RX ORDER — SODIUM CHLORIDE, SODIUM LACTATE, POTASSIUM CHLORIDE, CALCIUM CHLORIDE 600; 310; 30; 20 MG/100ML; MG/100ML; MG/100ML; MG/100ML
INJECTION, SOLUTION INTRAVENOUS CONTINUOUS
Status: DISCONTINUED | OUTPATIENT
Start: 2024-05-27 | End: 2024-05-27

## 2024-05-27 RX ORDER — METOPROLOL SUCCINATE 25 MG/1
25 TABLET, EXTENDED RELEASE ORAL EVERY MORNING
Status: DISCONTINUED | OUTPATIENT
Start: 2024-05-27 | End: 2024-06-03 | Stop reason: HOSPADM

## 2024-05-27 RX ORDER — CLONIDINE HYDROCHLORIDE 0.1 MG/1
0.1 TABLET ORAL EVERY 8 HOURS
Status: DISCONTINUED | OUTPATIENT
Start: 2024-05-27 | End: 2024-05-27

## 2024-05-27 RX ORDER — DIAZEPAM 5 MG
10 TABLET ORAL EVERY 30 MIN PRN
Status: DISCONTINUED | OUTPATIENT
Start: 2024-05-27 | End: 2024-05-27

## 2024-05-27 RX ADMIN — SODIUM CHLORIDE, POTASSIUM CHLORIDE, SODIUM LACTATE AND CALCIUM CHLORIDE 1000 ML: 600; 310; 30; 20 INJECTION, SOLUTION INTRAVENOUS at 03:46

## 2024-05-27 RX ADMIN — LEVETIRACETAM 2000 MG: 100 INJECTION, SOLUTION INTRAVENOUS at 01:02

## 2024-05-27 RX ADMIN — OXYCODONE HYDROCHLORIDE 5 MG: 5 TABLET ORAL at 08:56

## 2024-05-27 RX ADMIN — Medication 1 TABLET: at 08:55

## 2024-05-27 RX ADMIN — THIAMINE HYDROCHLORIDE 500 MG: 100 INJECTION, SOLUTION INTRAMUSCULAR; INTRAVENOUS at 15:37

## 2024-05-27 RX ADMIN — THIAMINE HYDROCHLORIDE 500 MG: 100 INJECTION, SOLUTION INTRAMUSCULAR; INTRAVENOUS at 20:04

## 2024-05-27 RX ADMIN — LEVETIRACETAM 500 MG: 500 TABLET, FILM COATED ORAL at 08:56

## 2024-05-27 RX ADMIN — ALBUTEROL SULFATE 2 PUFF: 90 AEROSOL, METERED RESPIRATORY (INHALATION) at 21:58

## 2024-05-27 RX ADMIN — POTASSIUM CHLORIDE 20 MEQ: 750 TABLET, EXTENDED RELEASE ORAL at 15:37

## 2024-05-27 RX ADMIN — SODIUM CHLORIDE, POTASSIUM CHLORIDE, SODIUM LACTATE AND CALCIUM CHLORIDE: 600; 310; 30; 20 INJECTION, SOLUTION INTRAVENOUS at 10:08

## 2024-05-27 RX ADMIN — VANCOMYCIN HYDROCHLORIDE 1500 MG: 10 INJECTION, POWDER, LYOPHILIZED, FOR SOLUTION INTRAVENOUS at 01:05

## 2024-05-27 RX ADMIN — GADOBUTROL 10 ML: 604.72 INJECTION INTRAVENOUS at 15:11

## 2024-05-27 RX ADMIN — VALPROIC ACID 500 MG: 250 CAPSULE ORAL at 05:03

## 2024-05-27 RX ADMIN — FOLIC ACID 1 MG: 1 TABLET ORAL at 08:55

## 2024-05-27 RX ADMIN — THIAMINE HYDROCHLORIDE 500 MG: 100 INJECTION, SOLUTION INTRAMUSCULAR; INTRAVENOUS at 01:05

## 2024-05-27 RX ADMIN — SODIUM CHLORIDE, POTASSIUM CHLORIDE, SODIUM LACTATE AND CALCIUM CHLORIDE: 600; 310; 30; 20 INJECTION, SOLUTION INTRAVENOUS at 23:44

## 2024-05-27 RX ADMIN — SODIUM CHLORIDE, POTASSIUM CHLORIDE, SODIUM LACTATE AND CALCIUM CHLORIDE: 600; 310; 30; 20 INJECTION, SOLUTION INTRAVENOUS at 09:10

## 2024-05-27 RX ADMIN — POTASSIUM CHLORIDE 20 MEQ: 750 TABLET, EXTENDED RELEASE ORAL at 21:38

## 2024-05-27 RX ADMIN — SODIUM CHLORIDE, POTASSIUM CHLORIDE, SODIUM LACTATE AND CALCIUM CHLORIDE: 600; 310; 30; 20 INJECTION, SOLUTION INTRAVENOUS at 05:10

## 2024-05-27 RX ADMIN — THIAMINE HYDROCHLORIDE 500 MG: 100 INJECTION, SOLUTION INTRAMUSCULAR; INTRAVENOUS at 09:02

## 2024-05-27 RX ADMIN — CLONIDINE HYDROCHLORIDE 0.1 MG: 0.1 TABLET ORAL at 05:02

## 2024-05-27 RX ADMIN — POTASSIUM CHLORIDE, DEXTROSE MONOHYDRATE AND SODIUM CHLORIDE: 150; 5; 450 INJECTION, SOLUTION INTRAVENOUS at 05:12

## 2024-05-27 RX ADMIN — LEVETIRACETAM 500 MG: 500 TABLET, FILM COATED ORAL at 19:59

## 2024-05-27 ASSESSMENT — ACTIVITIES OF DAILY LIVING (ADL)
ADLS_ACUITY_SCORE: 38
ADLS_ACUITY_SCORE: 38
ADLS_ACUITY_SCORE: 42
ADLS_ACUITY_SCORE: 42
ADLS_ACUITY_SCORE: 38
ADLS_ACUITY_SCORE: 42
ADLS_ACUITY_SCORE: 38
ADLS_ACUITY_SCORE: 42
ADLS_ACUITY_SCORE: 38
ADLS_ACUITY_SCORE: 38
ADLS_ACUITY_SCORE: 42
ADLS_ACUITY_SCORE: 38
ADLS_ACUITY_SCORE: 42
ADLS_ACUITY_SCORE: 51
DEPENDENT_IADLS:: INDEPENDENT;TRANSPORTATION
ADLS_ACUITY_SCORE: 38
ADLS_ACUITY_SCORE: 42
ADLS_ACUITY_SCORE: 38
ADLS_ACUITY_SCORE: 38
ADLS_ACUITY_SCORE: 42
ADLS_ACUITY_SCORE: 51
ADLS_ACUITY_SCORE: 38

## 2024-05-27 ASSESSMENT — LIFESTYLE VARIABLES
AUDITORY DISTURBANCES: NOT PRESENT
TREMOR: 2
AGITATION: NORMAL ACTIVITY
PAROXYSMAL SWEATS: NO SWEAT VISIBLE
VISUAL DISTURBANCES: NOT PRESENT
HEADACHE, FULLNESS IN HEAD: NOT PRESENT
ORIENTATION AND CLOUDING OF SENSORIUM: ORIENTED AND CAN DO SERIAL ADDITIONS
NAUSEA AND VOMITING: NO NAUSEA AND NO VOMITING
TOTAL SCORE: 2
ANXIETY: NO ANXIETY, AT EASE

## 2024-05-27 NOTE — PROGRESS NOTES
Sister Dianne has called twice today regarding Luigi.   Luigi verbally consented for me to talk to her.  She would like to get updates on Luigi and possible talk to social work regarding future needs for Luigi.  She does not live in [place].  Her phone number is [phone].

## 2024-05-27 NOTE — CONSULTS
[facility]    History and Physical / Consult note: Trauma Service    Date of Admission:  5/26/2024    Time of Admission/Consult Request (page/call): 2000      Time of my evaluation: 2230  Consulting services:  Neurosurgery - Emergent consult (within 30 mins): Called by ED  Neurology - emergent consult: called by the ED    Assessment & Plan   Trauma mechanism: Recurrent fall  Time/date of injury: Patient is unsure of the exact date of his fall but thinks it was 3 days ago  Known Injuries:  SDH    Other diagnoses:   CKD  Chronic alcoholism    Procedure: No procedures    Plan:   - Admit to the trauma service with the medical service as a consulting team  - Performed a primary and secondary survey which found no other acute injuries, will do a tertiary survey on 5/27  - Could not elicit any neck pain on palpation of ROM, but would get a CT of the neck given the LUE weakness    ETOH: This patient was asked if in the last 3-6 months there has been a time when he had  5 or more drinks in a single day/outing.. Patient answer to the screening question was in the positive. Spoke with the patient about the correlation of ETOH use and accidents/injuries. We also discussed the importance of abstaining from ETOH use while healing from existing injuries, especially if prescribed narcotics at the time of discharge. The patient demonstrated understanding.    Primary Care Physician   Gatito Ware    Chief Complaint   Fall    History is obtained from the patient    History of Present Illness   Luigi Vieyra is a 68 year old male with a history of CKD and chronic alcoholism who presents with after recurrent falls. He was initially seen at an OSH for left upper extremity weakness and left lower extremity weakness. At the OSH he reported that he had not had any recent falls but here he said he had a fall 3 days ago. He is a rather poor historian. He reports some lower back pain but no  neck or head pain. No headaches. He and his wife are .    Past Medical History    I have reviewed this patient's medical history and updated it with pertinent information if needed.   Past Medical History:   Diagnosis Date    Asthma    CDK  HLD  HTN    Past Surgical History   I have reviewed this patient's surgical history and updated it with pertinent information if needed.  Past Surgical History:   Procedure Laterality Date    ESOPHAGOSCOPY, GASTROSCOPY, DUODENOSCOPY (EGD), COMBINED N/A 3/21/2022    Procedure: ESOPHAGOGASTRODUODENOSCOPY, WITH BIOPSY;  Surgeon: Eunice Rubin MD;  Location: [facility]    ESOPHAGOSCOPY, GASTROSCOPY, DUODENOSCOPY (EGD), COMBINED N/A 10/14/2022    Procedure: ESOPHAGOGASTRODUODENOSCOPY (EGD) with epi injection and clip placement;  Surgeon: Cash Oconnor MD;  Location: [facility]       Prior to Admission Medications   Prior to Admission Medications   Prescriptions Last Dose Informant Patient Reported? Taking?   albuterol (PROAIR HFA/PROVENTIL HFA/VENTOLIN HFA) 108 (90 Base) MCG/ACT inhaler  Self, Pharmacy No No   Sig: INHALE 2 PUFFS INTO THE LUNGS EVERY 4 HOURS AS NEEDED FOR SHORTNESS OF BREATH, WHEEZING OR COUGH SEE PROVIDER BEFORE OTHER REFILLS ARE GIVEN.   losartan (COZAAR) 25 MG tablet  Self, Pharmacy No No   Sig: TAKE 1 TABLET BY MOUTH EVERY MORNING   metoprolol succinate ER (TOPROL XL) 25 MG 24 hr tablet  Self, Pharmacy No No   Sig: TAKE 1 TABLET BY MOUTH EVERY MORNING   order for DME  Self, Pharmacy No No   Sig: Equipment being ordered: Digital home blood pressure monitor kit   Patient not taking: Reported on 12/5/2023      Facility-Administered Medications: None     Allergies   No Known Allergies    Social History   Social History     Socioeconomic History    Marital status:      Spouse name: Not on file    Number of children: Not on file    Years of education: Not on file    Highest education level: Not on file   Occupational History    Not on file    Tobacco Use    Smoking status: Every Day     Current packs/day: 0.50     Average packs/day: 0.5 packs/day for 48.4 years (24.2 ttl pk-yrs)     Types: Cigarettes     Start date: 1976    Smokeless tobacco: Never   Vaping Use    Vaping status: Never Used   Substance and Sexual Activity    Alcohol use: Yes     Comment: 2-3 drinks daily    Drug use: No    Sexual activity: Yes     Partners: Female   Other Topics Concern    Parent/sibling w/ CABG, MI or angioplasty before 65F 55M? Not Asked   Social History Narrative    Not on file     Social Determinants of Health     Financial Resource Strain: High Risk (1/1/2022)    Received from [facility], [facility]    Financial Resource Strain     Difficulty of Paying Living Expenses: Not on file     Difficulty of Paying Living Expenses: Not on file   Food Insecurity: Not on file   Transportation Needs: Not on file   Physical Activity: Not on file   Stress: Not on file   Social Connections: Unknown (1/1/2022)    Received from [facility], [facility]    Social Connections     Frequency of Communication with Friends and Family: Not on file   Interpersonal Safety: Not on file   Housing Stability: Not on file       Family History   Non-contributory     Review of Systems   CONSTITUTIONAL: No fever, chills, sweats, fatigue   EYES: no visual blurring, no double vision or visual loss  ENT: no decrease in hearing, no tinnitus, no vertigo, no hoarseness  RESPIRATORY: no shortness of breath, no cough, no sputum   CARDIOVASCULAR: no palpitations, no chest  pain, no exertional chest pain or pressure  GASTROINTESTINAL: no nausea or vomiting, or abd pain  GENITOURINARY: no dysuria, no frequency or hesitancy, no hematuria  MUSCULOSKELETAL: + Lower back pain  SKIN: no rashes, ecchymoses, abrasions or lacerations  NEUROLOGIC: + Left upper and lower extremity  weakness  PSYCHIATRIC: no sleep disturbances, no anxiety or depression    Physical Exam   Temp: 98.9  F (37.2  C) Temp src: Oral BP: 104/72 Pulse: 66   Resp: 18 SpO2: 98 % O2 Device: None (Room air)    Vital Signs with Ranges  Temp:  [98  F (36.7  C)-98.9  F (37.2  C)] 98.9  F (37.2  C)  Pulse:  [] 66  Resp:  [12-32] 18  BP: ()/() 104/72  SpO2:  [95 %-98 %] 98 % 160 lbs 0 oz    Primary Survey:  Airway: patient talking  Breathing: symmetric respiratory effort bilaterally  Circulation: central pulses present and peripheral pulses present  Disability: Pupils - left 4 mm and brisk, right 4 mm and brisk     Sargeant Coma Scale - Total 15/15  Eye Response (E): 4  4= spontaneous,  3= to verbal/voice, 2=  to pain, 1= No response   Verbal Response (V): 5   5= Orientated, converses,  4= Confused, converses, 3= Inappropriate words,  2= Incomprehensible sounds,  1=No response   Motor Response (M): 6   6= Obeys commands, 5= Localizes to pain, 4= Withdrawal to pain, 3=Fexion to pain, 2= Extension to pain, 1= No response    Secondary Survey:  General: alert, oriented to person, place, time  Head: atraumatic, normocephalic, trachea midline  Eyes: PERRLA, pupils 4mm, EOMI, corneas and conjunctivae clear  Ears: pearly grey bilateral TMs and non-inflamed external ear canals  Nose: nares patent, no drainage, nasal septum non-tender  Mouth/Throat: no exudates or erythema,  no dental tenderness or malocclusions, no tongue lacerations  Neck:  No cervical collar present. No midline posterior tenderness, full AROM without pain or tenderness   Chest/Pulmonary: normal respiratory rate and rhythm,  bilateral clear breath sounds, no wheezes, rales or rhonchi, no chest wall tenderness or deformities,   Cardiovascular: S1, S2,  normal and regular rate and rhythm, no murmurs  Abdomen: soft, non-tender, no guarding, no rebound tenderness and no tenderness to palpation  : pelvis stable to lateral compression  Back/Spine: no  deformity, no midline tenderness, no sacral tenderness,  no step-offs and no abrasions or contusions  Musculoskel/Extremities: normal extremities, full AROM of major joints without tenderness, edema, erythema, ecchymosis, or abrasions. Scattered skin tears on both upper extremities.  Hand: no gross deformities of hands or fingers. Full AROM of hand and fingers in flexion and extension  Skin: no rashes, laceration, ecchymosis, skin warm and dry.   Neuro: PERRLA, alert, oriented x 3. CN II-XII grossly intact. No focal deficits. Strength 2/5 in the LUE and 3/5 in the LLE extremities.  Sensation intact.  Psychiatric: affect/mood normal, cooperative, normal judgement/insight and memory intact      Data   Results for orders placed or performed during the hospital encounter of 05/26/24 (from the past 24 hour(s))   CBC with platelets differential    Narrative    The following orders were created for panel order CBC with platelets differential.  Procedure                               Abnormality         Status                     ---------                               -----------         ------                     CBC with platelets and d...[[id number]]  Abnormal            Final result                 Please view results for these tests on the individual orders.   INR   Result Value Ref Range    INR 1.08 0.85 - 1.15   Partial thromboplastin time   Result Value Ref Range    aPTT 27 22 - 38 Seconds   ABO/Rh type and screen    Narrative    The following orders were created for panel order ABO/Rh type and screen.  Procedure                               Abnormality         Status                     ---------                               -----------         ------                     Adult Type and Screen[[id number]]                            Final result                 Please view results for these tests on the individual orders.   Basic metabolic panel   Result Value Ref Range    Sodium 130 (L) 135 - 145 mmol/L     Potassium 3.6 3.4 - 5.3 mmol/L    Chloride 88 (L) 98 - 107 mmol/L    Carbon Dioxide (CO2) 25 22 - 29 mmol/L    Anion Gap 17 (H) 7 - 15 mmol/L    Urea Nitrogen 60.3 (H) 8.0 - 23.0 mg/dL    Creatinine 3.68 (H) 0.67 - 1.17 mg/dL    GFR Estimate 17 (L) >60 mL/min/1.73m2    Calcium 8.8 8.8 - 10.2 mg/dL    Glucose 112 (H) 70 - 99 mg/dL   Hepatic function panel   Result Value Ref Range    Protein Total 6.5 6.4 - 8.3 g/dL    Albumin 3.3 (L) 3.5 - 5.2 g/dL    Bilirubin Total 0.9 <=1.2 mg/dL    Alkaline Phosphatase 108 40 - 150 U/L    AST 44 0 - 45 U/L    ALT 15 0 - 70 U/L    Bilirubin Direct     Lipase   Result Value Ref Range    Lipase 29 13 - 60 U/L   Lactic acid whole blood with 1x repeat in 2 hr when >2   Result Value Ref Range    Lactic Acid, Initial 1.9 0.7 - 2.0 mmol/L   Troponin T, High Sensitivity   Result Value Ref Range    Troponin T, High Sensitivity 49 (H) <=22 ng/L   Magnesium   Result Value Ref Range    Magnesium 2.1 1.7 - 2.3 mg/dL   Phosphorus   Result Value Ref Range    Phosphorus 3.9 2.5 - 4.5 mg/dL   Ethyl Alcohol Level   Result Value Ref Range    Alcohol ethyl <0.01 <=0.01 g/dL   CBC with platelets and differential   Result Value Ref Range    WBC Count 14.7 (H) 4.0 - 11.0 10e3/uL    RBC Count 3.19 (L) 4.40 - 5.90 10e6/uL    Hemoglobin 10.3 (L) 13.3 - 17.7 g/dL    Hematocrit 30.0 (L) 40.0 - 53.0 %    MCV 94 78 - 100 fL    MCH 32.3 26.5 - 33.0 pg    MCHC 34.3 31.5 - 36.5 g/dL    RDW 15.6 (H) 10.0 - 15.0 %    Platelet Count 451 (H) 150 - 450 10e3/uL    % Neutrophils 74 %    % Lymphocytes 13 %    % Monocytes 11 %    % Eosinophils 0 %    % Basophils 1 %    % Immature Granulocytes 1 %    NRBCs per 100 WBC 0 <1 /100    Absolute Neutrophils 11.1 (H) 1.6 - 8.3 10e3/uL    Absolute Lymphocytes 1.9 0.8 - 5.3 10e3/uL    Absolute Monocytes 1.5 (H) 0.0 - 1.3 10e3/uL    Absolute Eosinophils 0.0 0.0 - 0.7 10e3/uL    Absolute Basophils 0.1 0.0 - 0.2 10e3/uL    Absolute Immature Granulocytes 0.1 <=0.4 10e3/uL    Absolute  NRBCs 0.0 10e3/uL   Adult Type and Screen   Result Value Ref Range    ABO/RH(D) O POS     Antibody Screen Negative Negative    SPECIMEN EXPIRATION DATE [id number]    Asymptomatic COVID-19 Virus (Coronavirus) by PCR Nasopharyngeal    Specimen: Nasopharyngeal; Swab   Result Value Ref Range    SARS CoV2 PCR Negative Negative    Narrative    Testing was performed using the Xpert Xpress SARS-CoV-2 Assay on the Cepheid Gene-Xpert Instrument Systems. Additional information about this Emergency Use Authorization (EUA) assay can be found via the Lab Guide. This test should be ordered for the detection of SARS-CoV-2 in individuals who meet SARS-CoV-2 clinical and/or epidemiological criteria as well as from individuals without symptoms or other reasons to suspect COVID-19. Test performance for asymptomatic patients has only been established in anterior nasal swab specimens. This test is for in vitro diagnostic use under the FDA EUA for laboratories certified under CLIA to perform high complexity testing. This test has not been FDA cleared or approved. A negative result does not rule out the presence of PCR inhibitors in the specimen or target RNA concentration below the limit of detection for the assay. The possibility of a false negative should be considered if the patient's recent exposure or clinical presentation suggests COVID-19. This test was validated by [facility]. These Laboratories are certified under the Clinical Laboratory Improvement Amendments (CLIA) as qualified to perform high complexity testing.     UA with Microscopic reflex to Culture    Specimen: Urine, Midstream   Result Value Ref Range    Color Urine Light Yellow Colorless, Straw, Light Yellow, Yellow    Appearance Urine Clear Clear    Glucose Urine 70 (A) Negative mg/dL    Bilirubin Urine Negative Negative    Ketones Urine Negative Negative mg/dL    Specific Gravity Urine 1.020 1.003 - 1.035    Blood Urine Moderate (A) Negative     pH Urine 6.0 5.0 - 7.0    Protein Albumin Urine 20 (A) Negative mg/dL    Urobilinogen Urine Normal Normal, 2.0 mg/dL    Nitrite Urine Negative Negative    Leukocyte Esterase Urine Negative Negative    Mucus Urine Present (A) None Seen /LPF    RBC Urine 0 <=2 /HPF    WBC Urine <1 <=5 /HPF    Hyaline Casts Urine 5 (H) <=2 /LPF    Narrative    Urine Culture not indicated   Urine Drug Screen    Narrative    The following orders were created for panel order Urine Drug Screen.  Procedure                               Abnormality         Status                     ---------                               -----------         ------                     Urine Drug Screen Panel[[id number]]      Normal              Final result                 Please view results for these tests on the individual orders.   Urine Drug Screen Panel   Result Value Ref Range    Amphetamines Urine Screen Negative Screen Negative    Barbituates Urine Screen Negative Screen Negative    Benzodiazepine Urine Screen Negative Screen Negative    Cannabinoids Urine Screen Negative Screen Negative    Cocaine Urine Screen Negative Screen Negative    Fentanyl Qual Urine Screen Negative Screen Negative    Opiates Urine Screen Negative Screen Negative    PCP Urine Screen Negative Screen Negative       Edin Shelley MD  Trauma [facility]

## 2024-05-27 NOTE — CONSULTS
Brief Neurology Day Team Addendum / Update:  MRI-C spine returned with severe multilevel canal and neural foraminal stenosis, in addition to multiple thoracic and lumbar fractures on T-spine.  Suspect presentation related to these findings, with no concern for stroke in the setting of negative MRI brain.  Will defer further cares to neurosurgery.  Defer need for ongoing Keppra to them.  Pending staff exam in AM, we will plan to sign off.  (Unable to see patient despite multiple attempts today).      Remainder of note per Dr. Augustin.  Case discussed with Dr. Lozano.    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  Stroke neurology were consulted due to left-sided weakness out of proportion to left subdural hemorrhage.    History obtained is limited as patient is not able to provide much history.  Patient's wife called EMS today and reported that he has been falling in the past week and hence he was transported to ER at [facility] [place].  Per patient, he has had weakness of his left arm and left leg for the past 5 days, and has been primarily in the bed for the past 3 days.  He also reports having a couple of falls over the past week, last fall about 3 days ago and hit head (unclear if LOC).  He does endorse alcohol use, last drink was yesterday at 4 PM.  Reports he has had withdrawal in the past but never seizures from alcohol withdrawal.  Not on any antithrombotics. No neck pain or HA.    On exam per ED provider at [place], his NIHSS was 5.  Stroke neurology were called at [facility] [place] for these symptoms.  CT head shows a  "right subdural hematoma 4 mm along the right tentorial leaflet.  CT angiogram with no LVO, bilateral ICA stenosis (less than 50%).  It was felt that his deficits were out of proportion to his right subdural which should not cause a lot of symptoms.  As a result MRI brain was recommended, and transferred to [facility] for neurosurgery evaluation.    MRI brain was reviewed, does not show any evidence of acute infarct.  He has significant atrophy especially of his hippocampal regions.  Outside of the right subdural hematoma, no evidence of bleed/microhemorrhages noted.    Per ER note on 5/26/2024: \"Patient sister, Dianne called. Dianne is listed in patient chart with consent to discuss all medical information. Dianne states that patient is a \"full blown non-functioning alcoholic and is not able to make medical decisions.\" Dianne lives in [place], but will fly here asap if needed. Call Dianne's cell with updates. \"    Per chart review, patient was seen by neurology on 3/11/2024 for right leg weakness. He had an EMG which showed a right peroneal neuropathy    Stroke Evaluation Summarized    MRI/Head CT MRI IMPRESSION:  1.  No acute infarct, mass, or mass effect.  2.  Small acute subdural hematoma along right tentorium, similar to prior CT.  3.  Mild chronic small vessel ischemia.  4.  Small amount of chronic encephalomalacia in the region of the vermis and adjacent cerebellum, similar to prior exams.  5.  Moderate atrophy.    IMPRESSION:   HEAD CT:  1.  Thin subdural hematoma along the right tentorial leaflet.  2.  No mass effect or midline shift.  3.  Age-related changes as above.   Intracranial Vasculature HEAD CTA:   1.  No significant stenosis, aneurysm, or high flow vascular malformation identified.   Cervical Vasculature NECK CTA:  1.  No hemodynamically significant stenosis within the vessels of the neck.     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  Stroke neurology were consulted due to falls and left-sided weakness out of proportion to left subdural hemorrhage.  Patient was transferred from [facility] due to neurosurgery consultation and management of left SDH.    Here, initial NIHSS is 4 for LUE drift to bed, LLE dysmetria, dysarthria.  CT/CTA were already performed at [facility] with no bleed or LVO.  MRI brain with and without contrast was performed-this did not show any evidence of stroke, no concerns for CAA.  He had a repeat CT head here which shows that the left SDH is stable.  His symptoms of left upper extremity weakness is indeed out of proportion to his left SDH, but on exam, he appears to have more brisk reflexes of LUE as well as BLE, with positive Aye of the left.  His distribution of weakness could be consistent with a lower cervical myelopathy as a result (especially with unremarkable MRI brain).  As a result, would recommend MRI cervical spine with and without contrast.    While our suspicion for seizure is very low, given the left SDH being potentially traumatic, reasonable to load patient with Keppra and start 500 mg twice daily.  EEG can be considered down the line if there is concern for seizures especially in the setting of alcohol withdrawal.    Recommendations   -MRI cervical spine with and without contrast.  -Loaded with 2 g Keppra, start 500 mg Keppra twice daily.  -EEG can be considered down the line if there is continued concern for seizures.  -Will defer management of SDH and neurochecks to neurosurgery.    Patient Follow-up    -Will peripherally follow patient for MRI  findings.  General neurology can follow-up if there is ongoing concerns.    Thank you for this consult.     The patient was discussed with Stroke Fellow, Dr. Terry.  The Stroke Staff is Dr. Vance.    Hollis Augustin MD  Neurology Resident    _____________________________________________________    Clinically Significant Risk Factors Present on Admission         # Hyponatremia: Lowest Na = 129 mmol/L in last 2 days, will monitor as appropriate   # Hypercalcemia: corrected calcium is >10.1, will monitor as appropriate  # Hypomagnesemia: Lowest Mg = 1.6 mg/dL in last 2 days, will replace as needed  # Anion Gap Metabolic Acidosis: Highest Anion Gap = 25 mmol/L in last 2 days, will monitor and treat as appropriate  # Hypoalbuminemia: Lowest albumin = 3.3 g/dL at 5/26/2024 10:21 PM, will monitor as appropriate     # Hypertension: Noted on problem list          # Asthma: noted on problem list     # CKD, Stage 4 (GFR 15-29): Will monitor and treat as appropriate  - last Cr =  3.68 mg/dL (Ref range: 0.67 - 1.17 mg/dL)  - last GFR = 17 mL/min/1.73m2 (Ref range: >60 mL/min/1.73m2)        Past Medical History    Past Medical History:   Diagnosis Date    Asthma      Medications   Home Meds  Prior to Admission medications    Medication Sig Start Date End Date Taking? Authorizing Provider   albuterol (PROAIR HFA/PROVENTIL HFA/VENTOLIN HFA) 108 (90 Base) MCG/ACT inhaler INHALE 2 PUFFS INTO THE LUNGS EVERY 4 HOURS AS NEEDED FOR SHORTNESS OF BREATH, WHEEZING OR COUGH SEE PROVIDER BEFORE OTHER REFILLS ARE GIVEN. 9/21/23   Gatito Ware MD   losartan (COZAAR) 25 MG tablet TAKE 1 TABLET BY MOUTH EVERY MORNING 12/4/23   Gatito Ware MD   metoprolol succinate ER (TOPROL XL) 25 MG 24 hr tablet TAKE 1 TABLET BY MOUTH EVERY MORNING 12/4/23   Gatito Ware MD   order for DME Equipment being ordered: Digital home blood pressure monitor kit  Patient not taking: Reported on 12/5/2023 3/17/20   Jeanie  Gatito Hinojosa MD       Scheduled Meds  Current Facility-Administered Medications   Medication Dose Route Frequency Provider Last Rate Last Admin    folic acid (FOLVITE) tablet 1 mg  1 mg Oral Daily Kp Steward MD        levETIRAcetam (KEPPRA) 2,000 mg in sodium chloride 0.9 % 250 mL intermittent infusion  2,000 mg Intravenous Once Kp Steward MD        multivitamin w/minerals (THERA-VIT-M) tablet 1 tablet  1 tablet Oral Daily Kp Steward MD        pharmacy alert - intermittent dosing  1 each Other See Admin Instructions Kp Steward MD        thiamine (B-1) injection 500 mg  500 mg Intravenous TID Kp Steward MD        Followed by    [START ON 5/29/2024] thiamine (B-1) injection 250 mg  250 mg Intravenous Daily Kp Steward MD        Followed by    [START ON 6/3/2024] thiamine (B-1) tablet 100 mg  100 mg Oral Daily Kp Steward MD        vancomycin (VANCOCIN) 1,500 mg in 0.9% NaCl 250 mL intermittent infusion  1,500 mg Intravenous Once Kp Steward MD        vancomycin place granado - receiving intermittent dosing  1 each Intravenous See Admin Instructions Kp Steward MD           Infusion Meds  Current Facility-Administered Medications   Medication Dose Route Frequency Provider Last Rate Last Admin       Allergies   No Known Allergies       PHYSICAL EXAMINATION   Temp:  [98  F (36.7  C)-98.9  F (37.2  C)] 98.9  F (37.2  C)  Pulse:  [] 66  Resp:  [12-32] 18  BP: ()/() 104/72  SpO2:  [95 %-98 %] 98 %    General Exam  General:  patient lying in bed without any acute distress    HEENT:  normocephalic/atraumatic  Cardio:  RRR  Pulmonary:  no respiratory distress  Abdomen:  soft  Extremities:  no edema  Skin:  intact     Neuro Exam  Mental Status:  alert, knows month and age , follows 1 step and 2 step commands but struggles with complex commands crossing midline, speech clear and fluent, naming and repetition normal  Cranial Nerves:   visual fields intact, PERRL, EOMI with normal smooth pursuit, facial sensation intact and symmetric, facial movements symmetric, tongue protrusion midline. Dysarthric  Motor: Normal tone, no abnormal movements.  RUE, RLE and LLE antigravity without drift, LUE drifts to bed.      On formal strength testing of LUE, 2/5 shoulder abduction, 4/5 biceps and triceps, 2/5 wrist flexion and extension, 2/5 finger flexion and extension, FDI and ADM 1/5.  On RUE, 5/5 throughout.    On testing of BLE, hip flexion (R/L) 5/4+, ankle plantarflexion 5/5, ankle dorsiflexion 5 -/5.    Reflexes:  On L, 3+ Biceps, brachioradialis, positive duval, on R 2+ biceps, brachioradialis.  Patellar reflexes were 3+ with crossed adduction, left Achilles 2+, right Achilles absent.  Left toe upgoing, right toe equivocal/upgoing.  Sensory:  light touch sensation intact and symmetric throughout upper and lower extremities, no extinction on double simultaneous stimulation   Coordination: Finger-nose-finger on RUE intact, could not perform on LUE.  Heel-to-shin with some dysmetria on LLE.  No dysmetria on RLE.  Station/Gait:  deferred    Stroke Scales      NIHSS  1a. Level of Consciousness 0-->Alert, keenly responsive   1b. LOC Questions 0-->Answers both questions correctly   1c. LOC Commands 0-->Performs both tasks correctly   2.   Best Gaze 0-->Normal   3.   Visual 0-->No visual loss   4.   Facial Palsy 0-->Normal symmetrical movements   5a. Motor Arm, Left 2-->Some effort against gravity, limb cannot get to or maintain (if cued) 90 (or 45) degrees, drifts down to bed, but has some effort against gravity   5b. Motor Arm, Right 0-->No drift, limb holds 90 (or 45) degrees for full 10 secs   6a. Motor Leg, Left 0-->No drift, leg holds 30 degree position for full 5 secs   6b. Motor Leg, right 0-->No drift, leg holds 30 degree position for full 5 secs   7.   Limb Ataxia 1-->Present in one limb   8.   Sensory 0-->Normal, no sensory loss   9.   Best  Language 0-->No aphasia, normal   10. Dysarthria 1-->Mild-to-moderate dysarthria, patient slurs at least some words and, at worst, can be understood with some difficulty   11. Extinction and Inattention  0-->No abnormality   Total 4 (05/27/24 0050)       Modified Bradford Score (Pre-morbid)  0-No deficits       Imaging  I personally reviewed all imaging; relevant findings per HPI.    Labs Data   CBC  Recent Labs   Lab 05/26/24 2221 05/26/24  1454   WBC 14.7* 16.8*   RBC 3.19* 3.91*   HGB 10.3* 12.5*   HCT 30.0* 36.9*   * 544*     Basic Metabolic Panel   Recent Labs   Lab 05/26/24 2221 05/26/24  1500 05/26/24  1454   *  --  129*   POTASSIUM 3.6  --  3.7   CHLORIDE 88*  --  80*   CO2 25  --  24   BUN 60.3*  --  65.5*   CR 3.68*  --  4.70*   * 119* 104*   PINO 8.8  --  10.0     Liver Panel  Recent Labs   Lab 05/26/24 2221 05/26/24  1625 05/26/24  1454   PROTTOTAL 6.5  --  8.3   ALBUMIN 3.3*  --  4.1   BILITOTAL 0.9  --  1.3*   ALKPHOS 108  --  144   AST 44 36  --    ALT 15  --  19     INR    Recent Labs   Lab Test 05/26/24 2221 05/26/24  1454 10/13/22  1915   INR 1.08 1.00 1.00           Stroke Consult Data Data   This was a non-emergent, non-telestroke consult.

## 2024-05-27 NOTE — CONSULTS
Care Management Initial Consult    General Information  Assessment completed with: Patient, VM-chart review,    Type of CM/SW Visit: Initial Assessment    Primary Care Provider verified and updated as needed: Yes (Gatito andino [facility] [phone] [address] [facility] [address])   Readmission within the last 30 days: no previous admission in last 30 days      Reason for Consult: discharge planning, utilization management concerns (elevated readmission risk score)  Advance Care Planning: Advance Care Planning Reviewed: no concerns identified        Communication Assessment  Patient's communication style: spoken language (English or Bilingual)         Cognitive  Cognitive/Neuro/Behavioral: (P) WDL  Level of Consciousness: alert  Arousal Level: opens eyes spontaneously  Orientation: oriented x 4  Mood/Behavior: calm, cooperative, behavior appropriate to situation     Speech: clear, spontaneous, logical    Living Environment:   People in home: spouse  Larisa  Current living Arrangements: house      Able to return to prior arrangements: yes     Family/Social Support:  Care provided by: self  Provides care for: no one  Marital Status:   Wife, Children, Sibling(s)          Description of Support System: Supportive, Involved    Support Assessment: Patient communicates needs well met, Adequate family and caregiver support    Current Resources:   Patient receiving home care services: No     Community Resources: [facility] (Per chart review, has been to treatment a few times, has a hard time following through with OP treatment and is resistant to therapy, per sister Dianne)  Equipment currently used at home: grab bar, tub/shower  Supplies currently used at home: None    Employment/Financial:  Employment Status: retired        Financial Concerns: none   Referral to Financial Worker: No     Does the patient's insurance plan have a 3 day qualifying hospital stay waiver?  Yes  "    Which insurance plan 3 day waiver is available? Alternative insurance waiver    Will the waiver be used for post-acute placement? Undetermined at this time    Lifestyle & Psychosocial Needs:  Social Determinants of Health     Food Insecurity: Not on file   Depression: Not at risk (3/11/2024)    PHQ-2     PHQ-2 Score: 0   Housing Stability: Not on file   Tobacco Use: High Risk (5/26/2024)    Patient History     Smoking Tobacco Use: Every Day     Smokeless Tobacco Use: Never     Passive Exposure: Not on file   Financial Resource Strain: High Risk (1/1/2022)    [facility]    Financial Resource Strain     Difficulty of Paying Living Expenses: Not on file     Difficulty of Paying Living Expenses: Not on file   Alcohol Use: Not on file   Transportation Needs: Not on file   Physical Activity: Not on file   Interpersonal Safety: Not on file   Stress: Not on file   Social Connections: Unknown (1/1/2022)    [facility] from [facility]    Social Connections     Frequency of Communication with Friends and Family: Not on file   Health Literacy: Not on file     Functional Status:  Prior to admission patient needed assistance:   Dependent ADLs:: Independent  Dependent IADLs:: Independent (other than transportation-relies on others for rides)     Mental Health Status:  Mental Health Status: No Current Concerns       Chemical Dependency Status:  Chemical Dependency Status: Current Concern  Chemical Dependency Management: Previous treatment (Not interested in [facility] treatment at this time. Aware of resources available)        Values/Beliefs:  Spiritual, Cultural Beliefs, Gnosticism Practices, Values that affect care: no             Additional Information:      Per chart review: \"Luigi Vieyra is a 68 year old male with a past medical history significant for " "ongoing peroneal neuropathy, gastritis 2/2 alcohol abuse, stage III CKD, HLD, and HTN presents to the ED as a transfer from [facility] for neurosurgery evaluation.  Patient initially presented to [facility] ED earlier today for evaluation of left-sided weakness and back pain, persisting for the past 5 days.  Imaging revealed a 4 mm subdural hematoma along the right tentorial leaflet. Given the profound deficits and the size of the subdural, it was recommended that he be further managed by neurosurgery.\" (Kp Steward MD; 5/26/2024)    Care Management/Social Work Consult placed due to patient's complex medical history and elevated unplanned readmission risk score and for discharge planning. ROMMEL performed chart review to begin assessment.     1815 ROMMEL met with Luigi at bedside to complete assessment and confirm/update information in previous assessment (3/21/2022) . SW introduced self and explained the reason for the visit. Luigi agreed to speak with ROMMEL    Luigi confirmed that he lives with his wife in a house in [place], [place]. They have 3 adult children (2 live in [place] and 1 in [place]). He has 2 sisters (1 in [place], and 1 in [place]). He reports feeling well supported by his family.    He reports no  mental health issues and declined [facility] treatment resources, saying that he was aware of what resources are available.    ROMMEL discussed potential TCU placement at discharge, as well as potential transportation costs not covered by insurance. Luigi expressed understanding.    SW provided emotional support via active and reflective listening and responding to feelings; and a supportive non-judgmental presence. No additional questions or concerns were reported. ROMMEL provided availability and contact information and excused self from Luigi's bedside.      ROMMEL will continue to follow as needed.    ARTI Lindquist, [facility]  ED/OBS   [facility]  Phone: [phone]  Pager: [phone]  Fax: " [phone]     After hours [facility] [employer] and After Hours [facility] [place]     On-call pager, [phone], 4:00 pm to midnight

## 2024-05-27 NOTE — ED TRIAGE NOTES
"/72   Pulse 66   Temp 98.9  F (37.2  C) (Oral)   Resp 18   Ht 1.778 m (5' 10\")   Wt 72.6 kg (160 lb)   SpO2 98%   BMI 22.96 kg/m       BIBA from OSH complaining of lower back pain and right ankle pain. Patient is noted to have left side weakness.         "

## 2024-05-27 NOTE — CONSULTS
[facility]   Consult Note - Addiction Service     Date of Admission:  5/26/2024    Consult Requested by: Eddie Harding MD  Reason for Consult: Alcohol Use    Assessment & Plan   Luigi Vieyra is a 68 year old male with a history of heavy alcohol use c/b R peroneal neuropathy, gastritis, tobacco use, asthma, stage III CKD, HLD, and HTN who presented to [facility] for neurosurgical evaluation of L sided weekness after being found down at home.  CHAT team evaluating the patient for history of alcohol use.     # Alcohol Use Disorder: severe  # Alcohol-related neuropathy  Lifetime history of alcohol use, currently 1-2 pint/day. Has previously maintained periods of sobriety in the past but rarely more than 1 month.  Somewhat passive in evaluation today, but would be interested in cessation, and may be interested in CD treatment options, as well as medications to help maintain sobriety.  Would be a good candidate for naltrexone (discussed briefly) but would not start until he is no longer requiring agonist opiates.  Denies any history of withdrawal, no seizures or DTs, last drink reportedly 4 days prior to presentation and neg EtOH in the ED, but given his overall condition, would still monitor for signs of withdrawal for at least 24-38h  -Continue CIWA for EtOH withdrawal;  consider adding on gabapentin, 600 mg TID.  Consider prescribing at discharge if this helps with underlying anxiety.  -Start naltrexone when no longer requiring agonist opiate pain control.  -Interested in IP vs OP CD treatment, chem dep consulted  -We discussed different options to maintain goal of sobriety, including medications to manage cravings, including acamprosate and naltrexone.  -Patient's sister Dianne is a primary contact and would like to talk with team/social work regarding treatment options on discharge    # Mental health:  Patient denies any mental health concerns, but very flat in  conversation, quite isolated, moderate anhedonia, although difficult to parse out acute and chronic neurocognitive and emotional changes .  Per sister, he has been using alcohol to managed emotional challenges his whole life.  - Consider Health Psych if patient struggling with adjustment/hospitalization  - Consider behavioral health on discharge vs selective serotonin reuptake inhibitor for moderate depression.      # Peer Support:   -Our peer  will meet the patient if agreeable and still hospitalized on Thursday, to provide additional outpatient resources  -To contact Nabila Peer  from [facility] ([facility]): call or text: [phone]    # Addiction Social Worker:   Our addiction social worker Abelardo Candido can be contacted if needed, on her pager [phone] or texted/called at [phone]  --Patient may be interested in inpatient addiction treatment after discharge depending on his overall condition and need for physical rehabilitation     # Linkage to Care:   Sees Gatito Ware MD in Family Medicine, could consider follow-up with PCP for naltrexone.  Might benefit from Addiction Med referral, though pt lives approximately an hour from the [facility].     The patient's care was discussed with the Bedside Nurse, Patient, Patient's Family, and Primary team.    I spent 120 minutes on the unit/floor managing the care of Luigi Vieyra. Over 50% of my time was spent on the following:   Significant education and counseling spent on: how substance use disorders and dependence occur, and how it can become a chronic relapsing and remitting medical condition.  In addition, the pharmacology of medical treatments including Naltrexone, the importance of follow up, and Harm Reduction advice on how to use substances in a less harmful way why trying to cut down were discussed today.      Denis Ware MD  [facility]   Contact information available via  [employer] Paging/Directory  Please see sign in/sign out for up to date coverage information    ChAT team (Addiction Consult Team): Coverage daily 8-4pm     ______________________________________________________________________    Chief Complaint   Alcohol Use Disorder    History is obtained from the patient, his sister, and the medical records    History of Present Illness   Luigi Vieyra is a 68 year old male with a history of heavy alcohol use c/b R peroneal neuropathy, gastritis, tobacco use, asthma, stage III CKD, HLD, and HTN who presented to [facility] for neurosurgical evaluation of L sided weekness after being found down at home.  CHAT team asked to se the patient regarding his alcohol use    Drug/Substance of Choice:  Alcohol     Alcohol use history:   Patient has a history of EtOH use since he was a a young adult, endorsed daily alcohol use of approx 1-2 pints/day (1 gallon/week) of vodka.  Typically starts in the morning and drinks all day.  He endorses previous periods of sobriety, up to a year at one point, and most recently earlier in the year for approximately one month.  He has been through inpatient treatment at least once in the past (30d inpatient tx), but was unable to further follow-up with outpatient CD tx or meetings.  Denies any history of alcohol withdrawal, no DTs or seizures.      He currently lives in a house (which he owns) with his wife, though their relationship is significantly strained and he reports that he typically only sees her once/week.  He spends the majority of his day watching TV ([employer], [employer], [employer], etc) and doesn't get out of the house.  Reports one friend who sees him regularly, but otherwise largely socially isolated.     Employed: Retired, worked in motorcycle sales  Housing status/insecurity: Secur  Where patient lives/what town:[facility]  Transportation status/insecurity: Secure   Telephone status/insecurity: Has a flip phone that's reliable  Additional family  member or friend who can support health goals: see above  Sexually active: Did not discuss  Birth control: N/A      Review of Systems   The 5 point Review of Systems is negative other than noted in the HPI or here.     Past Medical History:   Diagnosis Date    Asthma        Past Surgical History:   Procedure Laterality Date    ESOPHAGOSCOPY, GASTROSCOPY, DUODENOSCOPY (EGD), COMBINED N/A 3/21/2022    Procedure: ESOPHAGOGASTRODUODENOSCOPY, WITH BIOPSY;  Surgeon: Eunice Rubin MD;  Location: [facility]    ESOPHAGOSCOPY, GASTROSCOPY, DUODENOSCOPY (EGD), COMBINED N/A 10/14/2022    Procedure: ESOPHAGOGASTRODUODENOSCOPY (EGD) with epi injection and clip placement;  Surgeon: Cash Oconnor MD;  Location: [facility]       Social History   Social History     Socioeconomic History    Marital status:      Spouse name: Not on file    Number of children: Not on file    Years of education: Not on file    Highest education level: Not on file   Occupational History    Not on file   Tobacco Use    Smoking status: Every Day     Current packs/day: 0.50     Average packs/day: 0.5 packs/day for 48.4 years (24.2 ttl pk-yrs)     Types: Cigarettes     Start date: 1976    Smokeless tobacco: Never   Vaping Use    Vaping status: Never Used   Substance and Sexual Activity    Alcohol use: Yes     Comment: 2-3 drinks daily    Drug use: No    Sexual activity: Yes     Partners: Female   Other Topics Concern    Parent/sibling w/ CABG, MI or angioplasty before 65F 55M? Not Asked   Social History Narrative    Not on file     Social Determinants of Health     Financial Resource Strain: High Risk (1/1/2022)    [email or web address]    Financial Resource Strain     Difficulty of Paying Living Expenses: Not on file     Difficulty of Paying Living Expenses: Not on file   Food Insecurity: Not on file   Transportation Needs: Not on file   Physical Activity: Not on  file   Stress: Not on file   Social Connections: Unknown (1/1/2022)    [facility]    Social Connections     Frequency of Communication with Friends and Family: Not on file   Interpersonal Safety: Not on file   Housing Stability: Not on file       Family History   I have reviewed this patient's family history and updated it with pertinent information if needed.  Family History   Problem Relation Age of Onset    Asthma Mother     Cerebrovascular Disease Mother     C.A.D. No family hx of     Diabetes No family hx of     Hypertension No family hx of     Breast Cancer No family hx of     Cancer - colorectal No family hx of     Prostate Cancer No family hx of          Medications   Current Facility-Administered Medications   Medication Dose Route Frequency Provider Last Rate Last Admin    diazepam (VALIUM) tablet 10 mg  10 mg Oral Q30 Min PRN Kp Steward MD        Or    diazepam (VALIUM) injection 5-10 mg  5-10 mg Intravenous Q30 Min PRN Kp Steward MD        flumazenil (ROMAZICON) injection 0.2 mg  0.2 mg Intravenous q1 min prn Edin Shelley MD        flumazenil (ROMAZICON) injection 0.2 mg  0.2 mg Intravenous q1 min prn Kp Steward MD        folic acid (FOLVITE) tablet 1 mg  1 mg Oral Daily Kp Steward MD   1 mg at 05/27/24 0855    OLANZapine zydis (zyPREXA) ODT tab 5-10 mg  5-10 mg Oral Q6H PRN Edin Shelley MD        Or    haloperidol lactate (HALDOL) injection 2.5-5 mg  2.5-5 mg Intravenous Q6H PRN Edin Shelley MD        lactated ringers infusion   Intravenous Continuous Kube, Parth Dalesina, APRN CNP 75 mL/hr at 05/27/24 1253 Restarted at 05/27/24 1253    levETIRAcetam (KEPPRA) tablet 500 mg  500 mg Oral BID Torey Harding MD   500 mg at 05/27/24 0856    melatonin tablet 5 mg  5 mg Oral QPM PRN Kp Steward MD        metoprolol succinate ER (TOPROL XL) 24 hr tablet 25 mg   25 mg Oral QAM Kube, Roeva Lumsina, APRN CNP        multivitamin w/minerals (THERA-VIT-M) tablet 1 tablet  1 tablet Oral Daily Kp Steward MD   1 tablet at 05/27/24 0855    ondansetron (ZOFRAN ODT) ODT tab 4 mg  4 mg Oral Q6H PRN Edin Shelley MD        Or    ondansetron (ZOFRAN) injection 4 mg  4 mg Intravenous Q6H PRN Edin Shelley MD        oxyCODONE IR (ROXICODONE) half-tab 2.5 mg  2.5 mg Oral Q4H PRN Edin Shelley MD        Or    oxyCODONE (ROXICODONE) tablet 5 mg  5 mg Oral Q4H PRN Edin Shelley MD   5 mg at 05/27/24 0856    thiamine (B-1) injection 500 mg  500 mg Intravenous TID Kp Steward MD   500 mg at 05/27/24 1537    Followed by    [START ON 5/29/2024] thiamine (B-1) injection 250 mg  250 mg Intravenous Daily Kp Steward MD        Followed by    [START ON 6/3/2024] thiamine (B-1) tablet 100 mg  100 mg Oral Daily Kp Steward MD         Current Outpatient Medications   Medication Sig Dispense Refill    albuterol (PROAIR HFA/PROVENTIL HFA/VENTOLIN HFA) 108 (90 Base) MCG/ACT inhaler INHALE 2 PUFFS INTO THE LUNGS EVERY 4 HOURS AS NEEDED FOR SHORTNESS OF BREATH, WHEEZING OR COUGH SEE PROVIDER BEFORE OTHER REFILLS ARE GIVEN. 8.5 g 4    losartan (COZAAR) 25 MG tablet TAKE 1 TABLET BY MOUTH EVERY MORNING 90 tablet 3    metoprolol succinate ER (TOPROL XL) 25 MG 24 hr tablet TAKE 1 TABLET BY MOUTH EVERY MORNING 90 tablet 2    order for DME Equipment being ordered: Digital home blood pressure monitor kit 1 each 0       Allergies   No Known Allergies    Physical Exam   Temp: 97.7  F (36.5  C) Temp src: Oral BP: 112/80 Pulse: 76   Resp: 16 SpO2: 99 % O2 Device: None (Room air)      Gen: no acute distress, mildly disheveled   HEENT: NC, MMM, EOMI.   CV: Extremities WWP, pulses assumed   Resp: breathing comfortably on RA  Ext: No significant deformities moving all ext   Skin: No erythema, scattered bruising over arms from fall.   Neuro: Speech muffled with slight slur, alert and oriented  but tired, memory intact, oriented x4  Psych: Appropriately interactive, pleasant if somewhat brief with responses.     Due to regulation of Title 42 of the Code of Federal Regulations (CFR) Part 2: Confidentiality laws apply to this note and the information wherein.  Thus, this note cannot be copy and pasted into any other health care staff's note nor can it be included in general medical records sent to ANY outside agency without the patient's written consent.

## 2024-05-27 NOTE — CONSULTS
"[facility]  Consult Note - Hospitalist Service, GOLD TEAM 9  Date of Admission:  5/26/2024  Consult Requested by: Trauma Service  Reason for Consult: MAIA and Alcohol Dependence    Assessment & Plan   Luigi Vieyra is a 68 year old male admitted on 5/26/2024. He has a PMHx of alcohol use c/b R peroneal neuropathy, gastritis, tobacco use, asthma, stage III CKD, HLD, and HTN who presents to the ED as a transfer from [facility] for neurosurgery evaluation.     # Subdural hematoma along the R tentorial leaflet noted on head CT at 3:30PM on 5/26  # Interval development of parafalcinesubdural hematoma at 11:30PM on 5/26  - Defer management to primary team and neurosurgery     # T2 superior endplate fracture with mild height loss, likely acute.  Acute L1 superior endplate compression fracture with mild height loss.  - Multimodal pain control with robaxin 4x a day scheduled, PRN tylenol, and lidocaine patch. Add opioids if needed    #Left andreia-body weakness  - Stroke neurology were consulted due to left-sided weakness out of proportion to left subdural hemorrhage.  Appreciate recs  - MRI reassuring for no acute stroke  -  [facility] team rec'd C-T-L spine MRI that are pending   - Alcohol myopathy is in the differential, but it would be unlikely to cause one-sided weakness    #Alcohol use, dependence and withdrawal  - Patient reports that he drinks 1 gallon of vodka per week. He has been drinking since age 19 and has never \"stopped long enough to have the shakes\"  - Has attempted to quit in the past and went to AA   - Interested in meeting with Chem Dep  - [facility] protocol  - Started on empiric high-dose thiamine replacement due to concern for possible Wernicke by ER provider. Continue for now.   - Would hold off on valproic acid and keep on benzos for now as part of withdrawal protocol. Once renal function starts to recover, can start gabapentin  - Ordered Keppra as per Neuro recs " "for seizure prevention    #MAIA on CKD  # AGMA  #Hyponatremia (likely hypovolemic)  - Patient with Hx of CKD-III with baseline creatinine of 1.2-1.3. On admission, his creat was 4.7 at [place] ER down to 3.68 in [facility] ER  - Most likely pre-renal due to dehydration- UA is showing hyaline casts. Will check FeNa, U osm, and FeUrea. Check bladder scan to r/o post-renal causes. Keep strict Is and Os  - Agree with holding losartan and other nephrotoxic agents like NSAIDs. Maintain MAP>65  - Strict Is and Os  - Continue hydration - Per primary team on 100cc of D5- 1/NS with KCl. Given his hyponatremia, would monitor his sodium closely with these fluids. Monitor K with MAIA  - AGMA likely due to uremia. LA is normal. Alcohol level is pending.  S.Osm is pending.     #Elevated CK   - Patient reportedly was on the floor for about 3-4 hours. Elevated CK, but trending down. Low concern for rhabdo. Although could be related to alcohol myopathy    # Mildly elevated troponin  - Stable   - Likely 2/2 MAIA  - Trend to peak  - No cardiac symptoms- no chest pain or dyspnea.      #Mildly elevated WBC  - Will hold off on any further Abx as no clear symptoms or signs of infection  - Per ER, patient received one dose of Vanco and Zosyn as per ER note- \"the patient did flagged on the new [facility] sepsis predictive model as having a elevated percentage of 3 days of IV antibiotics resulting in a mortality benefit. The model does not explain why it came to this decision. We have been told to give antibiotics if this popup is noted. Antibiotics were given.\"  - ? Hemoconcentration vs Stress demarginalization  -  Procal ordered, would be high in MAIA     #Anemia  - Patient had a Hgb of 12.5 on admission dropped to 10.3, likely as his hemoconcentration is improving  - Monitor for any signs of GIB or hypotension to indicate intra-abdominal bleed  - Vit B12, Iron panel sent and folate given his alcohol use    # Thrombocytosis- likely due to " hemoconcentration    #HTN  - Continue Metoprolol and hold if SBP <120    #Asthma  - Continue PRN albuterol inhaler    Verbally discussed all these recs with the primary team- trauma service     Clinically Significant Risk Factors Present on Admission        # Hypokalemia: Lowest K = 3.2 mmol/L in last 2 days, will replace as needed  # Hyponatremia: Lowest Na = 129 mmol/L in last 2 days, will monitor as appropriate   # Hypercalcemia: corrected calcium is >10.1, will monitor as appropriate  # Hypomagnesemia: Lowest Mg = 1.6 mg/dL in last 2 days, will replace as needed  # Anion Gap Metabolic Acidosis: Highest Anion Gap = 25 mmol/L in last 2 days, will monitor and treat as appropriate  # Hypoalbuminemia: Lowest albumin = 3.3 g/dL at 5/26/2024 10:21 PM, will monitor as appropriate     # Hypertension: Noted on problem list          # Asthma: noted on problem list        Torey Harding MD  Hospitalist Service, [facility] TEAM 9  Securely message with [email or web address] (more info)  Text page via [employer] Paging/Directory   See signed in provider for up to date coverage information  ______________________________________________________________________    Chief Complaint   Luigi Vieyra is a 68 year old male who presents to the [facility] ER as a transfer from [place] ED for neurosurgery evaluation.    History of Present Illness      Patient initially presented to [facility] ED earlier on 5/26 for evaluation of left-sided weakness and back pain, persisting for the past 5 days. Imaging revealed a 4 mm subdural hematoma along the right tentorial leaflet. Given the profound deficits and the size of the subdural, it was recommended that he be further managed by neurosurgery and he was transferred to [facility] ER.    In the ER, patient states that he was in a generally well state of health until today when he was unable to stand up and sank down to the floor in the living room. States he was on the floor about 3-4 hours before his wife found  him and brought him into the ER. In ROS, he also states that he's had left body weakness with upper limb > lower limb for the past 4-5 days for which he has not yet sought medical attention. States he's had issues with limb weakness from alcohol use in the past.     Otherwise ROS is negative for headache, chest pain, sore throat, fever, dyspnea, cough, abdominal pain, NVD, blood in urine or stool, dysuria in the past 2 weeks.    History is obtained from the patient    Past Medical History    Past Medical History:   Diagnosis Date    Asthma        Past Surgical History   Past Surgical History:   Procedure Laterality Date    ESOPHAGOSCOPY, GASTROSCOPY, DUODENOSCOPY (EGD), COMBINED N/A 3/21/2022    Procedure: ESOPHAGOGASTRODUODENOSCOPY, WITH BIOPSY;  Surgeon: Eunice Rubin MD;  [facility]: [facility]    ESOPHAGOSCOPY, GASTROSCOPY, DUODENOSCOPY (EGD), COMBINED N/A 10/14/2022    Procedure: ESOPHAGOGASTRODUODENOSCOPY (EGD) with epi injection and clip placement;  Surgeon: Cash Oconnor MD;  Location: [facility] GI       Review of Systems    The 10 point Review of Systems is negative other than noted in the HPI or here.      Physical Exam   Vital Signs: Temp: 98.4  F (36.9  C) Temp src: Oral BP: 109/78 Pulse: 60   Resp: 16 SpO2: 100 % O2 Device: None (Room air)    Weight: 160 lbs 0 oz    General Appearance: Alert, well appearing, and in no acute distress  Mental Status: Oriented to person, place, and time  HEENT: No pallor or icterus. Pupils equal and reactive, extraocular eye movements intact. In C-collar  Chest: Clear to auscultation bilaterally  Heart: Normal S1, S2. No murmurs, rubs, clicks or gallops. Normal rate, regular rhythm  Abdomen: Soft, nontender, nondistended, no masses or organomegaly, Bowel sounds heard  Neurological: Alert, oriented, normal speech, CN 3-7, 9-12 intact, 100% handgrip in the R, L upper limb, 20% handgrip, able to lift forearm against gravity, but not upper arm. Skin and Extremities:  Peripheral pulses normal, no pedal edema, no clubbing or cyanosis.       Medical Decision Making

## 2024-05-27 NOTE — ED NOTES
Patient sister Dianne updated that patient will transfer to the [facility] ED and eventually to a room at that facility.  Dianne is to be primary decision maker and contact regarding patient.  Phone number in demographic.  Patient in agreement.

## 2024-05-27 NOTE — CONSULTS
[facility]       NEUROSURGERY CONSULTATION NOTE    This consultation was requested by Dr. Steward from the ED service.    Reason for Consultation: Right subdural along the tentorial leaflet    Assessment:  68 year old male with a past medical history significant for alcohol use disorder, peroneal neuropathy, gastritis, stage III chronic kidney disease, hypertension and hyperlipidemia who presents as a transfer from [facility] for evaluation of a 4 mm right tentorial subdural hematoma and left hemibody weakness, UE worse than LE.     Clinically Significant Risk Factors Present on Admission         # Hyponatremia: Lowest Na = 129 mmol/L in last 2 days, will monitor as appropriate   # Hypercalcemia: corrected calcium is >10.1, will monitor as appropriate  # Hypomagnesemia: Lowest Mg = 1.6 mg/dL in last 2 days, will replace as needed  # Anion Gap Metabolic Acidosis: Highest Anion Gap = 25 mmol/L in last 2 days, will monitor and treat as appropriate  # Hypoalbuminemia: Lowest albumin = 3.3 g/dL at 5/26/2024 10:21 PM, will monitor as appropriate     # Hypertension: Noted on problem list          # Asthma: noted on problem list     # CKD, Stage 4 (GFR 15-29): Will monitor and treat as appropriate  - last Cr =  3.68 mg/dL (Ref range: 0.67 - 1.17 mg/dL)  - last GFR = 17 mL/min/1.73m2 (Ref range: >60 mL/min/1.73m2)  # Compression of brain       RECOMMENDATIONS:  No neurosurgical intervention indicated at this time   Repeat head CT in 6 hours from the time of first acquisition  Hold all antiplatelets and anticoagulants  HOB > 30 degrees  Q1H neuro exams until stable scan  Keppra 7d for seizure ppx  Maintain SBP < 140  Keep NPO until stability scan  Platelets > 100,000  INR < 1.5  Hemoglobin > 8  DVT: SCDs while in bed    MRI C and T spine wo  MRI brachial plexus w/wo  XR LUE to rule out fractures  Neurology evaluation of the LUE weakness  Upright T and L spine Xrays for  compression fractures  Endo consult for bone health optimization  Neurosurgery will follow closely    Umesh Weiss MD  Neurosurgery Resident  Pager- [phone]    The patient was discussed with Dr. Crow, neurosurgery chief resident, and Dr. Skinner, neurosurgery staff.    Please contact neurosurgery resident on call with questions.    Dial * * [phone], enter [phone] when prompted.      HPI:  Luigi Vieyra is a 68 year old male with a past medical history significant for alcohol use disorder, peroneal neuropathy, gastritis, stage III chronic kidney disease, hypertension and hyperlipidemia who presents as a transfer from [facility] ED for evaluation of a 4 mm right tentorial subdural hematoma.     He presented to [facility] ED with a 5-day history of left hemibody weakness more so in his upper extremity.  He has a history of alcohol use disorder when he drinks every day.  He endorses falling multiple times last week with the last fall being 3 days ago.  He recalls hitting his head but does not remember if he lost his consciousness.  He denies neck tenderness but he reports that he started having severe left upper extremity weakness and disability.     A stroke code was called an outside hospital where the imaging demonstrated a right subdural hematoma 4 mm along the right tentorial leaflet.  The remainder of the workup was unremarkable.  He is asymptomatic from his subdural however he has significant left upper extremity weakness.  MRI brain did not show any evidence of infarction.    PAST MEDICAL HISTORY:   Past Medical History:   Diagnosis Date    Asthma        PAST SURGICAL HISTORY:   Past Surgical History:   Procedure Laterality Date    ESOPHAGOSCOPY, GASTROSCOPY, DUODENOSCOPY (EGD), COMBINED N/A 3/21/2022    Procedure: ESOPHAGOGASTRODUODENOSCOPY, WITH BIOPSY;  Surgeon: Eunice Rubin MD;  Location: [facility]    ESOPHAGOSCOPY, GASTROSCOPY, DUODENOSCOPY (EGD), COMBINED N/A 10/14/2022    Procedure:  "ESOPHAGOGASTRODUODENOSCOPY (EGD) with epi injection and clip placement;  Surgeon: Cash Oconnor MD;  Location: [facility] GI       FAMILY HISTORY:   Family History   Problem Relation Age of Onset    Asthma Mother     Cerebrovascular Disease Mother     C.A.D. No family hx of     Diabetes No family hx of     Hypertension No family hx of     Breast Cancer No family hx of     Cancer - colorectal No family hx of     Prostate Cancer No family hx of        SOCIAL HISTORY:   Social History     Tobacco Use    Smoking status: Every Day     Current packs/day: 0.50     Average packs/day: 0.5 packs/day for 48.4 years (24.2 ttl pk-yrs)     Types: Cigarettes     Start date: 1976    Smokeless tobacco: Never   Substance Use Topics    Alcohol use: Yes     Comment: 2-3 drinks daily       MEDICATIONS:  Current Outpatient Medications   Medication Sig Dispense Refill    albuterol (PROAIR HFA/PROVENTIL HFA/VENTOLIN HFA) 108 (90 Base) MCG/ACT inhaler INHALE 2 PUFFS INTO THE LUNGS EVERY 4 HOURS AS NEEDED FOR SHORTNESS OF BREATH, WHEEZING OR COUGH SEE PROVIDER BEFORE OTHER REFILLS ARE GIVEN. 8.5 g 4    losartan (COZAAR) 25 MG tablet TAKE 1 TABLET BY MOUTH EVERY MORNING 90 tablet 3    metoprolol succinate ER (TOPROL XL) 25 MG 24 hr tablet TAKE 1 TABLET BY MOUTH EVERY MORNING 90 tablet 2    order for DME Equipment being ordered: Digital home blood pressure monitor kit (Patient not taking: Reported on 12/5/2023) 1 each 0       Allergies:  No Known Allergies    ROS: 10 point ROS of systems including Constitutional, Eyes, Respiratory, Cardiovascular, Gastroenterology, Genitourinary, Integumentary, Muscularskeletal, Psychiatric were all negative except for pertinent positives noted in my HPI.    Physical exam:   Blood pressure 104/72, pulse 66, temperature 98.9  F (37.2  C), temperature source Oral, resp. rate 18, height 1.778 m (5' 10\"), weight 72.6 kg (160 lb), SpO2 98%.  General: awake and alert  HEENT: Normocephalic  PULM: breathing " comfortably on room air  NEUROLOGIC:  -- Awake; Alert; oriented x 3  -- Intermittent confused speech  -- Follows commands briskly  -- +repetition, calculation, and naming  -- Speech fluent, spontaneous. No aphasia or dysarthria.  -- no gaze preference. No apparent hemineglect.  Cranial Nerves:  -- visual fields full to confrontation, PERRL 3-2mm bilat and brisk, extraocular movements intact  -- face symmetrical, tongue midline  -- sensory V1-V3 intact bilaterally  -- palate elevates symmetrically, uvula midline  -- hearing grossly intact bilat  -- Trapezii 5/5 strength bilat symmetric  -- Cerebellar: Finger nose finger without dysmetria, intact rapid alternating motions bilaterally    Motor:  Normal bulk / tone; no tremor, rigidity, or bradykinesia.  No muscle wasting or fasciculations  No Pronator Drift     Delt Bi Tri Hand Flexion/  Extension Iliopsoas Quadriceps Hamstrings Tibialis Anterior Gastroc    C5 C6 C7 C8/T1 L2 L3 L4-S1 L4 S1   R 5 5 5 5 5 5 5 5 5   L 2 3 2 2 5 5 5 5 5     Sensory:  intact to LT x 4 extremities       Reflexes: no clonus       Bi Tri BR Ellen Pat Ach Bab     C5-6 C7-8 C6 UMN L2-4 S1 UMN   R 3+ 2+ 3+ Norm 3+ 2+ Upgoing   L 3+ 2+ 3+ Norm 3+ 2+ Upgoing      Gait: Deferred      IMAGING:  Recent Results (from the past 24 hour(s))   CT Head w/o Contrast   Result Value    Radiologist flags Acute intracranial hemorrhage (AA)    Narrative    EXAM: CT HEAD W/O CONTRAST, CTA HEAD NECK W CONTRAST  LOCATION: [facility]  DATE: 5/26/2024    INDICATION: Left sided weakness  COMPARISON: Chest CT October 13, 2022.  CONTRAST:  Omni 370 - 68 mL  TECHNIQUE: Head and neck CT angiogram with IV contrast. Noncontrast head CT followed by axial helical CT images of the head and neck vessels obtained during the arterial phase of intravenous contrast administration. Axial 2D reconstructed images and   multiplanar 3D MIP reconstructed images of the head and neck vessels were performed by the  technologist. Dose reduction techniques were used. All stenosis measurements made according to NASCET criteria unless otherwise specified.    FINDINGS:   NONCONTRAST HEAD CT:   INTRACRANIAL CONTENTS: Thin subdural hematoma along the right tentorial leaflet measuring up to 4 mm. No additional intracranial hemorrhage is noted. No mass effect or midline shift. No CT evidence of acute infarct. Mild presumed chronic small vessel   ischemic changes. Mild to moderate generalized volume loss. No hydrocephalus.     VISUALIZED ORBITS/SINUSES/MASTOIDS: Prior bilateral cataract surgery. Visualized portions of the orbits are otherwise unremarkable. No paranasal sinus mucosal disease. No middle ear or mastoid effusion.    BONES/SOFT TISSUES: No acute abnormality.    HEAD CTA:  ANTERIOR CIRCULATION: No stenosis/occlusion, aneurysm, or high flow vascular malformation. Standard [place] anatomy.    POSTERIOR CIRCULATION: No stenosis/occlusion, aneurysm, or high flow vascular malformation. Balanced vertebral arteries supply a normal basilar artery.     DURAL VENOUS SINUSES: Not well evaluated on a technical basis.    NECK CTA:  RIGHT CAROTID: Atherosclerotic plaque results in less than 50% stenosis in the right ICA. No dissection.    LEFT CAROTID: Atherosclerotic plaque results in less than 50% stenosis in the left ICA. No dissection.    VERTEBRAL ARTERIES: No focal stenosis or dissection. Balanced vertebral arteries.    AORTIC ARCH: Classic aortic arch anatomy with no significant stenosis at the origin of the great vessels.    NONVASCULAR STRUCTURES: Mild, chronic anterior compression deformities of the T2, T3 and T4 vertebra, unchanged.      Impression    IMPRESSION:   HEAD CT:  1.  Thin subdural hematoma along the right tentorial leaflet.  2.  No mass effect or midline shift.  3.  Age-related changes as above.    HEAD CTA:   1.  No significant stenosis, aneurysm, or high flow vascular malformation identified.    NECK  CTA:  1.  No hemodynamically significant stenosis within the vessels of the neck.      [Critical Result: Acute intracranial hemorrhage]    Finding was identified on 5/26/2024 3:46 PM CDT.     DR DHILLON was contacted by me on 5/26/2024 3:49 PM CDT and verbalized understanding of the critical result.     CTA Head Neck w Contrast   Result Value    Radiologist flags Acute intracranial hemorrhage (AA)    Narrative    EXAM: CT HEAD W/O CONTRAST, CTA HEAD NECK W CONTRAST  LOCATION: [facility]  DATE: 5/26/2024    INDICATION: Left sided weakness  COMPARISON: Chest CT October 13, 2022.  CONTRAST:  Omni 370 - 68 mL  TECHNIQUE: Head and neck CT angiogram with IV contrast. Noncontrast head CT followed by axial helical CT images of the head and neck vessels obtained during the arterial phase of intravenous contrast administration. Axial 2D reconstructed images and   multiplanar 3D MIP reconstructed images of the head and neck vessels were performed by the technologist. Dose reduction techniques were used. All stenosis measurements made according to NASCET criteria unless otherwise specified.    FINDINGS:   NONCONTRAST HEAD CT:   INTRACRANIAL CONTENTS: Thin subdural hematoma along the right tentorial leaflet measuring up to 4 mm. No additional intracranial hemorrhage is noted. No mass effect or midline shift. No CT evidence of acute infarct. Mild presumed chronic small vessel   ischemic changes. Mild to moderate generalized volume loss. No hydrocephalus.     VISUALIZED ORBITS/SINUSES/MASTOIDS: Prior bilateral cataract surgery. Visualized portions of the orbits are otherwise unremarkable. No paranasal sinus mucosal disease. No middle ear or mastoid effusion.    BONES/SOFT TISSUES: No acute abnormality.    HEAD CTA:  ANTERIOR CIRCULATION: No stenosis/occlusion, aneurysm, or high flow vascular malformation. Standard [place] anatomy.    POSTERIOR CIRCULATION: No stenosis/occlusion, aneurysm, or high  flow vascular malformation. Balanced vertebral arteries supply a normal basilar artery.     DURAL VENOUS SINUSES: Not well evaluated on a technical basis.    NECK CTA:  RIGHT CAROTID: Atherosclerotic plaque results in less than 50% stenosis in the right ICA. No dissection.    LEFT CAROTID: Atherosclerotic plaque results in less than 50% stenosis in the left ICA. No dissection.    VERTEBRAL ARTERIES: No focal stenosis or dissection. Balanced vertebral arteries.    AORTIC ARCH: Classic aortic arch anatomy with no significant stenosis at the origin of the great vessels.    NONVASCULAR STRUCTURES: Mild, chronic anterior compression deformities of the T2, T3 and T4 vertebra, unchanged.      Impression    IMPRESSION:   HEAD CT:  1.  Thin subdural hematoma along the right tentorial leaflet.  2.  No mass effect or midline shift.  3.  Age-related changes as above.    HEAD CTA:   1.  No significant stenosis, aneurysm, or high flow vascular malformation identified.    NECK CTA:  1.  No hemodynamically significant stenosis within the vessels of the neck.      [Critical Result: Acute intracranial hemorrhage]    Finding was identified on 5/26/2024 3:46 PM CDT.     DR DHILLON was contacted by me on 5/26/2024 3:49 PM CDT and verbalized understanding of the critical result.     MR Brain w/o & w Contrast    Narrative    EXAM: MR BRAIN W/O and W CONTRAST  LOCATION: [facility]  DATE: 5/26/2024    INDICATION: 5 days of left sided weakness  COMPARISON: CT head 5/26/2024, MRI 7/20/2023  CONTRAST: gadavist 7 mL  TECHNIQUE: Routine multiplanar multisequence head MRI without and with intravenous contrast.    FINDINGS:  INTRACRANIAL CONTENTS: No acute or subacute infarct. Small acute subdural hematoma along right tentorium, similar to prior CT. Scattered nonspecific T2/FLAIR hyperintensities within the cerebral white matter most consistent with mild chronic   microvascular ischemic change. Small amount of chronic  encephalomalacia in the region of the vermis and adjacent cerebellum, similar to prior exams. Moderate generalized cerebral atrophy. Atrophy is more prominent in hippocampal regions, more so on left.   This is a nonspecific finding, similar finding could be observed in dementia of Alzheimer's type. No hydrocephalus. Normal position of the cerebellar tonsils. Thin smooth enhancement of dura, right more than left; nonspecific.    SELLA: No abnormality accounting for technique.    OSSEOUS STRUCTURES/SOFT TISSUES: Normal marrow signal. The major intracranial vascular flow voids are maintained.     ORBITS: No abnormality accounting for technique.     SINUSES/MASTOIDS: No paranasal sinus mucosal disease. No middle ear or mastoid effusion.       Impression    IMPRESSION:  1.  No acute infarct, mass, or mass effect.  2.  Small acute subdural hematoma along right tentorium, similar to prior CT.  3.  Mild chronic small vessel ischemia.  4.  Small amount of chronic encephalomalacia in the region of the vermis and adjacent cerebellum, similar to prior exams.  5.  Moderate atrophy.         LABS:   Last Comprehensive Metabolic Panel:  Lab Results   Component Value Date     (L) 05/26/2024    POTASSIUM 3.6 05/26/2024    CHLORIDE 88 (L) 05/26/2024    CO2 25 05/26/2024    ANIONGAP 17 (H) 05/26/2024     (H) 05/26/2024    BUN 60.3 (H) 05/26/2024    CR 3.68 (H) 05/26/2024    GFRESTIMATED 17 (L) 05/26/2024    PINO 8.8 05/26/2024         Lab Results   Component Value Date    WBC 14.7 05/26/2024    WBC 11.7 11/21/2020     Lab Results   Component Value Date    RBC 3.19 05/26/2024    RBC 4.74 11/21/2020     Lab Results   Component Value Date    HGB 10.3 05/26/2024    HGB 14.6 11/21/2020     Lab Results   Component Value Date    HCT 30.0 05/26/2024    HCT 42.6 11/21/2020     Lab Results   Component Value Date    MCV 94 05/26/2024    MCV 90 11/21/2020     Lab Results   Component Value Date    MCH 32.3 05/26/2024    MCH 30.8  11/21/2020     Lab Results   Component Value Date    MCHC 34.3 05/26/2024    MCHC 34.3 11/21/2020     Lab Results   Component Value Date    RDW 15.6 05/26/2024    RDW 13.2 11/21/2020     Lab Results   Component Value Date     05/26/2024     11/21/2020     INR   Date Value Ref Range Status   05/26/2024 1.08 0.85 - 1.15 Final

## 2024-05-27 NOTE — MEDICATION SCRIBE - ADMISSION MEDICATION HISTORY
Medication Scribe Admission Medication History    Admission medication history is complete. The information provided in this note is only as accurate as the sources available at the time of the update.    Information Source(s): Patient and CareEverywhere/SureScripts via in-person    Pertinent Information: Patient stated he only takes Losartan and Metoprolol and nothing else.    Changes made to PTA medication list:  Added: None  Deleted: None  Changed: None    Allergies reviewed with patient and updates made in EHR: yes    Medication History Completed By: Deedee Gabriel 5/27/2024 1:16 PM    PTA Med List   Medication Sig Note Last Dose    albuterol (PROAIR HFA/PROVENTIL HFA/VENTOLIN HFA) 108 (90 Base) MCG/ACT inhaler INHALE 2 PUFFS INTO THE LUNGS EVERY 4 HOURS AS NEEDED FOR SHORTNESS OF BREATH, WHEEZING OR COUGH SEE PROVIDER BEFORE OTHER REFILLS ARE GIVEN.      losartan (COZAAR) 25 MG tablet TAKE 1 TABLET BY MOUTH EVERY MORNING  Past Week at 3 days ago    metoprolol succinate ER (TOPROL XL) 25 MG 24 hr tablet TAKE 1 TABLET BY MOUTH EVERY MORNING  Past Week at 3 days ago    order for DME Equipment being ordered: Digital home blood pressure monitor kit 5/27/2024: Patient has this but not using

## 2024-05-27 NOTE — PHARMACY-VANCOMYCIN DOSING SERVICE
"Pharmacy Vancomycin Initial Note  Date of Service May 26, 2024  Patient's  1955  68 year old, male    Indication: Sepsis    Current estimated CrCl = Estimated Creatinine Clearance: 15.7 mL/min (A) (based on SCr of 4.7 mg/dL (H)).    Creatinine for last 3 days  2024:  2:54 PM Creatinine 4.70 mg/dL    Recent Vancomycin Level(s) for last 3 days  No results found for requested labs within last 3 days.      Vancomycin IV Administrations (past 72 hours)        No vancomycin orders with administrations in past 72 hours.                    Nephrotoxins and other renal medications (From now, onward)      Start     Dose/Rate Route Frequency Ordered Stop    24 230  vancomycin (VANCOCIN) 1,500 mg in 0.9% NaCl 250 mL intermittent infusion         1,500 mg  over 90 Minutes Intravenous ONCE 24 230  vancomycin place granado - receiving intermittent dosing         1 each Intravenous SEE ADMIN INSTRUCTIONS 24  piperacillin-tazobactam (ZOSYN) 4.5 g vial to attach to  mL bag        Note to Pharmacy: For [facility], [facility] and [facility]: For Zosyn-naive patients, use the \"Zosyn initial dose + extended infusion\" order panel.    4.5 g  over 30 Minutes Intravenous ONCE 24 222              Contrast Orders - past 72 hours (72h ago, onward)      None            Plan:  Start vancomycin intermittent dosing d/t  ? MAIA/CKD; 1500 mg IV x 1 today.   Vancomycin monitoring method: Trough (Method 2 = manual dose calculation)  Vancomycin therapeutic monitoring goal: 15-20 mg/L  Pharmacy will check vancomycin levels as appropriate in 1-3 Days.    Serum creatinine levels will be ordered daily for the first week of therapy and at least twice weekly for subsequent weeks.      Collin Lo formerly [facility]   "

## 2024-05-27 NOTE — ED PROVIDER NOTES
[place] EMERGENCY DEPARTMENT ([facility])    5/26/24       ED PROVIDER NOTE      History     Chief Complaint   Patient presents with    Left Side Weakness    Back Pain     HPI  Luigi Vieyra is a 68 year old male with a past medical history significant for ongoing peroneal neuropathy, gastritis 2/2 alcohol abuse, stage III CKD, HLD, and HTN presents to the ED as a transfer from [facility] for neurosurgery evaluation.  Patient initially presented to [facility] ED earlier today for evaluation of left-sided weakness and back pain, persisting for the past 5 days.  Imaging revealed a 4 mm subdural hematoma along the right tentorial leaflet. Given the profound deficits and the size of the subdural, it was recommended that he be further managed by neurosurgery.    On arrival patient is endorsing low back pain.  No urinary or bowel concerns.  He states he is not having any acute voice changes.  He states his last drink was about 2 days ago.  He does not know if he has a history of alcohol withdrawal.    [facility]   05/26/2024  Imaging Findings  CT head: Thin 4 mm SDH along with the right tentorial leaflet, no mass effect or midline sift.   CTA head/neck: No large vessel occlusion. Bilateral atherosclerotic plaque results in less than 50% stenosis in the right and left ICA.    Past Medical History  Past Medical History:   Diagnosis Date    Asthma      Past Surgical History:   Procedure Laterality Date    ESOPHAGOSCOPY, GASTROSCOPY, DUODENOSCOPY (EGD), COMBINED N/A 3/21/2022    Procedure: ESOPHAGOGASTRODUODENOSCOPY, WITH BIOPSY;  Surgeon: Eunice Rubin MD;  Location: [facility]    ESOPHAGOSCOPY, GASTROSCOPY, DUODENOSCOPY (EGD), COMBINED N/A 10/14/2022    Procedure: ESOPHAGOGASTRODUODENOSCOPY (EGD) with epi injection and clip placement;  Surgeon: Cash Oconnor MD;  Location: [facility]     albuterol (PROAIR HFA/PROVENTIL HFA/VENTOLIN HFA) 108 (90 Base) MCG/ACT inhaler  losartan  "(COZAAR) 25 MG tablet  metoprolol succinate ER (TOPROL XL) 25 MG 24 hr tablet  order for DME      No Known Allergies  Family History  Family History   Problem Relation Age of Onset    Asthma Mother     Cerebrovascular Disease Mother     C.A.D. No family hx of     Diabetes No family hx of     Hypertension No family hx of     Breast Cancer No family hx of     Cancer - colorectal No family hx of     Prostate Cancer No family hx of      Social History   Social History     Tobacco Use    Smoking status: Every Day     Current packs/day: 0.50     Average packs/day: 0.5 packs/day for 48.4 years (24.2 ttl pk-yrs)     Types: Cigarettes     Start date: 1976    Smokeless tobacco: Never   Vaping Use    Vaping status: Never Used   Substance Use Topics    Alcohol use: Yes     Comment: 2-3 drinks daily    Drug use: No      Past medical history, past surgical history, medications, allergies, family history, and social history were reviewed with the patient. No additional pertinent items.   A medically appropriate review of systems was performed with pertinent positives and negatives noted in the HPI, and all other systems negative.    Physical Exam   BP: 104/72  Pulse: 66  Temp: 98.9  F (37.2  C)  Resp: 18  Height: 177.8 cm (5' 10\")  Weight: 72.6 kg (160 lb)  SpO2: 98 %  Physical Exam  Vital Signs Reviewed  Gen: Well nourished, well developed, resting comfortably, no acute distress  HEENT: NC/AT, PERRL, EOMI, MMM  Neck: Supple, FROM  CV: Regular Rate  Lungs/Chest: Normal Effort  Abd: Non-distended  MSK/Back: FROM, no visible deformity  Neuro: A&Ox3, GCS 15, CN II-XII unremarkable. 4/5 strength left upper and lower extremity  Skin: Warm, Dry, Intact, no visible lesions    ED Course, Procedures, & Data      Procedures            EKG Interpretation:      Interpreted by Kp Steward MD  Time reviewed: 2215  Symptoms at time of EKG: Trauma   Rhythm: normal sinus   Rate: Normal  Axis: Normal  Ectopy: none  Conduction: normal  ST " Segments/ T Waves: Non-specific ST-T wave changes  Q Waves: none    Clinical Impression: NSR, no CHRISTELLE                 Results for orders placed or performed during the hospital encounter of 05/26/24   Head CT w/o contrast     Status: None    Narrative    EXAM: CT HEAD W/O CONTRAST, CT LUMBAR SPINE W/O CONTRAST, CT THORACIC SPINE W/O CONTRAST, CT CERVICAL SPINE W/O CONTRAST  LOCATION: [facility]  DATE/TIME: 5/26/2024 11:39 PM CDT    INDICATION: ? fall, SDH, stability scan; trauma, neck and back pain  COMPARISON: MRI brain and CT head 5/26/2024, CT head and cervical spine 10/13/2022, CT chest abdomen pelvis 10/13/2022.  TECHNIQUE:   1) Routine CT Head without IV contrast. Multiplanar reformats. Dose reduction techniques were used.   2) Routine CT Cervical Spine without IV contrast. Multiplanar reformats. Dose reduction techniques were used.   3) Routine CT Thoracic Spine without IV contrast. Multiplanar reformats. Dose reduction techniques were used.   4) Routine CT Lumbar Spine without IV contrast. Multiplanar reformats. Dose reduction techniques were used.     FINDINGS:   HEAD CT:   INTRACRANIAL CONTENTS:  Stable thin right convexity subdural hematoma. Interval development of a thin parafalcine subdural hematoma. No mass effect.  No CT evidence of acute infarct. Mild presumed chronic small vessel ischemic changes. Moderate   generalized volume loss. No hydrocephalus.     VISUALIZED ORBITS/SINUSES/MASTOIDS: No intraorbital abnormality. No significant paranasal sinus mucosal disease. No middle ear or mastoid effusion.    BONES/SOFT TISSUES: No acute abnormality.    CERVICAL SPINE CT:  VERTEBRA: Vertebral body heights and alignment are unchanged. No acute compression fracture or posttraumatic subluxation.     CANAL/FORAMINA: Multilevel spondylosis without high grade canal stenosis.    PARASPINAL: No prevertebral edema.    THORACIC SPINE CT:  VERTEBRA: T2 superior endplate  deformity with mild height loss, new since 2022. Chronic T3, T4, T8, and T12 compression deformities with mild height loss. No posttraumatic subluxation.     CANAL/FORAMINA: Multilevel spondylosis without high grade canal stenosis.    PARASPINAL: No acute extraspinal abnormality. Chronic healed rib deformities.    LUMBAR SPINE CT:  VERTEBRA: Acute L1 superior endplate compression fracture with mild height loss. Minimal retrolisthesis of L4 on L5. Unchanged grade 1 anterolisthesis of L5 on S1 due to bilateral L5 spondylolysis. No posttraumatic subluxation.     CANAL/FORAMINA: Multilevel spondylosis without high grade canal stenosis.    PARASPINAL: No acute extraspinal abnormality.       Impression    IMPRESSION:  HEAD CT:  1.  Interval development of a thin parafalcine subdural hematoma.  2.  Stable thin right tentorial subdural hematoma.  3.  No mass effect or midline shift.    CERVICAL SPINE CT:  1.  No acute cervical spine fracture.    THORACIC SPINE CT:  1.  T2 superior endplate fracture with mild height loss, likely acute.  2.  No retropulsion or traumatic subluxation.  3.  Chronic compression deformities, as above.    LUMBAR SPINE CT:  1.  Acute L1 superior endplate compression fracture with mild height loss.  2.  No retropulsion or traumatic subluxation.   Chest XR,  PA & LAT     Status: None    Narrative    EXAM: XR CHEST 2 VIEWS  LOCATION: [facility]  DATE: 5/26/2024    INDICATION: Falls.  COMPARISON: CT chest, abdomen and pelvis with IV contrast 10/13/2022.      Impression    IMPRESSION: Minor strand of linear atelectasis left lower lung. Both lungs are otherwise clear. No adenopathy or effusion. Normal cardiac size and pulmonary vascularity. Mildly atherosclerotic thoracic aorta. Mild degenerative changes both shoulders and   the spine. Multiple bilateral healed rib fracture deformities, unchanged. Monitoring leads overlying the chest.   XR Pelvis 1/2 Views      Status: None    Narrative    EXAM: XR PELVIS 1/2 VIEWS  LOCATION: [facility]  DATE: 5/26/2024    INDICATION: Falls, lower back pain  COMPARISON: CT 3/20/2022      Impression    IMPRESSION: Normal joint spaces and alignment. No fracture.   CT Cervical Spine w/o Contrast     Status: None    Narrative    EXAM: CT HEAD W/O CONTRAST, CT LUMBAR SPINE W/O CONTRAST, CT THORACIC SPINE W/O CONTRAST, CT CERVICAL SPINE W/O CONTRAST  LOCATION: [facility]  DATE/TIME: 5/26/2024 11:39 PM CDT    INDICATION: ? fall, SDH, stability scan; trauma, neck and back pain  COMPARISON: MRI brain and CT head 5/26/2024, CT head and cervical spine 10/13/2022, CT chest abdomen pelvis 10/13/2022.  TECHNIQUE:   1) Routine CT Head without IV contrast. Multiplanar reformats. Dose reduction techniques were used.   2) Routine CT Cervical Spine without IV contrast. Multiplanar reformats. Dose reduction techniques were used.   3) Routine CT Thoracic Spine without IV contrast. Multiplanar reformats. Dose reduction techniques were used.   4) Routine CT Lumbar Spine without IV contrast. Multiplanar reformats. Dose reduction techniques were used.     FINDINGS:   HEAD CT:   INTRACRANIAL CONTENTS:  Stable thin right convexity subdural hematoma. Interval development of a thin parafalcine subdural hematoma. No mass effect.  No CT evidence of acute infarct. Mild presumed chronic small vessel ischemic changes. Moderate   generalized volume loss. No hydrocephalus.     VISUALIZED ORBITS/SINUSES/MASTOIDS: No intraorbital abnormality. No significant paranasal sinus mucosal disease. No middle ear or mastoid effusion.    BONES/SOFT TISSUES: No acute abnormality.    CERVICAL SPINE CT:  VERTEBRA: Vertebral body heights and alignment are unchanged. No acute compression fracture or posttraumatic subluxation.     CANAL/FORAMINA: Multilevel spondylosis without high grade canal  stenosis.    PARASPINAL: No prevertebral edema.    THORACIC SPINE CT:  VERTEBRA: T2 superior endplate deformity with mild height loss, new since 2022. Chronic T3, T4, T8, and T12 compression deformities with mild height loss. No posttraumatic subluxation.     CANAL/FORAMINA: Multilevel spondylosis without high grade canal stenosis.    PARASPINAL: No acute extraspinal abnormality. Chronic healed rib deformities.    LUMBAR SPINE CT:  VERTEBRA: Acute L1 superior endplate compression fracture with mild height loss. Minimal retrolisthesis of L4 on L5. Unchanged grade 1 anterolisthesis of L5 on S1 due to bilateral L5 spondylolysis. No posttraumatic subluxation.     CANAL/FORAMINA: Multilevel spondylosis without high grade canal stenosis.    PARASPINAL: No acute extraspinal abnormality.       Impression    IMPRESSION:  HEAD CT:  1.  Interval development of a thin parafalcine subdural hematoma.  2.  Stable thin right tentorial subdural hematoma.  3.  No mass effect or midline shift.    CERVICAL SPINE CT:  1.  No acute cervical spine fracture.    THORACIC SPINE CT:  1.  T2 superior endplate fracture with mild height loss, likely acute.  2.  No retropulsion or traumatic subluxation.  3.  Chronic compression deformities, as above.    LUMBAR SPINE CT:  1.  Acute L1 superior endplate compression fracture with mild height loss.  2.  No retropulsion or traumatic subluxation.   CT Lumbar Spine w/o Contrast     Status: None    Narrative    EXAM: CT HEAD W/O CONTRAST, CT LUMBAR SPINE W/O CONTRAST, CT THORACIC SPINE W/O CONTRAST, CT CERVICAL SPINE W/O CONTRAST  LOCATION: [facility]  DATE/TIME: 5/26/2024 11:39 PM CDT    INDICATION: ? fall, SDH, stability scan; trauma, neck and back pain  COMPARISON: MRI brain and CT head 5/26/2024, CT head and cervical spine 10/13/2022, CT chest abdomen pelvis 10/13/2022.  TECHNIQUE:   1) Routine CT Head without IV contrast. Multiplanar reformats. Dose  reduction techniques were used.   2) Routine CT Cervical Spine without IV contrast. Multiplanar reformats. Dose reduction techniques were used.   3) Routine CT Thoracic Spine without IV contrast. Multiplanar reformats. Dose reduction techniques were used.   4) Routine CT Lumbar Spine without IV contrast. Multiplanar reformats. Dose reduction techniques were used.     FINDINGS:   HEAD CT:   INTRACRANIAL CONTENTS:  Stable thin right convexity subdural hematoma. Interval development of a thin parafalcine subdural hematoma. No mass effect.  No CT evidence of acute infarct. Mild presumed chronic small vessel ischemic changes. Moderate   generalized volume loss. No hydrocephalus.     VISUALIZED ORBITS/SINUSES/MASTOIDS: No intraorbital abnormality. No significant paranasal sinus mucosal disease. No middle ear or mastoid effusion.    BONES/SOFT TISSUES: No acute abnormality.    CERVICAL SPINE CT:  VERTEBRA: Vertebral body heights and alignment are unchanged. No acute compression fracture or posttraumatic subluxation.     CANAL/FORAMINA: Multilevel spondylosis without high grade canal stenosis.    PARASPINAL: No prevertebral edema.    THORACIC SPINE CT:  VERTEBRA: T2 superior endplate deformity with mild height loss, new since 2022. Chronic T3, T4, T8, and T12 compression deformities with mild height loss. No posttraumatic subluxation.     CANAL/FORAMINA: Multilevel spondylosis without high grade canal stenosis.    PARASPINAL: No acute extraspinal abnormality. Chronic healed rib deformities.    LUMBAR SPINE CT:  VERTEBRA: Acute L1 superior endplate compression fracture with mild height loss. Minimal retrolisthesis of L4 on L5. Unchanged grade 1 anterolisthesis of L5 on S1 due to bilateral L5 spondylolysis. No posttraumatic subluxation.     CANAL/FORAMINA: Multilevel spondylosis without high grade canal stenosis.    PARASPINAL: No acute extraspinal abnormality.       Impression    IMPRESSION:  HEAD CT:  1.  Interval  development of a thin parafalcine subdural hematoma.  2.  Stable thin right tentorial subdural hematoma.  3.  No mass effect or midline shift.    CERVICAL SPINE CT:  1.  No acute cervical spine fracture.    THORACIC SPINE CT:  1.  T2 superior endplate fracture with mild height loss, likely acute.  2.  No retropulsion or traumatic subluxation.  3.  Chronic compression deformities, as above.    LUMBAR SPINE CT:  1.  Acute L1 superior endplate compression fracture with mild height loss.  2.  No retropulsion or traumatic subluxation.   CT Thoracic Spine w/o Contrast     Status: None    Narrative    EXAM: CT HEAD W/O CONTRAST, CT LUMBAR SPINE W/O CONTRAST, CT THORACIC SPINE W/O CONTRAST, CT CERVICAL SPINE W/O CONTRAST  LOCATION: [facility]  DATE/TIME: 5/26/2024 11:39 PM CDT    INDICATION: ? fall, SDH, stability scan; trauma, neck and back pain  COMPARISON: MRI brain and CT head 5/26/2024, CT head and cervical spine 10/13/2022, CT chest abdomen pelvis 10/13/2022.  TECHNIQUE:   1) Routine CT Head without IV contrast. Multiplanar reformats. Dose reduction techniques were used.   2) Routine CT Cervical Spine without IV contrast. Multiplanar reformats. Dose reduction techniques were used.   3) Routine CT Thoracic Spine without IV contrast. Multiplanar reformats. Dose reduction techniques were used.   4) Routine CT Lumbar Spine without IV contrast. Multiplanar reformats. Dose reduction techniques were used.     FINDINGS:   HEAD CT:   INTRACRANIAL CONTENTS:  Stable thin right convexity subdural hematoma. Interval development of a thin parafalcine subdural hematoma. No mass effect.  No CT evidence of acute infarct. Mild presumed chronic small vessel ischemic changes. Moderate   generalized volume loss. No hydrocephalus.     VISUALIZED ORBITS/SINUSES/MASTOIDS: No intraorbital abnormality. No significant paranasal sinus mucosal disease. No middle ear or mastoid effusion.    BONES/SOFT  TISSUES: No acute abnormality.    CERVICAL SPINE CT:  VERTEBRA: Vertebral body heights and alignment are unchanged. No acute compression fracture or posttraumatic subluxation.     CANAL/FORAMINA: Multilevel spondylosis without high grade canal stenosis.    PARASPINAL: No prevertebral edema.    THORACIC SPINE CT:  VERTEBRA: T2 superior endplate deformity with mild height loss, new since 2022. Chronic T3, T4, T8, and T12 compression deformities with mild height loss. No posttraumatic subluxation.     CANAL/FORAMINA: Multilevel spondylosis without high grade canal stenosis.    PARASPINAL: No acute extraspinal abnormality. Chronic healed rib deformities.    LUMBAR SPINE CT:  VERTEBRA: Acute L1 superior endplate compression fracture with mild height loss. Minimal retrolisthesis of L4 on L5. Unchanged grade 1 anterolisthesis of L5 on S1 due to bilateral L5 spondylolysis. No posttraumatic subluxation.     CANAL/FORAMINA: Multilevel spondylosis without high grade canal stenosis.    PARASPINAL: No acute extraspinal abnormality.       Impression    IMPRESSION:  HEAD CT:  1.  Interval development of a thin parafalcine subdural hematoma.  2.  Stable thin right tentorial subdural hematoma.  3.  No mass effect or midline shift.    CERVICAL SPINE CT:  1.  No acute cervical spine fracture.    THORACIC SPINE CT:  1.  T2 superior endplate fracture with mild height loss, likely acute.  2.  No retropulsion or traumatic subluxation.  3.  Chronic compression deformities, as above.    LUMBAR SPINE CT:  1.  Acute L1 superior endplate compression fracture with mild height loss.  2.  No retropulsion or traumatic subluxation.   INR     Status: Normal   Result Value Ref Range    INR 1.08 0.85 - 1.15   Partial thromboplastin time     Status: Normal   Result Value Ref Range    aPTT 27 22 - 38 Seconds   Basic metabolic panel     Status: Abnormal   Result Value Ref Range    Sodium 130 (L) 135 - 145 mmol/L    Potassium 3.6 3.4 - 5.3 mmol/L     Chloride 88 (L) 98 - 107 mmol/L    Carbon Dioxide (CO2) 25 22 - 29 mmol/L    Anion Gap 17 (H) 7 - 15 mmol/L    Urea Nitrogen 60.3 (H) 8.0 - 23.0 mg/dL    Creatinine 3.68 (H) 0.67 - 1.17 mg/dL    GFR Estimate 17 (L) >60 mL/min/1.73m2    Calcium 8.8 8.8 - 10.2 mg/dL    Glucose 112 (H) 70 - 99 mg/dL   Hepatic function panel     Status: Abnormal   Result Value Ref Range    Protein Total 6.5 6.4 - 8.3 g/dL    Albumin 3.3 (L) 3.5 - 5.2 g/dL    Bilirubin Total 0.9 <=1.2 mg/dL    Alkaline Phosphatase 108 40 - 150 U/L    AST 44 0 - 45 U/L    ALT 15 0 - 70 U/L    Bilirubin Direct     Lipase     Status: Normal   Result Value Ref Range    Lipase 29 13 - 60 U/L   Lactic acid whole blood with 1x repeat in 2 hr when >2     Status: Normal   Result Value Ref Range    Lactic Acid, Initial 1.9 0.7 - 2.0 mmol/L   Troponin T, High Sensitivity     Status: Abnormal   Result Value Ref Range    Troponin T, High Sensitivity 49 (H) <=22 ng/L   Magnesium     Status: Normal   Result Value Ref Range    Magnesium 2.1 1.7 - 2.3 mg/dL   Phosphorus     Status: Normal   Result Value Ref Range    Phosphorus 3.9 2.5 - 4.5 mg/dL   Ethyl Alcohol Level     Status: Normal   Result Value Ref Range    Alcohol ethyl <0.01 <=0.01 g/dL   UA with Microscopic reflex to Culture     Status: Abnormal    Specimen: Urine, Midstream   Result Value Ref Range    Color Urine Light Yellow Colorless, Straw, Light Yellow, Yellow    Appearance Urine Clear Clear    Glucose Urine 70 (A) Negative mg/dL    Bilirubin Urine Negative Negative    Ketones Urine Negative Negative mg/dL    Specific Gravity Urine 1.020 1.003 - 1.035    Blood Urine Moderate (A) Negative    pH Urine 6.0 5.0 - 7.0    Protein Albumin Urine 20 (A) Negative mg/dL    Urobilinogen Urine Normal Normal, 2.0 mg/dL    Nitrite Urine Negative Negative    Leukocyte Esterase Urine Negative Negative    Mucus Urine Present (A) None Seen /LPF    RBC Urine 0 <=2 /HPF    WBC Urine <1 <=5 /HPF    Hyaline Casts Urine 5 (H) <=2  /LPF    Narrative    Urine Culture not indicated   Asymptomatic COVID-19 Virus (Coronavirus) by PCR Nasopharyngeal     Status: Normal    Specimen: Nasopharyngeal; Swab   Result Value Ref Range    SARS CoV2 PCR Negative Negative    Narrative    Testing was performed using the Xpert Xpress SARS-CoV-2 Assay on the Cepheid Gene-Xpert Instrument Systems. Additional information about this Emergency Use Authorization (EUA) assay can be found via the Lab Guide. This test should be ordered for the detection of SARS-CoV-2 in individuals who meet SARS-CoV-2 clinical and/or epidemiological criteria as well as from individuals without symptoms or other reasons to suspect COVID-19. Test performance for asymptomatic patients has only been established in anterior nasal swab specimens. This test is for in vitro diagnostic use under the FDA EUA for laboratories certified under CLIA to perform high complexity testing. This test has not been FDA cleared or approved. A negative result does not rule out the presence of PCR inhibitors in the specimen or target RNA concentration below the limit of detection for the assay. The possibility of a false negative should be considered if the patient's recent exposure or clinical presentation suggests COVID-19. This test was validated by [facility]. These Laboratories are certified under the Clinical Laboratory Improvement Amendments (CLIA) as qualified to perform high complexity testing.     CBC with platelets and differential     Status: Abnormal   Result Value Ref Range    WBC Count 14.7 (H) 4.0 - 11.0 10e3/uL    RBC Count 3.19 (L) 4.40 - 5.90 10e6/uL    Hemoglobin 10.3 (L) 13.3 - 17.7 g/dL    Hematocrit 30.0 (L) 40.0 - 53.0 %    MCV 94 78 - 100 fL    MCH 32.3 26.5 - 33.0 pg    MCHC 34.3 31.5 - 36.5 g/dL    RDW 15.6 (H) 10.0 - 15.0 %    Platelet Count 451 (H) 150 - 450 10e3/uL    % Neutrophils 74 %    % Lymphocytes 13 %    % Monocytes 11 %    % Eosinophils 0 %    % Basophils  1 %    % Immature Granulocytes 1 %    NRBCs per 100 WBC 0 <1 /100    Absolute Neutrophils 11.1 (H) 1.6 - 8.3 10e3/uL    Absolute Lymphocytes 1.9 0.8 - 5.3 10e3/uL    Absolute Monocytes 1.5 (H) 0.0 - 1.3 10e3/uL    Absolute Eosinophils 0.0 0.0 - 0.7 10e3/uL    Absolute Basophils 0.1 0.0 - 0.2 10e3/uL    Absolute Immature Granulocytes 0.1 <=0.4 10e3/uL    Absolute NRBCs 0.0 10e3/uL   Urine Drug Screen Panel     Status: Normal   Result Value Ref Range    Amphetamines Urine Screen Negative Screen Negative    Barbituates Urine Screen Negative Screen Negative    Benzodiazepine Urine Screen Negative Screen Negative    Cannabinoids Urine Screen Negative Screen Negative    Cocaine Urine Screen Negative Screen Negative    Fentanyl Qual Urine Screen Negative Screen Negative    Opiates Urine Screen Negative Screen Negative    PCP Urine Screen Negative Screen Negative   Adult Type and Screen     Status: None   Result Value Ref Range    ABO/RH(D) O POS     Antibody Screen Negative Negative    SPECIMEN EXPIRATION DATE [id number]    CBC with platelets differential     Status: Abnormal    Narrative    The following orders were created for panel order CBC with platelets differential.  Procedure                               Abnormality         Status                     ---------                               -----------         ------                     CBC with platelets and d...[[id number]]  Abnormal            Final result                 Please view results for these tests on the individual orders.   ABO/Rh type and screen     Status: None    Narrative    The following orders were created for panel order ABO/Rh type and screen.  Procedure                               Abnormality         Status                     ---------                               -----------         ------                     Adult Type and Screen[[id number]]                            Final result                 Please view results for these tests on  the individual orders.   Urine Drug Screen     Status: Normal    Narrative    The following orders were created for panel order Urine Drug Screen.  Procedure                               Abnormality         Status                     ---------                               -----------         ------                     Urine Drug Screen Panel[[id number]]      Normal              Final result                 Please view results for these tests on the individual orders.     Medications   pharmacy alert - intermittent dosing (has no administration in time range)   vancomycin (VANCOCIN) 1,500 mg in 0.9% NaCl 250 mL intermittent infusion (1,500 mg Intravenous $New Bag 5/27/24 0105)   vancomycin place [facility] - receiving intermittent dosing (has no administration in time range)   thiamine (B-1) injection 500 mg (500 mg Intravenous $Given 5/27/24 0105)     Followed by   thiamine (B-1) injection 250 mg (has no administration in time range)     Followed by   thiamine (B-1) tablet 100 mg (has no administration in time range)   melatonin tablet 5 mg (has no administration in time range)   diazepam (VALIUM) tablet 10 mg (has no administration in time range)     Or   diazepam (VALIUM) injection 5-10 mg (has no administration in time range)   flumazenil (ROMAZICON) injection 0.2 mg (has no administration in time range)   folic acid (FOLVITE) tablet 1 mg (has no administration in time range)   multivitamin w/minerals (THERA-VIT-M) tablet 1 tablet (has no administration in time range)   piperacillin-tazobactam (ZOSYN) 4.5 g vial to attach to  mL bag (0 g Intravenous Stopped 5/27/24 0104)   folic acid (FOLVITE) tablet 1 mg (1 mg Oral $Given 5/26/24 2352)   oxyCODONE (ROXICODONE) tablet 5 mg (5 mg Oral $Given 5/26/24 2353)   acetaminophen (TYLENOL) tablet 1,000 mg (1,000 mg Oral $Given 5/26/24 2352)   levETIRAcetam (KEPPRA) 2,000 mg in sodium chloride 0.9 % 250 mL intermittent infusion (2,000 mg Intravenous $New Bag 5/27/24  0102)     Labs Ordered and Resulted from Time of ED Arrival to Time of ED Departure   BASIC METABOLIC PANEL - Abnormal       Result Value    Sodium 130 (*)     Potassium 3.6      Chloride 88 (*)     Carbon Dioxide (CO2) 25      Anion Gap 17 (*)     Urea Nitrogen 60.3 (*)     Creatinine 3.68 (*)     GFR Estimate 17 (*)     Calcium 8.8      Glucose 112 (*)    HEPATIC FUNCTION PANEL - Abnormal    Protein Total 6.5      Albumin 3.3 (*)     Bilirubin Total 0.9      Alkaline Phosphatase 108      AST 44      ALT 15      Bilirubin Direct       TROPONIN T, HIGH SENSITIVITY - Abnormal    Troponin T, High Sensitivity 49 (*)    ROUTINE UA WITH MICROSCOPIC REFLEX TO CULTURE - Abnormal    Color Urine Light Yellow      Appearance Urine Clear      Glucose Urine 70 (*)     Bilirubin Urine Negative      Ketones Urine Negative      Specific Gravity Urine 1.020      Blood Urine Moderate (*)     pH Urine 6.0      Protein Albumin Urine 20 (*)     Urobilinogen Urine Normal      Nitrite Urine Negative      Leukocyte Esterase Urine Negative      Mucus Urine Present (*)     RBC Urine 0      WBC Urine <1      Hyaline Casts Urine 5 (*)    CBC WITH PLATELETS AND DIFFERENTIAL - Abnormal    WBC Count 14.7 (*)     RBC Count 3.19 (*)     Hemoglobin 10.3 (*)     Hematocrit 30.0 (*)     MCV 94      MCH 32.3      MCHC 34.3      RDW 15.6 (*)     Platelet Count 451 (*)     % Neutrophils 74      % Lymphocytes 13      % Monocytes 11      % Eosinophils 0      % Basophils 1      % Immature Granulocytes 1      NRBCs per 100 WBC 0      Absolute Neutrophils 11.1 (*)     Absolute Lymphocytes 1.9      Absolute Monocytes 1.5 (*)     Absolute Eosinophils 0.0      Absolute Basophils 0.1      Absolute Immature Granulocytes 0.1      Absolute NRBCs 0.0     INR - Normal    INR 1.08     PARTIAL THROMBOPLASTIN TIME - Normal    aPTT 27     LIPASE - Normal    Lipase 29     LACTIC ACID WHOLE BLOOD WITH 1X REPEAT IN 2 HR WHEN >2 - Normal    Lactic Acid, Initial 1.9      MAGNESIUM - Normal    Magnesium 2.1     PHOSPHORUS - Normal    Phosphorus 3.9     ETHYL ALCOHOL LEVEL - Normal    Alcohol ethyl <0.01     COVID-19 VIRUS (CORONAVIRUS) BY PCR - Normal    SARS CoV2 PCR Negative     URINE DRUG SCREEN PANEL - Normal    Amphetamines Urine Screen Negative      Barbituates Urine Screen Negative      Benzodiazepine Urine Screen Negative      Cannabinoids Urine Screen Negative      Cocaine Urine Screen Negative      Fentanyl Qual Urine Screen Negative      Opiates Urine Screen Negative      PCP Urine Screen Negative     TROPONIN T, HIGH SENSITIVITY   TYPE AND SCREEN, ADULT    ABO/RH(D) O POS      Antibody Screen Negative      SPECIMEN EXPIRATION DATE [id number]     ABO/RH TYPE AND SCREEN     CT Lumbar Spine w/o Contrast   Final Result   IMPRESSION:   HEAD CT:   1.  Interval development of a thin parafalcine subdural hematoma.   2.  Stable thin right tentorial subdural hematoma.   3.  No mass effect or midline shift.      CERVICAL SPINE CT:   1.  No acute cervical spine fracture.      THORACIC SPINE CT:   1.  T2 superior endplate fracture with mild height loss, likely acute.   2.  No retropulsion or traumatic subluxation.   3.  Chronic compression deformities, as above.      LUMBAR SPINE CT:   1.  Acute L1 superior endplate compression fracture with mild height loss.   2.  No retropulsion or traumatic subluxation.      CT Thoracic Spine w/o Contrast   Final Result   IMPRESSION:   HEAD CT:   1.  Interval development of a thin parafalcine subdural hematoma.   2.  Stable thin right tentorial subdural hematoma.   3.  No mass effect or midline shift.      CERVICAL SPINE CT:   1.  No acute cervical spine fracture.      THORACIC SPINE CT:   1.  T2 superior endplate fracture with mild height loss, likely acute.   2.  No retropulsion or traumatic subluxation.   3.  Chronic compression deformities, as above.      LUMBAR SPINE CT:   1.  Acute L1 superior endplate compression fracture with mild  height loss.   2.  No retropulsion or traumatic subluxation.      CT Cervical Spine w/o Contrast   Final Result   IMPRESSION:   HEAD CT:   1.  Interval development of a thin parafalcine subdural hematoma.   2.  Stable thin right tentorial subdural hematoma.   3.  No mass effect or midline shift.      CERVICAL SPINE CT:   1.  No acute cervical spine fracture.      THORACIC SPINE CT:   1.  T2 superior endplate fracture with mild height loss, likely acute.   2.  No retropulsion or traumatic subluxation.   3.  Chronic compression deformities, as above.      LUMBAR SPINE CT:   1.  Acute L1 superior endplate compression fracture with mild height loss.   2.  No retropulsion or traumatic subluxation.      Head CT w/o contrast   Final Result   IMPRESSION:   HEAD CT:   1.  Interval development of a thin parafalcine subdural hematoma.   2.  Stable thin right tentorial subdural hematoma.   3.  No mass effect or midline shift.      CERVICAL SPINE CT:   1.  No acute cervical spine fracture.      THORACIC SPINE CT:   1.  T2 superior endplate fracture with mild height loss, likely acute.   2.  No retropulsion or traumatic subluxation.   3.  Chronic compression deformities, as above.      LUMBAR SPINE CT:   1.  Acute L1 superior endplate compression fracture with mild height loss.   2.  No retropulsion or traumatic subluxation.      XR Pelvis 1/2 Views   Final Result   IMPRESSION: Normal joint spaces and alignment. No fracture.      Chest XR,  PA & LAT   Final Result   IMPRESSION: Minor strand of linear atelectasis left lower lung. Both lungs are otherwise clear. No adenopathy or effusion. Normal cardiac size and pulmonary vascularity. Mildly atherosclerotic thoracic aorta. Mild degenerative changes both shoulders and    the spine. Multiple bilateral healed rib fracture deformities, unchanged. Monitoring leads overlying the chest.      Cervical spine MRI w/o contrast    (Results Pending)   MR Thoracic Spine w/o Contrast    (Results  Pending)   CT Head w/o Contrast    (Results Pending)          Critical Care Addendum  My initial assessment, based on my review of prehospital provider report, review of nursing observations, review of vital signs, focused history, physical exam, and discussion with Referring hospital , established a high suspicion that Luigi Vieyra has severe traumatic injuries, which requires immediate intervention, and therefore he is critically ill.     After the initial assessment, the care team initiated multiple lab tests, initiated IV fluid administration, initiated medication therapy with IV AEDs, high dose vitamins, and consulted with NSG, neurology, medicine, and trauma  to provide stabilization care. Due to the critical nature of this patient, I reassessed nursing observations, vital signs, physical exam, mental status, neurologic status, and respiratory status multiple times prior to his disposition.     Time also spent performing documentation, reviewing test results, discussion with consultants, and coordination of care.     Critical care time (excluding teaching time and procedures): 45 minutes.       Assessment & Plan    Luigi Vieyra is a 68 year old male with a past medical history significant for ongoing peroneal neuropathy, gastritis 2/2 alcohol abuse, stage III CKD, HLD, and HTN presents to the ED as a transfer from [facility] for neurosurgery evaluation.  Patient initially presented to [facility] ED earlier today for evaluation of left-sided weakness and back pain, persisting for the past 5 days.  Imaging revealed a 4 mm subdural hematoma along the right tentorial leaflet. Given the profound deficits and the size of the subdural, it was recommended that he be further managed by neurosurgery.  On arrival the patient was noted to have unremarkable vital signs.  The deficits on the left upper and lumbar extremities were appreciated.  Stroke neurology and neurosurgery were consulted as was trauma  surgery.    Laboratory studies show a downtrending creatinine but still with a significant acute kidney injury likely in the setting of dehydration in this clinical context.  He has a mild troponinemia without signs of acute ischemia/CHRISTELLE on his EKG that is possibly related to this MAIA.  There is a leukocytosis that is likely reactive in the setting of trauma.  The patient did flagged on the new [facility] sepsis predictive model as having a elevated percentage of 3 days of IV antibiotics resulting in a mortality benefit.  The model does not explain why it came to this decision.  We have been told to give antibiotics if this popup is noted.  Antibiotics were given.    Given the patient's history of alcohol use disorder he was placed on the CIWA withdrawal protocol.  I have also initiated empiric high-dose thiamine replacement due to concern for possible Wernicke.    Due to the complaints of pain additional imaging was ordered showing an acute L1 superior endplate compression fracture as well as a T2 superior endplate fracture there is no significant retropulsion.  These are likely unrelated to the patient's neurologic symptoms.  Furthermore the repeat head CT showed interval development of a thin parafalcine hematoma.  Neurosurgery team was not concerned about this.  They recommended admission to either medicine or trauma.  The trauma team initially thought patient would be better served on medicine however the overnight hospitalist believes that with these additional traumatic findings on subsequent imaging as well as the small expansion of the bleed the patient would be best served on trauma for 24 hours.  Trauma will admit with medicine consulting.    Stroke neurology is reassuring against ischemia as an etiology based on the MRI at the outside hospital.  Given the patient's neurologic findings there is concern for possible cervical or high thoracic myelopathy.  MRIs will be obtained.  Per MRI technicians additional MRIs  with contrast cannot be obtained tonight, per neurosurgery's recommendation we will get MRIs without contrast to start.    Patient will be admitted to the trauma service.  MRIs of the cervical and thoracic spine are outstanding and will need to be followed up.       New Prescriptions    No medications on file       Final diagnoses:   Left-sided weakness   Subdural hemorrhage (H)   MAIA (acute kidney injury) (H24)   Elevated troponin   Severe alcohol use disorder (H)   Alcohol withdrawal syndrome without complication (H)   Closed compression fracture of body of L1 vertebra (H)   Other closed fracture of second thoracic vertebra, initial encounter (H) - endplate   I, Shelly Osman, am serving as a trained medical scribe to document services personally performed by Kp Steward MD, based on the provider's statements to me.     I, Kp Steward MD, was physically present and have reviewed and verified the accuracy of this note documented by Shelly Osman.      Kp Steward Jr., MD   [facility] EMERGENCY DEPARTMENT  5/26/2024     Kp Steward MD  05/27/24 0150

## 2024-05-27 NOTE — PROGRESS NOTES
[facility]   Tertiary Survey Progress Note     Date of Service (when I saw the patient): 05/27/2024  History of present illness  Luigi Vieyra is a 68 year old male with a past medical history significant for alcohol use disorder, peroneal neuropathy, gastritis, stage III CKD, HLD, and HTN who presented to the ED with left sided weakness and multiple falls over a period of 3-5days. Stroke evaluation was initially pursed. He was found to have a 4mm subdural hematoma along the right tentorial leaflet. No on anticoagulation. He was transferred here for further cares.    Other significant findings:   Multi level acute on chronic vertebrae deformities,   Hyponatremia, MAIA, AGMA, leukocytosis, thrombocytosis, hypoalbuminemia    A tertiary exam was completed today. He is awake, alert and able to participate with the exam. Medicine consulted and is following.    Assessment & Plan     Neuro/Pain:  # Traumatic right tentorial 4mm SDH  # Left hemiparesis  Stability scan 05/27/2024 with decreased conspicuity.  Awake alert, no other deficits than left upper extremity weakness, no change in sensation.  CTA head and neck and MRI brain without large vessel occlusion of acute stroke, moderate brain atrophy noted.  MRI C/T spine notable for C7 endplate fracture with 10% height loss, severe cervical canal stenosis.  Neurology, Neurosurgery following  - Continue neuro checks every 4 hours  - Keppra for seizure prophylaxis  - Maintain SBP < 140  - Platelets > 100,000  - INR < 1.5  - Hemoglobin > 8  - DVT: SCDs while in bed  - MRI left brachial plexus ordered  - Maintain circadian rhythm.  Lights on during the day.  Off at night, minimize cares at night.  OOB during the day.    # Alcohol use disorder  Last alcohol use as reported per patient was 4 prior to admission. Usually consumes about 1 gallon of vodka per week  - CIWA protocol, high dose thiamine taper, folic and MVI  - Addiction  medicine consult    #Multilevel end plate fractures (C7,T2, L1)  Pain control  Cervical collar    Pulmonary:  # Hx Asthma  - Supplemental oxygen to keep saturation above 92 %.  - Aggressive pulmonary hygiene. Incentive spirometer while awake   - PRN albuterol    Cardiovascular:    #HTN  Troponin is,mildly elevated @ 49, down trending,  12 lead EKG  Resume Metoprolol with hold parameters, hold Losartan due to MAIA    GI/Nutrition:    regular diet    Renal/ Fluids/Electrolytes:  # Acute on chronic kidney injury  # AGMA  # Hyponatremia,  hypovolemic  # Hypomagnesemia  Na 129--> 133 after fluid bolus  Creatine was up from his baseline from 1.3 to 4.7 on admission, AG 25   not concerning for rhabdomyolysis  - LR for IV fluid hydration.   - electrolyte replacement protocol  In place.     Endocrine:  - PTA medications: none   - No management indication.     Infectious disease:   #Leukocytosis  Due to hemoconcentration. He initially received a dose of Vanco/Zosyn concern for sepsis, however no lactic acidosis, fever, UA unremarkable.  - Monitor for fever  - No antibiotics for now    Hematology:    # Anemia of acute and chronic illness  - Admitted with Hb of 12.5g/dL--> 10.3, possibly dilutional after fluid boluses. There are no acute signs of bleeding  - Iron studies , folate and B12 due to hx of alcohol use  - Threshold for transfusion if hgb <7.0 or signs/symptoms of hypoperfusion.       Musculoskeletal:  #Falls  # Weakness and deconditioning of acute on chronic illness  - Physical and occupational therapy consults.    Skin:  - dilgent cares to prevent skin breakdown and wound formation.      Lines/ tubes/ drains:  - PIV  Code status:   General Cares:    PPI/H2 blocker:  n/a   DVT prophylaxis: Mechanical 2/2 ICH   Bowel Regimen/Date of last stool: PTA   Pulmonary toilet: cough and deep breath   ETOH screen completed: yes   Lines / drains: yes    Discharge goals:   Expected D/C date: TBD pending additional work up for  LUE weakness    Interval History     Review Of Systems  Skin: negative  Eyes: negative  Ears/Nose/Throat: negative  Respiratory: No shortness of breath, dyspnea on exertion, cough, or hemoptysis  Cardiovascular: negative  Gastrointestinal: negative  Genitourinary: negative  Musculoskeletal: positive for positive for left upper extremity (shoulder weakness)  Neurologic: incoordination  Psychiatric: alcohol dependence  Hematologic/Lymphatic/Immunologic: negative  Endocrine: negative     Physical Exam     Mauro Coma Scale - Total 15/15  Eye Response (E): 4   4= spontaneous, 3= to verbal/voice, 2= to pain, 1= No response   Verbal Response (V): 5   5= Orientated, converses, 4= Confused, converses, 3= Inappropriate words, 2= Incomprehensible sounds, 1=No response   Motor Response (M): 6   6= Obeys commands, 5= Localizes to pain, 4= Withdrawal to pain, 3=Fexion to pain, 2= Extension to pain, 1= No response     Frailty Questionnaire: To be done for all patients age 60+. To be completed on admission or with the tertiary exam  F (Fatigue): Is the patient easily fatigued? YES = 1  R (Resistance): Is the patient unable to walk one flight of stairs? YES = 1, due to recent weakness and falls  A (Ambulation): Is the patient unable to walk one block? YES = 1  I  (Illness): Does the patient have more than five illnesses? NO = 0  L (Loss of weight): Has the patient lost more than 5% of weight in the past 6 months. NO = 0  Lost five pounds or more in the last 3 months without trying? AND/OR Unintended weight loss?  Does the patient have difficulty performing housework such as washing windows or scrubbing floors? AND Activity in a typical 24-hour day- No moderate or vigorous activity    Score: 3    Score: 0-2: Ensure appropriate therapies consulted if needed     Physical Exam  Vitals reviewed.   HENT:      Head: Normocephalic.      Right Ear: Tympanic membrane normal.      Nose: Nose normal.      Mouth/Throat:      Mouth: Mucous  membranes are dry.   Eyes:      Pupils: Pupils are equal, round, and reactive to light.   Neck:      Comments: Cervical collar ON  Cardiovascular:      Rate and Rhythm: Normal rate and regular rhythm.      Pulses: Normal pulses.      Heart sounds: Normal heart sounds. No murmur heard.  Pulmonary:      Effort: Pulmonary effort is normal.      Breath sounds: Normal breath sounds.   Abdominal:      General: Abdomen is flat. Bowel sounds are normal. There is no distension.      Tenderness: There is no abdominal tenderness.   Musculoskeletal:         General: No tenderness, deformity or signs of injury.   Skin:     General: Skin is warm and dry.      Comments: Rigth forearm skin tear, scabbed   Neurological:      Mental Status: He is alert and oriented to person, place, and time.      Motor: Weakness present.      Comments: Left shoulder  and upper extremity weakness   Psychiatric:         Mood and Affect: Mood normal.       Temp: 97.7  F (36.5  C) Temp src: Oral BP: 97/68 Pulse: 60   Resp: 18 SpO2: 100 % O2 Device: None (Room air)    Vitals:    05/26/24 2147   Weight: 72.6 kg (160 lb)     Vital Signs with Ranges  Temp:  [97.7  F (36.5  C)-98.9  F (37.2  C)] 97.7  F (36.5  C)  Pulse:  [] 60  Resp:  [12-32] 18  BP: ()/() 97/68  SpO2:  [95 %-100 %] 100 %  I/O last 3 completed shifts:  In: -   Out: 300 [Urine:300]      DEYSI Cortez CNP  To contact the trauma service use job code pager [phone],   Numeric texts or alpha text through [facility]

## 2024-05-28 ENCOUNTER — APPOINTMENT (OUTPATIENT)
Dept: CT IMAGING | Facility: CLINIC | Age: 69
DRG: 871 | End: 2024-05-28
Attending: NURSE PRACTITIONER
Payer: COMMERCIAL

## 2024-05-28 ENCOUNTER — APPOINTMENT (OUTPATIENT)
Dept: GENERAL RADIOLOGY | Facility: CLINIC | Age: 69
DRG: 871 | End: 2024-05-28
Attending: INTERNAL MEDICINE
Payer: COMMERCIAL

## 2024-05-28 ENCOUNTER — APPOINTMENT (OUTPATIENT)
Dept: ULTRASOUND IMAGING | Facility: CLINIC | Age: 69
DRG: 871 | End: 2024-05-28
Attending: INTERNAL MEDICINE
Payer: COMMERCIAL

## 2024-05-28 ENCOUNTER — APPOINTMENT (OUTPATIENT)
Dept: PHYSICAL THERAPY | Facility: CLINIC | Age: 69
DRG: 871 | End: 2024-05-28
Attending: NURSE PRACTITIONER
Payer: COMMERCIAL

## 2024-05-28 ENCOUNTER — APPOINTMENT (OUTPATIENT)
Dept: OCCUPATIONAL THERAPY | Facility: CLINIC | Age: 69
DRG: 871 | End: 2024-05-28
Attending: NURSE PRACTITIONER
Payer: COMMERCIAL

## 2024-05-28 ENCOUNTER — APPOINTMENT (OUTPATIENT)
Dept: MRI IMAGING | Facility: CLINIC | Age: 69
DRG: 871 | End: 2024-05-28
Attending: INTERNAL MEDICINE
Payer: COMMERCIAL

## 2024-05-28 LAB
ANION GAP SERPL CALCULATED.3IONS-SCNC: 12 MMOL/L (ref 7–15)
BACTERIA SPT CULT: NORMAL
BUN SERPL-MCNC: 37.8 MG/DL (ref 8–23)
CALCIUM SERPL-MCNC: 8.9 MG/DL (ref 8.8–10.2)
CHLORIDE SERPL-SCNC: 97 MMOL/L (ref 98–107)
CK SERPL-CCNC: 343 U/L (ref 39–308)
CREAT SERPL-MCNC: 2.54 MG/DL (ref 0.67–1.17)
CRP SERPL-MCNC: 70.2 MG/L
DEPRECATED HCO3 PLAS-SCNC: 26 MMOL/L (ref 22–29)
EGFRCR SERPLBLD CKD-EPI 2021: 27 ML/MIN/1.73M2
ERYTHROCYTE [DISTWIDTH] IN BLOOD BY AUTOMATED COUNT: 15.5 % (ref 10–15)
GLUCOSE SERPL-MCNC: 138 MG/DL (ref 70–99)
GRAM STAIN RESULT: NORMAL
HCT VFR BLD AUTO: 28.5 % (ref 40–53)
HGB BLD-MCNC: 9.5 G/DL (ref 13.3–17.7)
HOLD SPECIMEN: NORMAL
HOLD SPECIMEN: NORMAL
LACTATE SERPL-SCNC: 2.3 MMOL/L (ref 0.7–2)
LACTATE SERPL-SCNC: 2.9 MMOL/L (ref 0.7–2)
MAGNESIUM SERPL-MCNC: 1.3 MG/DL (ref 1.7–2.3)
MAGNESIUM SERPL-MCNC: 2 MG/DL (ref 1.7–2.3)
MCH RBC QN AUTO: 32.1 PG (ref 26.5–33)
MCHC RBC AUTO-ENTMCNC: 33.3 G/DL (ref 31.5–36.5)
MCV RBC AUTO: 96 FL (ref 78–100)
PHOSPHATE SERPL-MCNC: 2.2 MG/DL (ref 2.5–4.5)
PLATELET # BLD AUTO: 449 10E3/UL (ref 150–450)
POTASSIUM SERPL-SCNC: 3.5 MMOL/L (ref 3.4–5.3)
POTASSIUM SERPL-SCNC: 3.8 MMOL/L (ref 3.4–5.3)
PROCALCITONIN SERPL IA-MCNC: 1.67 NG/ML
RBC # BLD AUTO: 2.96 10E6/UL (ref 4.4–5.9)
RETICS # AUTO: 0.03 10E6/UL (ref 0.03–0.1)
RETICS/RBC NFR AUTO: 1.1 % (ref 0.5–2)
SODIUM SERPL-SCNC: 135 MMOL/L (ref 135–145)
WBC # BLD AUTO: 13.7 10E3/UL (ref 4–11)

## 2024-05-28 PROCEDURE — 250N000013 HC RX MED GY IP 250 OP 250 PS 637: Performed by: INTERNAL MEDICINE

## 2024-05-28 PROCEDURE — 97530 THERAPEUTIC ACTIVITIES: CPT | Mod: GP

## 2024-05-28 PROCEDURE — 72125 CT NECK SPINE W/O DYE: CPT | Mod: 26 | Performed by: RADIOLOGY

## 2024-05-28 PROCEDURE — 36415 COLL VENOUS BLD VENIPUNCTURE: CPT | Performed by: NURSE PRACTITIONER

## 2024-05-28 PROCEDURE — 83735 ASSAY OF MAGNESIUM: CPT | Performed by: STUDENT IN AN ORGANIZED HEALTH CARE EDUCATION/TRAINING PROGRAM

## 2024-05-28 PROCEDURE — 36415 COLL VENOUS BLD VENIPUNCTURE: CPT | Performed by: INTERNAL MEDICINE

## 2024-05-28 PROCEDURE — 84145 PROCALCITONIN (PCT): CPT | Performed by: INTERNAL MEDICINE

## 2024-05-28 PROCEDURE — 99418 PROLNG IP/OBS E/M EA 15 MIN: CPT | Performed by: INTERNAL MEDICINE

## 2024-05-28 PROCEDURE — 97165 OT EVAL LOW COMPLEX 30 MIN: CPT | Mod: GO

## 2024-05-28 PROCEDURE — 71045 X-RAY EXAM CHEST 1 VIEW: CPT

## 2024-05-28 PROCEDURE — 99233 SBSQ HOSP IP/OBS HIGH 50: CPT | Performed by: INTERNAL MEDICINE

## 2024-05-28 PROCEDURE — 80048 BASIC METABOLIC PNL TOTAL CA: CPT | Performed by: SURGERY

## 2024-05-28 PROCEDURE — 87040 BLOOD CULTURE FOR BACTERIA: CPT | Performed by: INTERNAL MEDICINE

## 2024-05-28 PROCEDURE — 85045 AUTOMATED RETICULOCYTE COUNT: CPT | Performed by: INTERNAL MEDICINE

## 2024-05-28 PROCEDURE — 76770 US EXAM ABDO BACK WALL COMP: CPT | Mod: 26 | Performed by: RADIOLOGY

## 2024-05-28 PROCEDURE — 70450 CT HEAD/BRAIN W/O DYE: CPT

## 2024-05-28 PROCEDURE — 72141 MRI NECK SPINE W/O DYE: CPT

## 2024-05-28 PROCEDURE — 99232 SBSQ HOSP IP/OBS MODERATE 35: CPT | Performed by: NURSE PRACTITIONER

## 2024-05-28 PROCEDURE — 97161 PT EVAL LOW COMPLEX 20 MIN: CPT | Mod: GP

## 2024-05-28 PROCEDURE — 84100 ASSAY OF PHOSPHORUS: CPT | Performed by: SURGERY

## 2024-05-28 PROCEDURE — 70450 CT HEAD/BRAIN W/O DYE: CPT | Mod: 26 | Performed by: RADIOLOGY

## 2024-05-28 PROCEDURE — 258N000003 HC RX IP 258 OP 636: Performed by: INTERNAL MEDICINE

## 2024-05-28 PROCEDURE — 86140 C-REACTIVE PROTEIN: CPT | Performed by: INTERNAL MEDICINE

## 2024-05-28 PROCEDURE — 87070 CULTURE OTHR SPECIMN AEROBIC: CPT | Performed by: INTERNAL MEDICINE

## 2024-05-28 PROCEDURE — 99207 PR APP CREDIT; MD BILLING SHARED VISIT: CPT | Performed by: STUDENT IN AN ORGANIZED HEALTH CARE EDUCATION/TRAINING PROGRAM

## 2024-05-28 PROCEDURE — 250N000013 HC RX MED GY IP 250 OP 250 PS 637: Performed by: HOSPITALIST

## 2024-05-28 PROCEDURE — 84300 ASSAY OF URINE SODIUM: CPT | Performed by: INTERNAL MEDICINE

## 2024-05-28 PROCEDURE — 83605 ASSAY OF LACTIC ACID: CPT | Performed by: INTERNAL MEDICINE

## 2024-05-28 PROCEDURE — 76770 US EXAM ABDO BACK WALL COMP: CPT

## 2024-05-28 PROCEDURE — 84295 ASSAY OF SERUM SODIUM: CPT | Performed by: INTERNAL MEDICINE

## 2024-05-28 PROCEDURE — 250N000011 HC RX IP 250 OP 636: Performed by: NURSE PRACTITIONER

## 2024-05-28 PROCEDURE — 36415 COLL VENOUS BLD VENIPUNCTURE: CPT | Performed by: SURGERY

## 2024-05-28 PROCEDURE — 999N000248 HC STATISTIC IV INSERT WITH US BY RN

## 2024-05-28 PROCEDURE — 83735 ASSAY OF MAGNESIUM: CPT | Performed by: PSYCHIATRY & NEUROLOGY

## 2024-05-28 PROCEDURE — 250N000011 HC RX IP 250 OP 636: Performed by: INTERNAL MEDICINE

## 2024-05-28 PROCEDURE — 84132 ASSAY OF SERUM POTASSIUM: CPT | Performed by: NURSE PRACTITIONER

## 2024-05-28 PROCEDURE — 99222 1ST HOSP IP/OBS MODERATE 55: CPT | Mod: GC | Performed by: STUDENT IN AN ORGANIZED HEALTH CARE EDUCATION/TRAINING PROGRAM

## 2024-05-28 PROCEDURE — 250N000013 HC RX MED GY IP 250 OP 250 PS 637: Performed by: STUDENT IN AN ORGANIZED HEALTH CARE EDUCATION/TRAINING PROGRAM

## 2024-05-28 PROCEDURE — 250N000011 HC RX IP 250 OP 636: Performed by: STUDENT IN AN ORGANIZED HEALTH CARE EDUCATION/TRAINING PROGRAM

## 2024-05-28 PROCEDURE — 120N000002 HC R&B MED SURG/OB UMMC

## 2024-05-28 PROCEDURE — 82565 ASSAY OF CREATININE: CPT | Performed by: INTERNAL MEDICINE

## 2024-05-28 PROCEDURE — 72125 CT NECK SPINE W/O DYE: CPT

## 2024-05-28 PROCEDURE — 71045 X-RAY EXAM CHEST 1 VIEW: CPT | Mod: 26 | Performed by: RADIOLOGY

## 2024-05-28 PROCEDURE — 82550 ASSAY OF CK (CPK): CPT | Performed by: INTERNAL MEDICINE

## 2024-05-28 PROCEDURE — 250N000013 HC RX MED GY IP 250 OP 250 PS 637: Performed by: NURSE PRACTITIONER

## 2024-05-28 PROCEDURE — 72141 MRI NECK SPINE W/O DYE: CPT | Mod: 26 | Performed by: RADIOLOGY

## 2024-05-28 PROCEDURE — 85014 HEMATOCRIT: CPT | Performed by: NURSE PRACTITIONER

## 2024-05-28 PROCEDURE — 99207 PR APP CREDIT; MD BILLING SHARED VISIT: CPT | Performed by: NURSE PRACTITIONER

## 2024-05-28 PROCEDURE — 999N000128 HC STATISTIC PERIPHERAL IV START W/O US GUIDANCE

## 2024-05-28 RX ORDER — HEPARIN SODIUM 5000 [USP'U]/.5ML
5000 INJECTION, SOLUTION INTRAVENOUS; SUBCUTANEOUS EVERY 8 HOURS
Status: DISCONTINUED | OUTPATIENT
Start: 2024-05-28 | End: 2024-06-03 | Stop reason: HOSPADM

## 2024-05-28 RX ORDER — ACETAMINOPHEN 325 MG/1
975 TABLET ORAL EVERY 8 HOURS
Status: DISCONTINUED | OUTPATIENT
Start: 2024-05-28 | End: 2024-06-03 | Stop reason: HOSPADM

## 2024-05-28 RX ORDER — METHOCARBAMOL 500 MG/1
250 TABLET ORAL 3 TIMES DAILY PRN
Status: DISCONTINUED | OUTPATIENT
Start: 2024-05-28 | End: 2024-06-03 | Stop reason: HOSPADM

## 2024-05-28 RX ORDER — SODIUM CHLORIDE, SODIUM LACTATE, POTASSIUM CHLORIDE, CALCIUM CHLORIDE 600; 310; 30; 20 MG/100ML; MG/100ML; MG/100ML; MG/100ML
INJECTION, SOLUTION INTRAVENOUS CONTINUOUS
Status: DISCONTINUED | OUTPATIENT
Start: 2024-05-28 | End: 2024-05-29

## 2024-05-28 RX ORDER — NALOXONE HYDROCHLORIDE 0.4 MG/ML
0.2 INJECTION, SOLUTION INTRAMUSCULAR; INTRAVENOUS; SUBCUTANEOUS
Status: DISCONTINUED | OUTPATIENT
Start: 2024-05-28 | End: 2024-06-03 | Stop reason: HOSPADM

## 2024-05-28 RX ORDER — LANOLIN ALCOHOL/MO/W.PET/CERES
3 CREAM (GRAM) TOPICAL AT BEDTIME
Status: DISCONTINUED | OUTPATIENT
Start: 2024-05-28 | End: 2024-06-03 | Stop reason: HOSPADM

## 2024-05-28 RX ORDER — GABAPENTIN 100 MG/1
100 CAPSULE ORAL AT BEDTIME
Status: DISCONTINUED | OUTPATIENT
Start: 2024-05-28 | End: 2024-05-28

## 2024-05-28 RX ORDER — METHOCARBAMOL 500 MG/1
500 TABLET, FILM COATED ORAL AT BEDTIME
Status: DISCONTINUED | OUTPATIENT
Start: 2024-05-28 | End: 2024-06-03 | Stop reason: HOSPADM

## 2024-05-28 RX ORDER — GABAPENTIN 100 MG/1
100 CAPSULE ORAL 3 TIMES DAILY
Status: DISCONTINUED | OUTPATIENT
Start: 2024-05-28 | End: 2024-06-03

## 2024-05-28 RX ORDER — NALOXONE HYDROCHLORIDE 0.4 MG/ML
0.4 INJECTION, SOLUTION INTRAMUSCULAR; INTRAVENOUS; SUBCUTANEOUS
Status: DISCONTINUED | OUTPATIENT
Start: 2024-05-28 | End: 2024-06-03 | Stop reason: HOSPADM

## 2024-05-28 RX ORDER — AMPICILLIN AND SULBACTAM 2; 1 G/1; G/1
3 INJECTION, POWDER, FOR SOLUTION INTRAMUSCULAR; INTRAVENOUS EVERY 12 HOURS
Status: DISCONTINUED | OUTPATIENT
Start: 2024-05-28 | End: 2024-05-29

## 2024-05-28 RX ORDER — MAGNESIUM SULFATE HEPTAHYDRATE 40 MG/ML
2 INJECTION, SOLUTION INTRAVENOUS ONCE
Status: COMPLETED | OUTPATIENT
Start: 2024-05-28 | End: 2024-05-29

## 2024-05-28 RX ORDER — LIDOCAINE 4 G/G
2 PATCH TOPICAL
Status: DISCONTINUED | OUTPATIENT
Start: 2024-05-28 | End: 2024-06-03 | Stop reason: HOSPADM

## 2024-05-28 RX ORDER — ESCITALOPRAM OXALATE 10 MG/1
10 TABLET ORAL DAILY
Status: DISCONTINUED | OUTPATIENT
Start: 2024-06-04 | End: 2024-05-28

## 2024-05-28 RX ORDER — ESCITALOPRAM OXALATE 5 MG/1
5 TABLET ORAL DAILY
Qty: 7 TABLET | Refills: 0 | Status: DISCONTINUED | OUTPATIENT
Start: 2024-05-28 | End: 2024-05-28

## 2024-05-28 RX ADMIN — HEPARIN SODIUM 5000 UNITS: 5000 INJECTION, SOLUTION INTRAVENOUS; SUBCUTANEOUS at 20:04

## 2024-05-28 RX ADMIN — THIAMINE HYDROCHLORIDE 500 MG: 100 INJECTION, SOLUTION INTRAMUSCULAR; INTRAVENOUS at 14:04

## 2024-05-28 RX ADMIN — FOLIC ACID 1 MG: 1 TABLET ORAL at 08:22

## 2024-05-28 RX ADMIN — LEVETIRACETAM 500 MG: 500 TABLET, FILM COATED ORAL at 08:22

## 2024-05-28 RX ADMIN — ACETAMINOPHEN 975 MG: 325 TABLET, FILM COATED ORAL at 20:04

## 2024-05-28 RX ADMIN — LIDOCAINE 2 PATCH: 4 PATCH TOPICAL at 14:01

## 2024-05-28 RX ADMIN — POTASSIUM & SODIUM PHOSPHATES POWDER PACK 280-160-250 MG 1 PACKET: 280-160-250 PACK at 15:05

## 2024-05-28 RX ADMIN — POTASSIUM & SODIUM PHOSPHATES POWDER PACK 280-160-250 MG 1 PACKET: 280-160-250 PACK at 20:03

## 2024-05-28 RX ADMIN — THIAMINE HYDROCHLORIDE 500 MG: 100 INJECTION, SOLUTION INTRAMUSCULAR; INTRAVENOUS at 09:39

## 2024-05-28 RX ADMIN — Medication 1 TABLET: at 08:22

## 2024-05-28 RX ADMIN — AMPICILLIN SODIUM AND SULBACTAM SODIUM 3 G: 2; 1 INJECTION, POWDER, FOR SOLUTION INTRAMUSCULAR; INTRAVENOUS at 20:03

## 2024-05-28 RX ADMIN — AZITHROMYCIN MONOHYDRATE 500 MG: 500 INJECTION, POWDER, LYOPHILIZED, FOR SOLUTION INTRAVENOUS at 20:53

## 2024-05-28 RX ADMIN — ACETAMINOPHEN 975 MG: 325 TABLET, FILM COATED ORAL at 14:00

## 2024-05-28 RX ADMIN — LEVETIRACETAM 500 MG: 500 TABLET, FILM COATED ORAL at 20:04

## 2024-05-28 RX ADMIN — HEPARIN SODIUM 5000 UNITS: 5000 INJECTION, SOLUTION INTRAVENOUS; SUBCUTANEOUS at 12:23

## 2024-05-28 RX ADMIN — METOPROLOL SUCCINATE 25 MG: 25 TABLET, EXTENDED RELEASE ORAL at 08:22

## 2024-05-28 RX ADMIN — MAGNESIUM SULFATE HEPTAHYDRATE 2 G: 2 INJECTION, SOLUTION INTRAVENOUS at 15:00

## 2024-05-28 RX ADMIN — Medication 3 MG: at 22:05

## 2024-05-28 RX ADMIN — SODIUM CHLORIDE, POTASSIUM CHLORIDE, SODIUM LACTATE AND CALCIUM CHLORIDE 1000 ML: 600; 310; 30; 20 INJECTION, SOLUTION INTRAVENOUS at 20:19

## 2024-05-28 RX ADMIN — METHOCARBAMOL 500 MG: 500 TABLET ORAL at 22:05

## 2024-05-28 RX ADMIN — GABAPENTIN 100 MG: 100 CAPSULE ORAL at 20:04

## 2024-05-28 ASSESSMENT — ACTIVITIES OF DAILY LIVING (ADL)
ADLS_ACUITY_SCORE: 51
ADLS_ACUITY_SCORE: 55
ADLS_ACUITY_SCORE: 53
ADLS_ACUITY_SCORE: 51
ADLS_ACUITY_SCORE: 53
ADLS_ACUITY_SCORE: 53
ADLS_ACUITY_SCORE: 51
ADLS_ACUITY_SCORE: 51
ADLS_ACUITY_SCORE: 53
ADLS_ACUITY_SCORE: 51
ADLS_ACUITY_SCORE: 53
ADLS_ACUITY_SCORE: 51

## 2024-05-28 NOTE — CONSULTS
[facility]  Neurology Consultation    Patient Name:  Luigi Vieyra  MRN:  [id number]    :  1955  Date of Service:  May 28, 2024  Primary care provider:  Gatito Ware      Neurology consultation service was asked to see Luigi Vieyra by Dr. Frost to evaluate left arm weakness.    Chief Complaint:  left arm weakness    History of Present Illness:   Luigi Vieyra is a 68 year old male with history of alcohol use disorder who presents with left arm weakness.    Neurology is consulted regarding this symptom.    Mr. Vieyra tells me that he first noticed this weakness two weeks ago. He told me that he woke up and realized that his left arm was weak. He had been sleeping in bed and does not recall his arm being in any unusual position, or held in such a way that there was significant pressure on one area of it. He denies any sensory changes of the arm. He reports that the arm has gotten slightly stronger over the past two weeks. He denies any problems with the left arm previously. He denies any symptoms of the right arm or either leg. He denies bowel or bladder incontinence.    Note that this history differs somewhat from that obtained on initial evaluation by stroke neurology, where patient reported he had weakness of the left arm and left leg for the past five days, and had been reportedly bedbound over the 3 days leading up to presentation. He has endorsed multiple falls over the past week.    Clinically, Mr. Vieyra initially presented to [facility] where initial reported history was 5 days of left upper extremity and left lower extremity weakness, being primarily bedbound for 5 days. Initial CT Head w/o contrast notable for thin 4 mm subdural hemorrhage along the right tentorial leaflet, without mass effect. MRI Brain obtained without evidence of acute infarct. Mr. Vieyra was transferred to [facility] for further care.    Known injuries  "thus far include acute superior endplate compression deformities involving C7, T2, and L1. An MRI of the left brachial plexus was performed which did not reveal abnormality of the plexus; incidentally there was mixed edema of the left acromioclavicular joint and rotator cuff musculature.    ROS  A comprehensive ROS was performed and pertinent findings were included in HPI.     PMH  Past Medical History:   Diagnosis Date    Asthma      Past Surgical History:   Procedure Laterality Date    ESOPHAGOSCOPY, GASTROSCOPY, DUODENOSCOPY (EGD), COMBINED N/A 3/21/2022    Procedure: ESOPHAGOGASTRODUODENOSCOPY, WITH BIOPSY;  Surgeon: Eunice Rubin MD;  Location: [facility]    ESOPHAGOSCOPY, GASTROSCOPY, DUODENOSCOPY (EGD), COMBINED N/A 10/14/2022    Procedure: ESOPHAGOGASTRODUODENOSCOPY (EGD) with epi injection and clip placement;  Surgeon: Cash Oconnor MD;  Location: [facility]       Medications   I have personally reviewed the patient's medication list.     Allergies  I have personally reviewed the patient's allergy list.     Social History  Retired, used to work for a [employer] company until two years prior. Significant alcohol use.    Family History    Non-contributory      Physical Examination   Vitals: /88 (BP Location: Right arm)   Pulse 94   Temp 98.1  F (36.7  C) (Oral)   Resp 20   Ht 1.778 m (5' 10\")   Wt 72.6 kg (160 lb)   SpO2 95%   BMI 22.96 kg/m    General: Lying in bed, NAD  Head: NC/AT  Eyes: no icterus, op pink and moist  Cardiac: RRR. Extremities warm, no edema.   Respiratory: non-labored on RA  GI: S/NT/ND  Skin: No rash or lesion on exposed skin  Psych: Mood pleasant, affect congruent  Neuro:  Mental status: Awake, alert, attentive, oriented to self, time, place, and circumstance. Language is fluent and coherent with intact comprehension of complex commands, naming and repetition.  Cranial nerves: Visual fields full on confrontational testing. Conjugate gaze with intact extraocular " movements. Upper and lower face symmetric at rest and with activation. Tongue protrusion midline.  Motor: No abnormal movements appreciated.     Right Left   Shoulder Abduction 5 2+   Elbow Flexion 5 4   Elbow Extension 5 5   Wrist Extension 5 2+   Wrist Flexion 5 2+   Finger Extension 5 2+   ADM 5 1   FDI 5 1   Hip Flexion 5 4+   Hip Abduction 5 4+   Hip Adduction 5 5   Knee Flexion 5 5   Knee Extension 5 5   Ankle Dorsiflexion 5 4+   Ankle Plantarflexion 5 5        Reflexes: LUE hyperreflexic (3/4 at biceps and brachioradialis, Chandler positive). RUE with 2/4 at biceps, brachioradialis. Negative Chandler on the right. LLE hyperreflexic (3/4 at patella, 2/4 at achilles, Babinski positive). RLE 2/4 at patella and achilles, negative Babinski.  Sensory: Denies any numbness to light touch of the left arm. Vibratory sensation symmetric at the thumbs, mildly reduced at the great toes and medial malleoli bilaterally. Denies numbness to light touch of the feet.  Gait: Deferred.    Investigations   I have personally reviewed pertinent labs, tests, and radiological imaging. Discussion of notable findings is included under Impression.     Was patient transferred from outside hospital?   Yes - I have personally reviewed pertinent notes, labs, and images (if available) from outside hospital.    Impression  68 year old male with history of alcohol use disorder and multiple falls over the past 1-2 weeks who presents with acute weakness of the left arm without pain or sensory symptoms.    Neurology is consulted regarding this finding in the setting of hospitalization for thin subdural hemorrhage along the posterior falx/right tentorial leaflet and acute superior endplate compression deformities of C7, T2, and L1.    Exam is notable for severe weakness of the left arm, proximal and distal, with most preserved strength of elbow flexion and extension. There is mild weakness of hip flexion, hip abduction, and ankle plantarflexion on  the left, with hyperreflexia throughout the left arm and leg and positive Chandler and Babsinki signs. Sensation seems preserved on exam.    Taken together, an acute motor-predominant phenomenon without sensory involvement and with hyperreflexia suggests localization to the ventral root, anterior horn, or corticospinal tract. Imaging thus far has not revealed evidence of cord signal change (despite severe stenosis at C6-7 - would repeat MR C spine this time with and without contrast, as previous study was non-contrast only) or ischemic stroke (would expect right hemisphere subcortical). There is T2 hyperintensity in the cerebellar peduncle and right sharita though this finding was also present in 07/2023, well before the onset of symptoms.    Of note, earlier in 2024 he underwent outpatient neurology work-up of right leg weakness (specifically right foot drop), with EMG findings (3/11/2024) suggestive of right peroneal neuropathy. It is possible that the previous right leg weakness was part of an evolving process now involving the left leg and left arm, though this seems less likely as per neurology note 3/11/24 by Dr. Patterson, the right leg weakness had resolved.    Recommendations  - MRI Cervical Spine w/ and w/o contrast  - may require EMG for further characterization pending clinical course    Thank you for involving Neurology in the care of Luigi Vieyra.  Please do not hesitate to call with questions/concerns (consult pager [phone]).      Neurology will continue to follow.    Patient was seen and discussed with Dr. Mora.    Vaibhav Henriquez MD  Neurology Resident PGY-3

## 2024-05-28 NOTE — PROGRESS NOTES
[facility]     Addiction Progress Note - Addiction Service        Date of Admission:  5/26/2024  Assessment & Plan       Luigi Vieyra is a 68 year old male with a history of heavy alcohol use c/b R peroneal neuropathy, gastritis, tobacco use, asthma, stage III CKD, HLD, and HTN who presented to [facility] for neurosurgical evaluation of L sided weekness after being found down at home.  CHAT team evaluating the patient for history of alcohol use.      # Alcohol Use Disorder: severe  # Alcohol-related neuropathy  Lifetime history of alcohol use, currently 1-2 pint/day. Has previously maintained periods of sobriety in the past but rarely more than 1 month.  Somewhat passive in evaluation today, but would be interested in cessation, and may be interested in CD treatment options, as well as medications to help maintain sobriety.  Would be a good candidate for naltrexone (discussed briefly) but would not start until he is no longer requiring agonist opiates.  Denies any history of withdrawal, no seizures or DTs, last drink reportedly 4 days prior to presentation and neg EtOH in the ED, but given his overall condition, would still monitor for signs of withdrawal for at least 24-38h  -Continue CIWA for EtOH withdrawal;  consider adding on gabapentin, 600 mg TID.  Consider prescribing at discharge if this helps with underlying anxiety.  -Can start naltrexone when no longer requiring agonist opiate pain control, possibly as early as tomorrow or Thursday 5/30 if opiate agonists can be discontinued and doesn't require any today (48-72 hours without pain medications and no history of chronic use).  -Interested in IP vs OP CD treatment, chem dep consulted but patient declined available options for IOP based on availability with insurance.  -Have discussed different options to maintain goal of sobriety, including medications to manage cravings, including acamprosate and  "naltrexone.  -Patient's sister Dianne is a primary contact and would like to talk with team/social work regarding treatment options on discharge     # Mental health:  Patient denies any mental health concerns, but very flat in conversation, quite isolated, moderate anhedonia, although difficult to parse out acute and chronic neurocognitive and emotional changes .  Per sister, he has been using alcohol to managed emotional challenges his whole life.  - Consider Health Psych if patient struggling with adjustment/hospitalization  - Consider behavioral health on discharge vs selective serotonin reuptake inhibitor for moderate depression.      # Peer Support:   -Our peer  will meet the patient if agreeable and still hospitalized on Thursday, to provide additional outpatient resources  -To contact Nabila Peer  from [facility]): call or text: [phone]     # Addiction Social Worker:   Our addiction social worker Abelardo Rey can be contacted if needed, on her pager [phone] or texted/called at [phone]  --Patient may be interested in inpatient addiction treatment after discharge depending on his overall condition and need for physical rehabilitation      # Linkage to Care:   Sees Gatito Ware MD in Family Medicine, could consider follow-up with PCP for naltrexone.  Might benefit from Addiction Med referral, though pt lives approximately an hour from the [facility].     The patient's care was discussed with the Care Coordinator/.    Time Spent on this Encounter   I spent 30 minutes on the unit/floor managing the care of Yosvany Masso.     Luigi Donaldson MD on 5/28/2024 at 3:41 PM   Addiction Service   [facility]     Contact information available via [facility] Paging/Directory under \"addiction medicine\"        ______________________________________________________________________    Interval History   Feeling okay " today when asked, though says he would like to defer a visit today due to feeling tired and CD consult that already happened earlier today.    ROS:  CV, Pulm, GI and  assessed. Pertinent positives as above, otherwise negative.     Data reviewed today: I reviewed all medications, new labs and imaging results over the last 24 hours.     Physical Exam   Temp: 98.4  F (36.9  C) Temp src: Oral BP: 138/81 Pulse: 85   Resp: 18 SpO2: 99 % O2 Device: None (Room air)    General Appearance: NAD, sleeping but awakens to voice and requests to have discussion another time.  Respiratory: No signs of respiratory distress, in room air  Cardiovascular: pulses assumed  GI: Soft ND  Skin: WWP     Due to regulation of Title 42 of the Code of Federal Regulations (CFR) Part 2: Confidentiality laws apply to this note and the information wherein.  Thus, this note cannot be copy and pasted into any other health care staff's note nor can it be included in general medical records sent to ANY outside agency without the patient's written consent.

## 2024-05-28 NOTE — PROGRESS NOTES
"   05/28/24 1500   Appointment Info   Signing Clinician's Name / Credentials (OT) Negar Earl, OTD, OTR/L   Living Environment   People in Home spouse   Current Living Arrangements house   Home Accessibility stairs within home   Number of Stairs, Within Home, Primary nine   Stair Railings, Within Home, Primary railings on both sides of stairs   Transportation Anticipated family or friend will provide   Living Environment Comments pt lives with spouse in a split entry home; 9 steps to get up to his bedroom.   Self-Care   Usual Activity Tolerance good   Current Activity Tolerance moderate   Equipment Currently Used at Home grab bar, tub/shower   Fall history within last six months yes   Number of times patient has fallen within last six months 4   Activity/Exercise/Self-Care Comment pt reports he is IND at baseline; states he has had 3-4 falls in the last 6 months   Instrumental Activities of Daily Living (IADL)   IADL Comments IND with I/ADLS   General Information   Onset of Illness/Injury or Date of Surgery 05/26/24   Referring Physician Parth Herrmann, DEYSI CNP   Additional Occupational Profile Info/Pertinent History of Current Problem \"Luigi Vieyra is a 68 year old male with a past medical history significant for alcohol use disorder, peroneal neuropathy, gastritis, stage III CKD, HLD, and HTN who presented to the ED with left sided weakness and multiple falls over a period of 3-5days. Stroke evaluation was initially pursed. He was found to have a 4mm subdural hematoma along the right tentorial leaflet. No on anticoagulation. He was transferred here for further cares\"   Existing Precautions/Restrictions fall   Left Upper Extremity (Weight-bearing Status) full weight-bearing (FWB)   Right Upper Extremity (Weight-bearing Status) full weight-bearing (FWB)   Left Lower Extremity (Weight-bearing Status) full weight-bearing (FWB)   Right Lower Extremity (Weight-bearing Status) full weight-bearing (FWB) "   Cognitive Status Examination   Orientation Status orientation to person, place and time   Cognitive Status Comments STM appears somewhat impaired, unsure if baseline vs acute change   Range of Motion Comprehensive   Comment, General Range of Motion LUE generally impaired, unable to fully extend elbow or flex shoulder   Strength Comprehensive (MMT)   Comment, General Manual Muscle Testing (MMT) Assessment generally weak, profound weakness in LUE   Coordination   Coordination Comments anticipate dec coordination due to LUE weakness and ROM deficits   Bed Mobility   Comment (Bed Mobility) min A sup>sit EOB   Transfers   Transfer Comments min A STS   Balance   Balance Comments poor- defer to PT for formal assessments   Activities of Daily Living   BADL Assessment/Intervention upper body dressing;lower body dressing   Upper Body Dressing Assessment/Training   Comment, (Upper Body Dressing) anticipate mod A per clinical judgement   Lower Body Dressing Assessment/Training   Comment, (Lower Body Dressing) anticipate max a per clinical judgement   Clinical Impression   Criteria for Skilled Therapeutic Interventions Met (OT) Yes, treatment indicated   OT Diagnosis dec IND with I/ADLs, dec cog, dec LUE strength and ROM   OT Problem List-Impairments impacting ADL problems related to;activity tolerance impaired;balance;cognition;coordination;mobility;range of motion (ROM);strength   Assessment of Occupational Performance 3-5 Performance Deficits   Identified Performance Deficits bathing, dressing, toileting, g/h, cognition   Planned Therapy Interventions (OT) ADL retraining;IADL retraining;balance training;bed mobility training;cognition;fine motor coordination training;ROM;strengthening;stretching;transfer training;home program guidelines;progressive activity/exercise   Clinical Decision Making Complexity (OT) problem focused assessment/low complexity   Risk & Benefits of therapy have been explained evaluation/treatment  results reviewed;care plan/treatment goals reviewed;risks/benefits reviewed;current/potential barriers reviewed;participants voiced agreement with care plan;participants included;patient   OT Total Evaluation Time   OT Eval, Low Complexity Minutes (67009) 11   OT Goals   Therapy Frequency (OT) 5 times/week   OT Predicted Duration/Target Date for Goal Attainment 06/27/24   OT Goals Hygiene/Grooming;Upper Body Dressing;Lower Body Dressing;Toilet Transfer/Toileting;Cognition   OT: Hygiene/Grooming modified independent   OT: Upper Body Dressing Modified independent   OT: Lower Body Dressing Modified independent   OT: Toilet Transfer/Toileting Modified independent   OT: Cognitive Patient/caregiver will verbalize understanding of cognitive assessment results/recommendations as needed for safe discharge planning   Interventions   Interventions Quick Adds Therapeutic Activity   Therapeutic Activities   Treatment Detail/Skilled Intervention Eval only on this date as pt declining further activity.   OT Discharge Planning   OT Plan g/h, dressing, standing, commode transfer   OT Discharge Recommendation (DC Rec) Transitional Care Facility   OT Rationale for DC Rec pt below functional baseline at this time, anticipate pt will likely req TCU to progress strength and IND prior to return home   OT Brief overview of current status min A   Total Session Time   Total Session Time (sum of timed and untimed services) 11

## 2024-05-28 NOTE — CONSULTS
Called patient to discuss possible treatment options and to possibly complete an assessment. With patients insurance it would only cover outpatient at [facility] or [facility].  Patient declined both of those options.  Patient also declined Virtual Outpatient Treatment with [facility].  Those are the only options available with patients insurance Medicare replacement plans.  Patient declined self pay options. Patient declined any other [facility] services/assistance at this time.      Robert Galloway [facility] on 5/28/2024 at 10:07 AM

## 2024-05-28 NOTE — PLAN OF CARE
Hours of care 1900 - 0700    A&Ox4. VSS on RA. Neuros unchanged, pt has significant L-sided weakness. Unable to lift LUE without assistance. C-collar in place. Pt had unwitnessed fall overnight at 0200, RRT called and CT of head obtained with no major changes. Sitter now at bedside. Voiding frequently, primofit in place. Endorses some back pain, rating 4-5/10. Pt declined PRNs. CIWA Q4H, no valium given overnight. LR @ 75ml in R PIV. Will continue POC and report changes to treatment team.

## 2024-05-28 NOTE — PROGRESS NOTES
Post Fall Assessment Note    S: Patient had a(n): unwitnessed, unassisted fall.    B: Patient's orientation/mental status at time of fall:  alert.    Medications administered within 8 hours of fall:    PCA/Opiates:  No    Hypnotics:  No    Psychotropics: No    Antihypertensives:  No    Sedatives:  No   Location of fall:  In patients room.     A: Type of injury from fall: No identifiable injuries, CT of head was ordered.    Patient status:  A&Ox4. Neuros unchanged.    Patient was lifted from fall event:  Staff members assisted   Interventions:  RRT called, CT of head.    MD notified:  Yes    R: Falls assessment completed in [facility]:  Yes   Care Plan updated:  Yes

## 2024-05-28 NOTE — CODE/RAPID RESPONSE
Rapid Response Team Note    Assessment   A rapid response was called on Luigi Vieyra due to  fall . Patient had unwitnessed fall. He states that he was getting out of bed to use the bathroom and could not find his call light. He states that he did hit his head. He has a known subdural hematoma and C7 and T2 compression fracture. He was wearing a cervical collar at time of fall.    Plan   -  CT head and cervical spine  - Fall precautions     Trauma service was notified by me and updated on plan, remainder of plan per trauma service    --CT images were stable. Patient has no new deficits.       DEYSI Bingham CNP  Rapid Response Team PAULINA  Securely message with [employer]     Medical Decision Making     35 MINUTES SPENT BY ME on the date of service doing chart review, history, exam, documentation & further activities per the note.        Hospital Course   Brief Summary of events leading to rapid response:   Transfer from [facility] due to subdural hematoma. Also found to have C7 and T2 compression fractures. Falls risk    Physical Exam   Vital Signs: Temp: 98.6  F (37  C) Temp src: Oral BP: 117/89 Pulse: 89   Resp: 18 SpO2: 100 % O2 Device: None (Room air)    Weight: 160 lbs 0 oz      Exam:   Respiratory: non-labored  Cardiovascular: regular  Neurologic: Left UE weakness. Alert and oriented. No other deficits    Significant Results and Procedures   Results for orders placed or performed during the hospital encounter of 05/26/24   Head CT w/o contrast    Impression    IMPRESSION:  HEAD CT:  1.  Interval development of a thin parafalcine subdural hematoma.  2.  Stable thin right tentorial subdural hematoma.  3.  No mass effect or midline shift.    CERVICAL SPINE CT:  1.  No acute cervical spine fracture.    THORACIC SPINE CT:  1.  T2 superior endplate fracture with mild height loss, likely acute.  2.  No retropulsion or traumatic subluxation.  3.  Chronic compression deformities, as above.    LUMBAR SPINE CT:  1.   Acute L1 superior endplate compression fracture with mild height loss.  2.  No retropulsion or traumatic subluxation.   Chest XR,  PA & LAT    Impression    IMPRESSION: Minor strand of linear atelectasis left lower lung. Both lungs are otherwise clear. No adenopathy or effusion. Normal cardiac size and pulmonary vascularity. Mildly atherosclerotic thoracic aorta. Mild degenerative changes both shoulders and   the spine. Multiple bilateral healed rib fracture deformities, unchanged. Monitoring leads overlying the chest.   XR Pelvis 1/2 Views    Impression    IMPRESSION: Normal joint spaces and alignment. No fracture.   CT Cervical Spine w/o Contrast    Impression    IMPRESSION:  HEAD CT:  1.  Interval development of a thin parafalcine subdural hematoma.  2.  Stable thin right tentorial subdural hematoma.  3.  No mass effect or midline shift.    CERVICAL SPINE CT:  1.  No acute cervical spine fracture.    THORACIC SPINE CT:  1.  T2 superior endplate fracture with mild height loss, likely acute.  2.  No retropulsion or traumatic subluxation.  3.  Chronic compression deformities, as above.    LUMBAR SPINE CT:  1.  Acute L1 superior endplate compression fracture with mild height loss.  2.  No retropulsion or traumatic subluxation.   CT Lumbar Spine w/o Contrast    Impression    IMPRESSION:  HEAD CT:  1.  Interval development of a thin parafalcine subdural hematoma.  2.  Stable thin right tentorial subdural hematoma.  3.  No mass effect or midline shift.    CERVICAL SPINE CT:  1.  No acute cervical spine fracture.    THORACIC SPINE CT:  1.  T2 superior endplate fracture with mild height loss, likely acute.  2.  No retropulsion or traumatic subluxation.  3.  Chronic compression deformities, as above.    LUMBAR SPINE CT:  1.  Acute L1 superior endplate compression fracture with mild height loss.  2.  No retropulsion or traumatic subluxation.   CT Thoracic Spine w/o Contrast    Impression    IMPRESSION:  HEAD CT:  1.   Interval development of a thin parafalcine subdural hematoma.  2.  Stable thin right tentorial subdural hematoma.  3.  No mass effect or midline shift.    CERVICAL SPINE CT:  1.  No acute cervical spine fracture.    THORACIC SPINE CT:  1.  T2 superior endplate fracture with mild height loss, likely acute.  2.  No retropulsion or traumatic subluxation.  3.  Chronic compression deformities, as above.    LUMBAR SPINE CT:  1.  Acute L1 superior endplate compression fracture with mild height loss.  2.  No retropulsion or traumatic subluxation.   Cervical spine MRI w/o contrast    Impression    IMPRESSION:  1.  Acute superior endplate compression deformities involving C7 and T2, as above.  2.  Multilevel degenerative changes of the cervical spine, as above.  3.  At C6-C7 there is severe central spinal canal stenosis with severe right and moderate to severe left neural foraminal stenosis.  4.  At C5-C6 there is marked/severe right neural foraminal stenosis, severe left neural foraminal stenosis, moderate right lateral recess stenosis and mild to moderate central spinal canal stenosis (more pronounced on the right).  5.  At C4-C5 there is mild central spinal canal stenosis with mild to moderate right and moderate to severe left neural foraminal stenosis.  6.  At C3-C4 there is mild central spinal canal stenosis with mild right and severe left neural foraminal stenosis.     MR Thoracic Spine w/o Contrast    Impression    IMPRESSION:  1.  Acute T2 superior endplate compression fracture.  2.  Acute nondisplaced T3 inferior endplate fracture.  3.  Acute L1 superior endplate compression fracture.  4.  Multilevel degenerative changes, as above.  5.  Decreased osseous mineralization.   CT Head w/o Contrast    Impression    IMPRESSION:  1.  Thin parafalcine subdural hematoma has decreased in conspicuity.  2.  Stable thin subdural hematoma along the right tentorial leaflet.  3.  No superimposed acute findings.     MR Brachial Plexus  Left wo & w Contrast    Impression    RESIDENT PRELIMINARY INTERPRETATION  Impression:    1. No definite abnormality of the brachial plexus identified.  2. Mild soft tissue edema in the left AC joint and rotator cuff  musculature, presumably degenerative, though not optimally evaluated  on this exam.   XR Humerus Port Left G/E 2 Views    Impression    IMPRESSION: Bones are markedly demineralized. There is no evidence of an acute displaced left humerus or shoulder fracture. Mild AC joint arthrosis. No significant glenohumeral joint space narrowing.     Mildly displaced, approximately 7th through 9th left posterior lateral rib fractures are age indeterminant, favor subacute to chronic.   XR Shoulder Left Port G/E 2 Views    Impression    IMPRESSION: Bones are markedly demineralized. There is no evidence of an acute displaced left humerus or shoulder fracture. Mild AC joint arthrosis. No significant glenohumeral joint space narrowing.     Mildly displaced, approximately 7th through 9th left posterior lateral rib fractures are age indeterminant, favor subacute to chronic.   CT Head w/o Contrast    Impression    IMPRESSION:  HEAD CT:  1.  Stable thin subdural hemorrhage along the posterior falx/right tentorial leaflet. No new or enlarging hemorrhage.    CERVICAL SPINE CT:  1.  Redemonstration of minimal C7 and T2 acute compression fractures.   CT Cervical Spine w/o Contrast    Impression    IMPRESSION:  HEAD CT:  1.  Stable thin subdural hemorrhage along the posterior falx/right tentorial leaflet. No new or enlarging hemorrhage.    CERVICAL SPINE CT:  1.  Redemonstration of minimal C7 and T2 acute compression fractures.

## 2024-05-28 NOTE — PROGRESS NOTES
05/28/24 1000   Appointment Info   Signing Clinician's Name / Credentials (PT) Mauro Ramirez, PT, DPT   Living Environment   People in Home spouse   Current Living Arrangements house   Home Accessibility stairs within home   Number of Stairs, Within Home, Primary nine   Stair Railings, Within Home, Primary railings on both sides of stairs   Transportation Anticipated family or friend will provide   Living Environment Comments pt lives with spouse in a split entry home; 9 steps to get up to his bedroom.   Self-Care   Usual Activity Tolerance good   Current Activity Tolerance moderate   Regular Exercise No   Equipment Currently Used at Home grab bar, tub/shower   Fall history within last six months yes   Number of times patient has fallen within last six months 4   Activity/Exercise/Self-Care Comment pt reports he is IND at baseline; states he has had 3-4 falls in the last 6 months   General Information   Onset of Illness/Injury or Date of Surgery 05/26/24   Referring Physician Parth Herrmann APRN CNP   Patient/Family Therapy Goals Statement (PT) to get stronger   Pertinent History of Current Problem (include personal factors and/or comorbidities that impact the POC) per EMR:68 year old male with a past medical history significant for alcohol use disorder, peroneal neuropathy, gastritis, stage III chronic kidney disease, hypertension and hyperlipidemia who presents as a transfer from [facility] ED for evaluation of a 4 mm right tentorial subdural hematoma and left hemibody weakness, UE worse than LE.   Cognition   Affect/Mental Status (Cognition) WFL   Orientation Status (Cognition) oriented x 4   Follows Commands (Cognition) WFL   Pain Assessment   Patient Currently in Pain No   Integumentary/Edema   Integumentary/Edema no deficits were identifed   Posture    Posture Protracted shoulders   Range of Motion (ROM)   Range of Motion ROM deficits secondary to weakness   Strength (Manual Muscle Testing)    Strength (Manual Muscle Testing) Deficits observed during functional mobility   Bed Mobility   Bed Mobility supine-sit   Comment, (Bed Mobility) Joy with supine to sit   Transfers   Transfers sit-stand transfer   Comment, (Transfers) CGA with sit to stand   Gait/Stairs (Locomotion)   [place] Level (Gait) contact guard   Assistive Device (Gait) walker, front-wheeled   Comment, (Gait/Stairs) CGA with walker   Balance   Balance other (describe)   Sensory Examination   Sensory Perception patient reports no sensory changes   Coordination   Coordination no deficits were identified   Muscle Tone   Muscle Tone no deficits were identified   Clinical Impression   Criteria for Skilled Therapeutic Intervention Yes, treatment indicated   PT Diagnosis (PT) impaired functional mobility   Influenced by the following impairments weakness   Functional limitations due to impairments bed mobility, transfers, gait, stairs   Clinical Presentation (PT Evaluation Complexity) stable   Clinical Presentation Rationale clinician judgement   Clinical Decision Making (Complexity) low complexity   Planned Therapy Interventions (PT) balance training;bed mobility training;gait training;home exercise program;stair training;strengthening;transfer training;progressive activity/exercise;risk factor education;home program guidelines   Risk & Benefits of therapy have been explained evaluation/treatment results reviewed;care plan/treatment goals reviewed;risks/benefits reviewed;current/potential barriers reviewed;participants voiced agreement with care plan;participants included;patient   PT Total Evaluation Time   PT Eval, Low Complexity Minutes (41568) 8   Physical Therapy Goals   PT Frequency 5x/week   PT Predicted Duration/Target Date for Goal Attainment 06/15/24   PT Goals Bed Mobility;Transfers;Gait;Stairs   PT: Bed Mobility Modified independent;Supine to/from sit   PT: Transfers Modified independent;Sit to/from stand   PT: Gait Modified  independent;Assistive device;Greater than 200 feet   PT: Stairs Modified independent;9 stairs;Rail on both sides   Interventions   Interventions Quick Adds Gait Training;Therapeutic Activity   PT Discharge Planning   PT Plan PT: bed mobility training, repeat sit to stands, gait training with walker   PT Discharge Recommendation (DC Rec) Transitional Care Facility   PT Rationale for DC Rec PT: pt below baseline in terms of functional mobility; multiple falls recently and would benefit from TCU stay to promote strength and balance training.   PT Brief overview of current status Ax1   Total Session Time   Total Session Time (sum of timed and untimed services) 8

## 2024-05-28 NOTE — CODE/RAPID RESPONSE
05/28/24 0200   Call Information   Date of Call 05/28/24   Time of Call 0155   Name of person requesting the team Rahat Vizcarra   Title of person requesting team RN   RRT Arrival time 0200   Time RRT ended 0230   Reason for call   Type of RRT Adult   Primary reason for call Staff concerned   SBAR   Situation Pt attempted to get out of bed and fell backwards and hit his head on the door.   Background 68 year old male with a past medical history significant for alcohol use disorder, peroneal neuropathy, gastritis, stage III CKD, HLD, and HTN who presented to the ED with left sided weakness and multiple falls over a period of 3-5days. Stroke evaluation was initially pursed. He was found to have a 4mm subdural hematoma along the right tentorial leaflet. No on anticoagulation. He was transferred here for further care.   Notable History/Conditions Hypertension;End-Stage disease   Assessment Pt alert/oriented x4, in neck brace, neuro exam unchanged from prior per nurse. Denies vision changes, increased weakness, headache, N/V. Pupils perrla, LUE 2/5, LLE 3/5 strength, RUE/RLE 5/5 strength. LUE  weak, LLE dorsiflexion weak.   Interventions Other (describe)  (Head CT w/o)   Adjustments to Recommend Maintain spinal precautions   Patient Outcome   Patient Outcome Stabilized on unit   RRT Team   Attending/Primary/Covering Physician Trauma   Date Attending Physician notified 05/28/24   Time Attending Physician notified 0155   Physician(s) Doris Whittaker NP   Lead RN Joey BAUTISTA   Post RRT Intervention Assessment   Post RRT Assessment Stable/Improved   Date Follow Up Done 05/28/24   Time Follow Up Done 0500   Comments Pt VS stable. Neuro exam unchanged from previous. Unchanged weakness in LUE and LLE. No other complaints per patient.

## 2024-05-28 NOTE — PROGRESS NOTES
Called patient to discuss possible treatment options and to possibly complete an assessment. With patients insurance it would only cover outpatient at [facility] or [facility].  Patient declined both of those options.  Patient also declined Virtual Outpatient Treatment with [facility].  Those are the only options available with patients insurance Medicare replacement plans.  Patient declined self pay options. Patient declined any other [facility] services/assistance at this time.     Robert Galloway [facility] on 5/28/2024 at 10:07 AM

## 2024-05-28 NOTE — CARE PLAN
05/28/24 0200   Fall Event   Patient Assessed By nurse;rapid response team   Name of Provider Notified Dr Cristina William, and trauma team   Family/Designated Caregiver Notified of Fall No   Fall Prevention Plan Updated Yes

## 2024-05-28 NOTE — PLAN OF CARE
[id number]:  AVSS. A&Ox4, forgetful.  Wearing C-collar.  Spinal precautions, up with assist per MD order.  Regular diet.  LR infusing at 75mL/hr.  Urine output adequate.

## 2024-05-28 NOTE — PROGRESS NOTES
[facility], [place]  Neurosurgery Progress Note:  05/28/2024      Interval History: no acute events overnight. XR LUE and MRI L brachial plexus obtained with no acute findings.     Assessment:  68 year old male with a past medical history significant for alcohol use disorder, peroneal neuropathy, gastritis, stage III chronic kidney disease, hypertension and hyperlipidemia who presents as a transfer from [facility] ED for evaluation of a 4 mm right tentorial subdural hematoma and left hemibody weakness, UE worse than LE.     Clinically Significant Risk Factors        # Hypokalemia: Lowest K = 3.2 mmol/L in last 2 days, will replace as needed  # Hyponatremia: Lowest Na = 129 mmol/L in last 2 days, will monitor as appropriate   # Hypercalcemia: corrected calcium is >10.1, will monitor as appropriate  # Hypomagnesemia: Lowest Mg = 1.3 mg/dL in last 2 days, will replace as needed  # Anion Gap Metabolic Acidosis: Highest Anion Gap = 25 mmol/L in last 2 days, will monitor and treat as appropriate  # Hypoalbuminemia: Lowest albumin = 3.3 g/dL at 5/26/2024 10:21 PM, will monitor as appropriate     # Hypertension: Noted on problem list            # Financial/Environmental Concerns: none  # Asthma: noted on problem list           Plan:  Serial neuro exams  Recommend general neurology evaluation   - recommend EMG in setting of acute LUE weakness with unexplained etiology to help localize  Referral of outpatient epidural steroid injection   PT/OT  Ok for DVT prophylaxis per trauma protocol    Remainder of cares per primary    -----------------------------------  Ryan Rivas MD  Neurosurgery resident, PGY-3  -----------------------------------  Gen: Appears comfortable, NAD  Neurologic:  Alert & Oriented to person, place, time, and situation  Follows commands briskly  Speech fluent, spontaneous. No aphasia or dysarthria.  No gaze preference. No apparent hemineglect.  PERRL, EOMI  Face  symmetric with sensation intact to light touch  Palate elevates symmetrically, uvula midline, tongue protrudes midline  Trapezii muscles 5/5 bilaterally  No pronator drift     Del Tr Bi WE WF Gr   R 5 5 5 5 5 5   L 2 2 3 2 2 4    HF KE KF DF PF EHL   R 5 5 5 5 5 5   L 5 5 5 5 5 5     Reflexes: bilateral biceps/brachioradialis 3+, bilateral Chandler's, bilateral patellar 3+, bilateral upgoing toes    Sensation intact and symmetric to light touch throughout    Objective:   Temp:  [97.7  F (36.5  C)-98.6  F (37  C)] 98.1  F (36.7  C)  Pulse:  [60-94] 94  Resp:  [16-20] 20  BP: ()/(57-89) 109/88  SpO2:  [95 %-100 %] 95 %  I/O last 3 completed shifts:  In: 300 [P.O.:300]  Out: 1700 [Urine:1700]        LABS:  Recent Labs   Lab 05/28/24  0656 05/28/24  0424 05/27/24 2003 05/27/24  0639 05/26/24  2221     --   --  133* 130*   POTASSIUM 3.5 3.8 3.3* 3.2* 3.6   CHLORIDE 97*  --   --  93* 88*   CO2 26  --   --  24 25   ANIONGAP 12  --   --  16* 17*   *  --   --  108* 112*   BUN 37.8*  --   --  56.3* 60.3*   CR 2.54*  --   --  3.36* 3.68*   PINO 8.9  --   --  8.6* 8.8       Recent Labs   Lab 05/28/24  0656   WBC 13.7*   RBC 2.96*   HGB 9.5*   HCT 28.5*   MCV 96   MCH 32.1   MCHC 33.3   RDW 15.5*          IMAGING:  Recent Results (from the past 24 hour(s))   MR Brachial Plexus Left wo & w Contrast    Narrative    MR BRACHIAL PLEXUS LEFT W/O & W CONTRAST 5/27/2024 3:11 PM    History:  left shoulder weakness.  ICD-10:    Comparison: Same-day MRI of the cervical and thoracic spine, CT  cervical spine 5/26/2024, chest x-ray 5/26/2024    Contrast Dose: 10mL Gadavist    Technique: Axial and sagittal STIR, sagittal and coronal T1-weighted,  3-D STIR images of the left brachial plexus were obtained without  intravenous contrast. After intravenous gadolinium administration, fat  saturated sagittal, coronal and axial T1-weighted images were  obtained.    Contrast: 10 cc Gadavist IV    Findings:   Mildly motion  degraded examination. No mass, hemorrhage, or adenopathy  involving the brachial plexus. The roots appear normal along their  course. No pseudomeningocele. No mass or lesion is noted in the  adjacent lung apices or axilla.    Post-intravenous contrast images also demonstrate no abnormal  enhancement of the brachial plexus.    See separately performed MRI of the cervical and thoracic spine for  detailed findings including mild C7, T2, and T3 endplate edema  attributed to compression deformities. Multilevel cervical  degenerative changes, most pronounced at C5-6 and C6-7. Moderate  spinal canal stenosis at C6-7. Multilevel neural foraminal stenosis.    There is STIR hyperintense signal about the left AC joint and  posterior rotator cuff musculature (series 2 image 17, 19 for  example).      Impression    Impression:    1. No abnormality of the brachial plexus identified.  2. Mild edema of the left acromioclavicular joint and rotator cuff  musculature, presumably degenerative, though not optimally evaluated  on this exam.  3. Multilevel cervical degenerative changes, most pronounced at C5-6  and C6-7.    I have personally reviewed the examination and initial interpretation  and I agree with the findings.    COLBY SIM MD         SYSTEM ID:  [id number]   XR Shoulder Left Port G/E 2 Views    Narrative    EXAM: XR HUMERUS PORT LEFT G/E 2 VIEWS, XR SHOULDER LEFT PORT G/E 2 VIEWS  LOCATION: [facility]  DATE: 5/27/2024    INDICATION: Weakness, of unclear etiology.    COMPARISON: None.      Impression    IMPRESSION: Bones are markedly demineralized. There is no evidence of an acute displaced left humerus or shoulder fracture. Mild AC joint arthrosis. No significant glenohumeral joint space narrowing.     Mildly displaced, approximately 7th through 9th left posterior lateral rib fractures are age indeterminant, favor subacute to chronic.   XR Humerus Port Left G/E 2 Views     Narrative    EXAM: XR HUMERUS PORT LEFT G/E 2 VIEWS, XR SHOULDER LEFT PORT G/E 2 VIEWS  LOCATION: [facility]  DATE: 5/27/2024    INDICATION: Weakness, of unclear etiology.    COMPARISON: None.      Impression    IMPRESSION: Bones are markedly demineralized. There is no evidence of an acute displaced left humerus or shoulder fracture. Mild AC joint arthrosis. No significant glenohumeral joint space narrowing.     Mildly displaced, approximately 7th through 9th left posterior lateral rib fractures are age indeterminant, favor subacute to chronic.   CT Cervical Spine w/o Contrast    Narrative    EXAM: CT CERVICAL SPINE W/O CONTRAST, CT HEAD W/O CONTRAST  LOCATION: [facility]  DATE: 5/28/2024    INDICATION: unwitnessed fall; Neck pain; Neurologic deficit, non traumatic; None of the following: Babinski clonus, balance gait abnormality, Chandler's sign, hyperreflexia, or UE weakness  COMPARISON: May 27, 2024 MR cervical spine. May 27, 2024 CT head.  TECHNIQUE:   1) Routine CT Head without IV contrast. Multiplanar reformats. Dose reduction techniques were used.  2) Routine CT Cervical Spine without IV contrast. Multiplanar reformats. Dose reduction techniques were used.    FINDINGS:   HEAD CT:   INTRACRANIAL CONTENTS: Stable size of the small subdural hemorrhage layering along the posterior falx/right tentorial leaflet. No new or enlarging hemorrhage. Unchanged advanced global cerebral volume loss. Stable ventricles. No CT evidence for acute   ischemia.    VISUALIZED ORBITS/SINUSES/MASTOIDS: Prior bilateral cataract surgery. Visualized portions of the orbits are otherwise unremarkable. No significant paranasal sinus mucosal disease. No middle ear or mastoid effusion.    BONES/SOFT TISSUES: No acute abnormality.    CERVICAL SPINE CT:   VERTEBRA: Normal alignment. Redemonstration of minimal C7 and T2 mild acute compression fractures.  Chronic T3 superior endplate compression fracture.. No acute compression fracture or posttraumatic subluxation.     CANAL/FORAMINA: Multilevel spondylosis without high grade canal stenosis. Severe C5-C6 disc degeneration. Uncovertebral ridging with facet arthrosis results in mild left C2-C3, mild left C3-C4, moderate to severe bilateral C5-C6, and mild bilateral C6-C7   neural foraminal stenosis.    PARASPINAL: No prevertebral edema. Calcified atherosclerosis of the carotid bulbs.      Impression    IMPRESSION:  HEAD CT:  1.  Stable thin subdural hemorrhage along the posterior falx/right tentorial leaflet. No new or enlarging hemorrhage.    CERVICAL SPINE CT:  1.  Redemonstration of minimal C7 and T2 acute compression fractures.   CT Head w/o Contrast    Narrative    EXAM: CT CERVICAL SPINE W/O CONTRAST, CT HEAD W/O CONTRAST  LOCATION: [facility]  DATE: 5/28/2024    INDICATION: unwitnessed fall; Neck pain; Neurologic deficit, non traumatic; None of the following: Babinski clonus, balance gait abnormality, Chandler's sign, hyperreflexia, or UE weakness  COMPARISON: May 27, 2024 MR cervical spine. May 27, 2024 CT head.  TECHNIQUE:   1) Routine CT Head without IV contrast. Multiplanar reformats. Dose reduction techniques were used.  2) Routine CT Cervical Spine without IV contrast. Multiplanar reformats. Dose reduction techniques were used.    FINDINGS:   HEAD CT:   INTRACRANIAL CONTENTS: Stable size of the small subdural hemorrhage layering along the posterior falx/right tentorial leaflet. No new or enlarging hemorrhage. Unchanged advanced global cerebral volume loss. Stable ventricles. No CT evidence for acute   ischemia.    VISUALIZED ORBITS/SINUSES/MASTOIDS: Prior bilateral cataract surgery. Visualized portions of the orbits are otherwise unremarkable. No significant paranasal sinus mucosal disease. No middle ear or mastoid effusion.    BONES/SOFT TISSUES: No acute  abnormality.    CERVICAL SPINE CT:   VERTEBRA: Normal alignment. Redemonstration of minimal C7 and T2 mild acute compression fractures. Chronic T3 superior endplate compression fracture.. No acute compression fracture or posttraumatic subluxation.     CANAL/FORAMINA: Multilevel spondylosis without high grade canal stenosis. Severe C5-C6 disc degeneration. Uncovertebral ridging with facet arthrosis results in mild left C2-C3, mild left C3-C4, moderate to severe bilateral C5-C6, and mild bilateral C6-C7   neural foraminal stenosis.    PARASPINAL: No prevertebral edema. Calcified atherosclerosis of the carotid bulbs.      Impression    IMPRESSION:  HEAD CT:  1.  Stable thin subdural hemorrhage along the posterior falx/right tentorial leaflet. No new or enlarging hemorrhage.    CERVICAL SPINE CT:  1.  Redemonstration of minimal C7 and T2 acute compression fractures.

## 2024-05-28 NOTE — PROGRESS NOTES
[facility]  Trauma Service Progress Note    Date of Service (when I saw the patient): 05/28/2024  History of present illness  Luigi Vieyra is a 68 year old male with a past medical history significant for alcohol use disorder, peroneal neuropathy, gastritis, stage III CKD, HLD, and HTN who presented to the ED with left sided weakness and multiple falls over a period of 3-5days. Stroke evaluation was initially pursed. He was found to have a 4mm subdural hematoma along the right tentorial leaflet. No on anticoagulation. He was transferred here for further cares.     Other significant findings:   Multi level acute on chronic vertebrae deformities, C7, T2, L1  Hyponatremia, MAIA, AGMA, leukocytosis, thrombocytosis, hypoalbuminemia     A tertiary exam completed 05/27/24 without obvious additional traumatic injuries     Addendum@ [facility]  Medicine service will assume primary management at this time, discussed with Dr Frost     Assessment & Plan  Neuro/Pain:  # Traumatic right tentorial 4mm SDH  # Left hemiparesis, LUE >LLE  Stability scan 05/27/2024 with decreased conspicuity.  Awake alert, no other deficits than left upper extremity weakness, no change in sensation.  Current work up so far:  CTA head and neck and MRI brain without large vessel occlusion of acute stroke, moderate brain atrophy noted.  MRI C/T spine notable for C7 endplate fracture with 10% height loss, severe cervical canal stenosis.  MR left brachial plexus without abnormality  XR's of the left shoulder and humerus without acute fracture/injury  Neurology was on onboard for stroke workup and Neurosurgery for traumatic ICH.  Plan  Continue Neuro assessments every shift, notify provide with changes  Neurology consulted for LUE motor weakness  Will defer to Neurosurgery for outpatient epidural steroid injections  OK for chemical DVT prophylaxis, Heparin subcutaneous   Keppra x7days    Unfortunately he had an  unwitnessed fall in his room last night and hit his head. CT head, CT C spine with stable ICH. No new findings.    - Maintain circadian rhythm.  Lights on during the day.  Off at night, minimize cares at night.  OOB during the day.     Denying pain, would discontinue Oxycodone and have low dose PRN tylenol for pain     # Alcohol use disorder  Last alcohol use as reported per patient was 4 prior to admission. Usually consumes about 1 gallon of vodka per week. Was placed on a CIWA protocol, has not required any benzo's, no acute withdrawal symptoms.  - CIWA protocol, high dose thiamine taper, folic and MVI  - Addiction medicine consult  - Chemical dependency consult  - Psychiatry consult, ? depression     #Multilevel endplate fractures (C7,T2, L1)  Denies pain  Was previously placed in a cervical collar which has been cleared by the Neurosurgery team?    Pulmonary:  # Hx Asthma  - Supplemental oxygen to keep saturation above 92 %.  - Aggressive pulmonary hygiene. Incentive spirometer while awake   - PRN albuterol     Cardiovascular:    #HTN  Troponin is,mildly elevated @ 49, peaked, no acute ischemia on EKG  Resume Metoprolol with hold parameters, Losartan on hold due to MAIA     GI/Nutrition:    regular diet  Bowel regimen     Renal/ Fluids/Electrolytes:  # Acute on chronic kidney injury  # AGMA  # Hyponatremia, hypovolemic  # Hypomagnesemia, hypophosphatemia  Na 129, creatine was up from his baseline from 1.3 to 4.7 on admission, AG 25   not concerning for rhabdomyolysis  Poor intake multiple days prior to admission per patient report, much improved now with adequate urine output  - electrolyte replacement protocol in place.      Endocrine:  - PTA medications: none   - No management indication.      Infectious disease:   #Leukocytosis, downtrending  Due to hemoconcentration. He initially received a dose of Vanco/Zosyn concern for sepsis, however no lactic acidosis, fever, UA unremarkable.  - Monitor for  fever  - No antibiotics for now     Hematology:    # Anemia of acute and chronic illness  - Admitted with Hb of 12.5g/dL--> 10.3, possibly dilutional after fluid boluses. There are no acute signs of bleeding  - Iron studies , folate and B12 per medicine  - Threshold for transfusion if hgb <7.0 or signs/symptoms of hypoperfusion.        Musculoskeletal:  #Falls  # Weakness and deconditioning of acute on chronic illness  - Physical and occupational therapy consults.    Lines/ tubes/ drains:  - PIV  - Update provided to his sister Dianne [phone], updated about the fall last night  Code status: Full code  General Cares:               PPI/H2 blocker:  n/a              DVT prophylaxis: OK to start chemical DVT prophylaxis               Bowel Regimen/Date of last stool: PTA              Pulmonary toilet: cough and deep breath              ETOH screen completed: yes              Lines / drains: yes    Discharge goals:   Adequate pain management: yes  VSS x24 hours: yes  Hemoglobin stable x 48 hours:yes  Ambulating safely and/or therapy evals complete: pending  Drains/lines removed or plan in place to manage: yes  Teaching done: as able, is confused at times  Other:  Expected D/C date: TBD   Interval History   Unfortunately Sanjiv had an unwitnessed fall in his room last night, hit head, CT head, C spine without new findings. Has a bedside attendant. Denies pain, was awake, sitting in bed eating breakfast.  ROS x 8 negative with exception of those things listed in interval hx    Physical Exam   Temp: 98.1  F (36.7  C) Temp src: Oral BP: 109/88 Pulse: 94   Resp: 20 SpO2: 95 % O2 Device: None (Room air)    Vitals:    05/26/24 2147   Weight: 72.6 kg (160 lb)     Vital Signs with Ranges  Temp:  [97.7  F (36.5  C)-98.6  F (37  C)] 98.1  F (36.7  C)  Pulse:  [76-94] 94  Resp:  [16-20] 20  BP: ()/(57-89) 109/88  SpO2:  [95 %-100 %] 95 %  I/O last 3 completed shifts:  In: 300 [P.O.:300]  Out: 1700 [Urine:1700]    West  Coma Scale - Total 15/15  Eye Response (E): 4   4= spontaneous, 3= to verbal/voice, 2= to pain, 1= No response   Verbal Response (V): 5   5= Orientated, converses, 4= Confused, converses, 3= Inappropriate words, 2= Incomprehensible sounds, 1=No response   Motor Response (M): 6   6= Obeys commands, 5= Localizes to pain, 4= Withdrawal to pain, 3=Fexion to pain, 2= Extension to pain, 1= No response   Constitutional: Awake, alert, cooperative, no apparent distress.  Eyes: Lids and lashes normal, pupils equal, round and reactive to light, extra ocular muscles intact, sclera clear, conjunctiva normal.  ENT: Normocephalic, atraumatic  Respiratory: No increased work of breathing, good air exchange, clear to auscultation bilaterally, no crackles or wheezing.  Cardiovascular:  regular rate and rhythm, normal S1 and S2, no S3 or S4, and no murmur.   GI: Normal bowel sounds, abdomen soft, non-distended, non-tender, no guarding  Genitourinary:  clear yellow  Skin:  Warm and dry, scattered scabbed skin tears right upper arm  Musculoskeletal: There is no redness, warmth, or swelling of the joints.  Pedal pulse palpated.  Neurologic: Awake, alert, oriented. Occasionally forgetful and confused. Cranial nerves II-XII are grossly intact.  Strength and sensory is intact.   +left arm weakness   Neuropsychiatric: Calm, normal eye contact, alert, affect appropriate to situation, oriented    DEYSI Cortez CNP  To contact the trauma service use job code pager [phone],   Numeric texts or alpha text through [facility]

## 2024-05-29 ENCOUNTER — APPOINTMENT (OUTPATIENT)
Dept: PHYSICAL THERAPY | Facility: CLINIC | Age: 69
DRG: 871 | End: 2024-05-29
Payer: COMMERCIAL

## 2024-05-29 ENCOUNTER — APPOINTMENT (OUTPATIENT)
Dept: OCCUPATIONAL THERAPY | Facility: CLINIC | Age: 69
DRG: 871 | End: 2024-05-29
Payer: COMMERCIAL

## 2024-05-29 ENCOUNTER — APPOINTMENT (OUTPATIENT)
Dept: CARDIOLOGY | Facility: CLINIC | Age: 69
DRG: 871 | End: 2024-05-29
Attending: INTERNAL MEDICINE
Payer: COMMERCIAL

## 2024-05-29 PROBLEM — R29.2 HYPERREFLEXIA: Status: ACTIVE | Noted: 2024-05-29

## 2024-05-29 LAB
ACANTHOCYTES BLD QL SMEAR: SLIGHT
ADV 40+41 DNA STL QL NAA+NON-PROBE: NEGATIVE
ALBUMIN SERPL BCG-MCNC: 2.9 G/DL (ref 3.5–5.2)
ALP SERPL-CCNC: 105 U/L (ref 40–150)
ALT SERPL W P-5'-P-CCNC: 12 U/L (ref 0–70)
ANION GAP SERPL CALCULATED.3IONS-SCNC: 12 MMOL/L (ref 7–15)
AST SERPL W P-5'-P-CCNC: 31 U/L (ref 0–45)
ASTRO TYP 1-8 RNA STL QL NAA+NON-PROBE: NEGATIVE
BASE EXCESS BLDV CALC-SCNC: 4.2 MMOL/L (ref -3–3)
BASOPHILS # BLD AUTO: ABNORMAL 10*3/UL
BASOPHILS # BLD MANUAL: 0.3 10E3/UL (ref 0–0.2)
BASOPHILS NFR BLD AUTO: ABNORMAL %
BASOPHILS NFR BLD MANUAL: 3 %
BILIRUB SERPL-MCNC: 0.4 MG/DL
BUN SERPL-MCNC: 21.6 MG/DL (ref 8–23)
BURR CELLS BLD QL SMEAR: SLIGHT
C CAYETANENSIS DNA STL QL NAA+NON-PROBE: NEGATIVE
C DIFF TOX B STL QL: NEGATIVE
CALCIUM SERPL-MCNC: 8.4 MG/DL (ref 8.8–10.2)
CAMPYLOBACTER DNA SPEC NAA+PROBE: NEGATIVE
CHLORIDE SERPL-SCNC: 102 MMOL/L (ref 98–107)
CREAT SERPL-MCNC: 1.88 MG/DL (ref 0.67–1.17)
CREAT SERPL-MCNC: 2.2 MG/DL (ref 0.67–1.17)
CREAT UR-MCNC: 67.8 MG/DL
CRYPTOSP DNA STL QL NAA+NON-PROBE: NEGATIVE
DEPRECATED HCO3 PLAS-SCNC: 24 MMOL/L (ref 22–29)
E COLI O157 DNA STL QL NAA+NON-PROBE: NORMAL
E HISTOLYT DNA STL QL NAA+NON-PROBE: NEGATIVE
EAEC ASTA GENE ISLT QL NAA+PROBE: NEGATIVE
EC STX1+STX2 GENES STL QL NAA+NON-PROBE: NEGATIVE
EGFRCR SERPLBLD CKD-EPI 2021: 39 ML/MIN/1.73M2
EOSINOPHIL # BLD AUTO: ABNORMAL 10*3/UL
EOSINOPHIL # BLD MANUAL: 0.2 10E3/UL (ref 0–0.7)
EOSINOPHIL NFR BLD AUTO: ABNORMAL %
EOSINOPHIL NFR BLD MANUAL: 2 %
EPEC EAE GENE STL QL NAA+NON-PROBE: NEGATIVE
ERYTHROCYTE [DISTWIDTH] IN BLOOD BY AUTOMATED COUNT: 15.8 % (ref 10–15)
ETEC LTA+ST1A+ST1B TOX ST NAA+NON-PROBE: NEGATIVE
FRACT EXCRET NA UR+SERPL-RTO: 1.9 %
G LAMBLIA DNA STL QL NAA+NON-PROBE: NEGATIVE
GLUCOSE SERPL-MCNC: 113 MG/DL (ref 70–99)
HCO3 BLDV-SCNC: 29 MMOL/L (ref 21–28)
HCT VFR BLD AUTO: 28.6 % (ref 40–53)
HGB BLD-MCNC: 9.2 G/DL (ref 13.3–17.7)
IMM GRANULOCYTES # BLD: ABNORMAL 10*3/UL
IMM GRANULOCYTES NFR BLD: ABNORMAL %
LACTATE SERPL-SCNC: 1.4 MMOL/L (ref 0.7–2)
LVEF ECHO: NORMAL
LYMPHOCYTES # BLD AUTO: ABNORMAL 10*3/UL
LYMPHOCYTES # BLD MANUAL: 1.6 10E3/UL (ref 0.8–5.3)
LYMPHOCYTES NFR BLD AUTO: ABNORMAL %
LYMPHOCYTES NFR BLD MANUAL: 16 %
MAGNESIUM SERPL-MCNC: 1.6 MG/DL (ref 1.7–2.3)
MCH RBC QN AUTO: 31.9 PG (ref 26.5–33)
MCHC RBC AUTO-ENTMCNC: 32.2 G/DL (ref 31.5–36.5)
MCV RBC AUTO: 99 FL (ref 78–100)
MONOCYTES # BLD AUTO: ABNORMAL 10*3/UL
MONOCYTES # BLD MANUAL: 1 10E3/UL (ref 0–1.3)
MONOCYTES NFR BLD AUTO: ABNORMAL %
MONOCYTES NFR BLD MANUAL: 10 %
NEUTROPHILS # BLD AUTO: ABNORMAL 10*3/UL
NEUTROPHILS # BLD MANUAL: 6.8 10E3/UL (ref 1.6–8.3)
NEUTROPHILS NFR BLD AUTO: ABNORMAL %
NEUTROPHILS NFR BLD MANUAL: 69 %
NOROVIRUS GI+II RNA STL QL NAA+NON-PROBE: NEGATIVE
NRBC # BLD AUTO: 0 10E3/UL
NRBC BLD AUTO-RTO: 0 /100
NT-PROBNP SERPL-MCNC: 4620 PG/ML (ref 0–900)
O2/TOTAL GAS SETTING VFR VENT: 21 %
OXYHGB MFR BLDV: 77 % (ref 70–75)
P SHIGELLOIDES DNA STL QL NAA+NON-PROBE: NEGATIVE
PCO2 BLDV: 43 MM HG (ref 40–50)
PH BLDV: 7.44 [PH] (ref 7.32–7.43)
PHOSPHATE SERPL-MCNC: 2.1 MG/DL (ref 2.5–4.5)
PLAT MORPH BLD: ABNORMAL
PLATELET # BLD AUTO: 413 10E3/UL (ref 150–450)
PO2 BLDV: 43 MM HG (ref 25–47)
POTASSIUM SERPL-SCNC: 3.8 MMOL/L (ref 3.4–5.3)
PROT SERPL-MCNC: 5.9 G/DL (ref 6.4–8.3)
RBC # BLD AUTO: 2.88 10E6/UL (ref 4.4–5.9)
RBC MORPH BLD: ABNORMAL
RVA RNA STL QL NAA+NON-PROBE: NEGATIVE
SALMONELLA SP RPOD STL QL NAA+PROBE: NEGATIVE
SAO2 % BLDV: 78.6 % (ref 70–75)
SAPO I+II+IV+V RNA STL QL NAA+NON-PROBE: NEGATIVE
SHIGELLA SP+EIEC IPAH ST NAA+NON-PROBE: NEGATIVE
SODIUM SERPL-SCNC: 137 MMOL/L (ref 135–145)
SODIUM SERPL-SCNC: 138 MMOL/L (ref 135–145)
SODIUM UR-SCNC: 81 MMOL/L
V CHOLERAE DNA SPEC QL NAA+PROBE: NEGATIVE
VIBRIO DNA SPEC NAA+PROBE: NEGATIVE
WBC # BLD AUTO: 9.8 10E3/UL (ref 4–11)
Y ENTEROCOL DNA STL QL NAA+PROBE: NEGATIVE

## 2024-05-29 PROCEDURE — 87507 IADNA-DNA/RNA PROBE TQ 12-25: CPT | Performed by: INTERNAL MEDICINE

## 2024-05-29 PROCEDURE — 36415 COLL VENOUS BLD VENIPUNCTURE: CPT | Performed by: INTERNAL MEDICINE

## 2024-05-29 PROCEDURE — 250N000011 HC RX IP 250 OP 636: Performed by: NURSE PRACTITIONER

## 2024-05-29 PROCEDURE — 250N000013 HC RX MED GY IP 250 OP 250 PS 637: Performed by: INTERNAL MEDICINE

## 2024-05-29 PROCEDURE — 85041 AUTOMATED RBC COUNT: CPT | Performed by: INTERNAL MEDICINE

## 2024-05-29 PROCEDURE — 87493 C DIFF AMPLIFIED PROBE: CPT | Performed by: INTERNAL MEDICINE

## 2024-05-29 PROCEDURE — 99207 PR APP CREDIT; MD BILLING SHARED VISIT: CPT | Performed by: STUDENT IN AN ORGANIZED HEALTH CARE EDUCATION/TRAINING PROGRAM

## 2024-05-29 PROCEDURE — 250N000013 HC RX MED GY IP 250 OP 250 PS 637: Performed by: NURSE PRACTITIONER

## 2024-05-29 PROCEDURE — 97116 GAIT TRAINING THERAPY: CPT | Mod: GP

## 2024-05-29 PROCEDURE — 97535 SELF CARE MNGMENT TRAINING: CPT | Mod: GO

## 2024-05-29 PROCEDURE — 93306 TTE W/DOPPLER COMPLETE: CPT

## 2024-05-29 PROCEDURE — 93306 TTE W/DOPPLER COMPLETE: CPT | Mod: 26 | Performed by: INTERNAL MEDICINE

## 2024-05-29 PROCEDURE — 83735 ASSAY OF MAGNESIUM: CPT | Performed by: INTERNAL MEDICINE

## 2024-05-29 PROCEDURE — 82040 ASSAY OF SERUM ALBUMIN: CPT | Performed by: INTERNAL MEDICINE

## 2024-05-29 PROCEDURE — 250N000013 HC RX MED GY IP 250 OP 250 PS 637: Performed by: STUDENT IN AN ORGANIZED HEALTH CARE EDUCATION/TRAINING PROGRAM

## 2024-05-29 PROCEDURE — 250N000011 HC RX IP 250 OP 636: Performed by: STUDENT IN AN ORGANIZED HEALTH CARE EDUCATION/TRAINING PROGRAM

## 2024-05-29 PROCEDURE — 99233 SBSQ HOSP IP/OBS HIGH 50: CPT | Mod: GC | Performed by: STUDENT IN AN ORGANIZED HEALTH CARE EDUCATION/TRAINING PROGRAM

## 2024-05-29 PROCEDURE — 85007 BL SMEAR W/DIFF WBC COUNT: CPT | Performed by: INTERNAL MEDICINE

## 2024-05-29 PROCEDURE — 84100 ASSAY OF PHOSPHORUS: CPT | Performed by: INTERNAL MEDICINE

## 2024-05-29 PROCEDURE — 120N000002 HC R&B MED SURG/OB UMMC

## 2024-05-29 PROCEDURE — 99232 SBSQ HOSP IP/OBS MODERATE 35: CPT | Performed by: INTERNAL MEDICINE

## 2024-05-29 PROCEDURE — 83880 ASSAY OF NATRIURETIC PEPTIDE: CPT | Performed by: INTERNAL MEDICINE

## 2024-05-29 PROCEDURE — 83605 ASSAY OF LACTIC ACID: CPT | Performed by: INTERNAL MEDICINE

## 2024-05-29 PROCEDURE — 82805 BLOOD GASES W/O2 SATURATION: CPT | Performed by: INTERNAL MEDICINE

## 2024-05-29 PROCEDURE — 258N000003 HC RX IP 258 OP 636: Performed by: INTERNAL MEDICINE

## 2024-05-29 PROCEDURE — 250N000011 HC RX IP 250 OP 636: Performed by: INTERNAL MEDICINE

## 2024-05-29 RX ORDER — SODIUM CHLORIDE, SODIUM LACTATE, POTASSIUM CHLORIDE, CALCIUM CHLORIDE 600; 310; 30; 20 MG/100ML; MG/100ML; MG/100ML; MG/100ML
INJECTION, SOLUTION INTRAVENOUS CONTINUOUS
Status: ACTIVE | OUTPATIENT
Start: 2024-05-29 | End: 2024-05-30

## 2024-05-29 RX ORDER — AMPICILLIN AND SULBACTAM 2; 1 G/1; G/1
3 INJECTION, POWDER, FOR SOLUTION INTRAMUSCULAR; INTRAVENOUS EVERY 6 HOURS
Qty: 48 G | Refills: 0 | Status: DISCONTINUED | OUTPATIENT
Start: 2024-05-29 | End: 2024-05-31

## 2024-05-29 RX ADMIN — ACETAMINOPHEN 975 MG: 325 TABLET, FILM COATED ORAL at 20:19

## 2024-05-29 RX ADMIN — Medication 3 MG: at 22:12

## 2024-05-29 RX ADMIN — GABAPENTIN 100 MG: 100 CAPSULE ORAL at 16:08

## 2024-05-29 RX ADMIN — Medication 1 TABLET: at 08:40

## 2024-05-29 RX ADMIN — METHOCARBAMOL 500 MG: 500 TABLET ORAL at 22:12

## 2024-05-29 RX ADMIN — THIAMINE HYDROCHLORIDE 250 MG: 100 INJECTION, SOLUTION INTRAMUSCULAR; INTRAVENOUS at 11:02

## 2024-05-29 RX ADMIN — POTASSIUM & SODIUM PHOSPHATES POWDER PACK 280-160-250 MG 1 PACKET: 280-160-250 PACK at 08:41

## 2024-05-29 RX ADMIN — AMPICILLIN SODIUM AND SULBACTAM SODIUM 3 G: 2; 1 INJECTION, POWDER, FOR SOLUTION INTRAMUSCULAR; INTRAVENOUS at 11:03

## 2024-05-29 RX ADMIN — GABAPENTIN 100 MG: 100 CAPSULE ORAL at 20:19

## 2024-05-29 RX ADMIN — POTASSIUM & SODIUM PHOSPHATES POWDER PACK 280-160-250 MG 1 PACKET: 280-160-250 PACK at 16:07

## 2024-05-29 RX ADMIN — HEPARIN SODIUM 5000 UNITS: 5000 INJECTION, SOLUTION INTRAVENOUS; SUBCUTANEOUS at 11:03

## 2024-05-29 RX ADMIN — LEVETIRACETAM 500 MG: 500 TABLET, FILM COATED ORAL at 08:41

## 2024-05-29 RX ADMIN — GABAPENTIN 100 MG: 100 CAPSULE ORAL at 08:38

## 2024-05-29 RX ADMIN — AMPICILLIN SODIUM AND SULBACTAM SODIUM 3 G: 2; 1 INJECTION, POWDER, FOR SOLUTION INTRAMUSCULAR; INTRAVENOUS at 04:37

## 2024-05-29 RX ADMIN — SODIUM CHLORIDE, POTASSIUM CHLORIDE, SODIUM LACTATE AND CALCIUM CHLORIDE: 600; 310; 30; 20 INJECTION, SOLUTION INTRAVENOUS at 00:58

## 2024-05-29 RX ADMIN — LIDOCAINE 2 PATCH: 4 PATCH TOPICAL at 08:55

## 2024-05-29 RX ADMIN — METOPROLOL SUCCINATE 25 MG: 25 TABLET, EXTENDED RELEASE ORAL at 08:39

## 2024-05-29 RX ADMIN — POTASSIUM & SODIUM PHOSPHATES POWDER PACK 280-160-250 MG 1 PACKET: 280-160-250 PACK at 13:04

## 2024-05-29 RX ADMIN — AMPICILLIN SODIUM AND SULBACTAM SODIUM 3 G: 2; 1 INJECTION, POWDER, FOR SOLUTION INTRAMUSCULAR; INTRAVENOUS at 16:18

## 2024-05-29 RX ADMIN — FOLIC ACID 1 MG: 1 TABLET ORAL at 08:38

## 2024-05-29 RX ADMIN — AZITHROMYCIN MONOHYDRATE 500 MG: 500 INJECTION, POWDER, LYOPHILIZED, FOR SOLUTION INTRAVENOUS at 17:40

## 2024-05-29 RX ADMIN — SODIUM CHLORIDE, POTASSIUM CHLORIDE, SODIUM LACTATE AND CALCIUM CHLORIDE: 600; 310; 30; 20 INJECTION, SOLUTION INTRAVENOUS at 17:40

## 2024-05-29 RX ADMIN — AMPICILLIN SODIUM AND SULBACTAM SODIUM 3 G: 2; 1 INJECTION, POWDER, FOR SOLUTION INTRAMUSCULAR; INTRAVENOUS at 22:11

## 2024-05-29 RX ADMIN — HEPARIN SODIUM 5000 UNITS: 5000 INJECTION, SOLUTION INTRAVENOUS; SUBCUTANEOUS at 20:19

## 2024-05-29 RX ADMIN — HEPARIN SODIUM 5000 UNITS: 5000 INJECTION, SOLUTION INTRAVENOUS; SUBCUTANEOUS at 04:40

## 2024-05-29 RX ADMIN — LEVETIRACETAM 500 MG: 500 TABLET, FILM COATED ORAL at 20:19

## 2024-05-29 RX ADMIN — ALBUTEROL SULFATE 2 PUFF: 90 AEROSOL, METERED RESPIRATORY (INHALATION) at 08:39

## 2024-05-29 RX ADMIN — Medication 25 MG: at 22:12

## 2024-05-29 ASSESSMENT — ACTIVITIES OF DAILY LIVING (ADL)
ADLS_ACUITY_SCORE: 53
ADLS_ACUITY_SCORE: 49
ADLS_ACUITY_SCORE: 53
ADLS_ACUITY_SCORE: 49
ADLS_ACUITY_SCORE: 48
ADLS_ACUITY_SCORE: 48
ADLS_ACUITY_SCORE: 49
ADLS_ACUITY_SCORE: 53
ADLS_ACUITY_SCORE: 49
ADLS_ACUITY_SCORE: 53
ADLS_ACUITY_SCORE: 53
ADLS_ACUITY_SCORE: 48
ADLS_ACUITY_SCORE: 49
ADLS_ACUITY_SCORE: 53
ADLS_ACUITY_SCORE: 53
ADLS_ACUITY_SCORE: 49

## 2024-05-29 NOTE — PLAN OF CARE
"Goal Outcome Evaluation:      Plan of Care Reviewed With: patient      /71 (BP Location: Right arm)   Pulse 80   Temp 98.9  F (37.2  C) (Oral)   Resp 16   Ht 1.778 m (5' 10\")   Wt 72.6 kg (160 lb)   SpO2 96%   BMI 22.96 kg/m      Goal Outcome Evaluation:     Plan of Care Reviewed With: patient   Overall Patient Progress:     VS: VSS  Neuros: A&OX4  Cardiac: WNL  Respiratory: RA  GI/: Voiding without difficulty, loose BM X5  Diet/Nausea ; Pt denies nausea, reg diet   Skin: Bruising on arms  LDA: PIV running LR @ 75ml  Labs:   Pain: pt denies pain  Activity: X1 assist   New changes this shift: Pt CIWA Q4 was scored at zero,  PRN was not needed at this time. He got up to use the commode a couple of times through out the night with 1 person assist, Pt slept in and out through out shift without any new concerns at this time.    Plan: CPC           "

## 2024-05-29 NOTE — PROGRESS NOTES
[facility]     Addiction Progress Note - Addiction Service        Date of Admission:  5/26/2024  Assessment & Plan       Luigi Vieyra is a 68 year old male with a history of alcohol use c/b R peroneal neuropathy, gastritis, tobacco use, asthma, stage III CKD, HLD, and HTN who presented to [facility] for neurosurgical evaluation of L sided weakness after being found down at home.  CHAT team evaluating the patient for history of alcohol use.      # Alcohol Use Disorder: severe  # Alcohol-related neuropathy  Lifetime history of alcohol use, currently 1-2 pint/day. Has previously maintained periods of sobriety in the past but rarely more than 1 month.  Somewhat passive in evaluation today, but would be interested in cessation, and may be interested in CD treatment options, as well as medications to help maintain sobriety.  Would be a good candidate for naltrexone (discussed briefly) but would not start until he is no longer requiring agonist opiates.  Denies any history of withdrawal, no seizures or DTs, last drink reportedly 4 days prior to presentation and neg EtOH in the ED, but given his overall condition, would still monitor for signs of withdrawal for at least 24-38h  -Reasonable to discontinue CIWA for EtOH withdrawal;  agree with gabapentin  Consider prescribing at discharge if this helps with underlying anxiety.  -Declined starting naltrexone in visit today; he feels he isn't having cravings and but is contemplative about use for the future. Will attempt to re-check on interest after he has some time for further consideration.  -Chem dep consulted but patient declined available options for IOP based on availability with insurance. He remained uninterested during visit today.  -Have discussed different options to maintain goal of sobriety, including medications to manage cravings, including naltrexone.  -Patient's sister Dianne is a primary contact and would like to  "talk with team/social work regarding treatment options on discharge     # Mental health:  Patient denies any mental health concerns, but very flat in conversation, quite isolated, moderate anhedonia, although difficult to parse out acute and chronic neurocognitive and emotional changes .  Per sister, he has been using alcohol to managed emotional challenges his whole life.  - Consider health psych if patient struggling with adjustment/hospitalization  - Consider behavioral health on discharge vs selective serotonin reuptake inhibitor for moderate depression.      # Peer Support:   -Our peer  will meet the patient if agreeable and still hospitalized on Thursday, to provide additional outpatient resources  -To contact Nabila Peer  from [facility]): call or text: [phone]     # Addiction Social Worker:   Our addiction social worker Abelardo Rey can be contacted if needed, on her pager [phone] or texted/called at [phone]  --Patient is not interested currently in OP addiction treatment after discharge.    # Linkage to Care:   Sees Gatito Ware MD in Family Medicine, could consider follow-up with PCP for naltrexone.  Might benefit from Addiction Med referral, though pt lives approximately an hour from the [facility].     The patient's care was discussed with the Patient.    Time Spent on this Encounter   I spent 35 minutes on the unit/floor managing the care of Luigi Vieyra. Over 50% of my time was spent on the following:   - Counseling the patient and/or family regarding: prevention of disease  - Coordination of care with the: coordination of care not performed today      Luigi Donaldson MD on 5/29/2024 at 1:30 PM   Addiction Service   [facility]     Contact information available via [facility] Paging/Directory under \"addiction medicine\"    "     ______________________________________________________________________    Interval History   Discussed purpose of visit with Mr. Vieyra today. Discussed naltrexone in particular and initially patient seemed interested. However, when asked if he would be willing to start today, he declined it. Did not feel he was having cravings and that he did not need it for stopping use of alcohol. Additionally affirmed that he is not interested in OP treatment programs presented by CD consult.    ROS:  CV, Pulm, GI and  assessed. Pertinent positives as above, otherwise negative.     Data reviewed today: I reviewed all medications, new labs and imaging results over the last 24 hours.     Physical Exam   Temp: 98.2  F (36.8  C) Temp src: Oral BP: 107/67 Pulse: 82   Resp: 16 SpO2: 97 % O2 Device: None (Room air)    General Appearance:  NAD  Respiratory: No signs of respiratory distress, in room air  Cardiovascular: pulses assumed  GI: Soft ND  Skin: WWP     Due to regulation of Title 42 of the Code of Federal Regulations (CFR) Part 2: Confidentiality laws apply to this note and the information wherein.  Thus, this note cannot be copy and pasted into any other health care staff's note nor can it be included in general medical records sent to ANY outside agency without the patient's written consent.

## 2024-05-29 NOTE — PROGRESS NOTES
"[facility]    Medicine Progress Note - Hospitalist Service, GOLD TEAM 9    Date of Admission:  5/26/2024    MEDICINE IS PRIMARY SINCE 5/28    Assessment & Plan   68 year old male admitted on 5/26/2024. He has a PMHx of alcohol use c/b R peroneal neuropathy, gastritis, tobacco use, asthma, stage III CKD, HLD, and HTN who presents to the ED as a transfer from [facility] for neurosurgery evaluation.     # Sepsis and severe sepsis secondary to likely bacterial pneumonia possibly secondary to aspiration, POA  Sepsis criteria include heart rate of 92 and white blood cell count of 14.7.  Patient qualifies for diagnosis of severe sepsis given lactic acid of 2.3.  Inflammatory markers with CRP and procalcitonin are both elevated.  He has high pretest probability given his alcohol use disorder and being found down.  Chest x-ray is abnormal.  - Sputum culture  -1 L bolus of LR  -Repeat lactic acid in am  -Started Unasyn for 5 days and azithromycin for 3 days    # Acute kidney injury, akin stage I on top of CKD stage IIIb complicated by anion gap metabolic acidosis, POA  This is likely prerenal in etiology.  Ordered fractional secretion of sodium and obtain renal ultrasound without evidence of obstructive physiology.  -Strict intake and output and daily body weight  -Started LR at 75 cc/h  -Basic metabolic panel in a.m.    # Alcohol use disorder with dependence and withdrawal Complicated by multiple falls complicated by multiple falls, POA  Patient reports that he drinks 1 gallon of vodka per week. He has been drinking since age 19 and has never \"stopped long enough to have the shakes\" The patient is interested in chemical dependency resources and is willing to discuss further with addiction medicine.  -Started gabapentin at 100 mg 3 times daily given his kidney function  -Will complete the high thiamine protocol that was started in the emergency department  -Will consider " initiation of naltrexone within the next 48 hours if the patient does not need any further narcotics  -Continue folic acid, multivitamins, and thiamine  -Appreciative to the input of addiction medicine    # 4 mm subdural hemorrhage along the right tentorial leaflet, without mass effect, POA  MRI Brain obtained without evidence of acute ischemic infarct.   -Serial neurological evaluation  -Continue Keppra 500 mg twice daily for 7 days based on recommendations of neurosurgery  -PT/OT  -The patient was cleared for heparin subcutaneous for DVT prevention by neurosurgery and trauma team    # Acute superior endplate compression deformities involving C7, T2, and L1, POA   The patient was cleared to be of cervical collar per neurosurgery.  The patient was seen in collaboration with trauma service  - Started scheduled acetaminophen and robaxin 500 mg at bedtime together with robaxin 250 mg TID PRN and lidocaine patches.   - Started gabapentin  - I agree with stopping narcotics that was ordered by trauma team    # Left sided andreia-paresis , POA  An MRI of the left brachial plexus was performed which did not reveal abnormality of the plexus; incidentally there was mixed edema of the left acromioclavicular joint and rotator cuff musculature.  - ordered MRI of the cervical spine with and without contrast based on recommendations of neurology  - May be considered for EMG later on    # Depression, POA  - Started lexapro at 5 mg for 7 days then 10 mg daily, but patient declined    # Hypophosphatemia and hypomagnesemia, POA  -Ordered replacement protocol    # Normocytic anemia with wide RDW, POA  -Added on reticulocyte count  -Will monitor closely for bleeding and obtain daily CBC    # Demand ischemia, POA  -Troponin trended down    # Elevated CPK, POA  -CPK trended down    # Chronic medical problems:  #HTN  - Continue Metoprolol and hold if SBP <120     #Asthma  - Continue PRN albuterol inhaler        Diet: Regular Diet Adult    DVT  Prophylaxis: Heparin SQ  Frederick Catheter: Not present  Lines: None     Cardiac Monitoring: ACTIVE order. Indication: QTc prolonging medication (48 hours)  Code Status: Full Code      Clinically Significant Risk Factors        # Hypokalemia: Lowest K = 3.2 mmol/L in last 2 days, will replace as needed     # Hypomagnesemia: Lowest Mg = 1.3 mg/dL in last 2 days, will replace as needed   # Hypoalbuminemia: Lowest albumin = 3.3 g/dL at 5/26/2024 10:21 PM, will monitor as appropriate     # Hypertension: Noted on problem list            # Financial/Environmental Concerns: none  # Asthma: noted on problem list        Disposition Plan     Medically Ready for Discharge: Anticipated in 5+ Days    Billing  I spent 50 minutes discussing the care of of the patient with consultants, with nursing staff, and further reviewing the chart           Jamel Frost MD  Hospitalist Service, [address]  Securely message with [employer] (more info)  Text page via [employer] Paging/Directory   See signed in provider for up to date coverage information  ______________________________________________________________________    Interval History   The patient denied any new symptoms to me    Physical Exam   Vital Signs: Temp: 98.4  F (36.9  C) Temp src: Oral BP: 138/81 Pulse: 85   Resp: 18 SpO2: 99 % O2 Device: None (Room air)    Weight: 160 lbs 0 oz    Constitutional: Awake, alert, cooperative, no apparent distress.  Eyes: Conjunctiva and pupils examined and normal.  HEENT: Moist mucous membranes, normal dentition.  Respiratory: Clear to auscultation bilaterally, no crackles or wheezing.  Cardiovascular: Regular rate and rhythm, normal S1 and S2, and no murmur noted.  GI: Soft, non-distended, non-tender, normal bowel sounds.  Lymph/Hematologic: No anterior cervical or supraclavicular adenopathy.  Skin: No rashes, no cyanosis, no edema.  Musculoskeletal: No joint swelling, erythema or  tenderness.  Neurologic: left sided 2/5 power  Psychiatric: Alert, oriented to person, place and time, no obvious anxiety or depression.     Medical Decision Making       45 MINUTES SPENT BY ME on the date of service doing chart review, history, exam, documentation & further activities per the note.      Data     I have personally reviewed the following data over the past 24 hrs:    13.7 (H)  \   9.5 (L)   / 449     135 97 (L) 37.8 (H) /  138 (H)   3.5 26 2.54 (H) \     Procal: 1.67 (H) CRP: 70.20 (H) Lactic Acid: 2.3 (H)         Imaging results reviewed over the past 24 hrs:   Recent Results (from the past 24 hour(s))   CT Cervical Spine w/o Contrast    Narrative    EXAM: CT CERVICAL SPINE W/O CONTRAST, CT HEAD W/O CONTRAST  LOCATION: [facility]  DATE: 5/28/2024    INDICATION: unwitnessed fall; Neck pain; Neurologic deficit, non traumatic; None of the following: Babinski clonus, balance gait abnormality, Chandler's sign, hyperreflexia, or UE weakness  COMPARISON: May 27, 2024 MR cervical spine. May 27, 2024 CT head.  TECHNIQUE:   1) Routine CT Head without IV contrast. Multiplanar reformats. Dose reduction techniques were used.  2) Routine CT Cervical Spine without IV contrast. Multiplanar reformats. Dose reduction techniques were used.    FINDINGS:   HEAD CT:   INTRACRANIAL CONTENTS: Stable size of the small subdural hemorrhage layering along the posterior falx/right tentorial leaflet. No new or enlarging hemorrhage. Unchanged advanced global cerebral volume loss. Stable ventricles. No CT evidence for acute   ischemia.    VISUALIZED ORBITS/SINUSES/MASTOIDS: Prior bilateral cataract surgery. Visualized portions of the orbits are otherwise unremarkable. No significant paranasal sinus mucosal disease. No middle ear or mastoid effusion.    BONES/SOFT TISSUES: No acute abnormality.    CERVICAL SPINE CT:   VERTEBRA: Normal alignment. Redemonstration of minimal C7 and T2 mild  acute compression fractures. Chronic T3 superior endplate compression fracture.. No acute compression fracture or posttraumatic subluxation.     CANAL/FORAMINA: Multilevel spondylosis without high grade canal stenosis. Severe C5-C6 disc degeneration. Uncovertebral ridging with facet arthrosis results in mild left C2-C3, mild left C3-C4, moderate to severe bilateral C5-C6, and mild bilateral C6-C7   neural foraminal stenosis.    PARASPINAL: No prevertebral edema. Calcified atherosclerosis of the carotid bulbs.      Impression    IMPRESSION:  HEAD CT:  1.  Stable thin subdural hemorrhage along the posterior falx/right tentorial leaflet. No new or enlarging hemorrhage.    CERVICAL SPINE CT:  1.  Redemonstration of minimal C7 and T2 acute compression fractures.   CT Head w/o Contrast    Narrative    EXAM: CT CERVICAL SPINE W/O CONTRAST, CT HEAD W/O CONTRAST  LOCATION: [facility]  DATE: 5/28/2024    INDICATION: unwitnessed fall; Neck pain; Neurologic deficit, non traumatic; None of the following: Babinski clonus, balance gait abnormality, Chandler's sign, hyperreflexia, or UE weakness  COMPARISON: May 27, 2024 MR cervical spine. May 27, 2024 CT head.  TECHNIQUE:   1) Routine CT Head without IV contrast. Multiplanar reformats. Dose reduction techniques were used.  2) Routine CT Cervical Spine without IV contrast. Multiplanar reformats. Dose reduction techniques were used.    FINDINGS:   HEAD CT:   INTRACRANIAL CONTENTS: Stable size of the small subdural hemorrhage layering along the posterior falx/right tentorial leaflet. No new or enlarging hemorrhage. Unchanged advanced global cerebral volume loss. Stable ventricles. No CT evidence for acute   ischemia.    VISUALIZED ORBITS/SINUSES/MASTOIDS: Prior bilateral cataract surgery. Visualized portions of the orbits are otherwise unremarkable. No significant paranasal sinus mucosal disease. No middle ear or mastoid  effusion.    BONES/SOFT TISSUES: No acute abnormality.    CERVICAL SPINE CT:   VERTEBRA: Normal alignment. Redemonstration of minimal C7 and T2 mild acute compression fractures. Chronic T3 superior endplate compression fracture.. No acute compression fracture or posttraumatic subluxation.     CANAL/FORAMINA: Multilevel spondylosis without high grade canal stenosis. Severe C5-C6 disc degeneration. Uncovertebral ridging with facet arthrosis results in mild left C2-C3, mild left C3-C4, moderate to severe bilateral C5-C6, and mild bilateral C6-C7   neural foraminal stenosis.    PARASPINAL: No prevertebral edema. Calcified atherosclerosis of the carotid bulbs.      Impression    IMPRESSION:  HEAD CT:  1.  Stable thin subdural hemorrhage along the posterior falx/right tentorial leaflet. No new or enlarging hemorrhage.    CERVICAL SPINE CT:  1.  Redemonstration of minimal C7 and T2 acute compression fractures.   US Renal Complete Non-Vascular    Narrative    EXAMINATION: US RENAL COMPLETE NON-VASCULAR, 5/28/2024 1:27 PM     COMPARISON: 7/28/2023.    HISTORY: acute kidney injury, need to rule out obstuctive phyiology    TECHNIQUE: The kidneys and bladder were scanned in the standard  fashion with specialized ultrasound transducer(s) using both gray  scale and limited color/spectral Doppler techniques.    FINDINGS:    Right kidney: Measures 9.3 cm in length. Parenchyma is of normal  thickness and echogenicity. No focal mass. No hydronephrosis.    Left kidney: Suboptimal visualization of the left kidney. Measures 9.7  cm in length. Parenchyma is of normal thickness and echogenicity. No  hydronephrosis.     Bladder: Unremarkable.      Impression    IMPRESSION:  Unremarkable renal ultrasound. No hydronephrosis.    LORY BURGOS MD         SYSTEM ID:  [id number]   XR Chest Port 1 View    Narrative    EXAM: XR CHEST PORT 1 VIEW  5/28/2024 1:49 PM      HISTORY: sepsis criteria, recent fall, alcohol use disorder, high risk  for  pneumonia    COMPARISON: 5/26/2024    FINDINGS: Single view of the chest. Trachea is midline. Cardiac  silhouette is within normal limits. Atherosclerotic calcifications of  the aortic arch. Streaky perihilar and left greater than right basilar  opacities. No focal consolidation. No pleural effusion or  pneumothorax.      Impression    IMPRESSION: Streaky perihilar and left greater than right bibasilar  opacities, favor atelectasis.    I have personally reviewed the examination and initial interpretation  and I agree with the findings.    NOE VASQUEZ MD         SYSTEM ID:  [id number]

## 2024-05-29 NOTE — PROGRESS NOTES
Shift: [id number]  Pt transferred from [facility] for neurosurg eval d/t left sided weakness and falls.  Stroke negative, SDH present. Sepsis 2/2 likely juan pneumonia    Past mhx: ETOH, CKD, HTN, C7+T2 hematoma    VS: WDL  Pain: Denies pain, lidocaine x2 on low back  Neuro: AOx4  Cardiac: WDL  Respiratory: WDL  Diet/Appetite: Reg, good appetite  /GI: Loose stools  LDAs: R. PIV,   Skin: Bruises from fall, R+LUE  Activity: Fall risk, 121  Test/Procedures: MRI under general anesthesia 5/30  Pertinent Labs/Lab Collection: C.diff results in progress    Plan: Abx, team to call sister

## 2024-05-29 NOTE — PROGRESS NOTES
"[facility]    Medicine Progress Note - Hospitalist Service, GOLD TEAM 9    Date of Admission:  5/26/2024    MEDICINE IS PRIMARY SINCE 5/28    Assessment & Plan   68 year old male admitted on 5/26/2024. He has a PMHx of alcohol use c/b R peroneal neuropathy, gastritis, tobacco use, asthma, stage III CKD, HLD, and HTN who presents to the ED as a transfer from [facility] for neurosurgery evaluation.     # Sepsis and severe sepsis secondary to likely bacterial pneumonia possibly secondary to aspiration, POA  Sepsis criteria include heart rate of 92 and white blood cell count of 14.7.  Patient qualifies for diagnosis of severe sepsis given lactic acid of 2.3.  Inflammatory markers with CRP and procalcitonin are both elevated.  He has high pretest probability given his alcohol use disorder and being found down.  Chest x-ray is abnormal.    The differential diagnosis would also include C. difficile antigen enteric infection given 7 episodes of diarrhea with generalized abdominal discomfort within the last 24 hours.    Lactic acidosis resolved with intravenous antibiotics and intravenous fluids.    -Awaiting sputum culture  -Ordered C. difficile and enteric panel  -Continue Unasyn for 5 days and azithromycin for 3 days    # Acute kidney injury, akin stage I on top of CKD stage IIIb complicated by anion gap metabolic acidosis, POA, improving  This is likely prerenal in etiology.  Ordered fractional secretion of sodium and obtain renal ultrasound without evidence of obstructive physiology.  -Strict intake and output and daily body weight  -Increase LR to 100 cc/h given diarrhea  -Basic metabolic panel in a.m.    # Alcohol use disorder with dependence and withdrawal Complicated by multiple falls complicated by multiple falls, POA  Patient reports that he drinks 1 gallon of vodka per week. He has been drinking since age 19 and has never \"stopped long enough to have the shakes\" " The patient is interested in chemical dependency resources and is willing to discuss further with addiction medicine.  -Continue gabapentin at 100 mg 3 times daily given his kidney function  -Will complete the high thiamine protocol that was started in the emergency department  -Will consider initiation of naltrexone within the next 24 hours if the patient does not need any further narcotics  -Continue folic acid, multivitamins, and thiamine  -Appreciative to the input of addiction medicine    # 4 mm subdural hemorrhage along the right tentorial leaflet, without mass effect, POA  MRI Brain obtained without evidence of acute ischemic infarct.   -Serial neurological evaluation  -Continue Keppra 500 mg twice daily for 7 days based on recommendations of neurosurgery  -PT/OT  -The patient was cleared for heparin subcutaneous for DVT prevention by neurosurgery and trauma team    # Acute superior endplate compression deformities involving C7, T2, and L1, POA   The patient was cleared to be of cervical collar per neurosurgery.  The patient was seen in collaboration with trauma service  -Continue scheduled acetaminophen and robaxin 500 mg at bedtime together with robaxin 250 mg TID PRN and lidocaine patches.   -Continue gabapentin  - I agree with stopping narcotics that was ordered by trauma team    # Left sided andreia-paresis , POA  An MRI of the left brachial plexus was performed which did not reveal abnormality of the plexus; incidentally there was mixed edema of the left acromioclavicular joint and rotator cuff musculature.  MRI showing evidence of redemonstration of mild compression fracture of the superior endplate of C7 and T2 with cervical spondylosis.  -Neurosurgery is recommending repeat cervical spine MRI with contrast under sedation, plan for 5/30 afternoon, npo on 5/30 at 5 am  - May be considered for EMG later on  -Appreciative to follow the recommendations of neurology    # Depression complicated by insomnia,  POA  -The patient declined escitalopram  -Started trazodone 25 mg nightly on top of melatonin    # Hypophosphatemia and hypomagnesemia, POA  -Ordered replacement protocol    # Normocytic anemia with wide RDW, POA  -Added on reticulocyte count  -Will monitor closely for bleeding and obtain daily CBC    # Demand ischemia and elevated NT proBNP, POA  -Ordered echocardiogram  -Will continue intravenous fluids for now given diarrhea with plans to diuresis within the next 24 hours    # Elevated CPK, POA  -CPK trended down    # Chronic medical problems:  #HTN  - Continue Metoprolol and hold if SBP <120     #Asthma  - Continue PRN albuterol inhaler        Diet: Regular Diet Adult    DVT Prophylaxis: Heparin SQ  Frederick Catheter: Not present  Lines: None     Cardiac Monitoring: ACTIVE order. Indication: QTc prolonging medication (48 hours)  Code Status: Full Code      Clinically Significant Risk Factors        # Hypokalemia: Lowest K = 3.3 mmol/L in last 2 days, will replace as needed     # Hypomagnesemia: Lowest Mg = 1.3 mg/dL in last 2 days, will replace as needed   # Hypoalbuminemia: Lowest albumin = 2.9 g/dL at 5/29/2024  6:11 AM, will monitor as appropriate     # Hypertension: Noted on problem list              # Financial/Environmental Concerns: none  # Asthma: noted on problem list        Disposition Plan     Medically Ready for Discharge: Anticipated in 5+ Days             Jamel Frost MD  Hospitalist Service, GOLD TEAM 9  [facility]  Securely message with [employer] (more info)  Text page via [facility] Paging/Directory   See signed in provider for up to date coverage information  ______________________________________________________________________    Interval History   The patient reported insomnia.  No other complaints to the undersigned.    Physical Exam   Vital Signs: Temp: 97.9  F (36.6  C) Temp src: Oral BP: 117/86 Pulse: 82   Resp: 16 SpO2: 100 % O2 Device: None (Room  air)    Weight: 160 lbs 0 oz    Constitutional: Awake, alert, cooperative, no apparent distress.  Eyes: Conjunctiva and pupils examined and normal.  HEENT: Moist mucous membranes, normal dentition.  Respiratory: Clear to auscultation bilaterally, no crackles or wheezing.  Cardiovascular: Regular rate and rhythm, normal S1 and S2, and no murmur noted.  GI: Soft, non-distended, non-tender, normal bowel sounds.  Lymph/Hematologic: No anterior cervical or supraclavicular adenopathy.  Skin: No rashes, no cyanosis, no edema.  Musculoskeletal: No joint swelling, erythema or tenderness.  Neurologic: left sided 2/5 power  Psychiatric: Alert, oriented to person, place and time, no obvious anxiety or depression.     Medical Decision Making       45 MINUTES SPENT BY ME on the date of service doing chart review, history, exam, documentation & further activities per the note.      Data     I have personally reviewed the following data over the past 24 hrs:    9.8  \   9.2 (L)   / 413     138 102 21.6 /  113 (H)   3.8 24 1.88 (H) \     ALT: 12 AST: 31 AP: 105 TBILI: 0.4   ALB: 2.9 (L) TOT PROTEIN: 5.9 (L) LIPASE: N/A     Trop: N/A BNP: 4,620 (H)     Procal: N/A CRP: N/A Lactic Acid: 1.4       Ferritin:  N/A % Retic:  N/A LDH:  N/A       Imaging results reviewed over the past 24 hrs:   Recent Results (from the past 24 hour(s))   US Renal Complete Non-Vascular    Narrative    EXAMINATION: US RENAL COMPLETE NON-VASCULAR, 5/28/2024 1:27 PM     COMPARISON: 7/28/2023.    HISTORY: acute kidney injury, need to rule out obstuctive phyiology    TECHNIQUE: The kidneys and bladder were scanned in the standard  fashion with specialized ultrasound transducer(s) using both gray  scale and limited color/spectral Doppler techniques.    FINDINGS:    Right kidney: Measures 9.3 cm in length. Parenchyma is of normal  thickness and echogenicity. No focal mass. No hydronephrosis.    Left kidney: Suboptimal visualization of the left kidney. Measures  9.7  cm in length. Parenchyma is of normal thickness and echogenicity. No  hydronephrosis.     Bladder: Unremarkable.      Impression    IMPRESSION:  Unremarkable renal ultrasound. No hydronephrosis.    LORY BURGOS MD         SYSTEM ID:  [id number]   XR Chest Port 1 View    Narrative    EXAM: XR CHEST PORT 1 VIEW  5/28/2024 1:49 PM      HISTORY: sepsis criteria, recent fall, alcohol use disorder, high risk  for pneumonia    COMPARISON: 5/26/2024    FINDINGS: Single view of the chest. Trachea is midline. Cardiac  silhouette is within normal limits. Atherosclerotic calcifications of  the aortic arch. Streaky perihilar and left greater than right basilar  opacities. No focal consolidation. No pleural effusion or  pneumothorax.      Impression    IMPRESSION: Streaky perihilar and left greater than right bibasilar  opacities, favor atelectasis.    I have personally reviewed the examination and initial interpretation  and I agree with the findings.    NOE VASQUEZ MD         SYSTEM ID:  [id number]   MR Cervical Spine w/o Contrast    Narrative    EXAM: MR CERVICAL SPINE W/O CONTRAST  5/28/2024 7:20 PM     HISTORY: left arm weakness, per recommendations of neurology       COMPARISON: Cervical spine MR 5/27/2024    TECHNIQUE: Sagittal T1-weighted, sagittal STIR, sagittal T2-weighted,  and axial and sagittal gradient echo. Axial T2-weighted images and  sagittal diffusion weighted images were not obtained due to patient  inability to hold still during the examination.    FINDINGS:  Redemonstration of mild compression deformities the superior end  plates of the C7 and T2 vertebral bodies. Stable cervical vertebral  alignment.    Based only on the sagittal images, there are disc bulges at C3-4,  C4-5, C5-6, and C6-7, most pronounced at C6-7 where there is moderate  spinal canal stenosis and contact with the ventral spinal cord. The  neural foramen are not well evaluated due to noncompletion of the  axial T2-weighted  images and significantly motion degraded axial GRE  images. No definite abnormal spinal canal signal on significantly  motion degraded images.      Impression    IMPRESSION:  1. Motion degraded and incomplete MR of the cervical spine.  2. Redemonstration of mild compression fractures of the superior  endplates of C7 and T2. Stable alignment.  3. Cervical spondylosis with multilevel disc bulges most pronounced at  C6-7 with moderate spinal canal stenosis similar to prior.    I have personally reviewed the examination and initial interpretation  and I agree with the findings.    VANE BRAN MD         SYSTEM ID:  [id number]

## 2024-05-29 NOTE — PROGRESS NOTES
[facility], [place]  Neurosurgery Progress Note:  05/29/2024      Interval History: No acute events overnight. LUE strength is improving. Neurology evaluated patient and recommending repeat MRI C spine w/wo contrast.    Assessment:  68 year old male with a past medical history significant for alcohol use disorder, peroneal neuropathy, gastritis, stage III chronic kidney disease, hypertension and hyperlipidemia who presents as a transfer from [facility] ED for evaluation of a 4 mm right tentorial subdural hematoma and left hemibody weakness, UE worse than LE.     Clinically Significant Risk Factors        # Hypokalemia: Lowest K = 3.3 mmol/L in last 2 days, will replace as needed     # Hypomagnesemia: Lowest Mg = 1.3 mg/dL in last 2 days, will replace as needed   # Hypoalbuminemia: Lowest albumin = 2.9 g/dL at 5/29/2024  6:11 AM, will monitor as appropriate     # Hypertension: Noted on problem list              # Financial/Environmental Concerns: none  # Asthma: noted on problem list           Plan:  Serial neuro exams  Neurology following, agree with MRI C spine w/wo contrast to evaluate for enhancing lesion  Recommend EMG in setting of acute LUE weakness with unexplained etiology to help localize  Referral of outpatient epidural steroid injection   PT/OT  Ok for DVT prophylaxis per trauma protocol    Remainder of cares per primary    -----------------------------------  Ryan Rivas MD  Neurosurgery resident, PGY-3    Emanuel Hinojosa  Neurosurgery Resident PGY-1  -----------------------------------  Gen: Appears comfortable, NAD  Neurologic:  Alert & Oriented to person, place, time, and situation  Follows commands briskly  Speech fluent, spontaneous. No aphasia or dysarthria.  No gaze preference. No apparent hemineglect.  PERRL, EOMI  Face symmetric with sensation intact to light touch  Palate elevates symmetrically, uvula midline, tongue protrudes midline  Trapezii muscles 5/5  bilaterally  No pronator drift     Del Tr Bi WE WF Gr   R 5 5 5 5 5 5   L 2 4+ 4- 4 4 4-    HF KE KF DF PF EHL   R 5 5 5 5 5 5   L 5 5 5 5 5 5     Reflexes: bilateral biceps/brachioradialis 3+, bilateral Chandler's, bilateral patellar 3+, bilateral upgoing toes    Sensation intact and symmetric to light touch throughout    Objective:   Temp:  [97.9  F (36.6  C)-98.9  F (37.2  C)] 97.9  F (36.6  C)  Pulse:  [53-87] 82  Resp:  [16-18] 16  BP: (110-138)/(63-86) 117/86  SpO2:  [96 %-100 %] 100 %  I/O last 3 completed shifts:  In: 1758.75 [P.O.:320; I.V.:1438.75]  Out: 300 [Urine:300]        LABS:  Recent Labs   Lab 05/29/24 0611 05/28/24 2157 05/28/24  0656 05/28/24 0424 05/27/24 2003 05/27/24  0639    137 135  --   --  133*   POTASSIUM 3.8  --  3.5 3.8   < > 3.2*   CHLORIDE 102  --  97*  --   --  93*   CO2 24  --  26  --   --  24   ANIONGAP 12  --  12  --   --  16*   *  --  138*  --   --  108*   BUN 21.6  --  37.8*  --   --  56.3*   CR 1.88* 2.20* 2.54*  --   --  3.36*   PINO 8.4*  --  8.9  --   --  8.6*    < > = values in this interval not displayed.       Recent Labs   Lab 05/29/24 0611 05/28/24 0656   WBC  --  13.7*   RBC 2.88* 2.96*   HGB 9.2* 9.5*   HCT 28.6* 28.5*   MCV 99 96   MCH 31.9 32.1   MCHC 32.2 33.3   RDW 15.8* 15.5*    449       IMAGING:  Recent Results (from the past 24 hour(s))   US Renal Complete Non-Vascular    Narrative    EXAMINATION: US RENAL COMPLETE NON-VASCULAR, 5/28/2024 1:27 PM     COMPARISON: 7/28/2023.    HISTORY: acute kidney injury, need to rule out obstuctive phyiology    TECHNIQUE: The kidneys and bladder were scanned in the standard  fashion with specialized ultrasound transducer(s) using both gray  scale and limited color/spectral Doppler techniques.    FINDINGS:    Right kidney: Measures 9.3 cm in length. Parenchyma is of normal  thickness and echogenicity. No focal mass. No hydronephrosis.    Left kidney: Suboptimal visualization of the left kidney. Measures  9.7  cm in length. Parenchyma is of normal thickness and echogenicity. No  hydronephrosis.     Bladder: Unremarkable.      Impression    IMPRESSION:  Unremarkable renal ultrasound. No hydronephrosis.    LORY BURGOS MD         SYSTEM ID:  [id number]   XR Chest Port 1 View    Narrative    EXAM: XR CHEST PORT 1 VIEW  5/28/2024 1:49 PM      HISTORY: sepsis criteria, recent fall, alcohol use disorder, high risk  for pneumonia    COMPARISON: 5/26/2024    FINDINGS: Single view of the chest. Trachea is midline. Cardiac  silhouette is within normal limits. Atherosclerotic calcifications of  the aortic arch. Streaky perihilar and left greater than right basilar  opacities. No focal consolidation. No pleural effusion or  pneumothorax.      Impression    IMPRESSION: Streaky perihilar and left greater than right bibasilar  opacities, favor atelectasis.    I have personally reviewed the examination and initial interpretation  and I agree with the findings.    NOE VASQUEZ MD         SYSTEM ID:  [id number]   MR Cervical Spine w/o Contrast    Narrative    EXAM: MR CERVICAL SPINE W/O CONTRAST  5/28/2024 7:20 PM     HISTORY: left arm weakness, per recommendations of neurology       COMPARISON: Cervical spine MR 5/27/2024    TECHNIQUE: Sagittal T1-weighted, sagittal STIR, sagittal T2-weighted,  and axial and sagittal gradient echo. Axial T2-weighted images and  sagittal diffusion weighted images were not obtained due to patient  inability to hold still during the examination.    FINDINGS:  Redemonstration of mild compression deformities the superior end  plates of the C7 and T2 vertebral bodies. Stable cervical vertebral  alignment.    Based only on the sagittal images, there are disc bulges at C3-4,  C4-5, C5-6, and C6-7, most pronounced at C6-7 where there is moderate  spinal canal stenosis and contact with the ventral spinal cord. The  neural foramen are not well evaluated due to noncompletion of the  axial T2-weighted  images and significantly motion degraded axial GRE  images. No definite abnormal spinal canal signal on significantly  motion degraded images.      Impression    IMPRESSION:  1. Motion degraded and incomplete MR of the cervical spine.  2. Redemonstration of mild compression fractures of the superior  endplates of C7 and T2. Stable alignment.  3. Cervical spondylosis with multilevel disc bulges most pronounced at  C6-7 with moderate spinal canal stenosis similar to prior.    I have personally reviewed the examination and initial interpretation  and I agree with the findings.    VANE BRAN MD         SYSTEM ID:  [id number]

## 2024-05-29 NOTE — PROGRESS NOTES
"[facility]  Neurology Consultation - Progress Note    Patient Name:  Luigi Vieyra  Date of Service:  May 29, 2024    Subjective:    Mr. Vieyra reports that he got some sleep last night. Says that his left arm weakness continues to get a little better. MRI C spine w/ and w/o contrast stopped before contrasted scan due to patient movement; images are motion degraded.    Objective:    Vitals: /86 (BP Location: Right arm)   Pulse 82   Temp 97.9  F (36.6  C) (Oral)   Resp 16   Ht 1.778 m (5' 10\")   Wt 72.6 kg (160 lb)   SpO2 100%   BMI 22.96 kg/m    General: Lying in bed, no acute distress  Head: normocephalic  Cardiac: no lower extremity edema  Neurologic:  Mental Status: awake, alert, oriented to hospital, month, and year. Follows one step commands on both sides of body.  Cranial Nerves: Visual fields full on confrontational testing. Conjugate gaze with intact extraocular movements. Upper and lower face symmetric at rest and with activation. Tongue protrusion midline.   Motor: No abnormal movements appreaciated.     Right Left   Shoulder Abduction 5 2+   Elbow Flexion 5 4+   Elbow Extension 5 5   Wrist Extension 5 3   Wrist Flexion 5 2+   Finger Extension 5 3   ADM 5 2   FDI 5 3   Hip Flexion 4+ 4+   Hip Abduction 5 4+   Hip Adduction 5 5   Knee Flexion 5 5   Knee Extension 5 5   Ankle Dorsiflexion 5 5   Ankle Plantarflexion 5 5      Reflexes: LUE hyperreflexic (3/4 at biceps and brachioradialis, Chandler positive). RUE with 2/4 at biceps, brachioradialis. Negative Chandler on the right. LLE hyperreflexic (3/4 at patella, 2/4 at achilles, Babinski positive). RLE 2/4 at patella and achilles, negative Babinski.   Sensory: Intact to light touch and vibration in the arms.  Station/Gait: Deferred    Pertinent Investigations:    I have personally reviewed most recent and pertinent labs, tests, and radiological images.     Assessment  68 year old male with alcohol use disorder " who has left upper extremity in the setting of multiple falls, etiology currently unclear. Exam also notable for hyperreflexia (including Chandler positive on the left) but preserved sensation. This suggests a combination of lower motor neuron and upper motor neuron lesion, though MRI cervical spine does not clearly demonstrate this, or alternatively one disease process with upper and lower motor neuron signs including motor neuron disease, although the onset seems relatively acute (~1 week) for this etiology.    It is reassuring that his exam has stabilized and is perhaps a bit better today. Would recommend outpatient general neurology follow up and outpatient EMG, which have been ordered for you in the discharge navigator. EMG should be performed at least 2 weeks after onset of symptoms in order to increase diagnostic yield.    Recommendations:   - continue working with therapies  - outpatient general neurology referral (ordered for you in discharge navigator)  - outpatient EMG (ordered for you in discharge navigator, on or after 6/5/24 to allow sufficient time after onset of symptoms for highest yield of study)  -Inpatient neurology will sign off at this time    Thank you for involving Neurology in the care of Luigi Vieyra.  Please do not hesitate to call with questions/concerns (consult pager [phone]).      Neurology will sign off at this time. Please do not hesitate to contact our service with further questions or concerns.    Patient was seen and discussed with Dr. Mora.    Vaibhav Henriquez MD   Neurology Resident PGY-3

## 2024-05-30 ENCOUNTER — ANESTHESIA EVENT (OUTPATIENT)
Dept: SURGERY | Facility: CLINIC | Age: 69
DRG: 871 | End: 2024-05-30
Payer: COMMERCIAL

## 2024-05-30 ENCOUNTER — APPOINTMENT (OUTPATIENT)
Dept: MRI IMAGING | Facility: CLINIC | Age: 69
DRG: 871 | End: 2024-05-30
Attending: INTERNAL MEDICINE
Payer: COMMERCIAL

## 2024-05-30 ENCOUNTER — ANESTHESIA (OUTPATIENT)
Dept: SURGERY | Facility: CLINIC | Age: 69
DRG: 871 | End: 2024-05-30
Payer: COMMERCIAL

## 2024-05-30 LAB
ALBUMIN SERPL BCG-MCNC: 2.9 G/DL (ref 3.5–5.2)
ALP SERPL-CCNC: 109 U/L (ref 40–150)
ALT SERPL W P-5'-P-CCNC: 14 U/L (ref 0–70)
ANION GAP SERPL CALCULATED.3IONS-SCNC: 9 MMOL/L (ref 7–15)
AST SERPL W P-5'-P-CCNC: 27 U/L (ref 0–45)
BASE EXCESS BLDV CALC-SCNC: 1.6 MMOL/L (ref -3–3)
BASOPHILS # BLD AUTO: ABNORMAL 10*3/UL
BASOPHILS # BLD MANUAL: 0.1 10E3/UL (ref 0–0.2)
BASOPHILS NFR BLD AUTO: ABNORMAL %
BASOPHILS NFR BLD MANUAL: 1 %
BILIRUB SERPL-MCNC: 0.3 MG/DL
BUN SERPL-MCNC: 14.5 MG/DL (ref 8–23)
CALCIUM SERPL-MCNC: 8.4 MG/DL (ref 8.8–10.2)
CHLORIDE SERPL-SCNC: 107 MMOL/L (ref 98–107)
CREAT SERPL-MCNC: 1.58 MG/DL (ref 0.67–1.17)
DEPRECATED HCO3 PLAS-SCNC: 23 MMOL/L (ref 22–29)
EGFRCR SERPLBLD CKD-EPI 2021: 48 ML/MIN/1.73M2
EOSINOPHIL # BLD AUTO: ABNORMAL 10*3/UL
EOSINOPHIL # BLD MANUAL: 0.6 10E3/UL (ref 0–0.7)
EOSINOPHIL NFR BLD AUTO: ABNORMAL %
EOSINOPHIL NFR BLD MANUAL: 6 %
ERYTHROCYTE [DISTWIDTH] IN BLOOD BY AUTOMATED COUNT: 16.1 % (ref 10–15)
GLUCOSE BLDC GLUCOMTR-MCNC: 94 MG/DL (ref 70–99)
GLUCOSE SERPL-MCNC: 97 MG/DL (ref 70–99)
HCO3 BLDV-SCNC: 26 MMOL/L (ref 21–28)
HCT VFR BLD AUTO: 26.5 % (ref 40–53)
HGB BLD-MCNC: 8.4 G/DL (ref 13.3–17.7)
HOWELL-JOLLY BOD BLD QL SMEAR: PRESENT
IMM GRANULOCYTES # BLD: ABNORMAL 10*3/UL
IMM GRANULOCYTES NFR BLD: ABNORMAL %
LYMPHOCYTES # BLD AUTO: ABNORMAL 10*3/UL
LYMPHOCYTES # BLD MANUAL: 2 10E3/UL (ref 0.8–5.3)
LYMPHOCYTES NFR BLD AUTO: ABNORMAL %
LYMPHOCYTES NFR BLD MANUAL: 19 %
MAGNESIUM SERPL-MCNC: 1.2 MG/DL (ref 1.7–2.3)
MCH RBC QN AUTO: 31.8 PG (ref 26.5–33)
MCHC RBC AUTO-ENTMCNC: 31.7 G/DL (ref 31.5–36.5)
MCV RBC AUTO: 100 FL (ref 78–100)
MONOCYTES # BLD AUTO: ABNORMAL 10*3/UL
MONOCYTES # BLD MANUAL: 0.4 10E3/UL (ref 0–1.3)
MONOCYTES NFR BLD AUTO: ABNORMAL %
MONOCYTES NFR BLD MANUAL: 4 %
NEUTROPHILS # BLD AUTO: ABNORMAL 10*3/UL
NEUTROPHILS # BLD MANUAL: 7.4 10E3/UL (ref 1.6–8.3)
NEUTROPHILS NFR BLD AUTO: ABNORMAL %
NEUTROPHILS NFR BLD MANUAL: 70 %
NRBC # BLD AUTO: 0 10E3/UL
NRBC BLD AUTO-RTO: 0 /100
O2/TOTAL GAS SETTING VFR VENT: 0 %
OXYHGB MFR BLDV: 86 % (ref 70–75)
PCO2 BLDV: 38 MM HG (ref 40–50)
PH BLDV: 7.44 [PH] (ref 7.32–7.43)
PHOSPHATE SERPL-MCNC: 2.9 MG/DL (ref 2.5–4.5)
PLAT MORPH BLD: ABNORMAL
PLATELET # BLD AUTO: 384 10E3/UL (ref 150–450)
PO2 BLDV: 48 MM HG (ref 25–47)
POTASSIUM SERPL-SCNC: 3.8 MMOL/L (ref 3.4–5.3)
PROT SERPL-MCNC: 5.6 G/DL (ref 6.4–8.3)
RBC # BLD AUTO: 2.64 10E6/UL (ref 4.4–5.9)
RBC MORPH BLD: ABNORMAL
SAO2 % BLDV: 87.5 % (ref 70–75)
SODIUM SERPL-SCNC: 139 MMOL/L (ref 135–145)
WBC # BLD AUTO: 10.5 10E3/UL (ref 4–11)

## 2024-05-30 PROCEDURE — 250N000013 HC RX MED GY IP 250 OP 250 PS 637: Performed by: INTERNAL MEDICINE

## 2024-05-30 PROCEDURE — 36415 COLL VENOUS BLD VENIPUNCTURE: CPT | Performed by: INTERNAL MEDICINE

## 2024-05-30 PROCEDURE — 250N000025 HC SEVOFLURANE, PER MIN

## 2024-05-30 PROCEDURE — 84075 ASSAY ALKALINE PHOSPHATASE: CPT | Performed by: INTERNAL MEDICINE

## 2024-05-30 PROCEDURE — 84100 ASSAY OF PHOSPHORUS: CPT | Performed by: INTERNAL MEDICINE

## 2024-05-30 PROCEDURE — 258N000003 HC RX IP 258 OP 636: Performed by: ANESTHESIOLOGY

## 2024-05-30 PROCEDURE — 72156 MRI NECK SPINE W/O & W/DYE: CPT | Performed by: ANESTHESIOLOGY

## 2024-05-30 PROCEDURE — 85027 COMPLETE CBC AUTOMATED: CPT | Performed by: INTERNAL MEDICINE

## 2024-05-30 PROCEDURE — 72156 MRI NECK SPINE W/O & W/DYE: CPT

## 2024-05-30 PROCEDURE — 250N000013 HC RX MED GY IP 250 OP 250 PS 637: Performed by: ANESTHESIOLOGY

## 2024-05-30 PROCEDURE — 250N000011 HC RX IP 250 OP 636

## 2024-05-30 PROCEDURE — 85007 BL SMEAR W/DIFF WBC COUNT: CPT | Performed by: INTERNAL MEDICINE

## 2024-05-30 PROCEDURE — 83735 ASSAY OF MAGNESIUM: CPT | Performed by: INTERNAL MEDICINE

## 2024-05-30 PROCEDURE — A9585 GADOBUTROL INJECTION: HCPCS | Performed by: INTERNAL MEDICINE

## 2024-05-30 PROCEDURE — 999N000141 HC STATISTIC PRE-PROCEDURE NURSING ASSESSMENT

## 2024-05-30 PROCEDURE — 258N000003 HC RX IP 258 OP 636: Performed by: INTERNAL MEDICINE

## 2024-05-30 PROCEDURE — 250N000011 HC RX IP 250 OP 636: Performed by: NURSE PRACTITIONER

## 2024-05-30 PROCEDURE — 250N000011 HC RX IP 250 OP 636: Performed by: INTERNAL MEDICINE

## 2024-05-30 PROCEDURE — 120N000002 HC R&B MED SURG/OB UMMC

## 2024-05-30 PROCEDURE — 258N000003 HC RX IP 258 OP 636

## 2024-05-30 PROCEDURE — 370N000017 HC ANESTHESIA TECHNICAL FEE, PER MIN

## 2024-05-30 PROCEDURE — 710N000011 HC RECOVERY PHASE 1, LEVEL 3, PER MIN

## 2024-05-30 PROCEDURE — 72156 MRI NECK SPINE W/O & W/DYE: CPT | Mod: 26 | Performed by: RADIOLOGY

## 2024-05-30 PROCEDURE — 255N000002 HC RX 255 OP 636: Performed by: INTERNAL MEDICINE

## 2024-05-30 PROCEDURE — 250N000009 HC RX 250

## 2024-05-30 PROCEDURE — 82805 BLOOD GASES W/O2 SATURATION: CPT | Performed by: INTERNAL MEDICINE

## 2024-05-30 PROCEDURE — 250N000013 HC RX MED GY IP 250 OP 250 PS 637: Performed by: STUDENT IN AN ORGANIZED HEALTH CARE EDUCATION/TRAINING PROGRAM

## 2024-05-30 PROCEDURE — 99232 SBSQ HOSP IP/OBS MODERATE 35: CPT | Performed by: INTERNAL MEDICINE

## 2024-05-30 PROCEDURE — 250N000013 HC RX MED GY IP 250 OP 250 PS 637: Performed by: NURSE PRACTITIONER

## 2024-05-30 PROCEDURE — 99231 SBSQ HOSP IP/OBS SF/LOW 25: CPT | Mod: GC | Performed by: NEUROLOGICAL SURGERY

## 2024-05-30 RX ORDER — GADOBUTROL 604.72 MG/ML
7.5 INJECTION INTRAVENOUS ONCE
Status: COMPLETED | OUTPATIENT
Start: 2024-05-30 | End: 2024-05-30

## 2024-05-30 RX ORDER — PROPOFOL 10 MG/ML
INJECTION, EMULSION INTRAVENOUS PRN
Status: DISCONTINUED | OUTPATIENT
Start: 2024-05-30 | End: 2024-05-30

## 2024-05-30 RX ORDER — METOPROLOL TARTRATE 1 MG/ML
1-2 INJECTION, SOLUTION INTRAVENOUS EVERY 5 MIN PRN
Status: DISCONTINUED | OUTPATIENT
Start: 2024-05-30 | End: 2024-05-30 | Stop reason: HOSPADM

## 2024-05-30 RX ORDER — DEXAMETHASONE SODIUM PHOSPHATE 4 MG/ML
4 INJECTION, SOLUTION INTRA-ARTICULAR; INTRALESIONAL; INTRAMUSCULAR; INTRAVENOUS; SOFT TISSUE
Status: DISCONTINUED | OUTPATIENT
Start: 2024-05-30 | End: 2024-05-30 | Stop reason: HOSPADM

## 2024-05-30 RX ORDER — ONDANSETRON 4 MG/1
4 TABLET, ORALLY DISINTEGRATING ORAL EVERY 30 MIN PRN
Status: DISCONTINUED | OUTPATIENT
Start: 2024-05-30 | End: 2024-05-30 | Stop reason: HOSPADM

## 2024-05-30 RX ORDER — SODIUM CHLORIDE, SODIUM LACTATE, POTASSIUM CHLORIDE, CALCIUM CHLORIDE 600; 310; 30; 20 MG/100ML; MG/100ML; MG/100ML; MG/100ML
INJECTION, SOLUTION INTRAVENOUS CONTINUOUS PRN
Status: DISCONTINUED | OUTPATIENT
Start: 2024-05-30 | End: 2024-05-30

## 2024-05-30 RX ORDER — SODIUM CHLORIDE, SODIUM LACTATE, POTASSIUM CHLORIDE, CALCIUM CHLORIDE 600; 310; 30; 20 MG/100ML; MG/100ML; MG/100ML; MG/100ML
INJECTION, SOLUTION INTRAVENOUS CONTINUOUS
Status: DISCONTINUED | OUTPATIENT
Start: 2024-05-30 | End: 2024-05-30 | Stop reason: HOSPADM

## 2024-05-30 RX ORDER — FENTANYL CITRATE 50 UG/ML
50 INJECTION, SOLUTION INTRAMUSCULAR; INTRAVENOUS EVERY 5 MIN PRN
Status: DISCONTINUED | OUTPATIENT
Start: 2024-05-30 | End: 2024-05-30 | Stop reason: HOSPADM

## 2024-05-30 RX ORDER — FENTANYL CITRATE 50 UG/ML
25 INJECTION, SOLUTION INTRAMUSCULAR; INTRAVENOUS EVERY 5 MIN PRN
Status: DISCONTINUED | OUTPATIENT
Start: 2024-05-30 | End: 2024-05-30 | Stop reason: HOSPADM

## 2024-05-30 RX ORDER — NALOXONE HYDROCHLORIDE 0.4 MG/ML
0.1 INJECTION, SOLUTION INTRAMUSCULAR; INTRAVENOUS; SUBCUTANEOUS
Status: DISCONTINUED | OUTPATIENT
Start: 2024-05-30 | End: 2024-05-30 | Stop reason: HOSPADM

## 2024-05-30 RX ORDER — LIDOCAINE 40 MG/G
CREAM TOPICAL
Status: DISCONTINUED | OUTPATIENT
Start: 2024-05-30 | End: 2024-05-30 | Stop reason: HOSPADM

## 2024-05-30 RX ORDER — ONDANSETRON 2 MG/ML
4 INJECTION INTRAMUSCULAR; INTRAVENOUS EVERY 30 MIN PRN
Status: DISCONTINUED | OUTPATIENT
Start: 2024-05-30 | End: 2024-05-30 | Stop reason: HOSPADM

## 2024-05-30 RX ORDER — LIDOCAINE HYDROCHLORIDE 20 MG/ML
INJECTION, SOLUTION INFILTRATION; PERINEURAL PRN
Status: DISCONTINUED | OUTPATIENT
Start: 2024-05-30 | End: 2024-05-30

## 2024-05-30 RX ORDER — ACETAMINOPHEN 325 MG/1
975 TABLET ORAL ONCE
Status: COMPLETED | OUTPATIENT
Start: 2024-05-30 | End: 2024-05-30

## 2024-05-30 RX ORDER — FENTANYL CITRATE 50 UG/ML
INJECTION, SOLUTION INTRAMUSCULAR; INTRAVENOUS PRN
Status: DISCONTINUED | OUTPATIENT
Start: 2024-05-30 | End: 2024-05-30

## 2024-05-30 RX ORDER — HYDROMORPHONE HCL IN WATER/PF 6 MG/30 ML
0.4 PATIENT CONTROLLED ANALGESIA SYRINGE INTRAVENOUS EVERY 5 MIN PRN
Status: DISCONTINUED | OUTPATIENT
Start: 2024-05-30 | End: 2024-05-30 | Stop reason: HOSPADM

## 2024-05-30 RX ORDER — HYDROMORPHONE HCL IN WATER/PF 6 MG/30 ML
0.2 PATIENT CONTROLLED ANALGESIA SYRINGE INTRAVENOUS EVERY 5 MIN PRN
Status: DISCONTINUED | OUTPATIENT
Start: 2024-05-30 | End: 2024-05-30 | Stop reason: HOSPADM

## 2024-05-30 RX ORDER — ONDANSETRON 2 MG/ML
INJECTION INTRAMUSCULAR; INTRAVENOUS PRN
Status: DISCONTINUED | OUTPATIENT
Start: 2024-05-30 | End: 2024-05-30

## 2024-05-30 RX ORDER — MAGNESIUM SULFATE HEPTAHYDRATE 40 MG/ML
4 INJECTION, SOLUTION INTRAVENOUS ONCE
Status: COMPLETED | OUTPATIENT
Start: 2024-05-30 | End: 2024-05-30

## 2024-05-30 RX ORDER — DEXAMETHASONE SODIUM PHOSPHATE 4 MG/ML
INJECTION, SOLUTION INTRA-ARTICULAR; INTRALESIONAL; INTRAMUSCULAR; INTRAVENOUS; SOFT TISSUE PRN
Status: DISCONTINUED | OUTPATIENT
Start: 2024-05-30 | End: 2024-05-30

## 2024-05-30 RX ADMIN — ALBUTEROL SULFATE 2 PUFF: 90 AEROSOL, METERED RESPIRATORY (INHALATION) at 00:55

## 2024-05-30 RX ADMIN — Medication 50 MG: at 11:11

## 2024-05-30 RX ADMIN — METOPROLOL SUCCINATE 25 MG: 25 TABLET, EXTENDED RELEASE ORAL at 08:05

## 2024-05-30 RX ADMIN — GABAPENTIN 100 MG: 100 CAPSULE ORAL at 21:00

## 2024-05-30 RX ADMIN — Medication 3 MG: at 21:01

## 2024-05-30 RX ADMIN — GABAPENTIN 100 MG: 100 CAPSULE ORAL at 08:06

## 2024-05-30 RX ADMIN — FOLIC ACID 1 MG: 1 TABLET ORAL at 08:05

## 2024-05-30 RX ADMIN — PROPOFOL 70 MG: 10 INJECTION, EMULSION INTRAVENOUS at 11:10

## 2024-05-30 RX ADMIN — LIDOCAINE HYDROCHLORIDE 80 MG: 20 INJECTION, SOLUTION INFILTRATION; PERINEURAL at 11:10

## 2024-05-30 RX ADMIN — AMPICILLIN SODIUM AND SULBACTAM SODIUM 3 G: 2; 1 INJECTION, POWDER, FOR SOLUTION INTRAMUSCULAR; INTRAVENOUS at 23:33

## 2024-05-30 RX ADMIN — SODIUM CHLORIDE, POTASSIUM CHLORIDE, SODIUM LACTATE AND CALCIUM CHLORIDE: 600; 310; 30; 20 INJECTION, SOLUTION INTRAVENOUS at 11:00

## 2024-05-30 RX ADMIN — ACETAMINOPHEN 975 MG: 325 TABLET, FILM COATED ORAL at 10:40

## 2024-05-30 RX ADMIN — AMPICILLIN SODIUM AND SULBACTAM SODIUM 3 G: 2; 1 INJECTION, POWDER, FOR SOLUTION INTRAMUSCULAR; INTRAVENOUS at 04:17

## 2024-05-30 RX ADMIN — GADOBUTROL 7 ML: 604.72 INJECTION INTRAVENOUS at 12:23

## 2024-05-30 RX ADMIN — LEVETIRACETAM 500 MG: 500 TABLET, FILM COATED ORAL at 08:06

## 2024-05-30 RX ADMIN — ALBUTEROL SULFATE 2 PUFF: 90 AEROSOL, METERED RESPIRATORY (INHALATION) at 17:57

## 2024-05-30 RX ADMIN — AZITHROMYCIN MONOHYDRATE 500 MG: 500 INJECTION, POWDER, LYOPHILIZED, FOR SOLUTION INTRAVENOUS at 17:50

## 2024-05-30 RX ADMIN — SUGAMMADEX 200 MG: 100 INJECTION, SOLUTION INTRAVENOUS at 12:13

## 2024-05-30 RX ADMIN — PROPOFOL 30 MG: 10 INJECTION, EMULSION INTRAVENOUS at 11:13

## 2024-05-30 RX ADMIN — HEPARIN SODIUM 5000 UNITS: 5000 INJECTION, SOLUTION INTRAVENOUS; SUBCUTANEOUS at 04:17

## 2024-05-30 RX ADMIN — LEVETIRACETAM 500 MG: 500 TABLET, FILM COATED ORAL at 21:00

## 2024-05-30 RX ADMIN — SODIUM CHLORIDE, POTASSIUM CHLORIDE, SODIUM LACTATE AND CALCIUM CHLORIDE: 600; 310; 30; 20 INJECTION, SOLUTION INTRAVENOUS at 06:15

## 2024-05-30 RX ADMIN — DEXAMETHASONE SODIUM PHOSPHATE 4 MG: 4 INJECTION, SOLUTION INTRA-ARTICULAR; INTRALESIONAL; INTRAMUSCULAR; INTRAVENOUS; SOFT TISSUE at 11:30

## 2024-05-30 RX ADMIN — ALBUTEROL SULFATE 2 PUFF: 90 AEROSOL, METERED RESPIRATORY (INHALATION) at 04:20

## 2024-05-30 RX ADMIN — SODIUM CHLORIDE, POTASSIUM CHLORIDE, SODIUM LACTATE AND CALCIUM CHLORIDE: 600; 310; 30; 20 INJECTION, SOLUTION INTRAVENOUS at 13:15

## 2024-05-30 RX ADMIN — HEPARIN SODIUM 5000 UNITS: 5000 INJECTION, SOLUTION INTRAVENOUS; SUBCUTANEOUS at 21:01

## 2024-05-30 RX ADMIN — ONDANSETRON 4 MG: 2 INJECTION INTRAMUSCULAR; INTRAVENOUS at 11:30

## 2024-05-30 RX ADMIN — FENTANYL CITRATE 50 MCG: 50 INJECTION INTRAMUSCULAR; INTRAVENOUS at 11:10

## 2024-05-30 RX ADMIN — ALBUTEROL SULFATE 2 PUFF: 90 AEROSOL, METERED RESPIRATORY (INHALATION) at 21:08

## 2024-05-30 RX ADMIN — MIDAZOLAM 2 MG: 1 INJECTION INTRAMUSCULAR; INTRAVENOUS at 11:00

## 2024-05-30 RX ADMIN — ALBUTEROL SULFATE 2 PUFF: 90 AEROSOL, METERED RESPIRATORY (INHALATION) at 08:06

## 2024-05-30 RX ADMIN — AMPICILLIN SODIUM AND SULBACTAM SODIUM 3 G: 2; 1 INJECTION, POWDER, FOR SOLUTION INTRAMUSCULAR; INTRAVENOUS at 17:08

## 2024-05-30 RX ADMIN — AMPICILLIN SODIUM AND SULBACTAM SODIUM 3 G: 2; 1 INJECTION, POWDER, FOR SOLUTION INTRAMUSCULAR; INTRAVENOUS at 12:52

## 2024-05-30 RX ADMIN — Medication 1 TABLET: at 08:06

## 2024-05-30 RX ADMIN — MAGNESIUM SULFATE IN WATER 4 G: 40 INJECTION, SOLUTION INTRAVENOUS at 20:55

## 2024-05-30 RX ADMIN — SODIUM CHLORIDE, POTASSIUM CHLORIDE, SODIUM LACTATE AND CALCIUM CHLORIDE: 600; 310; 30; 20 INJECTION, SOLUTION INTRAVENOUS at 15:15

## 2024-05-30 ASSESSMENT — ACTIVITIES OF DAILY LIVING (ADL)
ADLS_ACUITY_SCORE: 34
ADLS_ACUITY_SCORE: 48
ADLS_ACUITY_SCORE: 33
ADLS_ACUITY_SCORE: 34
ADLS_ACUITY_SCORE: 47
ADLS_ACUITY_SCORE: 34
ADLS_ACUITY_SCORE: 47
ADLS_ACUITY_SCORE: 33
ADLS_ACUITY_SCORE: 47
ADLS_ACUITY_SCORE: 48
ADLS_ACUITY_SCORE: 47
ADLS_ACUITY_SCORE: 48
ADLS_ACUITY_SCORE: 34
ADLS_ACUITY_SCORE: 47
ADLS_ACUITY_SCORE: 48

## 2024-05-30 ASSESSMENT — LIFESTYLE VARIABLES: TOBACCO_USE: 1

## 2024-05-30 NOTE — PLAN OF CARE
Goal Outcome Evaluation:      Plan of Care Reviewed With: patient    Overall Patient Progress: no change    Outcome Evaluation: Pt tranferred from PACU to . Oriented to unit. VSS on RA. BA on for safety. 1:1 attendant discontinued as pt was A&Ox4, cooperative in PACU for many hours. Pt stated he will call before getting up and showed writer how to use call light. IV abx continued via PIV. Denies pain.

## 2024-05-30 NOTE — ANESTHESIA PROCEDURE NOTES
Airway       Patient location during procedure: OR       Procedure Start/Stop Times: 5/30/2024 11:14 AM  Staff -        Other Anesthesia Staff: Sarita Lopez       Performed By: SRNA  Consent for Airway        Urgency: elective  Indications and Patient Condition       Indications for airway management: gillian-procedural       Induction type:intravenous       Mask difficulty assessment: 2 - vent by mask + OA or adjuvant +/- NMBA    Final Airway Details       Final airway type: endotracheal airway       Successful airway: ETT - single and Oral  Endotracheal Airway Details        ETT size (mm): 7.5       Cuffed: yes       Cuff volume (mL): 8       Successful intubation technique: video laryngoscopy       VL Blade Size: Glidescope 3 (Hyperangle S3)       Grade View of Cords: 1       Adjucts: stylet       Position: Left       Measured from: lips       Secured at (cm): 24       Bite block used: None    Post intubation assessment        Placement verified by: capnometry, equal breath sounds and chest rise        Number of attempts at approach: 1       Number of other approaches attempted: 0       Secured with: pink tape       Ease of procedure: easy       Dentition: Intact and Unchanged (poor denition baseline)    Medication(s) Administered   Medication Administration Time: 5/30/2024 11:14 AM    Additional Comments       Neck maintained in neutral position during intubation, glidescope utilized for cervical precautions

## 2024-05-30 NOTE — ANESTHESIA POSTPROCEDURE EVALUATION
Patient: Luigi Vieyra    Procedure: Procedure(s):  Anesthesia out of OR MRI       Anesthesia Type:  General    Note:  Disposition: Admission   Postop Pain Control: Uneventful            Sign Out: Well controlled pain   PONV: No   Neuro/Psych: Uneventful            Sign Out: Acceptable/Baseline neuro status   Airway/Respiratory: Uneventful            Sign Out: Acceptable/Baseline resp. status   CV/Hemodynamics: Uneventful            Sign Out: Acceptable CV status; No obvious hypovolemia; No obvious fluid overload   Other NRE: NONE   DID A NON-ROUTINE EVENT OCCUR? No           Last vitals:  Vitals Value Taken Time   /97 05/30/24 1300   Temp 36.5  C (97.7  F) 05/30/24 1300   Pulse 63 05/30/24 1305   Resp 17 05/30/24 1305   SpO2 98 % 05/30/24 1305   Vitals shown include unfiled device data.    Electronically Signed By: Kelly Harper MD  May 30, 2024  1:06 PM

## 2024-05-30 NOTE — CONSULTS
Health Psychology          Lauren Arriaga, Ph.D.,  [phone]  Brenna Hernandez, Ph.D.,  [phone]  Magaly Lu, Ph.D.,  [phone]  Kp Pacheco, Ph.D., , [facility] [phone]  Barbara Silverio, Ph.D.,  [phone]  Aby Flores, Ph.D., , [facility] [phone]    [facility] [place], 3rd Floor  [address] [place] [address]       Inpatient Health Psychology Consultation     Date of Service: 05/30/24     BACKGROUND: Per EMR: Luigi Vieyra is a 68 year old male with a history of alcohol use c/b R peroneal neuropathy, gastritis, tobacco use, asthma, stage III CKD, HLD, and HTN who presented to [facility] for neurosurgical evaluation of L sided weakness after being found down at home.      Health psychology consulted for assistance with anxiety. Completed chart review. Patient also seen by Addiction Medicine. Note from Dr. Donaldson indicates that patient has a history of using alcohol to manage emotional challenges. CHAT Team is evaluating patient for history of alcohol use.      Patient currently in the OR. Will monitor today and attempt consultation at a later time.    Aby Flores, Ph.D., , [facility]  Clinical Health Psychologist  Phone: [phone]   Pager: [phone]  5/30/2024 10:34 AM

## 2024-05-30 NOTE — PROGRESS NOTES
[facility], [place]  Neurosurgery Progress Note:  05/30/2024      Interval History: No acute events overnight. LUE strength remains stable. MRI with contrast was not completed due to patient movement, being arranged to be performed under general sedation per primary.    Assessment:  68 year old male with a past medical history significant for alcohol use disorder, peroneal neuropathy, gastritis, stage III chronic kidney disease, hypertension and hyperlipidemia who presents as a transfer from [facility] ED for evaluation of a 4 mm right tentorial subdural hematoma and left hemibody weakness, UE worse than LE, which has since improved to be isolated LEFT upper extremity weakness.     Clinically Significant Risk Factors            # Hypomagnesemia: Lowest Mg = 1.6 mg/dL in last 2 days, will replace as needed   # Hypoalbuminemia: Lowest albumin = 2.9 g/dL at 5/29/2024  6:11 AM, will monitor as appropriate     # Hypertension: Noted on problem list              # Financial/Environmental Concerns: none  # Asthma: noted on problem list           Plan:  Serial neuro exams  Neurology has signed off, agree with MRI C spine w/wo contrast to evaluate for enhancing lesion  Recommend EMG in setting of acute LUE weakness with unexplained etiology to help localize (Neurology has arranged for this in the outpatient setting)  Referral of outpatient epidural steroid injection   PT/OT  Ok for DVT prophylaxis per trauma protocol    Remainder of cares per primary    -----------------------------------  Emanuel Hinojosa  Neurosurgery Resident PGY-1  -----------------------------------  Gen: Appears comfortable, NAD  Neurologic:  Alert & Oriented to person, place, time, and situation  Follows commands briskly  Speech fluent, spontaneous. No aphasia or dysarthria.  No gaze preference. No apparent hemineglect.  PERRL, EOMI  Face symmetric with sensation intact to light touch  Palate elevates symmetrically, uvula  midline, tongue protrudes midline  Trapezii muscles 5/5 bilaterally  No pronator drift     Del Tr Bi WE WF Gr   R 5 5 5 5 5 5   L 2 4+ 4 4 3 3    HF KE KF DF PF EHL   R 5 5 5 5 5 5   L 5 5 5 5 5 5     Reflexes: bilateral biceps/brachioradialis 2+, LEFT-sided Chandler's, bilateral patellar 2    Sensation intact and symmetric to light touch throughout    Objective:   Temp:  [97.9  F (36.6  C)-99.1  F (37.3  C)] 97.9  F (36.6  C)  Pulse:  [75-84] 75  Resp:  [16-18] 16  BP: (107-139)/() 139/89  SpO2:  [95 %-100 %] 95 %  I/O last 3 completed shifts:  In: 630 [P.O.:630]  Out: 1825 [Urine:1825]        LABS:  Recent Labs   Lab 24  0611 24  2157 24  0656 24  0424 24  0639    137 135  --   --  133*   POTASSIUM 3.8  --  3.5 3.8   < > 3.2*   CHLORIDE 102  --  97*  --   --  93*   CO2 24  --  26  --   --  24   ANIONGAP 12  --  12  --   --  16*   *  --  138*  --   --  108*   BUN 21.6  --  37.8*  --   --  56.3*   CR 1.88* 2.20* 2.54*  --   --  3.36*   PINO 8.4*  --  8.9  --   --  8.6*    < > = values in this interval not displayed.       Recent Labs   Lab 24  0753 24  0611   WBC  --  9.8   RBC 2.64* 2.88*   HGB 8.4* 9.2*   HCT 26.5* 28.6*    99   MCH 31.8 31.9   MCHC 31.7 32.2   RDW 16.1* 15.8*    413       IMAGING:  Recent Results (from the past 24 hour(s))   Echo Complete   Result Value    LVEF  60-65%    Narrative    [id number]  [id number]  [id number]  [id number]^ANN-MARIE^ADDI     [facility],[place]  Echocardiography Laboratory  [address] [place], [place] [address]     Name: DEIDRA YBARRA  MRN: [id number]  : 1955  Study Date: 2024 09:12 AM  Age: 68 yrs  Gender: Male  Patient Location: [facility]  Reason For Study: SOB  Ordering Physician: ANN-MARIE PRINCE  Referring Physician: SID DHILLON  Performed By: Tanner Michael RDCS     BSA: 1.9 m2  Height: 70 in  Weight: 160 lb  HR: 90  BP: 110/63  mmHg  ______________________________________________________________________________  Procedure  Complete Portable Echo Adult.  ______________________________________________________________________________  Interpretation Summary  Global and regional left ventricular function is normal with an EF of 60-65%.  Left ventricular diastolic function is normal.  Right ventricular function, chamber size, wall motion, and thickness are  normal.  Pulmonary artery systolic pressure is normal.  The inferior vena cava is normal.  No pericardial effusion is present.  There is no prior study for direct comparison.  ______________________________________________________________________________  Left Ventricle  Global and regional left ventricular function is normal with an EF of 60-65%.  Left ventricular wall thickness is normal. Left ventricular size is normal.  Left ventricular diastolic function is normal. No regional wall motion  abnormalities are seen.     Right Ventricle  Right ventricular function, chamber size, wall motion, and thickness are  normal.     Atria  Both atria appear normal.     Mitral Valve  The mitral valve is normal. Trace mitral insufficiency is present.     Aortic Valve  Aortic valve is normal in structure and function.     Tricuspid Valve  The tricuspid valve is normal. Trace tricuspid insufficiency is present. The  right ventricular systolic pressure is approximated at 18.5 mmHg plus the  right atrial pressure. Pulmonary artery systolic pressure is normal.     Pulmonic Valve  The pulmonic valve is normal.     Vessels  The aorta root is normal. The thoracic aorta is normal. The pulmonary artery  cannot be assessed. The inferior vena cava is normal.     Pericardium  No pericardial effusion is present.     Compared to Previous Study  There is no prior study for direct comparison.  ______________________________________________________________________________  MMode/2D Measurements & Calculations  IVSd: 1.0  cm     LVIDd: 4.8 cm  LVIDs: 3.4 cm  LVPWd: 1.0 cm  FS: 28.0 %  LV mass(C)d: 171.3 grams  LV mass(C)dI: 90.3 grams/m2  Ao root diam: 3.5 cm  asc Aorta Diam: 3.6 cm  LVOT diam: 2.5 cm  LVOT area: 4.9 cm2     EF(MOD-bp): 54.8 %  Ao root diam index Ht(cm/m): 2.0  Ao root diam index BSA (cm/m2): 1.8  Asc Ao diam index BSA (cm/m2): 1.9  Asc Ao diam index Ht(cm/m): 2.0  LA Volume (BP): 33.2 ml     LA Volume Index (BP): 17.5 ml/m2  RV Base: 3.6 cm  RWT: 0.42  TAPSE: 2.6 cm     Doppler Measurements & Calculations  MV E max dre: 56.8 cm/sec  MV A max dre: 86.8 cm/sec  MV E/A: 0.65  MV dec slope: 271.0 cm/sec2  MV dec time: 0.21 sec  PA acc time: 0.07 sec  TR max dre: 215.0 cm/sec  TR max P.5 mmHg  Lateral E/e': 4.9  RV S Dre: 25.9 cm/sec     ______________________________________________________________________________  Report approved by: Caron Key 2024 11:32 AM

## 2024-05-30 NOTE — ANESTHESIA PREPROCEDURE EVALUATION
Anesthesia Pre-Procedure Evaluation    Patient: Luigi Vieyra   MRN: [id number] : 1955        Procedure : Procedure(s):  Anesthesia out of OR MRI          Past Medical History:   Diagnosis Date    Asthma       Past Surgical History:   Procedure Laterality Date    ESOPHAGOSCOPY, GASTROSCOPY, DUODENOSCOPY (EGD), COMBINED N/A 3/21/2022    Procedure: ESOPHAGOGASTRODUODENOSCOPY, WITH BIOPSY;  Surgeon: Eunice Rubin MD;  Location: [facility]    ESOPHAGOSCOPY, GASTROSCOPY, DUODENOSCOPY (EGD), COMBINED N/A 10/14/2022    Procedure: ESOPHAGOGASTRODUODENOSCOPY (EGD) with epi injection and clip placement;  Surgeon: Cash Oconnor MD;  Location: [facility]      No Known Allergies   Social History     Tobacco Use    Smoking status: Every Day     Current packs/day: 0.50     Average packs/day: 0.5 packs/day for 48.4 years (24.2 ttl pk-yrs)     Types: Cigarettes     Start date:     Smokeless tobacco: Never   Substance Use Topics    Alcohol use: Yes     Comment: 2-3 drinks daily      Wt Readings from Last 1 Encounters:   24 72.6 kg (160 lb)        Anesthesia Evaluation   Pt has had prior anesthetic.     No history of anesthetic complications       ROS/MED HX  ENT/Pulmonary:     (+)           allergic rhinitis,     tobacco use,   34  Pack-Year Hx,   Mild Persistent, asthma  Treatment: Inhaler prn, Inhaler daily and Inhaled steroids,                 Neurologic: Comment: Admitted with left sided weakness, found to have small subdural hematoma, currently scheduled for MRI to evaluate c-spine      Cardiovascular:     (+) Dyslipidemia hypertension- -   -  - -             fainting (syncope).                    Previous cardiac testing   Echo: Date: Results:    Stress Test:  Date: Results:    ECG Reviewed:  Date: 3-2022 Results:  Sinus  Rhythm   Low voltage in limb leads.     ABNORMAL   Cath:  Date: Results:      METS/Exercise Tolerance:     Hematologic:     (+)      anemia,          Musculoskeletal:      "  GI/Hepatic:     (+) GERD, Symptomatic,                  Renal/Genitourinary:     (+) renal disease, type: CRI,            Endo:       Psychiatric/Substance Use:     (+)   alcohol abuse      Infectious Disease:       Malignancy:       Other:            Physical Exam    Airway        Mallampati: III   TM distance: > 3 FB   Neck ROM: full   Mouth opening: > 3 cm    Respiratory Devices and Support         Dental       (+) Multiple visibly decayed, broken teeth      Cardiovascular          Rhythm and rate: regular and normal     Pulmonary           breath sounds clear to auscultation       Other findings: Able to extend neck without pain or increasing weakness    OUTSIDE LABS:  CBC:   Lab Results   Component Value Date    WBC 10.5 05/30/2024    WBC 9.8 05/29/2024    HGB 8.4 (L) 05/30/2024    HGB 9.2 (L) 05/29/2024    HCT 26.5 (L) 05/30/2024    HCT 28.6 (L) 05/29/2024     05/30/2024     05/29/2024     BMP:   Lab Results   Component Value Date     05/30/2024     05/29/2024    POTASSIUM 3.8 05/30/2024    POTASSIUM 3.8 05/29/2024    CHLORIDE 107 05/30/2024    CHLORIDE 102 05/29/2024    CO2 23 05/30/2024    CO2 24 05/29/2024    BUN 14.5 05/30/2024    BUN 21.6 05/29/2024    CR 1.58 (H) 05/30/2024    CR 1.88 (H) 05/29/2024    GLC 97 05/30/2024     (H) 05/29/2024     COAGS:   Lab Results   Component Value Date    PTT 27 05/26/2024    INR 1.08 05/26/2024     POC: No results found for: \"BGM\", \"HCG\", \"HCGS\"  HEPATIC:   Lab Results   Component Value Date    ALBUMIN 2.9 (L) 05/30/2024    PROTTOTAL 5.6 (L) 05/30/2024    ALT 14 05/30/2024    AST 27 05/30/2024    ALKPHOS 109 05/30/2024    BILITOTAL 0.3 05/30/2024    MATI 24 05/26/2024     OTHER:   Lab Results   Component Value Date    LACT 1.4 05/29/2024    PINO 8.4 (L) 05/30/2024    PHOS 2.9 05/30/2024    MAG 1.2 (L) 05/30/2024    LIPASE 29 05/26/2024    TSH 0.52 05/26/2024       Anesthesia Plan    ASA Status:  2    NPO Status:  NPO Appropriate  "   Anesthesia Type: General.     - Airway: ETT   Induction: Intravenous.   Maintenance: Balanced.        Consents    Anesthesia Plan(s) and associated risks, benefits, and realistic alternatives discussed. Questions answered and patient/representative(s) expressed understanding.     - Discussed:     - Discussed with:  Patient            Postoperative Care    Pain management: IV analgesics.   PONV prophylaxis: Ondansetron (or other 5HT-3)     Comments:               Kelly Harper MD    I have reviewed the pertinent notes and labs in the chart from the past 30 days and (re)examined the patient.  Any updates or changes from those notes are reflected in this note.

## 2024-05-30 NOTE — SUMMARY OF CARE
Reason for admission: subdural hematoma   Admitted from:  ED/PACU  Report received from: Stanford RN           2 RN skin assessment completed by: Joan RN and Gaby RN       - Findings (add LDA if needed): blotchy red skin on BUE. Scabbing on RUE. No WOC consult added  Bed algorithm reevaluated: n/a  Was airflow pump ordered?: n/a  Suction set up in room? yes  Care plan (primary problem) and education initiated: yes  MDRO education done if applicable: yes  Pt informed about policy regarding no IV pumps off unit: yes  Flu shot ordered? (October-April only): n/a  Detailed Belongings: phone, , clothing, glasses. Remain with pt at bedside.

## 2024-05-30 NOTE — PROGRESS NOTES
"[facility]    Medicine Progress Note - Hospitalist Service, GOLD TEAM 9    Date of Admission:  5/26/2024    MEDICINE IS PRIMARY SINCE 5/28    Assessment & Plan   68 year old male admitted on 5/26/2024. He has a PMHx of alcohol use c/b R peroneal neuropathy, gastritis, tobacco use, asthma, stage III CKD, HLD, and HTN who presents to the ED as a transfer from [facility] for neurosurgery evaluation.     # Sepsis and severe sepsis secondary to likely bacterial pneumonia possibly secondary to aspiration, POA  Sepsis criteria include heart rate of 92 and white blood cell count of 14.7.  Patient qualifies for diagnosis of severe sepsis given lactic acid of 2.3.  Inflammatory markers with CRP and procalcitonin are both elevated.  He has high pretest probability given his alcohol use disorder and being found down.  Chest x-ray is abnormal.    The differential diagnosis would also include C. difficile antigen enteric infection given 7 episodes of diarrhea with generalized abdominal discomfort within the last 24 hours.    Lactic acidosis resolved with intravenous antibiotics and intravenous fluids.C. difficile and enteric panel are negative    -Awaiting sputum culture  -Continue Unasyn for 5 days and azithromycin for 3 days    # Acute kidney injury, akin stage I on top of CKD stage IIIb complicated by anion gap metabolic acidosis, POA, resolved  This is likely prerenal in etiology.  Ordered fractional secretion of sodium and obtain renal ultrasound without evidence of obstructive physiology.  -Strict intake and output and daily body weight  -Basic metabolic panel in a.m.  -Stopped IV fluids    # Alcohol use disorder with dependence and withdrawal Complicated by multiple falls complicated by multiple falls, POA  Patient reports that he drinks 1 gallon of vodka per week. He has been drinking since age 19 and has never \"stopped long enough to have the shakes\" The patient is " interested in chemical dependency resources and is willing to discuss further with addiction medicine.  -Continue gabapentin at 100 mg 3 times daily given his kidney function  -Will complete the high thiamine protocol that was started in the emergency department  -The patient declined naltrexone  -Continue folic acid, multivitamins, and thiamine  -Appreciative to the input of addiction medicine    # 4 mm subdural hemorrhage along the right tentorial leaflet, without mass effect, brain compression considered and ruled out POA  MRI Brain obtained without evidence of acute ischemic infarct.   -Serial neurological evaluation  -Continue Keppra 500 mg twice daily for 7 days based on recommendations of neurosurgery  -PT/OT  -The patient was cleared for heparin subcutaneous for DVT prevention by neurosurgery and trauma team    # Acute superior endplate compression deformities involving C7, T2, and L1, POA   The patient was cleared to be of cervical collar per neurosurgery.  The patient was seen in collaboration with trauma service  -Continue scheduled acetaminophen and robaxin 500 mg at bedtime together with robaxin 250 mg TID PRN and lidocaine patches.   -Continue gabapentin  - I agree with stopping narcotics that was ordered by trauma team    # Left sided andreia-paresis , POA  An MRI of the left brachial plexus was performed which did not reveal abnormality of the plexus; incidentally there was mixed edema of the left acromioclavicular joint and rotator cuff musculature.  MRI showing evidence of redemonstration of mild compression fracture of the superior endplate of C7 and T2 with cervical spondylosis.  -MRI with contrast of the cervical spine obtained based on neurosurgery recommendations, currently awaiting further recommendations  - May be considered for EMG later on  -Appreciative to follow the recommendations of neurology    # Depression complicated by insomnia, POA  -The patient declined escitalopram  -Started  trazodone 25 mg nightly on top of melatonin    # Hypophosphatemia and hypomagnesemia, POA  -Ordered replacement protocol    # Normocytic anemia with wide RDW, POA  -Added on reticulocyte count  -Will monitor closely for bleeding and obtain daily CBC    # Demand ischemia and elevated NT proBNP, POA  -Ordered echocardiogram  -Will continue intravenous fluids for now given diarrhea with plans to diuresis within the next 24 hours    # Elevated CPK, POA  -CPK trended down    # Chronic medical problems:  #HTN  - Continue Metoprolol and hold if SBP <120     #Asthma  - Continue PRN albuterol inhaler        Diet: Regular Diet Adult    DVT Prophylaxis: Heparin SQ  Frederick Catheter: Not present  Lines: None     Cardiac Monitoring: None  Code Status: Full Code      Clinically Significant Risk Factors            # Hypomagnesemia: Lowest Mg = 1.2 mg/dL in last 2 days, will replace as needed   # Hypoalbuminemia: Lowest albumin = 2.9 g/dL at 5/30/2024  7:53 AM, will monitor as appropriate     # Hypertension: Noted on problem list              # Financial/Environmental Concerns: none  # Asthma: noted on problem list        Disposition Plan     Medically Ready for Discharge: Anticipated in 5+ Days             Jamel Frost MD  Hospitalist Service, GOLD TEAM 9  [facility]  Securely message with "[employer] (more info)  Text page via [employer] Paging/Directory   See signed in provider for up to date coverage information  ______________________________________________________________________    Interval History   The patient denied any new symptoms to the undersigned    Physical Exam   Vital Signs: Temp: 97.5  F (36.4  C) Temp src: Axillary BP: (!) 125/106 Pulse: 78   Resp: 18 SpO2: 97 % O2 Device: None (Room air) Oxygen Delivery: 3 LPM  Weight: 160 lbs 0 oz    Constitutional: Awake, alert, cooperative, no apparent distress.  Eyes: Conjunctiva and pupils examined and normal.  HEENT: Moist mucous  membranes, normal dentition.  Respiratory: Clear to auscultation bilaterally, no crackles or wheezing.  Cardiovascular: Regular rate and rhythm, normal S1 and S2, and no murmur noted.  GI: Soft, non-distended, non-tender, normal bowel sounds.  Lymph/Hematologic: No anterior cervical or supraclavicular adenopathy.  Skin: No rashes, no cyanosis, no edema.  Musculoskeletal: No joint swelling, erythema or tenderness.  Neurologic: left sided 2/5 power  Psychiatric: Alert, oriented to person, place and time, no obvious anxiety or depression.     Medical Decision Making       45 MINUTES SPENT BY ME on the date of service doing chart review, history, exam, documentation & further activities per the note.      Data     I have personally reviewed the following data over the past 24 hrs:    10.5  \   8.4 (L)   / 384     139 107 14.5 /  94   3.8 23 1.58 (H) \     ALT: 14 AST: 27 AP: 109 TBILI: 0.3   ALB: 2.9 (L) TOT PROTEIN: 5.6 (L) LIPASE: N/A       Imaging results reviewed over the past 24 hrs:   Recent Results (from the past 24 hour(s))   MR Cervical Spine w/o & w Contrast    Narrative    MR CERVICAL SPINE W/O & W CONTRAST 5/30/2024 12:22 PM    Provided History: paresis of left upper and lower extremity    Comparison: Cervical spine MRI 5/28/2024, 5/27/2024: Thoracic spine  MRI 5/27/2024    Technique: Sagittal T1-weighted, sagittal T2-weighted, sagittal  diffusion weighted, axial T2-weighted, and axial T2* gradient echo  images of the cervical spine were obtained without intravenous  contrast. Following intravenous administration of gadolinium, axial  and sagittal T1-weighted images with fat saturation were also  obtained.    Contrast: 7 mL gadavist    Findings:  There is multilevel disc height narrowing greatest at C5-6.  There is  normal signal within and normal contour of the cervical spinal cord.  There is retrolisthesis at C3-4.  Stable mild compression fractures of  C7, T2 and T3. There is also a chronic appearing  compression fracture  of T4. Tiny amount of edema associated with the left posterior  superior corner of C6. There is associated enhancement with these  compression fractures. No definitive evidence of ligamentous injury.  Hemangioma in the T1 vertebral body.     The findings on a level by level basis are as follows:    C2-3:  Left greater than right uncinate hypertrophy and mild disc  bulging. There is mild left neural foraminal stenosis. Minimal spinal  canal stenosis    C3-4:  Disc bulge with small superimposed central protrusion and left  greater than right uncinate hypertrophy and facet arthropathy. There  is moderate to severe left and mild right neural foraminal stenosis.  Minimal to mild spinal canal stenosis.    C4-5:  Disc bulge and small superimposed central protrusion. Uncinate  hypertrophy and facet arthropathy. There is moderate right and mild  left neural foraminal stenosis. No spinal canal stenosis    C5-6:  Disc bulge and right greater than left uncinate hypertrophy and  bilateral facet arthropathy. Bilateral severe neural foraminal  stenosis. No spinal canal stenosis    C6-7:  Disc bulge with superimposed central protrusion, hypertrophy  and mild facet arthropathy. There is mild-to-moderate right-sided and  mild left neural foraminal stenosis. Mild spinal canal stenosis    C7-T1:  No spinal canal or neural foraminal narrowing.     No abnormality of the paraspinous soft tissues. Mild cerebellar volume  loss. Asymmetric appearance of the oral tongue, poor visualization due  to saturation band. No abnormality visualized on recent neck MRI.      Impression    Impression:   1. No significant change in acute/subacute mild compression fractures  of C7, T2, T3. Small amount of focal edema in the left posterior  superior corner of C6 could also represent a tiny acute fracture.  2. Multilevel cervical spondylosis greatest at C5-C6 where there is  severe right and moderate left neural foraminal stenosis.  There are  additional multilevel high grade neuroforaminal stenoses as described.  No high-grade spinal canal stenosis at any level.  3. No abnormal cord signal.    I have personally reviewed the examination and initial interpretation  and I agree with the findings.    ALANA KEITH MD         SYSTEM ID:  [id number]

## 2024-05-30 NOTE — ANESTHESIA CARE TRANSFER NOTE
Patient: Luigi Vieyra    Procedure: Procedure(s):  Anesthesia out of OR MRI       Diagnosis: Unknown cause of injury [X58.XXXA]  Diagnosis Additional Information: No value filed.    Anesthesia Type:   General     Note:    Oropharynx: oropharynx clear of all foreign objects and spontaneously breathing  Level of Consciousness: awake  Oxygen Supplementation: nasal cannula  Level of Supplemental Oxygen (L/min / FiO2): 3 LPM  Independent Airway: airway patency satisfactory and stable  Dentition: dentition unchanged  Vital Signs Stable: post-procedure vital signs reviewed and stable  Report to RN Given: handoff report given  Patient transferred to: PACU    Handoff Report: Identifed the Patient, Identified the Reponsible Provider, Reviewed the pertinent medical history, Discussed the surgical course, Reviewed Intra-OP anesthesia mangement and issues during anesthesia, Set expectations for post-procedure period and Allowed opportunity for questions and acknowledgement of understanding      Vitals:  Vitals Value Taken Time   /101 05/30/24 1231   Temp     Pulse 68 05/30/24 1233   Resp 16 05/30/24 1233   SpO2 100 % 05/30/24 1233   Vitals shown include unfiled device data.    Electronically Signed By: DEYSI Irizarry CRNA  May 30, 2024  12:34 PM

## 2024-05-30 NOTE — PLAN OF CARE
"Goal Outcome Evaluation:      Plan of Care Reviewed With: patient      /72 (BP Location: Right arm)   Pulse 76   Temp 98.6  F (37  C) (Oral)   Resp 16   Ht 1.778 m (5' 10\")   Wt 72.6 kg (160 lb)   SpO2 97%   BMI 22.96 kg/m      Goal Outcome Evaluation:     Plan of Care Reviewed With: patient   Overall Patient Progress:     VS: VSS  Neuros: A&OX4  Cardiac: WNL  Respiratory: RA, pt complaint of SOB and PRN albuterol was given, pt is sating at high 90s   GI/: Pt had large loose BM X2  this shift, he is voiding without difficulty.  Diet/Nausea: Reg diet but NPO after midnight for MRI this morning.  Skin:  Bruising in arms   LDA: PIV infusing LR at 100ml/hr  Labs:   Pain: pt denies pain  Activity: X1 assist to move   New changes this shift: Pt slept most of the night and had no new health concerns or change this shift. Plan: CPC            "

## 2024-05-30 NOTE — CONSULTS
Health Psychology          Lauren Arriaga, Ph.D.,  [phone]  Brenna Hernandez, Ph.D.,  [phone]  Magaly Lu, Ph.D.,  [phone]  Kp Pacheco, Ph.D., , [facility] [phone]  Barbara Silverio, Ph.D.,  [phone]  Aby Flores, Ph.D., , [facility] [phone]    [facility] [place], 3rd Floor  [address] [place] [address]       Inpatient Health Psychology Consultation     Date of Service: 05/30/24     BACKGROUND: Per EMR: Luigi Vieyra is a 68 year old male with a history of alcohol use c/b R peroneal neuropathy, gastritis, tobacco use, asthma, stage III CKD, HLD, and HTN who presented to [facility] for neurosurgical evaluation of L sided weakness after being found down at home.      Health psychology consulted for assistance with anxiety. Completed chart review. Patient also seen by Addiction Medicine. Note from Dr. Donaldson indicates that patient has a history of using alcohol to manage emotional challenges. CHAT Team is evaluating patient for history of alcohol use.      Patient still in OR recovery. Health psychology will attempt consultation again in the coming days.    Aby Flores, Ph.D., , [facility]  Clinical Health Psychologist  Phone: [phone]   Pager: [phone]  5/30/2024 3:30 PM

## 2024-05-31 LAB
ALBUMIN SERPL BCG-MCNC: 2.9 G/DL (ref 3.5–5.2)
ALP SERPL-CCNC: 111 U/L (ref 40–150)
ALT SERPL W P-5'-P-CCNC: 13 U/L (ref 0–70)
ANION GAP SERPL CALCULATED.3IONS-SCNC: 10 MMOL/L (ref 7–15)
AST SERPL W P-5'-P-CCNC: 23 U/L (ref 0–45)
BASE EXCESS BLDV CALC-SCNC: -1.3 MMOL/L (ref -3–3)
BASOPHILS # BLD AUTO: 0.1 10E3/UL (ref 0–0.2)
BASOPHILS NFR BLD AUTO: 1 %
BILIRUB SERPL-MCNC: 0.2 MG/DL
BUN SERPL-MCNC: 12.3 MG/DL (ref 8–23)
CALCIUM SERPL-MCNC: 8.1 MG/DL (ref 8.8–10.2)
CHLORIDE SERPL-SCNC: 105 MMOL/L (ref 98–107)
CREAT SERPL-MCNC: 1.57 MG/DL (ref 0.67–1.17)
CRP SERPL-MCNC: 25.7 MG/L
DEPRECATED HCO3 PLAS-SCNC: 20 MMOL/L (ref 22–29)
EGFRCR SERPLBLD CKD-EPI 2021: 48 ML/MIN/1.73M2
EOSINOPHIL # BLD AUTO: 0.1 10E3/UL (ref 0–0.7)
EOSINOPHIL NFR BLD AUTO: 1 %
ERYTHROCYTE [DISTWIDTH] IN BLOOD BY AUTOMATED COUNT: 16.1 % (ref 10–15)
GLUCOSE SERPL-MCNC: 130 MG/DL (ref 70–99)
HCO3 BLDV-SCNC: 23 MMOL/L (ref 21–28)
HCT VFR BLD AUTO: 25.2 % (ref 40–53)
HGB BLD-MCNC: 8.1 G/DL (ref 13.3–17.7)
IMM GRANULOCYTES # BLD: 0.1 10E3/UL
IMM GRANULOCYTES NFR BLD: 1 %
LYMPHOCYTES # BLD AUTO: 2 10E3/UL (ref 0.8–5.3)
LYMPHOCYTES NFR BLD AUTO: 18 %
MAGNESIUM SERPL-MCNC: 2.2 MG/DL (ref 1.7–2.3)
MAGNESIUM SERPL-MCNC: 2.5 MG/DL (ref 1.7–2.3)
MCH RBC QN AUTO: 32.5 PG (ref 26.5–33)
MCHC RBC AUTO-ENTMCNC: 32.1 G/DL (ref 31.5–36.5)
MCV RBC AUTO: 101 FL (ref 78–100)
MONOCYTES # BLD AUTO: 1.3 10E3/UL (ref 0–1.3)
MONOCYTES NFR BLD AUTO: 11 %
NEUTROPHILS # BLD AUTO: 7.8 10E3/UL (ref 1.6–8.3)
NEUTROPHILS NFR BLD AUTO: 68 %
NRBC # BLD AUTO: 0 10E3/UL
NRBC BLD AUTO-RTO: 0 /100
O2/TOTAL GAS SETTING VFR VENT: 97 %
OXYHGB MFR BLDV: 89 % (ref 70–75)
PCO2 BLDV: 35 MM HG (ref 40–50)
PH BLDV: 7.42 [PH] (ref 7.32–7.43)
PHOSPHATE SERPL-MCNC: 1.4 MG/DL (ref 2.5–4.5)
PHOSPHATE SERPL-MCNC: 2.2 MG/DL (ref 2.5–4.5)
PLATELET # BLD AUTO: 370 10E3/UL (ref 150–450)
PO2 BLDV: 62 MM HG (ref 25–47)
POTASSIUM SERPL-SCNC: 4.1 MMOL/L (ref 3.4–5.3)
PROCALCITONIN SERPL IA-MCNC: 0.31 NG/ML
PROT SERPL-MCNC: 5.5 G/DL (ref 6.4–8.3)
RBC # BLD AUTO: 2.49 10E6/UL (ref 4.4–5.9)
SAO2 % BLDV: 90.4 % (ref 70–75)
SODIUM SERPL-SCNC: 135 MMOL/L (ref 135–145)
WBC # BLD AUTO: 11.4 10E3/UL (ref 4–11)

## 2024-05-31 PROCEDURE — 250N000013 HC RX MED GY IP 250 OP 250 PS 637: Performed by: STUDENT IN AN ORGANIZED HEALTH CARE EDUCATION/TRAINING PROGRAM

## 2024-05-31 PROCEDURE — 83735 ASSAY OF MAGNESIUM: CPT | Performed by: INTERNAL MEDICINE

## 2024-05-31 PROCEDURE — 90832 PSYTX W PT 30 MINUTES: CPT | Performed by: STUDENT IN AN ORGANIZED HEALTH CARE EDUCATION/TRAINING PROGRAM

## 2024-05-31 PROCEDURE — 250N000013 HC RX MED GY IP 250 OP 250 PS 637: Performed by: INTERNAL MEDICINE

## 2024-05-31 PROCEDURE — 250N000011 HC RX IP 250 OP 636: Performed by: STUDENT IN AN ORGANIZED HEALTH CARE EDUCATION/TRAINING PROGRAM

## 2024-05-31 PROCEDURE — 36415 COLL VENOUS BLD VENIPUNCTURE: CPT | Performed by: INTERNAL MEDICINE

## 2024-05-31 PROCEDURE — 250N000011 HC RX IP 250 OP 636: Performed by: INTERNAL MEDICINE

## 2024-05-31 PROCEDURE — 250N000011 HC RX IP 250 OP 636: Performed by: NURSE PRACTITIONER

## 2024-05-31 PROCEDURE — 86140 C-REACTIVE PROTEIN: CPT | Performed by: INTERNAL MEDICINE

## 2024-05-31 PROCEDURE — 999N000128 HC STATISTIC PERIPHERAL IV START W/O US GUIDANCE

## 2024-05-31 PROCEDURE — 82805 BLOOD GASES W/O2 SATURATION: CPT | Performed by: INTERNAL MEDICINE

## 2024-05-31 PROCEDURE — 84100 ASSAY OF PHOSPHORUS: CPT | Performed by: INTERNAL MEDICINE

## 2024-05-31 PROCEDURE — 250N000013 HC RX MED GY IP 250 OP 250 PS 637: Performed by: NURSE PRACTITIONER

## 2024-05-31 PROCEDURE — 99232 SBSQ HOSP IP/OBS MODERATE 35: CPT | Performed by: INTERNAL MEDICINE

## 2024-05-31 PROCEDURE — 120N000002 HC R&B MED SURG/OB UMMC

## 2024-05-31 PROCEDURE — 84145 PROCALCITONIN (PCT): CPT | Performed by: INTERNAL MEDICINE

## 2024-05-31 PROCEDURE — 80053 COMPREHEN METABOLIC PANEL: CPT | Performed by: INTERNAL MEDICINE

## 2024-05-31 PROCEDURE — 85025 COMPLETE CBC W/AUTO DIFF WBC: CPT | Performed by: INTERNAL MEDICINE

## 2024-05-31 RX ORDER — SODIUM BICARBONATE 650 MG/1
650 TABLET ORAL DAILY
Status: DISCONTINUED | OUTPATIENT
Start: 2024-05-31 | End: 2024-06-03 | Stop reason: HOSPADM

## 2024-05-31 RX ORDER — LOPERAMIDE HCL 2 MG
2 CAPSULE ORAL 4 TIMES DAILY PRN
Status: DISCONTINUED | OUTPATIENT
Start: 2024-05-31 | End: 2024-06-03 | Stop reason: HOSPADM

## 2024-05-31 RX ADMIN — ALBUTEROL SULFATE 2 PUFF: 90 AEROSOL, METERED RESPIRATORY (INHALATION) at 18:34

## 2024-05-31 RX ADMIN — GABAPENTIN 100 MG: 100 CAPSULE ORAL at 14:25

## 2024-05-31 RX ADMIN — HEPARIN SODIUM 5000 UNITS: 5000 INJECTION, SOLUTION INTRAVENOUS; SUBCUTANEOUS at 14:25

## 2024-05-31 RX ADMIN — FOLIC ACID 1 MG: 1 TABLET ORAL at 08:34

## 2024-05-31 RX ADMIN — Medication 25 MG: at 21:59

## 2024-05-31 RX ADMIN — Medication 3 MG: at 21:51

## 2024-05-31 RX ADMIN — ALBUTEROL SULFATE 2 PUFF: 90 AEROSOL, METERED RESPIRATORY (INHALATION) at 22:27

## 2024-05-31 RX ADMIN — METOPROLOL SUCCINATE 25 MG: 25 TABLET, EXTENDED RELEASE ORAL at 08:34

## 2024-05-31 RX ADMIN — POTASSIUM & SODIUM PHOSPHATES POWDER PACK 280-160-250 MG 2 PACKET: 280-160-250 PACK at 12:53

## 2024-05-31 RX ADMIN — Medication 1 TABLET: at 08:34

## 2024-05-31 RX ADMIN — ACETAMINOPHEN 975 MG: 325 TABLET, FILM COATED ORAL at 04:48

## 2024-05-31 RX ADMIN — GABAPENTIN 100 MG: 100 CAPSULE ORAL at 08:34

## 2024-05-31 RX ADMIN — THIAMINE HYDROCHLORIDE 250 MG: 100 INJECTION, SOLUTION INTRAMUSCULAR; INTRAVENOUS at 08:41

## 2024-05-31 RX ADMIN — AMPICILLIN SODIUM AND SULBACTAM SODIUM 3 G: 2; 1 INJECTION, POWDER, FOR SOLUTION INTRAMUSCULAR; INTRAVENOUS at 11:40

## 2024-05-31 RX ADMIN — ALBUTEROL SULFATE 2 PUFF: 90 AEROSOL, METERED RESPIRATORY (INHALATION) at 00:05

## 2024-05-31 RX ADMIN — HEPARIN SODIUM 5000 UNITS: 5000 INJECTION, SOLUTION INTRAVENOUS; SUBCUTANEOUS at 05:28

## 2024-05-31 RX ADMIN — LEVETIRACETAM 500 MG: 500 TABLET, FILM COATED ORAL at 08:34

## 2024-05-31 RX ADMIN — LOPERAMIDE HYDROCHLORIDE 2 MG: 2 CAPSULE ORAL at 19:16

## 2024-05-31 RX ADMIN — AMOXICILLIN AND CLAVULANATE POTASSIUM 1 TABLET: 875; 125 TABLET, FILM COATED ORAL at 19:16

## 2024-05-31 RX ADMIN — GABAPENTIN 100 MG: 100 CAPSULE ORAL at 19:16

## 2024-05-31 RX ADMIN — LEVETIRACETAM 500 MG: 500 TABLET, FILM COATED ORAL at 19:16

## 2024-05-31 RX ADMIN — POTASSIUM & SODIUM PHOSPHATES POWDER PACK 280-160-250 MG 2 PACKET: 280-160-250 PACK at 17:16

## 2024-05-31 RX ADMIN — ACETAMINOPHEN 975 MG: 325 TABLET, FILM COATED ORAL at 14:25

## 2024-05-31 RX ADMIN — ACETAMINOPHEN 975 MG: 325 TABLET, FILM COATED ORAL at 21:51

## 2024-05-31 RX ADMIN — AMPICILLIN SODIUM AND SULBACTAM SODIUM 3 G: 2; 1 INJECTION, POWDER, FOR SOLUTION INTRAMUSCULAR; INTRAVENOUS at 04:44

## 2024-05-31 RX ADMIN — METHOCARBAMOL 500 MG: 500 TABLET ORAL at 21:51

## 2024-05-31 RX ADMIN — POTASSIUM & SODIUM PHOSPHATES POWDER PACK 280-160-250 MG 2 PACKET: 280-160-250 PACK at 08:34

## 2024-05-31 RX ADMIN — SODIUM BICARBONATE 650 MG TABLET 650 MG: at 12:53

## 2024-05-31 RX ADMIN — HEPARIN SODIUM 5000 UNITS: 5000 INJECTION, SOLUTION INTRAVENOUS; SUBCUTANEOUS at 21:52

## 2024-05-31 ASSESSMENT — ACTIVITIES OF DAILY LIVING (ADL)
ADLS_ACUITY_SCORE: 33

## 2024-05-31 NOTE — PLAN OF CARE
"Goal Outcome Evaluation:    [id number]  /79 (BP Location: Right arm)   Pulse 100   Temp 99.2  F (37.3  C) (Oral)   Resp 18   Ht 1.778 m (5' 10\")   Wt 72.6 kg (160 lb)   SpO2 97%   BMI 22.96 kg/m      Pt. A&O, VSS, RA. Bed alarm on for safety  Denies pain and SOB  X2 BM, voiding spontaneously  Up with x1 assist to bathroom  Tolerating reg with good appetite. Denies nausea  PIV, TKO  No acute change, continue w/poc             "

## 2024-05-31 NOTE — PLAN OF CARE
Goal Outcome Evaluation:      Plan of Care Reviewed With: patient    Overall Patient Progress: no change    Outcome Evaluation: 0598-2305 A&O to self & place & sometimes time/situation.  Bed alarm audible, pt set off x1, reminded to use call light.  Up Ax1+ walker to bathroom.  L sided weakness improved.  VSS on RA, cough infrequent.  PRN albuterol inhaler used x2 per pt request.  C/o HA, 2/10, relief after tylenol. R PIV TKO between IV abx.  4g IV mag given for electrolyte replacement protocol, recheck 2.5.  Drinking fluids.  Continue to monitor & follow POC.

## 2024-05-31 NOTE — PROGRESS NOTES
Care Management Discharge Note    Discharge Date: 06/05/2024  Discharge Disposition: Transitional Care    [facility]; Facility Ph: [phone], Facility F: [phone])     Discharge Services: None  Discharge DME: None  Discharge Transportation: agency    [facility] (Ph: [phone])  Discharge wheelchair van ride scheduled for 6/1/24 with a pick-up window of [phone].      Private pay costs discussed: Not applicable  Does the patient's insurance plan have a 3 day qualifying hospital stay waiver?  No  PAS Confirmation Code: [id number]   Patient/family educated on Medicare website which has current facility and service quality ratings: no  Education Provided on the Discharge Plan: Yes  Persons Notified of Discharge Plans: Provider, bedside nurse, PCP, TCU, EMS, patient  Patient/Family in Agreement with the Plan: yes  Handoff Referral Completed: No  Additional Information: Per Gold 9 Provider, this patient is medically ready for discharge. PT/OT safe disposition recommendations are for TCU. The patient has been clinically accepted for placement at [facility] for TCU stay. All parties in agreement. Discharge WC ride set up for Saturday, June 1 with a pick-up window between [phone]. CHW completed PAS: [id number]. Handoff completed to weekend SW to fax discharge orders to facility and complete PCP handoff.  __________________________     ARTI Booth, [facility] Medical CenterSW  Clinical , [facility]  7C Medical Surgical Beds [phone]   Desk Phone: [phone]   Securely message with [employer] (Search Name or 7C Med Surg [phone] SW)  Text page via [facility] Paging/Directory   See signed in provider for up to date coverage information  Social Work & Care Management Department  ___________________________________    Unit 7C Care Management Weekend Contacts:    Searchable in [facility] - search SOCIAL WORK or CARE  COORDINATOR  Searchable in [employer] - search 7C Med Surg SW, 7C Med Surg RNCC     7C Weekend SW Vocera; Pager: [phone] ([phone])  7C Weekend RNCC Vocera; Pager: [phone] ([phone])  After hours  Vocera; Pager: [phone] (Weekends (1630 - 0000); Mon-Fri ([phone]); [facility] Recognized Holidays  ([phone]))

## 2024-05-31 NOTE — PROGRESS NOTES
[facility]    Medicine Progress Note - Hospitalist Service, GOLD TEAM 9    Date of Admission:  5/26/2024    MEDICINE IS PRIMARY SINCE 5/28    Assessment & Plan   68 year old male admitted on 5/26/2024. He has a PMHx of alcohol use c/b R peroneal neuropathy, gastritis, tobacco use, asthma, stage III CKD, HLD, and HTN who presents to the ED as a transfer from [facility] for neurosurgery evaluation.     # Sepsis and severe sepsis secondary to likely bacterial pneumonia possibly secondary to aspiration, POA  Sepsis criteria include heart rate of 92 and white blood cell count of 14.7.  Patient qualifies for diagnosis of severe sepsis given lactic acid of 2.3.  Inflammatory markers with CRP and procalcitonin are both elevated.  He has high pretest probability given his alcohol use disorder and being found down.  Chest x-ray is abnormal.  The patient completed 3-day course of azithromycin.The differential diagnosis would also include C. difficile antigen enteric infection given 7 episodes of diarrhea with generalized abdominal discomfort within the last 24 hours.Lactic acidosis resolved with intravenous antibiotics and intravenous fluids.C. difficile and enteric panel are negative.  - Switched Unasyn to Augmentin, to complete a total of 5 days course    # Acute kidney injury, akin stage I on top of CKD stage IIIb complicated by anion gap metabolic acidosis, POA, resolved  This is likely prerenal in etiology.  Ordered fractional secretion of sodium and obtain renal ultrasound without evidence of obstructive physiology.  Kidney function is at baseline.  -Started sodium bicarb daily  -Strict intake and output and daily body weight  -Basic metabolic panel in a.m.    # Antibiotic associated diarrhea, ruled out for enteric infections  2 more days left of antibiotic therapy.  -Ordered Imodium on as-needed basis    # Alcohol use disorder with dependence and withdrawal Complicated  "by multiple falls complicated by multiple falls, POA  Patient reports that he drinks 1 gallon of vodka per week. He has been drinking since age 19 and has never \"stopped long enough to have the shakes\" The patient is interested in chemical dependency resources and is willing to discuss further with addiction medicine.  -Continue gabapentin at 100 mg 3 times daily given his kidney function  -Will complete the high thiamine protocol that was started in the emergency department  -The patient declined naltrexone  -Continue folic acid, multivitamins, and thiamine  -Appreciative to the input of addiction medicine    # 4 mm subdural hemorrhage along the right tentorial leaflet, without mass effect, brain compression considered and ruled out POA  MRI Brain obtained without evidence of acute ischemic infarct.   -Serial neurological evaluation  -Continue Keppra 500 mg twice daily for 7 days based on recommendations of neurosurgery  -PT/OT  -The patient was cleared for heparin subcutaneous for DVT prevention by neurosurgery and trauma team    # Acute superior endplate compression deformities involving C7, T2, and L1, POA   The patient was cleared to be of cervical collar per neurosurgery.  The patient was seen in collaboration with trauma service  -Continue scheduled acetaminophen and robaxin 500 mg at bedtime together with robaxin 250 mg TID PRN and lidocaine patches.   -Continue gabapentin  - I agree with stopping narcotics that was ordered by trauma team    # Left sided andreia-paresis , POA, improving  An MRI of the left brachial plexus was performed which did not reveal abnormality of the plexus; incidentally there was mixed edema of the left acromioclavicular joint and rotator cuff musculature.  MRI showing evidence of redemonstration of mild compression fracture of the superior endplate of C7 and T2 with cervical spondylosis.  MRI with contrast performed under general sedation that was uneventful without any acute " pathology.  -Continue working with physical therapy with plans for discharge to TCU  - May be considered for EMG later on as outpatient.  -Appreciative to follow the recommendations of neurology and neurosurgery    # Resolved medical problems  # Hypophosphatemia and hypomagnesemia, POA  -Ordered replacement protocol    # Demand ischemia and elevated NT proBNP, POA echocardiogram with normal ejection fraction    # Elevated CPK, POA  -CPK trended down    # Chronic medical problems:  #HTN  - Continue Metoprolol and hold if SBP <120     #Asthma  - Continue PRN albuterol inhaler    # Normocytic anemia with wide RDW, POA  -Added on reticulocyte count  -Will monitor closely for bleeding and obtain daily CBC    # Depression complicated by insomnia, POA  -The patient declined escitalopram  -Continue trazodone 25 mg nightly on top of melatonin        Diet: Regular Diet Adult    DVT Prophylaxis: Heparin SQ  Frederick Catheter: Not present  Lines: None     Cardiac Monitoring: None  Code Status: Full Code      Clinically Significant Risk Factors            # Hypomagnesemia: Lowest Mg = 1.2 mg/dL in last 2 days, will replace as needed   # Hypoalbuminemia: Lowest albumin = 2.9 g/dL at 5/31/2024  5:51 AM, will monitor as appropriate     # Hypertension: Noted on problem list              # Financial/Environmental Concerns: none  # Asthma: noted on problem list        Disposition Plan     Medically Ready for Discharge: Ready Now    The patient is medically ready for discharge, awaiting bed in TCU         Jamel Frost MD  Hospitalist Service, GOLD TEAM 9  [facility]  Securely message with [employer] (more info)  Text page via [employer] Paging/Directory   See signed in provider for up to date coverage information  ______________________________________________________________________    Interval History   The patient denied any new symptoms to the undersigned.  The patient continues to report  diarrhea although improved from prior.  He had 3 bowel movements today.  No rectal vitals.  No abdominal pain reported.    Physical Exam   Vital Signs: Temp: 98  F (36.7  C) Temp src: Oral BP: 105/69 Pulse: 90   Resp: 16 SpO2: 97 % O2 Device: None (Room air)    Weight: 160 lbs 0 oz    Constitutional: Awake, alert, cooperative, no apparent distress.  Eyes: Conjunctiva and pupils examined and normal.  HEENT: Moist mucous membranes, normal dentition.  Respiratory: Clear to auscultation bilaterally, no crackles or wheezing.  Cardiovascular: Regular rate and rhythm, normal S1 and S2, and no murmur noted.  GI: Soft, non-distended, non-tender, normal bowel sounds.  Lymph/Hematologic: No anterior cervical or supraclavicular adenopathy.  Skin: No rashes, no cyanosis, no edema.  Musculoskeletal: No joint swelling, erythema or tenderness.  Neurologic: left sided 2/5 power  Psychiatric: Alert, oriented to person, place and time, no obvious anxiety or depression.     Medical Decision Making       45 MINUTES SPENT BY ME on the date of service doing chart review, history, exam, documentation & further activities per the note.      Data     I have personally reviewed the following data over the past 24 hrs:    11.4 (H)  \   8.1 (L)   / 370     135 105 12.3 /  130 (H)   4.1 20 (L) 1.57 (H) \     ALT: 13 AST: 23 AP: 111 TBILI: 0.2   ALB: 2.9 (L) TOT PROTEIN: 5.5 (L) LIPASE: N/A       Imaging results reviewed over the past 24 hrs:   No results found for this or any previous visit (from the past 24 hour(s)).

## 2024-05-31 NOTE — CONSULTS
"  Health Psychology                                                                                                                          Lauren Arriaga, Ph.D., L.P [phone]  Brenna Hernandez, Ph.D., L.P. [phone]  Magaly Lu, Ph.D, L..P. [phone]  Kelly Ashton, Ph.D., L.P. [phone]  Kp Pacheco, Ph.D., A.B.P.P., L.P. [phone]         Barbara Silverio, Ph.D., L.P. [phone]       Aby Flores, Ph.D., A.B.P.P., LP [phone]           [facility], 3rd Floor  [address] [place] [address]      Inpatient Health Psychology Consultation    Date of Service:  05/31/24    BACKGROUND:  Per EMR: \"68 year old male admitted on 5/26/2024. He has a PMHx of alcohol use c/b R peroneal neuropathy, gastritis, tobacco use, asthma, stage III CKD, HLD, and HTN who presents to the ED as a transfer from [facility] for neurosurgery evaluation.\"    Health psychology consulted by Dr. Frost with Gold Team 9 to support Luigi in coping with admission, anxiety.     SUBJECTIVE:  Met with Luigi today to introduce Health Psychology services and discuss nature of referral.  He was mostly engaged throughout the visit today and responded to questions asked.  His major concern today was about when he will be discharging and wondering about the source of weakness.     Attempted to assess emotional wellbeing, Luigi shared that he has mild worry about his recovery prognosis. Frustration with reduced independence. He was not very specific in responses about his mood. Shared that his relationship with his wife is challenging, feels his main motivating relationships are his sister and daughter.     Provided psychoeducation about psychotherapy and how it could be useful for Luigi. Reviewed his plans for alcohol abstinence. He had limited insight into what would be required to avoid alcohol. Not interested in medication intervention or psychotherapy at this " point. When we discussed individual therapy he had more interest in this as an option. Not enough interest to commit to a referral at this time but understands how he can request a referral in the future.    Encouraged Luigi's active participation in therapies as he recovers. Discussed strategies to use to ask questions of medical team. He shared that at times he does not fully understand all they tell him.     Luigi was engaged in the visit and expressed understanding of information provided.       OBJECTIVE:  Luigi was lying in his bed during the visit. He reported fair mood, eager to get home. Affect flat. Thought processes logical and linear. Insight and judgment fair. Speech WNL. Denied suicidal ideation.        ASSESSMENT:  Luigi presents with limited insight into relationship between emotional health, alcohol use, and physical health. He did exhibit slight interest in individual therapy as he has limited social support.     RECOMMENDATIONS:  Please revisit topic of individual therapy when Luigi discharges. He may benefit from individual support when he returns home as opposed to group-based support.      DIAGNOSIS:  Alcohol use disorder, severe      PLAN:  Plan for health psychology to follow this patient approximately once per week for the duration of their hospitalization.     Please feel free to call in urgent concerns arise prior to the next follow-up session.     Magaly Lu, PhD, LP  Clinical Health Psychologist  Phone: [phone]  Pager: [phone]      Time in: 12:00  Time out: 12:30

## 2024-05-31 NOTE — PROGRESS NOTES
[facility] [place]  Neurosurgery Progress Note:  05/31/2024      Interval History: No acute events overnight. MRI C w/wo completed, no concerns.    Assessment:  68 year old male with a past medical history significant for alcohol use disorder, peroneal neuropathy, gastritis, stage III chronic kidney disease, hypertension and hyperlipidemia who presents as a transfer from [facility] ED for evaluation of a 4 mm right tentorial subdural hematoma and left hemibody weakness, UE worse than LE, which has since improved to be isolated LEFT upper extremity weakness.     Clinically Significant Risk Factors            # Hypomagnesemia: Lowest Mg = 1.2 mg/dL in last 2 days, will replace as needed   # Hypoalbuminemia: Lowest albumin = 2.9 g/dL at 5/31/2024  5:51 AM, will monitor as appropriate     # Hypertension: Noted on problem list              # Financial/Environmental Concerns: none  # Asthma: noted on problem list           Plan:  Serial neuro exams  Patient ok to discharge from NSGY perspective    NSGY will follow outpatient in 2 weeks with a CT head for SDH- ordered.    Patient should follow up with Neurology outpatient for any LUE weakness work up.    Remainder of cares per primary; NSGY will follow peripherally.    -----------------------------------  Ryan Rivas MD  NSGY PGY-3  -----------------------------------  Gen: Appears comfortable, NAD  Neurologic:  Alert & Oriented to person, place, time, and situation  Follows commands briskly  Speech fluent, spontaneous. No aphasia or dysarthria.  No gaze preference. No apparent hemineglect.  PERRL, EOMI  Face symmetric with sensation intact to light touch  Palate elevates symmetrically, uvula midline, tongue protrudes midline  Trapezii muscles 5/5 bilaterally  No pronator drift     Del Tr Bi WE WF Gr   R 5 5 5 5 5 5   L 2 4+ 4 4 3 3    HF KE KF DF PF EHL   R 5 5 5 5 5 5   L 5 5 5 5 5 5     Reflexes: bilateral biceps/brachioradialis 2+,  LEFT-sided Chandler's, bilateral patellar 2    Sensation intact and symmetric to light touch throughout    Objective:   Temp:  [97.5  F (36.4  C)-98.3  F (36.8  C)] 98  F (36.7  C)  Pulse:  [59-90] 90  Resp:  [12-20] 16  BP: (105-155)/() 105/69  SpO2:  [93 %-100 %] 97 %  I/O last 3 completed shifts:  In: 2183 [P.O.:1080; I.V.:1003; IV Piggyback:100]  Out: 575 [Urine:575]        LABS:  Recent Labs   Lab 05/31/24  0551 05/30/24  1244 05/30/24  0753 05/29/24  0611     --  139 138   POTASSIUM 4.1  --  3.8 3.8   CHLORIDE 105  --  107 102   CO2 20*  --  23 24   ANIONGAP 10  --  9 12   * 94 97 113*   BUN 12.3  --  14.5 21.6   CR 1.57*  --  1.58* 1.88*   PINO 8.1*  --  8.4* 8.4*       Recent Labs   Lab 05/31/24  0551   WBC 11.4*   RBC 2.49*   HGB 8.1*   HCT 25.2*   *   MCH 32.5   MCHC 32.1   RDW 16.1*          IMAGING:  Recent Results (from the past 24 hour(s))   MR Cervical Spine w/o & w Contrast    Narrative    MR CERVICAL SPINE W/O & W CONTRAST 5/30/2024 12:22 PM    Provided History: paresis of left upper and lower extremity    Comparison: Cervical spine MRI 5/28/2024, 5/27/2024: Thoracic spine  MRI 5/27/2024    Technique: Sagittal T1-weighted, sagittal T2-weighted, sagittal  diffusion weighted, axial T2-weighted, and axial T2* gradient echo  images of the cervical spine were obtained without intravenous  contrast. Following intravenous administration of gadolinium, axial  and sagittal T1-weighted images with fat saturation were also  obtained.    Contrast: 7 mL gadavist    Findings:  There is multilevel disc height narrowing greatest at C5-6.  There is  normal signal within and normal contour of the cervical spinal cord.  There is retrolisthesis at C3-4.  Stable mild compression fractures of  C7, T2 and T3. There is also a chronic appearing compression fracture  of T4. Tiny amount of edema associated with the left posterior  superior corner of C6. There is associated enhancement with  these  compression fractures. No definitive evidence of ligamentous injury.  Hemangioma in the T1 vertebral body.     The findings on a level by level basis are as follows:    C2-3:  Left greater than right uncinate hypertrophy and mild disc  bulging. There is mild left neural foraminal stenosis. Minimal spinal  canal stenosis    C3-4:  Disc bulge with small superimposed central protrusion and left  greater than right uncinate hypertrophy and facet arthropathy. There  is moderate to severe left and mild right neural foraminal stenosis.  Minimal to mild spinal canal stenosis.    C4-5:  Disc bulge and small superimposed central protrusion. Uncinate  hypertrophy and facet arthropathy. There is moderate right and mild  left neural foraminal stenosis. No spinal canal stenosis    C5-6:  Disc bulge and right greater than left uncinate hypertrophy and  bilateral facet arthropathy. Bilateral severe neural foraminal  stenosis. No spinal canal stenosis    C6-7:  Disc bulge with superimposed central protrusion, hypertrophy  and mild facet arthropathy. There is mild-to-moderate right-sided and  mild left neural foraminal stenosis. Mild spinal canal stenosis    C7-T1:  No spinal canal or neural foraminal narrowing.     No abnormality of the paraspinous soft tissues. Mild cerebellar volume  loss. Asymmetric appearance of the oral tongue, poor visualization due  to saturation band. No abnormality visualized on recent neck MRI.      Impression    Impression:   1. No significant change in acute/subacute mild compression fractures  of C7, T2, T3. Small amount of focal edema in the left posterior  superior corner of C6 could also represent a tiny acute fracture.  2. Multilevel cervical spondylosis greatest at C5-C6 where there is  severe right and moderate left neural foraminal stenosis. There are  additional multilevel high grade neuroforaminal stenoses as described.  No high-grade spinal canal stenosis at any level.  3. No abnormal  cord signal.    I have personally reviewed the examination and initial interpretation  and I agree with the findings.    ALANA KEITH MD         SYSTEM ID:  [id number]

## 2024-05-31 NOTE — PROGRESS NOTES
Care Management Follow Up    Length of Stay (days): 4  Expected Discharge Date: 06/01/2024  Concerns to be Addressed: discharge planning     Patient plan of care discussed at interdisciplinary rounds: Yes  Anticipated Discharge Disposition: Transitional Care  Anticipated Discharge Services: None  Anticipated Discharge DME: None  Patient/family educated on Medicare website which has current facility and service quality ratings: no  Education Provided on the Discharge Plan: Yes  Patient/Family in Agreement with the Plan: yes  Referrals Placed by CM/SW: External Care Coordination, Senior Linkage Line, Post Acute Facilities, Communication hand-offs to next level of Care Providers    Accepting TCU Referral:    [facility]erral (Liaison Erendira Lira; P: [phone]; F: [phone]; E: [email or web address]) - Able to clinically accept pt for [facility]. No other facilities will be appropriate d/t pt's ETOH use/unwillingness to enter tx.    Pending TCU Referrals:    [facility] (Ph: [phone], Admissions: [phone], F: [phone])  [facility] (Ph: [phone], Admissions: [phone], F: [phone])  [facility] (Ph: [phone], Admissions: [phone], F: [phone])    Inactive TCU Referrals:    [facility] - Bed not available.  [facility] - Declined d/t hx of violence and/or drug or alcohol abuse.  [facility] - Declined d/t ETOH use and refusal to go to CD tx.  [facility] [place] [facility] - Declined d/t ETOH use and refusal to go to CD tx.  [facility] - Declined d/t ETOH use and refusal to go to CD tx.  [facility] - Declined d/t ETOH use and refusal to go to CD tx.    Private pay costs discussed: Not applicable  Additional Information: Per Gold 9 Provider, this patient is medically ready for discharge. PT/OT safe disposition recommendations are for TCU. Patient in  agreement with TCU placement.    Writer met with pt at bedside to discuss TCU recs. Medicare Care Compare tool education provided along with printout. Pt in agreement with referrals to facilities near his home that can meet his needs. Per chart review, pt not open to CD tx, so TCU with CD resources on-site will not be pursued. Referrals placed to TCUs within 25 miles of his home, as listed above. Writer received ample feedback from facilities that pt's history of ETOH use/misuse and refusing CD treatment make pt ineligible for many TCU facilities; anticipate challenging placement.    @1500 Writer received contact from [facility] liaison. While the requested facilities are unable to take the pt d/t chemical health needs, [facility] able to take the patient tomorrow. Writer updated Gold 9 Provider, bedside nurse. Placement discussed with the pt, pt in agreement with plan. Writer delegated task of discharge ride set up to SHANE Herrera.  __________________________     ARTI Booth, LOURDES  Clinical , [facility]  7C Medical Surgical Beds 5167-1984   Desk Phone: [phone]   Securely message with [employer] (Search Name or 7C Med Surg [phone] SW)  Text page via [employer] Paging/Directory   See signed in provider for up to date coverage information  Social Work & Care Management Department  ___________________________________    Unit 7C Care Management Weekend Contacts:    Searchable in [employer] - search SOCIAL WORK or CARE COORDINATOR  Searchable in [employer] - search 7C Med Surg SW, 7C Med Surg RNCC     7C Weekend SW Vocera; Pager: [phone])  7C Weekend RNCC Vocera; Pager: [phone] ([phone])  After hours  Jacob; Pager: [phone] (Weekends (1630 - 0000); Mon-Fri ([phone]); [facility] Recognized Holidays  ([phone]))

## 2024-05-31 NOTE — PROGRESS NOTES
Care Management Follow Up    Length of Stay (days): 4    Expected Discharge Date: 06/05/2024     Concerns to be Addressed: discharge planning     Patient plan of care discussed at interdisciplinary rounds: Yes    Anticipated Discharge Disposition: Transitional Care     Anticipated Discharge Services: None  Anticipated Discharge DME: None    Patient/family educated on Medicare website which has current facility and service quality ratings: no  Education Provided on the Discharge Plan: Yes  Patient/Family in Agreement with the Plan: yes    Referrals Placed by CM/SW: External Care Coordination, Senior Linkage Line, Post Acute Facilities, Communication hand-offs to next level of Care Providers  Private pay costs discussed: Not applicable    Additional Information:  CHW set up wheelchair ride through [facility] to the [facility] [place] [facility] for tomorrow (6/1) between 11:35 and 12:20 pm. CHW completed PAS: [id number].     Sharon Baldwin  Inpatient CHW  [facility] Unit 7C  [phone]

## 2024-05-31 NOTE — PLAN OF CARE
Goal Outcome Evaluation:      Plan of Care Reviewed With: patient    Overall Patient Progress: no changeOverall Patient Progress: no change    Outcome Evaluation: A&O to self, place, and sometimes time/situation. VSS on RA. Bed alarm on. Up to bathroom with assist x1 + walker. No c/o pain. R PIV running TKO between abx. PRN imodium available for diarrhea due to abx. Will discharge tomorrow to TCU around noon.

## 2024-05-31 NOTE — PLAN OF CARE
Goal Outcome Evaluation:      Plan of Care Reviewed With: patient    Overall Patient Progress: no changeOverall Patient Progress: no change    Outcome Evaluation: Anticipate TCU placement once medically ready. Care management team will follow.  __________________________     ARTI Booth, LOURDES  Clinical , [facility]  7C Medical Surgical Beds 8228-7720   Desk Phone: [phone]   Securely message with [employer] (Search Name or  Med Surg [phone] )  Text page via [employer] Paging/Directory   See signed in provider for up to date coverage information  Social Work & Care Management Department  ___________________________________    Unit 7C Care Management Weekend Contacts:    Searchable in [employer] - search SOCIAL WORK or CARE COORDINATOR  Searchable in [employer] - search 7C Med Surg SW, 7C Med Surg RNCC     7C Weekend SW Vocera; Pager: [phone] ([phone])  7C Weekend RNCC Vocera; Pager: [phone] ([phone])  After hours  [employer]oc[employer]; Pager: [phone] (Weekends (1630 - 0000); Mon-Fri ([phone]); [facility] Recognized Holidays  ([phone]))

## 2024-06-01 ENCOUNTER — APPOINTMENT (OUTPATIENT)
Dept: PHYSICAL THERAPY | Facility: CLINIC | Age: 69
DRG: 871 | End: 2024-06-01
Payer: COMMERCIAL

## 2024-06-01 LAB
ALBUMIN SERPL BCG-MCNC: 3 G/DL (ref 3.5–5.2)
ALBUMIN UR-MCNC: 10 MG/DL
ALP SERPL-CCNC: 137 U/L (ref 40–150)
ALT SERPL W P-5'-P-CCNC: 14 U/L (ref 0–70)
ANION GAP SERPL CALCULATED.3IONS-SCNC: 11 MMOL/L (ref 7–15)
APPEARANCE UR: CLEAR
AST SERPL W P-5'-P-CCNC: 25 U/L (ref 0–45)
BASE EXCESS BLDV CALC-SCNC: -0.8 MMOL/L (ref -3–3)
BASOPHILS # BLD AUTO: 0.2 10E3/UL (ref 0–0.2)
BASOPHILS NFR BLD AUTO: 1 %
BILIRUB SERPL-MCNC: 0.2 MG/DL
BILIRUB UR QL STRIP: NEGATIVE
BUN SERPL-MCNC: 9.5 MG/DL (ref 8–23)
CALCIUM SERPL-MCNC: 8.5 MG/DL (ref 8.8–10.2)
CHLORIDE SERPL-SCNC: 109 MMOL/L (ref 98–107)
COLOR UR AUTO: ABNORMAL
CREAT SERPL-MCNC: 1.56 MG/DL (ref 0.67–1.17)
CRP SERPL-MCNC: 22.2 MG/L
DEPRECATED HCO3 PLAS-SCNC: 20 MMOL/L (ref 22–29)
EGFRCR SERPLBLD CKD-EPI 2021: 48 ML/MIN/1.73M2
EOSINOPHIL # BLD AUTO: 0.6 10E3/UL (ref 0–0.7)
EOSINOPHIL NFR BLD AUTO: 5 %
ERYTHROCYTE [DISTWIDTH] IN BLOOD BY AUTOMATED COUNT: 16.3 % (ref 10–15)
GLUCOSE SERPL-MCNC: 118 MG/DL (ref 70–99)
GLUCOSE UR STRIP-MCNC: 100 MG/DL
HCO3 BLDV-SCNC: 24 MMOL/L (ref 21–28)
HCT VFR BLD AUTO: 27 % (ref 40–53)
HGB BLD-MCNC: 8.4 G/DL (ref 13.3–17.7)
HGB UR QL STRIP: NEGATIVE
HYALINE CASTS: 1 /LPF
IMM GRANULOCYTES # BLD: 0.1 10E3/UL
IMM GRANULOCYTES NFR BLD: 1 %
KETONES UR STRIP-MCNC: NEGATIVE MG/DL
LACTATE SERPL-SCNC: 1.3 MMOL/L (ref 0.7–2)
LEUKOCYTE ESTERASE UR QL STRIP: NEGATIVE
LYMPHOCYTES # BLD AUTO: 3.3 10E3/UL (ref 0.8–5.3)
LYMPHOCYTES NFR BLD AUTO: 26 %
MAGNESIUM SERPL-MCNC: 1.7 MG/DL (ref 1.7–2.3)
MCH RBC QN AUTO: 31.5 PG (ref 26.5–33)
MCHC RBC AUTO-ENTMCNC: 31.1 G/DL (ref 31.5–36.5)
MCV RBC AUTO: 101 FL (ref 78–100)
MONOCYTES # BLD AUTO: 1.7 10E3/UL (ref 0–1.3)
MONOCYTES NFR BLD AUTO: 13 %
NEUTROPHILS # BLD AUTO: 6.9 10E3/UL (ref 1.6–8.3)
NEUTROPHILS NFR BLD AUTO: 54 %
NITRATE UR QL: NEGATIVE
NRBC # BLD AUTO: 0 10E3/UL
NRBC BLD AUTO-RTO: 0 /100
O2/TOTAL GAS SETTING VFR VENT: 21 %
OXYHGB MFR BLDV: 87 % (ref 70–75)
PCO2 BLDV: 37 MM HG (ref 40–50)
PH BLDV: 7.41 [PH] (ref 7.32–7.43)
PH UR STRIP: 8.5 [PH] (ref 5–7)
PHOSPHATE SERPL-MCNC: 3 MG/DL (ref 2.5–4.5)
PLATELET # BLD AUTO: 378 10E3/UL (ref 150–450)
PO2 BLDV: 53 MM HG (ref 25–47)
POTASSIUM SERPL-SCNC: 4.2 MMOL/L (ref 3.4–5.3)
PROCALCITONIN SERPL IA-MCNC: 0.22 NG/ML
PROT SERPL-MCNC: 5.7 G/DL (ref 6.4–8.3)
RBC # BLD AUTO: 2.67 10E6/UL (ref 4.4–5.9)
RBC URINE: <1 /HPF
SAO2 % BLDV: 88.6 % (ref 70–75)
SODIUM SERPL-SCNC: 140 MMOL/L (ref 135–145)
SP GR UR STRIP: 1.01 (ref 1–1.03)
TRANSITIONAL EPI: <1 /HPF
UROBILINOGEN UR STRIP-MCNC: NORMAL MG/DL
WBC # BLD AUTO: 12.8 10E3/UL (ref 4–11)
WBC URINE: 1 /HPF

## 2024-06-01 PROCEDURE — 250N000013 HC RX MED GY IP 250 OP 250 PS 637: Performed by: NURSE PRACTITIONER

## 2024-06-01 PROCEDURE — 250N000011 HC RX IP 250 OP 636: Performed by: INTERNAL MEDICINE

## 2024-06-01 PROCEDURE — 83605 ASSAY OF LACTIC ACID: CPT | Performed by: INTERNAL MEDICINE

## 2024-06-01 PROCEDURE — 250N000013 HC RX MED GY IP 250 OP 250 PS 637: Performed by: INTERNAL MEDICINE

## 2024-06-01 PROCEDURE — 99232 SBSQ HOSP IP/OBS MODERATE 35: CPT | Performed by: INTERNAL MEDICINE

## 2024-06-01 PROCEDURE — 258N000003 HC RX IP 258 OP 636: Performed by: INTERNAL MEDICINE

## 2024-06-01 PROCEDURE — 97530 THERAPEUTIC ACTIVITIES: CPT | Mod: GP | Performed by: REHABILITATION PRACTITIONER

## 2024-06-01 PROCEDURE — 36415 COLL VENOUS BLD VENIPUNCTURE: CPT | Performed by: INTERNAL MEDICINE

## 2024-06-01 PROCEDURE — 99207 PR APP CREDIT; MD BILLING SHARED VISIT: CPT | Performed by: STUDENT IN AN ORGANIZED HEALTH CARE EDUCATION/TRAINING PROGRAM

## 2024-06-01 PROCEDURE — 82805 BLOOD GASES W/O2 SATURATION: CPT | Performed by: INTERNAL MEDICINE

## 2024-06-01 PROCEDURE — 83735 ASSAY OF MAGNESIUM: CPT | Performed by: INTERNAL MEDICINE

## 2024-06-01 PROCEDURE — 85004 AUTOMATED DIFF WBC COUNT: CPT | Performed by: INTERNAL MEDICINE

## 2024-06-01 PROCEDURE — 86140 C-REACTIVE PROTEIN: CPT | Performed by: INTERNAL MEDICINE

## 2024-06-01 PROCEDURE — 81001 URINALYSIS AUTO W/SCOPE: CPT | Performed by: INTERNAL MEDICINE

## 2024-06-01 PROCEDURE — 84100 ASSAY OF PHOSPHORUS: CPT | Performed by: INTERNAL MEDICINE

## 2024-06-01 PROCEDURE — 87040 BLOOD CULTURE FOR BACTERIA: CPT | Performed by: INTERNAL MEDICINE

## 2024-06-01 PROCEDURE — 84145 PROCALCITONIN (PCT): CPT | Performed by: INTERNAL MEDICINE

## 2024-06-01 PROCEDURE — 120N000002 HC R&B MED SURG/OB UMMC

## 2024-06-01 PROCEDURE — 80053 COMPREHEN METABOLIC PANEL: CPT | Performed by: INTERNAL MEDICINE

## 2024-06-01 RX ORDER — LOPERAMIDE HCL 2 MG
4 CAPSULE ORAL DAILY
Status: DISCONTINUED | OUTPATIENT
Start: 2024-06-01 | End: 2024-06-03 | Stop reason: HOSPADM

## 2024-06-01 RX ORDER — LOSARTAN POTASSIUM 25 MG/1
25 TABLET ORAL DAILY
Status: DISCONTINUED | OUTPATIENT
Start: 2024-06-01 | End: 2024-06-03 | Stop reason: HOSPADM

## 2024-06-01 RX ADMIN — ALBUTEROL SULFATE 2 PUFF: 90 AEROSOL, METERED RESPIRATORY (INHALATION) at 03:09

## 2024-06-01 RX ADMIN — HEPARIN SODIUM 5000 UNITS: 5000 INJECTION, SOLUTION INTRAVENOUS; SUBCUTANEOUS at 13:03

## 2024-06-01 RX ADMIN — METOPROLOL SUCCINATE 25 MG: 25 TABLET, EXTENDED RELEASE ORAL at 08:23

## 2024-06-01 RX ADMIN — LOPERAMIDE HYDROCHLORIDE 4 MG: 2 CAPSULE ORAL at 13:02

## 2024-06-01 RX ADMIN — METHOCARBAMOL 500 MG: 500 TABLET ORAL at 22:47

## 2024-06-01 RX ADMIN — HEPARIN SODIUM 5000 UNITS: 5000 INJECTION, SOLUTION INTRAVENOUS; SUBCUTANEOUS at 22:47

## 2024-06-01 RX ADMIN — ACETAMINOPHEN 975 MG: 325 TABLET, FILM COATED ORAL at 06:11

## 2024-06-01 RX ADMIN — Medication 25 MG: at 22:47

## 2024-06-01 RX ADMIN — AMOXICILLIN AND CLAVULANATE POTASSIUM 1 TABLET: 875; 125 TABLET, FILM COATED ORAL at 08:21

## 2024-06-01 RX ADMIN — SODIUM CHLORIDE, POTASSIUM CHLORIDE, SODIUM LACTATE AND CALCIUM CHLORIDE 500 ML: 600; 310; 30; 20 INJECTION, SOLUTION INTRAVENOUS at 13:14

## 2024-06-01 RX ADMIN — Medication 3 MG: at 22:47

## 2024-06-01 RX ADMIN — THIAMINE HYDROCHLORIDE 250 MG: 100 INJECTION, SOLUTION INTRAMUSCULAR; INTRAVENOUS at 08:16

## 2024-06-01 RX ADMIN — LEVETIRACETAM 500 MG: 500 TABLET, FILM COATED ORAL at 08:22

## 2024-06-01 RX ADMIN — LEVETIRACETAM 500 MG: 500 TABLET, FILM COATED ORAL at 20:26

## 2024-06-01 RX ADMIN — GABAPENTIN 100 MG: 100 CAPSULE ORAL at 13:02

## 2024-06-01 RX ADMIN — GABAPENTIN 100 MG: 100 CAPSULE ORAL at 20:26

## 2024-06-01 RX ADMIN — LOPERAMIDE HYDROCHLORIDE 2 MG: 2 CAPSULE ORAL at 02:28

## 2024-06-01 RX ADMIN — Medication 1 TABLET: at 08:23

## 2024-06-01 RX ADMIN — HEPARIN SODIUM 5000 UNITS: 5000 INJECTION, SOLUTION INTRAVENOUS; SUBCUTANEOUS at 06:11

## 2024-06-01 RX ADMIN — SODIUM BICARBONATE 650 MG TABLET 650 MG: at 08:23

## 2024-06-01 RX ADMIN — GABAPENTIN 100 MG: 100 CAPSULE ORAL at 08:22

## 2024-06-01 RX ADMIN — ACETAMINOPHEN 975 MG: 325 TABLET, FILM COATED ORAL at 22:47

## 2024-06-01 RX ADMIN — ALBUTEROL SULFATE 2 PUFF: 90 AEROSOL, METERED RESPIRATORY (INHALATION) at 09:21

## 2024-06-01 RX ADMIN — LOSARTAN POTASSIUM 25 MG: 25 TABLET, FILM COATED ORAL at 14:26

## 2024-06-01 RX ADMIN — ALBUTEROL SULFATE 2 PUFF: 90 AEROSOL, METERED RESPIRATORY (INHALATION) at 18:53

## 2024-06-01 RX ADMIN — FOLIC ACID 1 MG: 1 TABLET ORAL at 08:21

## 2024-06-01 ASSESSMENT — ACTIVITIES OF DAILY LIVING (ADL)
ADLS_ACUITY_SCORE: 33

## 2024-06-01 NOTE — PLAN OF CARE
Goal Outcome Evaluation:           Overall Patient Progress: no changeOverall Patient Progress: no change             Activity: SBA x1 with walker, (L) sided weakness on UE  Neuro: AnO x3, disoriented to situation, forgetful at times  Cardiac: WDL  Respiratory: WDL on RA, PRN albuterol given x1 overnight per pt request.  GI/: Pt had two watery stools overnight, fecally incontinent x1, PRN imodium given x1 overnight, urinal at bedside  Diet: Regular  Skin: See flowsheets for assessment  Lines/Drains: PIV saline locked  Pain/Nausea: Denies  Changes:  Discharge today to TCU, WC pickup scheduled between [id number]

## 2024-06-01 NOTE — PROGRESS NOTES
[facility]    Medicine Progress Note - Hospitalist Service, GOLD TEAM 9    Date of Admission:  5/26/2024    MEDICINE IS PRIMARY SINCE 5/28    Assessment & Plan   68 year old male admitted on 5/26/2024. He has a PMHx of alcohol use c/b R peroneal neuropathy, gastritis, tobacco use, asthma, stage III CKD, HLD, and HTN who presents to the ED as a transfer from [facility] for neurosurgery evaluation.     # Generalized fatigue, diarrhea, and leukocytosis, not present on admission  This is the main problem being managed for today that is led to on discharge.  Reviewed the literature for any other infectious etiology that can explain all of the above manifestations, there is none especially for a patient who has negative enteric panel and C. difficile was not immunosuppressed acutely.  There is no other symptomatology other than diarrhea.    The diarrhea is likely related to antibiotics.    -Repeat inflammatory markers, trending down  -Ordered lactic acid, urine analysis with reflex to urine culture and blood cultures  -Ordered scheduled Imodium in the morning and Imodium on as-needed basis otherwise  -Half a liter of LR bolus    # Uncontrolled hypertension, not present on admission  - Continue Metoprolol and hold if SBP <120  -Resumed losartan 25 mg daily    # Sepsis and severe sepsis secondary to likely bacterial pneumonia possibly secondary to aspiration, POA  Sepsis criteria include heart rate of 92 and white blood cell count of 14.7.  Patient qualifies for diagnosis of severe sepsis given lactic acid of 2.3.  Inflammatory markers with CRP and procalcitonin are both elevated.  He has high pretest probability given his alcohol use disorder and being found down.  Chest x-ray is abnormal.  The patient completed 3-day course of azithromycin.The differential diagnosis would also include C. difficile antigen enteric infection given 7 episodes of diarrhea with generalized  "abdominal discomfort within the last 24 hours.Lactic acidosis resolved with intravenous antibiotics and intravenous fluids.C. difficile and enteric panel are negative.  The patient completed a course of antibiotics.  Stopped antibiotics.    # Acute kidney injury, akin stage I on top of CKD stage IIIb complicated by anion gap metabolic acidosis, POA, resolved  This is likely prerenal in etiology.  Ordered fractional secretion of sodium and obtain renal ultrasound without evidence of obstructive physiology.  Kidney function is at baseline.  -Continue sodium bicarb daily that is needed from chronic kidney disease perspective  -Strict intake and output and daily body weight  -Basic metabolic panel in a.m.    # Antibiotic associated diarrhea, ruled out for enteric infections  2 more days left of antibiotic therapy.  -Ordered Imodium on as-needed basis    # Alcohol use disorder with dependence and withdrawal Complicated by multiple falls complicated by multiple falls, POA  Patient reports that he drinks 1 gallon of vodka per week. He has been drinking since age 19 and has never \"stopped long enough to have the shakes\" The patient is interested in chemical dependency resources and is willing to discuss further with addiction medicine.  -Continue gabapentin at 100 mg 3 times daily given his kidney function  -Will complete the high thiamine protocol that was started in the emergency department  -The patient declined naltrexone  -Continue folic acid, multivitamins, and thiamine  -Appreciative to the input of addiction medicine    # 4 mm subdural hemorrhage along the right tentorial leaflet, without mass effect, brain compression considered and ruled out POA  MRI Brain obtained without evidence of acute ischemic infarct.   -Serial neurological evaluation  -Continue Keppra 500 mg twice daily for 7 days based on recommendations of neurosurgery  -PT/OT  -The patient was cleared for heparin subcutaneous for DVT prevention by " neurosurgery and trauma team    # Acute superior endplate compression deformities involving C7, T2, and L1, POA   The patient was cleared to be of cervical collar per neurosurgery.  The patient was seen in collaboration with trauma service  -Continue scheduled acetaminophen and robaxin 500 mg at bedtime together with robaxin 250 mg TID PRN and lidocaine patches.   -Continue gabapentin  - I agree with stopping narcotics that was ordered by trauma team    # Left sided andreia-paresis , POA, improving  An MRI of the left brachial plexus was performed which did not reveal abnormality of the plexus; incidentally there was mixed edema of the left acromioclavicular joint and rotator cuff musculature.  MRI showing evidence of redemonstration of mild compression fracture of the superior endplate of C7 and T2 with cervical spondylosis.  MRI with contrast performed under general sedation that was uneventful without any acute pathology.  -Continue working with physical therapy with plans for discharge to TCU  - May be considered for EMG later on as outpatient.  -Appreciative to follow the recommendations of neurology and neurosurgery    # Resolved medical problems  # Hypophosphatemia and hypomagnesemia, POA  -Ordered replacement protocol    # Demand ischemia and elevated NT proBNP, POA echocardiogram with normal ejection fraction    # Elevated CPK, POA  -CPK trended down    # Chronic medical problems:     #Asthma  - Continue PRN albuterol inhaler    # Normocytic anemia with wide RDW, POA  -Added on reticulocyte count  -Will monitor closely for bleeding and obtain daily CBC    # Depression complicated by insomnia, POA  -The patient declined escitalopram  -Continue trazodone 25 mg nightly on top of melatonin        Diet: Regular Diet Adult    DVT Prophylaxis: Heparin SQ  Frederick Catheter: Not present  Lines: None     Cardiac Monitoring: None  Code Status: Full Code      Clinically Significant Risk Factors              #  Hypoalbuminemia: Lowest albumin = 2.9 g/dL at 5/31/2024  5:51 AM, will monitor as appropriate     # Hypertension: Noted on problem list              # Financial/Environmental Concerns: none  # Asthma: noted on problem list        Disposition Plan     Medically Ready for Discharge: Anticipated Tomorrow    The patient is medically ready for discharge, awaiting bed in TCU         Jamel Frost MD  Hospitalist Service, GOLD TEAM 9  [facility]  Securely message with [employer] (more info)  Text page via [facility] Paging/Directory   See signed in provider for up to date coverage information  ______________________________________________________________________    Interval History   The patient reported diarrhea with 4 bowel movements prior to evaluation by the undersigned.  No abdominal pain.  No nausea.  No vomiting.    Physical Exam   Vital Signs: Temp: 98.2  F (36.8  C) Temp src: Oral BP: (!) 147/93 Pulse: 89   Resp: 20 SpO2: 97 % O2 Device: None (Room air)    Weight: 160 lbs 0 oz    Constitutional: Awake, alert, cooperative, no apparent distress.  Eyes: Conjunctiva and pupils examined and normal.  HEENT: Moist mucous membranes, normal dentition.  Respiratory: Clear to auscultation bilaterally, no crackles or wheezing.  Cardiovascular: Regular rate and rhythm, normal S1 and S2, and no murmur noted.  GI: Soft, non-distended, non-tender, normal bowel sounds.  Lymph/Hematologic: No anterior cervical or supraclavicular adenopathy.  Skin: No rashes, no cyanosis, no edema.  Musculoskeletal: No joint swelling, erythema or tenderness.  Neurologic: left sided 2/5 power  Psychiatric: Alert, oriented to person, place and time, no obvious anxiety or depression.     Medical Decision Making       45 MINUTES SPENT BY ME on the date of service doing chart review, history, exam, documentation & further activities per the note.      Data     I have personally reviewed the following data over  the past 24 hrs:    12.8 (H)  \   8.4 (L)   / 378     140 109 (H) 9.5 /  118 (H)   4.2 20 (L) 1.56 (H) \     ALT: 14 AST: 25 AP: 137 TBILI: 0.2   ALB: 3.0 (L) TOT PROTEIN: 5.7 (L) LIPASE: N/A     Procal: 0.22 CRP: 22.20 (H) Lactic Acid: N/A         Imaging results reviewed over the past 24 hrs:   No results found for this or any previous visit (from the past 24 hour(s)).

## 2024-06-01 NOTE — DISCHARGE INSTRUCTIONS
Chemical Health Resources:    Comprehensive Assessment (formerly known as Rule 25)    To access chemical dependency treatment you must have a Comprehensive Assessment complete or have an updated assessment within 30 days of starting any program. If you have medical assistance or no insurance, information to secure funding and get assessed can be found through your [facility]'s chemical health intake line. If you have private insurance, contact the customer service number on the back of your insurance card to determine the required steps for accessing services through your insurance provider. Once approved for funding you can connect with a facility that does Rule 25 assessment. Treatment locations can be discussed with a Rule 25 , known as a Licensed Drug and Alcohol Counselor (LADC). They will send the completed Rule 25 to the treatment facility of choice.    [facility] Chemical Health Intake Lines:  [place] - [phone]  [place]  [phone]  [place] - [phone]  [facility] [phone]  [facility] [phone]  [facility] [phone]  [place] - [phone]  [facility] [phone]  [facility] [phone]    [facility]  To schedule a Chemical Dependency Assessment at any one of our five outpatient  clinics ([place], [place], [place], [place] or [facility]) call [phone] to speak to a . Go to: [email or web address]    Several additional local facilities that offer Comprehensive Assessments:     [facility] - Outpatient Mental Health and Addiction   [address] [address] SUDS  [phone]  [address]  [place] [address]     [facility]   [phone]    [facility]  [address] [facility] [address]     [facility]   [phone]  [address] [place] [address]     [facility]  [phone] - press 1  Multiple clinic locations      [facility]   [phone]  [address]  [facility] [address]     [facility]  [phone]  [address] [facility] [address]     [facility]  [phone]  [address]   [facility] [address]     [place]  [phone]  [address]  [place] [address]    Detox:    If you are intoxicated you may be required to detox at a detox facility before starting treatment.     [facility]: [phone]  [facility]: [phone]  [facility]: [phone]    Chemical Health Treatment Options:    Residential Substance Use Treatment:  Daily programming (individual and group) with lodging provided.  Treatment length usually varies from 21 to 30 days in most residential treatment facilities.  Some facilities are 90 days.  The longest residential program is [place] [facility] at 13 months.    Residential treatment can be based on specific populations (LGBTQ+); edilma-based; ethnicities or gender-specific.     Intensive out-patient treatment:  3-5 days/week programming, both individual and group.  May consist of 2 hours each day up to 4 hours a day.  Client can live at home or any other residence.  Some programs partner with sober living residences and may even pay rent towards the sober living residences depending on attendance in their intensive out-patient treatment.  Most are in-person; some are virtual.     Out-patient treatment:  2-4 days/week, both individual and group.  May consist of 2-3 hours a day.  Can live at home or any other residence.  Some programs partner with sober living residences.    Chemical Health Resource/Services Providers:    Fast Tracker  Chemical Health treatment   fasttrackermn.org  [phone]    FindTreatment.gov  Chemical Health Treatment      [facility]  Mental Health and Addiction Services Line: [phone] [address], [place], [place], [address]  Virtual  and In Person options available by appointments     [facility]  [phone]  Mon-Friday: [address] [facility], [address]  Saturday:  [address] [facility], [address]  M-F assessments (7a-1:45p); Saturday walk-in assessments (7:45-10:45a) [email or web address]     [facility]  [phone]  [address] [place], [place], [address]  *available by appointments only / www.[facility].[employer]     [facility]  [phone] or [phone] [address], [place], [address]  *walk in assessments available M-F starting at 7 am.     [facility] / Phone: [phone] / Locations: [place], [place], [place], [place], [place] and [place] / www.BeatTheBushes / Phone: [phone]/ Locations: [place], [place] and [place] / *appointments only / www.Bundle Buy      [employer] / [phone] or [phone] / Virtual + Locations: [place], [place], [place], [place], [place], [place], [place], [place], [place], [place], [place], [place], [place], [place], [place], [place], [place], [place], [place], [place], [place], [place], [place], [facility], [place], [place], [place], [place], [place], [place] / www.[email or web address].[employer]     [facility]nings / [phone] or [phone]  Virtual + Locations: [place], [place], [place], [place], [facility], [place] [facility], [place], [place]  [email or web address]     [facility] / [phone] / [address], [place] [address], [facility] [address] / *appointments only     [facility] / [phone] / Locations: [place] / www.[facility].org     [facility] / [phone] / Locations: [place] / *appointments only / www.[facility].[employer]     [address] fr[address] [facility] / [phone] / [address],  [facility] [address] / www.Transparent [email or web address].org     [address] [facility] / [phone] / [address], [facility] [address] / www.[email or web address]     Sober Support Resources:    [place] Recovery Connection (MRC): [facility] connects people seeking recovery to resources that help foster and sustain long-term recovery. Whether you are seeking resources for treatment, transportation, housing, job training, education, health care or other pathways to recovery, [facility] is a great place to start. Phone: [phone]. www.minnesotaSemanticator (Great listing of all types of recovery and non-recovery related resources)     [facility] Recovery: https://www.Attend.[email or web address]/     Alcoholics Anonymous: 1-800-ALCOHOL  HTTP://WWW.AA.ORG/  AA [place] (602-992-6154 or http://aaSupportPay.org)  AA [place] ([phone] or www.aastpaul.org)    Substance Abuse and Mental Health Services Administration ([facility]) National Help line: This is a free, confidential, 24/7, 365-day-a-year treatment referral and information service (in English and Lao) for individuals and families facing mental and/or substance use disorders. Call 0-794-464-HELP    Adult Mental Health Resources:    Suicide and Crisis Lifeline  Free and confidential support for people in distress, 24/7. Call or text 748.    [facility] Crisis Phone Numbers  [facility] [phone]   [place] [phone]   [place] [phone]   [place] [phone]   [place] [phone]   [place] [phone]   [place] [phone]   [place] ([phone]  [facility] ([phone]    *All information subject to change without notice.

## 2024-06-01 NOTE — PLAN OF CARE
"Goal Outcome Evaluation: 0700-1900      Plan of Care Reviewed With: patient    Overall Patient Progress: no changeOverall Patient Progress: no change    Outcome Evaluation: Disoriented to situation ocassionally. VSS on RA. Up SBA GB and walker, bed alarm. albuteral PRN. Denies nausea and pain. Delay disharge today for dierhhea work up. LR bolus given. Immodium ordered. x3 BMs since 7am. Blood cultures pending, lactic 1.3. Continue to monitor. Needs UA.     BP (!) 142/91 (BP Location: Right arm)   Pulse 86   Temp 98.1  F (36.7  C) (Oral)   Resp 20   Ht 1.778 m (5' 10\")   Wt 72.6 kg (160 lb)   SpO2 98%   BMI 22.96 kg/m     "

## 2024-06-01 NOTE — PROGRESS NOTES
[facility]     Addiction Progress Note - Addiction Service        Date of Admission:  5/26/2024  Assessment & Plan       Luigi Vieyra is a 68 year old male with a history of alcohol use c/b R peroneal neuropathy, gastritis, tobacco use, asthma, stage III CKD, HLD, and HTN who presented to [facility] for neurosurgical evaluation of L sided weakness after being found down at home.  CHAT team evaluating the patient for history of alcohol use.      # Alcohol Use Disorder: severe  # Alcohol-related neuropathy  Lifetime history of alcohol use, currently 1-2 pint/day. Has previously maintained periods of sobriety in the past but rarely more than 1 month.  Somewhat passive in evaluation today, but would be interested in cessation, and may be interested in CD treatment options, as well as medications to help maintain sobriety.  Would be a good candidate for naltrexone (discussed briefly) but remains uninterested in starting therapy on multiple discussions. Denies any history of withdrawal, no seizures or DTs, last drink reportedly 4 days prior to presentation and neg EtOH in the ED.  -CIWA discontinued for EtOH withdrawal;  agree with gabapentin. Consider prescribing at discharge if this helps with underlying anxiety.  -Declined starting naltrexone in visit again today; he feels he isn't having cravings and but is contemplative about use for the future.  -Chem dep consulted but patient declined available options for IOP based on availability with insurance. He remained uninterested during visit today.  -Spoke (with permission from patient) to patient's sister Dianne today to update prior to discharge.     # Mental health:  Patient denies any mental health concerns, but very flat in conversation, quite isolated, moderate anhedonia, although difficult to parse out acute and chronic neurocognitive and emotional changes .  Per sister, he has been using alcohol to managed emotional  "challenges his whole life.  - Consider health psych if patient struggling with adjustment/hospitalization  - Consider behavioral health on discharge vs selective serotonin reuptake inhibitor for moderate depression.      # Peer Support:   -Our peer  will meet the patient if agreeable and still hospitalized on Thursday, to provide additional outpatient resources  -To contact Nabila Peer  from [facility]): call or text: [phone]     # Addiction Social Worker:   Our addiction social worker Abelardo Rey can be contacted if needed, on her pager [phone] or texted/called at [phone]  --Patient is not interested currently in OP addiction treatment after discharge.     # Linkage to Care:   Sees Gatito Ware MD in Family Medicine, could consider follow-up with PCP for consideration of starting naltrexone if Mr. Vieyra changes his mind.    -Will provide list of resources to patient and family at discharge. List emailed to his sister.    Time Spent on this Encounter   I spent 40 minutes on the unit/floor managing the care of Luigi Vieyra. Over 50% of my time was spent on the following:   - Counseling the patient and/or family regarding: risks and benefits of treatment options, recommended follow-up, and prevention of disease  - Coordination of care with the: care coordinator/ and primary team    Luigi Donaldson MD on 6/1/2024 at 9:33 AM   Addiction Service   [facility]     Contact information available via [facility] Paging/Directory under \"addiction medicine\"        ______________________________________________________________________    Interval History   Discussed interest in medications or treatment programs with Mr. Vieyra again today. He remains uninterested in the programs currently, but contemplative about them in the future. Permission given to speak with patient's sister, Dianne for an update prior to " discharge.    ROS:  CV, Pulm, GI and  assessed. Pertinent positives as above, otherwise negative.     Data reviewed today: I reviewed all medications, new labs and imaging results over the last 24 hours.     Physical Exam   Temp: 98.4  F (36.9  C) Temp src: Oral BP: (!) 144/85 Pulse: 82   Resp: 20   O2 Device: None (Room air)    General Appearance:  NAD  Respiratory: No signs of respiratory distress, in room air  Cardiovascular: pulses assumed  GI: Soft ND  Skin: WWP   [place]: Left sided weakness of the upper extremity    Due to regulation of Title 42 of the Code of Federal Regulations (CFR) Part 2: Confidentiality laws apply to this note and the information wherein.  Thus, this note cannot be copy and pasted into any other health care staff's note nor can it be included in general medical records sent to ANY outside agency without the patient's written consent.

## 2024-06-01 NOTE — PROGRESS NOTES
Physical Therapy Discharge Summary    Reason for therapy discharge:    Discharged to transitional care facility.    Progress towards therapy goal(s). See goals on Care Plan in [facility] electronic health record for goal details.  Goals not met.  Barriers to achieving goals:   discharge from facility.    Therapy recommendation(s):    Continued therapy is recommended.  Rationale/Recommendations:  Continued skilled PT eval & treat at [facility].       06/01/24 0900   Appointment Info   Signing Clinician's Name / Credentials (PT) melanie Thayer   PT Assistant Visit Number 1   Therapeutic Activity   Therapeutic Activities: dynamic activities to improve functional performance Minutes (55075) 24   Symptoms Noted During/After Treatment Fatigue   Treatment Detail/Skilled Intervention PT: pt cont to be limited by LUE weakness and pain. V.c for tech for all bed mob, and STS for safety. min A for STS with Cont V.c for up right posture. WW for all standing and amb, cont CGA to slight min A for amb up to 40'x 1. declined up to chair after sessioin, pt return to [facility] with min A. RN stating that pt now to D/c today to U. see D/c note.     Pt met 0/4 PT goals   Pt to cont to work with skilled PT at [facility]   No equipment needs at this time - will be determined at [facility]

## 2024-06-01 NOTE — PROGRESS NOTES
"Care Management Follow Up    Length of Stay (days): 5    Expected Discharge Date: 06/01/2024     Concerns to be Addressed: discharge planning     Patient plan of care discussed at interdisciplinary rounds: Yes    Anticipated Discharge Disposition: Transitional Care     Anticipated Discharge Services: None  Anticipated Discharge DME: None    Patient/family educated on Medicare website which has current facility and service quality ratings: no  Education Provided on the Discharge Plan: Yes  Patient/Family in Agreement with the Plan: yes    Referrals Placed by CM/SW: External Care Coordination, Senior Linkage Line, Post Acute Facilities, Communication hand-offs to next level of Care Providers    [facility]  [address] [address]  Liaison: Erendira Lira  P: [phone]  F: [phone]  [email or web address]    Private pay costs discussed: Not applicable    Additional Information:  SW received a call from the patient's sister Dianne. She expressed frustration with the teams overall lack of communication and is upset about the online reviews she has read about the facility, she stated \"The facility is a shit hole\". SW explained that [facility] is the first accepting facility and per hospital policy the team has to move forward with the discharge plan. SW explained that the patient;'s history of ETOH use is a barrier. ROMMEL explained that if the patient and Dianne are unhappy with the facility they can continue to work with the facility SW to try and work toward finding alternative placement. Dianne stated \"I have tried to call you guys for 5 days and I have heard nothing. My name is on the chart!\" ROMMEL apologized and stated this is their first day working with the patient and cannot speak to the previous 5 days, ROMMEL offered Dianne the number for patient relations, she did not say whether or not she wanted the number. She asked \"How can I get a hold of  " "Mendoza?\" SW stated they will have him reach out to her directly. Dianne stated \"Oh wow look at you being such a great  with the way you talk.\" Dianne proceeded to hang up on this SW.    ROMMEL reported the above information to the medicine provider and requested they have orders in by 1000.    ROMMEL reported the above information to Dr. Donaldson and requested they reach out to Dianne prior to the patient discharging.    ROMMEL met with the patient at bedside and went over the discharge plan. ROMMEL presented the IMM to the patient. SW explained the IMM and that he has the right to appeal his discharge. Patient stated he is agreeable to the discharge plan and declined wanting to appeal at this time. SW provided the patient with a copy of the IMM. IMM was faxed to [facility] and placed in patient's chart.    The patient will discharge to [facility] with wheelchair transportation arranged through [employer] with a pickup window of [phone]    A list of CD resources and where to complete comprehensive assessment was put into the patient's discharge instructions.     @1040 Provider updated ROMMEL and reported the patient will not discharge today. Provider reported the patient will stay for at least 1 more night. Provider stated he will update the patient's sister Dianne.    SW called [facility] and cancelled transportation.     SW called and updated the facility.    ROMMEL will continue to follow for discharge needs and safe discharge planning.     Kenny GENAO  6/1/2024       Social Work and Care Management Department     SEARCHABLE in [employer] - search SOCIAL WORK     [place] ([phone]) Saturday and Sunday     Units: 4A Vocera, 4C Vocera, & 4E Vocera        Units: 5A 3620-7812 Vocera, 5A 5374-4139 Vocera , BMT SW 1 BMT SW 2, BMT SW 3 & BMT SW 4  5C Off Service 5941 - 5431  5C Off Service [phone]     Units: 6A Vocera & 6B Vocera      Units: 6C Vocera     Units: 7A Vocera & 7B Vocera  "     Units: 7C Med Surg 7401 thru 7418 and 7C Med Surg 7502 thru 7521      Unit: [place] [facility] Vocera & [place] Voc[place]     [facility] [place][phone]) Saturday and Sunday      Units: 5 Ortho Vocera, 5 Med Surg Vocera & WB ED Vocera     Units: 6 Med Surg Vocera, 8 Med Surg Vocera, & 10 ICU Vocera      After [place] [facility] Hours [place] [place]     Saturday & Sunday (1630 - 0000)    Mon-Fri (4559-3377)     [facility] Recognized Holidays  ([phone])    Units: ALL   - see above VOCERA links to units and after hours

## 2024-06-02 LAB
ALBUMIN SERPL BCG-MCNC: 2.9 G/DL (ref 3.5–5.2)
ALP SERPL-CCNC: 150 U/L (ref 40–150)
ALT SERPL W P-5'-P-CCNC: 11 U/L (ref 0–70)
ANION GAP SERPL CALCULATED.3IONS-SCNC: 8 MMOL/L (ref 7–15)
AST SERPL W P-5'-P-CCNC: 19 U/L (ref 0–45)
BACTERIA BLD CULT: NO GROWTH
BACTERIA BLD CULT: NO GROWTH
BASOPHILS # BLD AUTO: 0.2 10E3/UL (ref 0–0.2)
BASOPHILS NFR BLD AUTO: 2 %
BILIRUB SERPL-MCNC: <0.2 MG/DL
BUN SERPL-MCNC: 9.8 MG/DL (ref 8–23)
CALCIUM SERPL-MCNC: 8.7 MG/DL (ref 8.8–10.2)
CHLORIDE SERPL-SCNC: 109 MMOL/L (ref 98–107)
CREAT SERPL-MCNC: 1.51 MG/DL (ref 0.67–1.17)
DEPRECATED HCO3 PLAS-SCNC: 19 MMOL/L (ref 22–29)
EGFRCR SERPLBLD CKD-EPI 2021: 50 ML/MIN/1.73M2
EOSINOPHIL # BLD AUTO: 0.6 10E3/UL (ref 0–0.7)
EOSINOPHIL NFR BLD AUTO: 5 %
ERYTHROCYTE [DISTWIDTH] IN BLOOD BY AUTOMATED COUNT: 16.4 % (ref 10–15)
GLUCOSE SERPL-MCNC: 99 MG/DL (ref 70–99)
HCT VFR BLD AUTO: 25.9 % (ref 40–53)
HGB BLD-MCNC: 8.1 G/DL (ref 13.3–17.7)
IMM GRANULOCYTES # BLD: 0.2 10E3/UL
IMM GRANULOCYTES NFR BLD: 2 %
LYMPHOCYTES # BLD AUTO: 3.3 10E3/UL (ref 0.8–5.3)
LYMPHOCYTES NFR BLD AUTO: 29 %
MAGNESIUM SERPL-MCNC: 1.6 MG/DL (ref 1.7–2.3)
MCH RBC QN AUTO: 31.3 PG (ref 26.5–33)
MCHC RBC AUTO-ENTMCNC: 31.3 G/DL (ref 31.5–36.5)
MCV RBC AUTO: 100 FL (ref 78–100)
MONOCYTES # BLD AUTO: 1.9 10E3/UL (ref 0–1.3)
MONOCYTES NFR BLD AUTO: 17 %
NEUTROPHILS # BLD AUTO: 5.2 10E3/UL (ref 1.6–8.3)
NEUTROPHILS NFR BLD AUTO: 45 %
NRBC # BLD AUTO: 0 10E3/UL
NRBC BLD AUTO-RTO: 0 /100
PHOSPHATE SERPL-MCNC: 3.7 MG/DL (ref 2.5–4.5)
PLATELET # BLD AUTO: 384 10E3/UL (ref 150–450)
POTASSIUM SERPL-SCNC: 4.6 MMOL/L (ref 3.4–5.3)
PROT SERPL-MCNC: 5.6 G/DL (ref 6.4–8.3)
RBC # BLD AUTO: 2.59 10E6/UL (ref 4.4–5.9)
SODIUM SERPL-SCNC: 136 MMOL/L (ref 135–145)
WBC # BLD AUTO: 11.4 10E3/UL (ref 4–11)

## 2024-06-02 PROCEDURE — 250N000013 HC RX MED GY IP 250 OP 250 PS 637: Performed by: INTERNAL MEDICINE

## 2024-06-02 PROCEDURE — 99207 PR APP CREDIT; MD BILLING SHARED VISIT: CPT | Performed by: STUDENT IN AN ORGANIZED HEALTH CARE EDUCATION/TRAINING PROGRAM

## 2024-06-02 PROCEDURE — 36415 COLL VENOUS BLD VENIPUNCTURE: CPT | Performed by: INTERNAL MEDICINE

## 2024-06-02 PROCEDURE — 250N000013 HC RX MED GY IP 250 OP 250 PS 637: Performed by: STUDENT IN AN ORGANIZED HEALTH CARE EDUCATION/TRAINING PROGRAM

## 2024-06-02 PROCEDURE — 80053 COMPREHEN METABOLIC PANEL: CPT | Performed by: INTERNAL MEDICINE

## 2024-06-02 PROCEDURE — 250N000011 HC RX IP 250 OP 636: Performed by: INTERNAL MEDICINE

## 2024-06-02 PROCEDURE — 83735 ASSAY OF MAGNESIUM: CPT | Performed by: INTERNAL MEDICINE

## 2024-06-02 PROCEDURE — 99232 SBSQ HOSP IP/OBS MODERATE 35: CPT | Performed by: INTERNAL MEDICINE

## 2024-06-02 PROCEDURE — 120N000002 HC R&B MED SURG/OB UMMC

## 2024-06-02 PROCEDURE — 85025 COMPLETE CBC W/AUTO DIFF WBC: CPT | Performed by: INTERNAL MEDICINE

## 2024-06-02 PROCEDURE — 250N000013 HC RX MED GY IP 250 OP 250 PS 637: Performed by: NURSE PRACTITIONER

## 2024-06-02 PROCEDURE — 84100 ASSAY OF PHOSPHORUS: CPT | Performed by: INTERNAL MEDICINE

## 2024-06-02 RX ORDER — NALTREXONE HYDROCHLORIDE 50 MG/1
50 TABLET, FILM COATED ORAL DAILY
Status: DISCONTINUED | OUTPATIENT
Start: 2024-06-02 | End: 2024-06-03 | Stop reason: HOSPADM

## 2024-06-02 RX ADMIN — ALBUTEROL SULFATE 2 PUFF: 90 AEROSOL, METERED RESPIRATORY (INHALATION) at 19:02

## 2024-06-02 RX ADMIN — HEPARIN SODIUM 5000 UNITS: 5000 INJECTION, SOLUTION INTRAVENOUS; SUBCUTANEOUS at 21:03

## 2024-06-02 RX ADMIN — THIAMINE HYDROCHLORIDE 250 MG: 100 INJECTION, SOLUTION INTRAMUSCULAR; INTRAVENOUS at 09:54

## 2024-06-02 RX ADMIN — LEVETIRACETAM 500 MG: 500 TABLET, FILM COATED ORAL at 09:28

## 2024-06-02 RX ADMIN — GABAPENTIN 100 MG: 100 CAPSULE ORAL at 09:27

## 2024-06-02 RX ADMIN — HEPARIN SODIUM 5000 UNITS: 5000 INJECTION, SOLUTION INTRAVENOUS; SUBCUTANEOUS at 14:44

## 2024-06-02 RX ADMIN — LOPERAMIDE HYDROCHLORIDE 4 MG: 2 CAPSULE ORAL at 09:30

## 2024-06-02 RX ADMIN — SODIUM BICARBONATE 650 MG TABLET 650 MG: at 09:28

## 2024-06-02 RX ADMIN — METOPROLOL SUCCINATE 25 MG: 25 TABLET, EXTENDED RELEASE ORAL at 09:29

## 2024-06-02 RX ADMIN — FOLIC ACID 1 MG: 1 TABLET ORAL at 09:27

## 2024-06-02 RX ADMIN — ACETAMINOPHEN 975 MG: 325 TABLET, FILM COATED ORAL at 06:38

## 2024-06-02 RX ADMIN — Medication 1 TABLET: at 09:28

## 2024-06-02 RX ADMIN — GABAPENTIN 100 MG: 100 CAPSULE ORAL at 21:01

## 2024-06-02 RX ADMIN — GABAPENTIN 100 MG: 100 CAPSULE ORAL at 14:42

## 2024-06-02 RX ADMIN — Medication 3 MG: at 21:00

## 2024-06-02 RX ADMIN — HEPARIN SODIUM 5000 UNITS: 5000 INJECTION, SOLUTION INTRAVENOUS; SUBCUTANEOUS at 06:41

## 2024-06-02 RX ADMIN — ACETAMINOPHEN 975 MG: 325 TABLET, FILM COATED ORAL at 21:01

## 2024-06-02 RX ADMIN — LOSARTAN POTASSIUM 25 MG: 25 TABLET, FILM COATED ORAL at 09:29

## 2024-06-02 RX ADMIN — NALTREXONE HYDROCHLORIDE 50 MG: 50 TABLET, FILM COATED ORAL at 14:42

## 2024-06-02 RX ADMIN — METHOCARBAMOL 500 MG: 500 TABLET ORAL at 21:01

## 2024-06-02 RX ADMIN — Medication 25 MG: at 21:00

## 2024-06-02 RX ADMIN — ACETAMINOPHEN 975 MG: 325 TABLET, FILM COATED ORAL at 14:42

## 2024-06-02 RX ADMIN — LEVETIRACETAM 500 MG: 500 TABLET, FILM COATED ORAL at 21:00

## 2024-06-02 ASSESSMENT — ACTIVITIES OF DAILY LIVING (ADL)
ADLS_ACUITY_SCORE: 35
ADLS_ACUITY_SCORE: 33
ADLS_ACUITY_SCORE: 33
ADLS_ACUITY_SCORE: 35
ADLS_ACUITY_SCORE: 37
ADLS_ACUITY_SCORE: 35
ADLS_ACUITY_SCORE: 35
ADLS_ACUITY_SCORE: 33
ADLS_ACUITY_SCORE: 33
ADLS_ACUITY_SCORE: 35
ADLS_ACUITY_SCORE: 35
ADLS_ACUITY_SCORE: 33
ADLS_ACUITY_SCORE: 35
ADLS_ACUITY_SCORE: 37
ADLS_ACUITY_SCORE: 37

## 2024-06-02 NOTE — PROGRESS NOTES
[facility]    Medicine Progress Note - Hospitalist Service, GOLD TEAM 9    Date of Admission:  5/26/2024    MEDICINE IS PRIMARY SINCE 5/28    Assessment & Plan   68 year old male admitted on 5/26/2024. He has a PMHx of alcohol use c/b R peroneal neuropathy, gastritis, tobacco use, asthma, stage III CKD, HLD, and HTN who presents to the ED as a transfer from [facility] for neurosurgery evaluation.     # Controlled hypertension, not present on admission  - Continue Metoprolol and hold if SBP <120  - Continue losartan 25 mg daily    # Sepsis and severe sepsis secondary to likely bacterial pneumonia possibly secondary to aspiration, POA  Sepsis criteria include heart rate of 92 and white blood cell count of 14.7.  Patient qualifies for diagnosis of severe sepsis given lactic acid of 2.3.  Inflammatory markers with CRP and procalcitonin are both elevated.  He has high pretest probability given his alcohol use disorder and being found down.  Chest x-ray is abnormal.  The patient completed 3-day course of azithromycin.The differential diagnosis would also include C. difficile antigen enteric infection given 7 episodes of diarrhea with generalized abdominal discomfort within the last 24 hours.Lactic acidosis resolved with intravenous antibiotics and intravenous fluids.C. difficile and enteric panel are negative.  The patient completed a course of antibiotics.  Stopped antibiotics.    # Acute kidney injury, akin stage I on top of CKD stage IIIb complicated by anion gap metabolic acidosis, POA, resolved  This is likely prerenal in etiology.  Ordered fractional secretion of sodium and obtain renal ultrasound without evidence of obstructive physiology.  Kidney function is at baseline.  -Continue sodium bicarb daily that is needed from chronic kidney disease perspective  -Strict intake and output and daily body weight  -Basic metabolic panel in a.m.    # Antibiotic associated  "diarrhea, ruled out for enteric infections, resolved after stopping antibiotics  2 more days left of antibiotic therapy.  -Continue Imodium on as-needed basis    # Alcohol use disorder with dependence and withdrawal Complicated by multiple falls complicated by multiple falls, POA  Patient reports that he drinks 1 gallon of vodka per week. He has been drinking since age 19 and has never \"stopped long enough to have the shakes\" The patient is interested in chemical dependency resources and is willing to discuss further with addiction medicine.  -Continue gabapentin at 100 mg 3 times daily given his kidney function  -Will complete the high thiamine protocol that was started in the emergency department  -The patient agreed to start naltrexone 50 mg for 7 days that can be increased 100 mg daily, appreciative to addiction medicine  -Continue folic acid, multivitamins, and thiamine  -Appreciative to the input of addiction medicine    # 4 mm subdural hemorrhage along the right tentorial leaflet, without mass effect, brain compression considered and ruled out POA  MRI Brain obtained without evidence of acute ischemic infarct.   -Serial neurological evaluation  -Continue Keppra 500 mg twice daily for 7 days based on recommendations of neurosurgery  -PT/OT  -The patient was cleared for heparin subcutaneous for DVT prevention by neurosurgery and trauma team    # Acute superior endplate compression deformities involving C7, T2, and L1, POA   The patient was cleared to be of cervical collar per neurosurgery.  The patient was seen in collaboration with trauma service  -Continue scheduled acetaminophen and robaxin 500 mg at bedtime together with robaxin 250 mg TID PRN and lidocaine patches.   -Continue gabapentin  - I agree with stopping narcotics that was ordered by trauma team    # Left sided andreia-paresis , POA, improving  An MRI of the left brachial plexus was performed which did not reveal abnormality of the plexus; " incidentally there was mixed edema of the left acromioclavicular joint and rotator cuff musculature.  MRI showing evidence of redemonstration of mild compression fracture of the superior endplate of C7 and T2 with cervical spondylosis.  MRI with contrast performed under general sedation that was uneventful without any acute pathology.  -Continue working with physical therapy with plans for discharge to TCU  - May be considered for EMG later on as outpatient.  -Appreciative to follow the recommendations of neurology and neurosurgery    # Resolved medical problems  # Hypophosphatemia and hypomagnesemia, POA  -Ordered replacement protocol    # Demand ischemia and elevated NT proBNP, POA echocardiogram with normal ejection fraction    # Elevated CPK, POA  -CPK trended down    # Chronic medical problems:     #Asthma  - Continue PRN albuterol inhaler    # Normocytic anemia with wide RDW, POA  -Added on reticulocyte count  -Will monitor closely for bleeding and obtain daily CBC    # Depression complicated by insomnia, POA  -The patient declined escitalopram  -Continue trazodone 25 mg nightly on top of melatonin    Billing  I spent 40 minutes at the bedside discussing about options for treatment of addition to alcohol and discharge planning including options for transition of care unit versus sober house and having further discussions with addiction medicine consultant and case management/social work and the patient and his sister.          Diet: Regular Diet Adult    DVT Prophylaxis: Heparin SQ  Frederick Catheter: Not present  Lines: None     Cardiac Monitoring: None  Code Status: Full Code      Clinically Significant Risk Factors            # Hypomagnesemia: Lowest Mg = 1.6 mg/dL in last 2 days, will replace as needed   # Hypoalbuminemia: Lowest albumin = 2.9 g/dL at 6/2/2024  5:40 AM, will monitor as appropriate     # Hypertension: Noted on problem list              # Financial/Environmental Concerns: none  # Asthma: noted  on problem list        Disposition Plan     Medically Ready for Discharge: Anticipated Tomorrow    The patient is medically ready for discharge, awaiting bed in TCU         Jamel Frost MD  Hospitalist Service, GOLD TEAM 9  [facility]  Securely message with [employer] (more info)  Text page via [facility] Paging/Directory   See signed in provider for up to date coverage information  ______________________________________________________________________    Interval History   The patient reported significant improvement of diarrhea.  No abdominal pain or nausea.  Physical Exam   Vital Signs: Temp: 98.4  F (36.9  C) Temp src: Oral BP: (!) 141/95 Pulse: 86   Resp: 16 SpO2: 98 % O2 Device: None (Room air)    Weight: 160 lbs 14.97 oz    Constitutional: Awake, alert, cooperative, no apparent distress.  Eyes: Conjunctiva and pupils examined and normal.  HEENT: Moist mucous membranes, normal dentition.  Respiratory: Clear to auscultation bilaterally, no crackles or wheezing.  Cardiovascular: Regular rate and rhythm, normal S1 and S2, and no murmur noted.  GI: Soft, non-distended, non-tender, normal bowel sounds.  Lymph/Hematologic: No anterior cervical or supraclavicular adenopathy.  Skin: No rashes, no cyanosis, no edema.  Musculoskeletal: No joint swelling, erythema or tenderness.  Neurologic: left sided 2/5 power  Psychiatric: Alert, oriented to person, place and time, no obvious anxiety or depression.     Medical Decision Making       45 MINUTES SPENT BY ME on the date of service doing chart review, history, exam, documentation & further activities per the note.      Data     I have personally reviewed the following data over the past 24 hrs:    11.4 (H)  \   8.1 (L)   / 384     136 109 (H) 9.8 /  99   4.6 19 (L) 1.51 (H) \     ALT: 11 AST: 19 AP: 150 TBILI: <0.2   ALB: 2.9 (L) TOT PROTEIN: 5.6 (L) LIPASE: N/A       Imaging results reviewed over the past 24 hrs:   No results  found for this or any previous visit (from the past 24 hour(s)).

## 2024-06-02 NOTE — PLAN OF CARE
"Goal Outcome Evaluation: 0700-1900      Plan of Care Reviewed With: patient    Overall Patient Progress: improvingOverall Patient Progress: improving    Outcome Evaluation: Disoriented to situation occassionally, forgetful. VSS on RA. Denies pain. L sided weakness. A1 GB W , bed alarm in use. 1 loose incont. BM today. Utilizing urinal. Started alcohol dependence new medication today. Sister at bedside. Pending TCU placement.  Continue to monitor.     BP (!) 130/94 (BP Location: Right arm)   Pulse 85   Temp 98.4  F (36.9  C) (Oral)   Resp 18   Ht 1.778 m (5' 10\")   Wt 73 kg (160 lb 15 oz)   SpO2 95%   BMI 23.09 kg/m      "

## 2024-06-02 NOTE — PROGRESS NOTES
[facility]     Addiction Progress Note - Addiction Service        Date of Admission:  5/26/2024  Assessment & Plan       Luigi Vieyra is a 68 year old male with a history of alcohol use c/b R peroneal neuropathy, gastritis, tobacco use, asthma, stage III CKD, HLD, and HTN who presented to [facility] for neurosurgical evaluation of L sided weakness after being found down at home.  CHAT team evaluating the patient for history of alcohol use.     Interval Updates:  -Sanjiv's sister Dianne arrived today; discussion with patient at bedside who is now willing to trial naltrexone. We additionally discussed options around treatment programs. He continues to consider but not interested in enrolling in program or pursuing options to enroll in a program at this time.     # Alcohol Use Disorder: severe  # Alcohol-related neuropathy  Lifetime history of alcohol use, currently 1-2 pint/day. Has previously maintained periods of sobriety in the past but rarely more than 1 month.  Somewhat passive in evaluation today, but would be interested in cessation, and may be interested in CD treatment options, as well as medications to help maintain sobriety.  Would be a good candidate for naltrexone (discussed briefly) but remains uninterested in starting therapy on multiple discussions. Denies any history of withdrawal, no seizures or DTs, last drink reportedly 4 days prior to presentation and neg EtOH in the ED.  -CIWA discontinued for EtOH withdrawal;  agree with gabapentin. Consider prescribing at discharge if this helps with underlying anxiety.  -Agreed to start naltrexone in visit today with his sister; will start 50mg daily naltrexone which can be increased after 7 days to 100mg daily.  -Chem dep consulted but patient declined available options for IOP based on availability with insurance. He remained uninterested during visit today.     # Mental health:  Patient denies any mental health  "concerns, but very flat in conversation, quite isolated, moderate anhedonia, although difficult to parse out acute and chronic neurocognitive and emotional changes .  Per sister, he has been using alcohol to managed emotional challenges his whole life.  - Consider health psych if patient struggling with adjustment/hospitalization  - Consider behavioral health on discharge vs selective serotonin reuptake inhibitor for moderate depression.      # Peer Support:   -Our peer  will meet the patient if agreeable and still hospitalized on Thursday, to provide additional outpatient resources  -To contact Nabila Peer  from [facility]): call or text: [phone]     # Addiction Social Worker:   Our addiction social worker Abelardo Rey can be contacted if needed, on her pager [phone] or texted/called at [phone]  --Patient is not interested currently in OP addiction treatment after discharge.     # Linkage to Care:   Sees Gatito Ware MD in Family Medicine, could consider follow-up with PCP for consideration of starting naltrexone if Mr. Vieyra changes his mind.        The patient's care was discussed with the Care Coordinator/, Patient, Patient's Family, and Primary team.    Time Spent on this Encounter   I spent 40 minutes on the unit/floor managing the care of Luigi Vieyra. Over 50% of my time was spent on the following:   - Counseling the patient and/or family regarding: risks and benefits of treatment options and recommended follow-up  - Coordination of care with the: care coordinator/      Luigi Donaldson MD on 6/2/2024 at 1:45 PM   Addiction Service   [facility]     Contact information available via [facility] Paging/Directory under \"addiction medicine\"        ______________________________________________________________________    Interval History   Discussion today at bedside with Mr. Vieyra and his " sister Dianne. Sanjiv is willing to trial naltrexone. We additionally discussed options around treatment programs. He continues to consider but not interested in enrolling in program or pursuing options to enroll in a program at this time.    ROS:  CV, Pulm, GI and  assessed. Pertinent positives as above, otherwise negative.     Data reviewed today: I reviewed all medications, new labs and imaging results over the last 24 hours.     Physical Exam   Temp: 98.6  F (37  C) Temp src: Oral BP: 128/81 Pulse: 83   Resp: 16 SpO2: 99 % O2 Device: None (Room air)    General Appearance:  NAD  Respiratory: No signs of respiratory distress, in room air  Cardiovascular: pulses assumed  GI: Soft ND  Skin: WWP     Due to regulation of Title 42 of the Code of Federal Regulations (CFR) Part 2: Confidentiality laws apply to this note and the information wherein.  Thus, this note cannot be copy and pasted into any other health care staff's note nor can it be included in general medical records sent to ANY outside agency without the patient's written consent.

## 2024-06-03 VITALS
OXYGEN SATURATION: 96 % | RESPIRATION RATE: 18 BRPM | HEIGHT: 70 IN | BODY MASS INDEX: 23.04 KG/M2 | TEMPERATURE: 98.3 F | SYSTOLIC BLOOD PRESSURE: 112 MMHG | DIASTOLIC BLOOD PRESSURE: 65 MMHG | HEART RATE: 87 BPM | WEIGHT: 160.94 LBS

## 2024-06-03 LAB
ALBUMIN SERPL BCG-MCNC: 2.9 G/DL (ref 3.5–5.2)
ALP SERPL-CCNC: 184 U/L (ref 40–150)
ALT SERPL W P-5'-P-CCNC: 11 U/L (ref 0–70)
ANION GAP SERPL CALCULATED.3IONS-SCNC: 10 MMOL/L (ref 7–15)
AST SERPL W P-5'-P-CCNC: 23 U/L (ref 0–45)
BASOPHILS # BLD AUTO: ABNORMAL 10*3/UL
BASOPHILS # BLD MANUAL: 0.2 10E3/UL (ref 0–0.2)
BASOPHILS NFR BLD AUTO: ABNORMAL %
BASOPHILS NFR BLD MANUAL: 2 %
BILIRUB SERPL-MCNC: <0.2 MG/DL
BUN SERPL-MCNC: 12.1 MG/DL (ref 8–23)
BURR CELLS BLD QL SMEAR: ABNORMAL
CALCIUM SERPL-MCNC: 8.9 MG/DL (ref 8.8–10.2)
CHLORIDE SERPL-SCNC: 111 MMOL/L (ref 98–107)
CREAT SERPL-MCNC: 1.46 MG/DL (ref 0.67–1.17)
DEPRECATED HCO3 PLAS-SCNC: 18 MMOL/L (ref 22–29)
EGFRCR SERPLBLD CKD-EPI 2021: 52 ML/MIN/1.73M2
EOSINOPHIL # BLD AUTO: ABNORMAL 10*3/UL
EOSINOPHIL # BLD MANUAL: 1 10E3/UL (ref 0–0.7)
EOSINOPHIL NFR BLD AUTO: ABNORMAL %
EOSINOPHIL NFR BLD MANUAL: 9 %
ERYTHROCYTE [DISTWIDTH] IN BLOOD BY AUTOMATED COUNT: 16.4 % (ref 10–15)
GLUCOSE SERPL-MCNC: 130 MG/DL (ref 70–99)
HCT VFR BLD AUTO: 26.8 % (ref 40–53)
HGB BLD-MCNC: 8.7 G/DL (ref 13.3–17.7)
IMM GRANULOCYTES # BLD: ABNORMAL 10*3/UL
IMM GRANULOCYTES NFR BLD: ABNORMAL %
LYMPHOCYTES # BLD AUTO: ABNORMAL 10*3/UL
LYMPHOCYTES # BLD MANUAL: 2.4 10E3/UL (ref 0.8–5.3)
LYMPHOCYTES NFR BLD AUTO: ABNORMAL %
LYMPHOCYTES NFR BLD MANUAL: 23 %
MAGNESIUM SERPL-MCNC: 1.6 MG/DL (ref 1.7–2.3)
MCH RBC QN AUTO: 32.8 PG (ref 26.5–33)
MCHC RBC AUTO-ENTMCNC: 32.5 G/DL (ref 31.5–36.5)
MCV RBC AUTO: 101 FL (ref 78–100)
MONOCYTES # BLD AUTO: ABNORMAL 10*3/UL
MONOCYTES # BLD MANUAL: 0.3 10E3/UL (ref 0–1.3)
MONOCYTES NFR BLD AUTO: ABNORMAL %
MONOCYTES NFR BLD MANUAL: 3 %
MYELOCYTES # BLD MANUAL: 0.1 10E3/UL
MYELOCYTES NFR BLD MANUAL: 1 %
NEUTROPHILS # BLD AUTO: ABNORMAL 10*3/UL
NEUTROPHILS # BLD MANUAL: 6.6 10E3/UL (ref 1.6–8.3)
NEUTROPHILS NFR BLD AUTO: ABNORMAL %
NEUTROPHILS NFR BLD MANUAL: 62 %
NRBC # BLD AUTO: 0 10E3/UL
NRBC BLD AUTO-RTO: 0 /100
PHOSPHATE SERPL-MCNC: 3.8 MG/DL (ref 2.5–4.5)
PLAT MORPH BLD: ABNORMAL
PLATELET # BLD AUTO: 430 10E3/UL (ref 150–450)
POTASSIUM SERPL-SCNC: 4.1 MMOL/L (ref 3.4–5.3)
PROT SERPL-MCNC: 5.8 G/DL (ref 6.4–8.3)
RBC # BLD AUTO: 2.65 10E6/UL (ref 4.4–5.9)
RBC MORPH BLD: ABNORMAL
SODIUM SERPL-SCNC: 139 MMOL/L (ref 135–145)
TARGETS BLD QL SMEAR: SLIGHT
WBC # BLD AUTO: 10.6 10E3/UL (ref 4–11)

## 2024-06-03 PROCEDURE — 85007 BL SMEAR W/DIFF WBC COUNT: CPT | Performed by: INTERNAL MEDICINE

## 2024-06-03 PROCEDURE — 84100 ASSAY OF PHOSPHORUS: CPT | Performed by: INTERNAL MEDICINE

## 2024-06-03 PROCEDURE — 250N000013 HC RX MED GY IP 250 OP 250 PS 637: Performed by: NURSE PRACTITIONER

## 2024-06-03 PROCEDURE — 250N000013 HC RX MED GY IP 250 OP 250 PS 637: Performed by: INTERNAL MEDICINE

## 2024-06-03 PROCEDURE — 99239 HOSP IP/OBS DSCHRG MGMT >30: CPT | Performed by: INTERNAL MEDICINE

## 2024-06-03 PROCEDURE — 99207 PR NO BILLABLE SERVICE THIS VISIT: CPT | Performed by: PSYCHIATRY & NEUROLOGY

## 2024-06-03 PROCEDURE — 99207 PR APP CREDIT; MD BILLING SHARED VISIT: CPT | Performed by: STUDENT IN AN ORGANIZED HEALTH CARE EDUCATION/TRAINING PROGRAM

## 2024-06-03 PROCEDURE — 36415 COLL VENOUS BLD VENIPUNCTURE: CPT | Performed by: INTERNAL MEDICINE

## 2024-06-03 PROCEDURE — 80053 COMPREHEN METABOLIC PANEL: CPT | Performed by: INTERNAL MEDICINE

## 2024-06-03 PROCEDURE — 83735 ASSAY OF MAGNESIUM: CPT | Performed by: INTERNAL MEDICINE

## 2024-06-03 PROCEDURE — 250N000013 HC RX MED GY IP 250 OP 250 PS 637: Performed by: STUDENT IN AN ORGANIZED HEALTH CARE EDUCATION/TRAINING PROGRAM

## 2024-06-03 PROCEDURE — 250N000011 HC RX IP 250 OP 636: Performed by: INTERNAL MEDICINE

## 2024-06-03 PROCEDURE — 85027 COMPLETE CBC AUTOMATED: CPT | Performed by: INTERNAL MEDICINE

## 2024-06-03 RX ORDER — FUROSEMIDE 40 MG
40 TABLET ORAL DAILY
DISCHARGE
Start: 2024-06-03 | End: 2024-06-05

## 2024-06-03 RX ORDER — GABAPENTIN 100 MG/1
200 CAPSULE ORAL 3 TIMES DAILY
Status: DISCONTINUED | OUTPATIENT
Start: 2024-06-03 | End: 2024-06-03 | Stop reason: HOSPADM

## 2024-06-03 RX ORDER — METHOCARBAMOL 500 MG/1
250 TABLET, FILM COATED ORAL 3 TIMES DAILY PRN
DISCHARGE
Start: 2024-06-03

## 2024-06-03 RX ORDER — NALTREXONE HYDROCHLORIDE 50 MG/1
100 TABLET, FILM COATED ORAL DAILY
Status: DISCONTINUED | OUTPATIENT
Start: 2024-06-09 | End: 2024-06-03 | Stop reason: HOSPADM

## 2024-06-03 RX ORDER — LOPERAMIDE HCL 2 MG
2 CAPSULE ORAL 4 TIMES DAILY PRN
DISCHARGE
Start: 2024-06-03

## 2024-06-03 RX ORDER — GABAPENTIN 100 MG/1
200 CAPSULE ORAL 3 TIMES DAILY
DISCHARGE
Start: 2024-06-03

## 2024-06-03 RX ORDER — MIRTAZAPINE 15 MG/1
15 TABLET, FILM COATED ORAL AT BEDTIME
DISCHARGE
Start: 2024-06-03

## 2024-06-03 RX ORDER — LANOLIN ALCOHOL/MO/W.PET/CERES
100 CREAM (GRAM) TOPICAL DAILY
DISCHARGE
Start: 2024-06-04

## 2024-06-03 RX ORDER — MIRTAZAPINE 15 MG/1
15 TABLET, FILM COATED ORAL AT BEDTIME
Status: DISCONTINUED | OUTPATIENT
Start: 2024-06-03 | End: 2024-06-03 | Stop reason: HOSPADM

## 2024-06-03 RX ORDER — FOLIC ACID 1 MG/1
1 TABLET ORAL DAILY
DISCHARGE
Start: 2024-06-04

## 2024-06-03 RX ORDER — NALTREXONE HYDROCHLORIDE 50 MG/1
50 TABLET, FILM COATED ORAL DAILY
DISCHARGE
Start: 2024-06-04

## 2024-06-03 RX ORDER — LIDOCAINE 4 G/G
2 PATCH TOPICAL EVERY 24 HOURS
DISCHARGE
Start: 2024-06-03

## 2024-06-03 RX ORDER — SODIUM BICARBONATE 650 MG/1
650 TABLET ORAL DAILY
DISCHARGE
Start: 2024-06-04

## 2024-06-03 RX ORDER — MULTIPLE VITAMINS W/ MINERALS TAB 9MG-400MCG
1 TAB ORAL DAILY
DISCHARGE
Start: 2024-06-04

## 2024-06-03 RX ORDER — LOPERAMIDE HCL 2 MG
4 CAPSULE ORAL DAILY
DISCHARGE
Start: 2024-06-04

## 2024-06-03 RX ORDER — TRAZODONE HYDROCHLORIDE 50 MG/1
25 TABLET, FILM COATED ORAL AT BEDTIME
DISCHARGE
Start: 2024-06-03

## 2024-06-03 RX ORDER — FUROSEMIDE 40 MG
40 TABLET ORAL DAILY
Status: DISCONTINUED | OUTPATIENT
Start: 2024-06-03 | End: 2024-06-03 | Stop reason: HOSPADM

## 2024-06-03 RX ORDER — LANOLIN ALCOHOL/MO/W.PET/CERES
3 CREAM (GRAM) TOPICAL AT BEDTIME
DISCHARGE
Start: 2024-06-03

## 2024-06-03 RX ADMIN — METOPROLOL SUCCINATE 25 MG: 25 TABLET, EXTENDED RELEASE ORAL at 09:21

## 2024-06-03 RX ADMIN — GABAPENTIN 200 MG: 100 CAPSULE ORAL at 13:28

## 2024-06-03 RX ADMIN — FOLIC ACID 1 MG: 1 TABLET ORAL at 09:19

## 2024-06-03 RX ADMIN — FUROSEMIDE 40 MG: 40 TABLET ORAL at 10:11

## 2024-06-03 RX ADMIN — LOSARTAN POTASSIUM 25 MG: 25 TABLET, FILM COATED ORAL at 09:21

## 2024-06-03 RX ADMIN — HEPARIN SODIUM 5000 UNITS: 5000 INJECTION, SOLUTION INTRAVENOUS; SUBCUTANEOUS at 06:40

## 2024-06-03 RX ADMIN — ALBUTEROL SULFATE 2 PUFF: 90 AEROSOL, METERED RESPIRATORY (INHALATION) at 04:17

## 2024-06-03 RX ADMIN — GABAPENTIN 100 MG: 100 CAPSULE ORAL at 09:20

## 2024-06-03 RX ADMIN — NALTREXONE HYDROCHLORIDE 50 MG: 50 TABLET, FILM COATED ORAL at 09:22

## 2024-06-03 RX ADMIN — ALBUTEROL SULFATE 2 PUFF: 90 AEROSOL, METERED RESPIRATORY (INHALATION) at 10:11

## 2024-06-03 RX ADMIN — Medication 1 TABLET: at 09:22

## 2024-06-03 RX ADMIN — LOPERAMIDE HYDROCHLORIDE 4 MG: 2 CAPSULE ORAL at 09:21

## 2024-06-03 RX ADMIN — ACETAMINOPHEN 975 MG: 325 TABLET, FILM COATED ORAL at 06:40

## 2024-06-03 RX ADMIN — SODIUM BICARBONATE 650 MG TABLET 650 MG: at 09:22

## 2024-06-03 RX ADMIN — THIAMINE HCL TAB 100 MG 100 MG: 100 TAB at 09:22

## 2024-06-03 RX ADMIN — ACETAMINOPHEN 975 MG: 325 TABLET, FILM COATED ORAL at 13:29

## 2024-06-03 ASSESSMENT — ACTIVITIES OF DAILY LIVING (ADL)
ADLS_ACUITY_SCORE: 33

## 2024-06-03 NOTE — PROGRESS NOTES
Addiction Team Social Work Note    Date of  Intervention: 06/03/24  Collaborated with:  Dr. Donaldson, Addiction Attending; patient; pt's sister Dianne; BIRGIT Cox    Referred by: Dr. Donaldson  Reason for consult: Discussion of resources with patient and sister  Patient information: Pt is a 68 year old gentleman with a PMHx of AUD c/b R peroneal neuropathy, gastritis, tobacco use, asthma, stage III CKD, HLD, and HTN who presents to the [facility] for neurosurgical evaluation of L sided weakness after being found down at home.  Per report, pt is discharging to a TCU today.    Intervention:  Chart reviewed.  Discussed pt's care and plan in Addiction team rounds.      Writer met with pt and his sister, Dianne for a supportive visit and to discuss resources.    Introduced self and role.  Conducted a brief psychosocial assessment.  Pt lives with his wife, Larisa, in a multi-level home, however he reports that Larisa and he live in separate bedrooms and they do not interact.  In fact, interactions between the two of them are avoided due to significant marital strain.  It is reported that pt spends the majority of his day in his bedroom, watching TV and drinking.  He reports that his goal is sobriety and is proud of the 7 days of sobriety while being hospitalized.     This seems to be the first time pt is experiencing not being fully independent.  Pt and Dianne anticipate that pt may not have any support within the home.     He hopes to return home after the TCU.  Dianne brings up a great point that maintaining sobriety in his bedroom will be difficult if nothing else changes.  Pt states that he really does not want to move out of the home as he has invested time and energy into maintaining the home.      We talked about what it may be like getting in-home services that would align with pt being safe in his home.  Pt states that he co-owns his home and has an BOUCHRA so seems over assets and income for MA.    He wonders  outloud what it may be like trying to gain full ownership of the home.    Writer recommended to pt to discuss further with an Elder Law .    Pt saw [facility] while hospitalized.  Pt states that he is averse to support groups, however engaged with the Psychologist here in the hospital.    Writer would also recommend pt continue with seeing a Psychologist while at the TCU and pt is amenable to this.  Dianne echoes this recommendation further normalizing Psychological care.    We reviewed pt's decline to do Out-pt NIKIA tx.  Dianne talked more with pt re: OP NIKIA tx and pt is now amenable to doing this.    Suggested looking into [facility] which also has an OP NIKIA tx within the TCU.      Assessment:   Support and recommendations.    Plan:    Anticipated Disposition:  TCU.    Follow Up:  Writer called and left voicemail with the Hamida CARNEY at [facility] asking that pt see a Psychologist while at their facility.    Writer met back with pt and provided him a list of Elder Law Attorneys from the [facility] website, as well as the address and phone number of the [facility], and info on [facility] since we discussed it.    Due to regulation of Title 42 of the Code of Federal Regulations (CFR) Part 2: Confidentiality laws apply to this note and the information wherein.  Thus, this note cannot be copy and pasted into any other health care staff's note nor can it be included in general medical records sent to ANY outside agency without the patient's written consent.    LOURDES Tovar  She/her/hers  Social Work Services  Inpatient Addiction Medicine Team  [phone] work cell phone  [phone] pager  8:00am-4:30pm M/T/Th/F; Off Wednesday's

## 2024-06-03 NOTE — CONSULTS
Notes from psychology and addictions service were reviewed. I talked with Luigi and his sister about psychiatric meds that may be helpful, but he tends to be reluctant to take medications as indicated in the addictions note.  We decided that sleep difficulties and some anxiety may be contributing to his tendency to consume too much alcohol, therefore he will try something for that.  I think the best option would be mirtazapine 15 mg at bedtime.  He was warned of some morning grogginess for a few days.    No charge encounter    Ben Elias M.D.   Consult liaison psychiatry \  [facility]   Contact information available via [facility] Paging/Directory or [facility]

## 2024-06-03 NOTE — PLAN OF CARE
"Goal Outcome Evaluation:       Plan of Care Reviewed With: patient    Overall Patient Progress: no changeOverall Patient Progress: no change    Outcome Evaluation: Disoriented to situation occassionally, forgetful. VSS on RA. Denies pain. L sided weakness. A1 GB W , bed alarm in use. 1 formed BM today. Utilizing urinal. Started lasix and added Remeron tonight per psych. Albuterol inhaler given PRN. Sister at bedside. Discharged to [facility] report given to Shaka.     Discharge to: [facility]  Transportation: Wheelchair  Time: 1400  Prescriptions: none  Belongings: clothing   Access: None   Care plan and education discontinued: yes  Paperwork:  AVS printed and given. Packet sent with transport.     /65 (BP Location: Right arm)   Pulse 87   Temp 98.3  F (36.8  C) (Oral)   Resp 18   Ht 1.778 m (5' 10\")   Wt 73 kg (160 lb 15 oz)   SpO2 96%   BMI 23.09 kg/m     "

## 2024-06-03 NOTE — PROGRESS NOTES
[facility]     Addiction Progress Note - Addiction Service        Date of Admission:  5/26/2024  Assessment & Plan       Luigi Vieyra is a 68 year old male with a history of alcohol use c/b R peroneal neuropathy, gastritis, tobacco use, asthma, stage III CKD, HLD, and HTN who presented to [facility] for neurosurgical evaluation of L sided weakness after being found down at home.  CHAT team evaluating the patient for history of alcohol use.      # Alcohol Use Disorder: severe  # Alcohol-related neuropathy  Lifetime history of alcohol use, currently 1-2 pint/day. Has previously maintained periods of sobriety in the past but rarely more than 1 month.  Somewhat passive in evaluation today, but would be interested in cessation, and may be interested in CD treatment options, as well as medications to help maintain sobriety.  Would be a good candidate for naltrexone (discussed briefly) but remains uninterested in starting therapy on multiple discussions. Denies any history of withdrawal, no seizures or DTs, last drink reportedly 4 days prior to presentation and neg EtOH in the ED.  -CIWA discontinued for EtOH withdrawal; agree with gabapentin. Consider prescribing at discharge if this helps with underlying anxiety.  -Continue 50mg daily naltrexone which can be increased after 7 days (6/9) to 100mg daily.   -Chem dep consulted but patient declined available options for IOP based on availability with insurance. He remained uninterested during visit today.     # Mental health:  Patient denies any mental health concerns, but very flat in conversation, quite isolated, moderate anhedonia, although difficult to parse out acute and chronic neurocognitive and emotional changes .  Per sister, he has been using alcohol to managed emotional challenges his whole life.  - Consider health psych if patient struggling with adjustment/hospitalization  - Consider behavioral health on discharge vs  "selective serotonin reuptake inhibitor for moderate depression.      # Peer Support:   -Our peer  will meet the patient if agreeable and still hospitalized on Thursday, to provide additional outpatient resources  -To contact Nabila Peer  from [facility] ([facility]): call or text: [phone]     # Addiction Social Worker:   Our addiction social worker Abelardo Rey can be contacted if needed, on her pager [phone] or texted/called at [phone]  --Patient is not interested currently in OP addiction treatment after discharge.     # Linkage to Care:   Sees Gatito Ware MD in Family Medicine, could consider follow-up with PCP for consideration of increasing naltrexone       Time Spent on this Encounter   I spent 35 minutes on the unit/floor managing the care of Luigi Vieyra. Over 50% of my time was spent on the following:   - Counseling the patient and/or family regarding: risks and benefits of treatment options, recommended follow-up, and prevention of disease  - Coordination of care with the: care coordinator/ and primary team    Luigi Donaldson MD on 6/3/2024 at 11:36 AM   Addiction Service   [facility]     Contact information available via [facility] Paging/Directory under \"addiction medicine\"        ______________________________________________________________________    Interval History   Tolerated naltrexone dosing yesterday, remains interested in the trial of this medication. Continuing to contemplate outpatient treatment program options but not yet endorsing interest.    ROS:  CV, Pulm, GI and  assessed. Pertinent positives as above, otherwise negative.     Data reviewed today: I reviewed all medications, new labs and imaging results over the last 24 hours.     Physical Exam   Temp: 98.3  F (36.8  C) Temp src: Oral BP: 112/65 Pulse: 87   Resp: 18 SpO2: 96 % O2 Device: None (Room air)    General Appearance:  " NAD  Respiratory: No signs of respiratory distress, in room air  Cardiovascular: pulses assumed  GI: Soft ND  Skin: WWP     Due to regulation of Title 42 of the Code of Federal Regulations (CFR) Part 2: Confidentiality laws apply to this note and the information wherein.  Thus, this note cannot be copy and pasted into any other health care staff's note nor can it be included in general medical records sent to ANY outside agency without the patient's written consent.

## 2024-06-03 NOTE — PLAN OF CARE
Goal Outcome Evaluation:      Plan of Care Reviewed With: patient    Overall Patient Progress: no changeOverall Patient Progress: no change    Outcome Evaluation: A&O, forgetful. Using urinal at bedsid, up w/ax1 to toilet, 1x bm overight. Denies pain. Pt went very tachy, 227, around 0400 briefly. Back down quickly. Reports no symptoms other than SOB, albuterol inhaler provided.

## 2024-06-03 NOTE — PLAN OF CARE
Occupational Therapy Discharge Summary    Reason for therapy discharge:    Discharged to transitional care facility.    Progress towards therapy goal(s). See goals on Care Plan in [facility] electronic health record for goal details.  Goals partially met.  Barriers to achieving goals:   discharge from facility.    Therapy recommendation(s):    Continued therapy is recommended.  Rationale/Recommendations:  rec continued skilled OT services to progess IND and safety with ADLs/IADLs.

## 2024-06-03 NOTE — PROGRESS NOTES
Care Management Discharge Note    Discharge Date: 06/03/2024       Discharge Disposition: Transitional Care    Discharge Services: None    Discharge DME: None    Discharge Transportation: [employer] - Wheelchair [phone]    Private pay costs discussed: transportation costs    Does the patient's insurance plan have a 3 day qualifying hospital stay waiver?  No    PAS Confirmation Code: [id number]  Patient/family educated on Medicare website which has current facility and service quality ratings: no    Education Provided on the Discharge Plan: Yes  Persons Notified of Discharge Plans: Patient, patient's sister Dianne, Brendan Lira, provider, nursing staff, SW  Patient/Family in Agreement with the Plan: yes    Handoff Referral Completed: Yes    Additional Information:  Provider confirmed the patient is medically ready to discharge today    Due to patient still being Ax1 with GB and walker sober housing is not an option until patient back to baseline.     SW confirmed with Brendan Lira that the patient can admit to the facility today.    ROMMEL called [employer] and arranged wheelchair transportation for today with a pickup window of [phone].    @1026 ROMMEL sent discharge orders to Erendira Lira via Epic in basket     Provider requested transport be pushed back for around 1400.    ROMMEL called [employer] and rescheduled the wheelchair ride with a new pickup window of [phone]    ROMMEL updated the care team and Erendira CARNEY updated the care team and requested bedside nurse inform patient of ride time.     [facility]  [address] [place] [id number]  Liaison: Erendira Lira  P: [phone]  F: [phone]  [email or web address]    BIRG[facility] Florez  [address]   Office: [phone]  [email or web address]

## 2024-06-04 ENCOUNTER — TELEPHONE (OUTPATIENT)
Dept: NEUROSURGERY | Facility: CLINIC | Age: 69
End: 2024-06-04
Payer: COMMERCIAL

## 2024-06-04 ENCOUNTER — PATIENT OUTREACH (OUTPATIENT)
Dept: CARE COORDINATION | Facility: CLINIC | Age: 69
End: 2024-06-04
Payer: COMMERCIAL

## 2024-06-04 ASSESSMENT — ACTIVITIES OF DAILY LIVING (ADL): DEPENDENT_IADLS:: INDEPENDENT;TRANSPORTATION

## 2024-06-04 NOTE — LETTER
To:             Please give to facility    From:   Teagan Santana RN, Care Coordinator   [facility]   Teagan Santana RN, Care Coordinator   [facility]   E-mail [email or web address]   [phone]   Patient Name:  Luigi Vieyra YOB: 1955   Admit date: 6/3/2024      *Information Needed:  Please contact me when the patient will discharge (or if they will move to long term care)- include the discharge date, disposition, and main diagnosis   If the patient is discharged with home care services, please provide the name of the agency    Also- Please inform me if a care conference is being held.   [facility]   Teagan Santana RN, Care Coordinator   [facility]   E-mail [email or web address]   [phone]                             Thank you

## 2024-06-04 NOTE — TELEPHONE ENCOUNTER
Left Voicemail (1st Attempt) for the patient to call back and schedule the following:    Appointment type: Rtn vascular neurosurg - in person  Provider: Keily Ugarte  Return date: 06/14/2024  Specialty phone number: [phone]  Additional appointment(s) needed: CT prior  Additonal Notes: 2 wk f/u/ [facility]/ CT prior

## 2024-06-04 NOTE — DISCHARGE SUMMARY
[facility]  Hospitalist Discharge Summary      Date of Admission:  5/26/2024  Date of Discharge:  6/3/2024  1:58 PM  Discharging Provider: Jamel Frost MD  Discharge Service: Hospitalist Service, GOLD TEAM 9    Discharge Diagnoses   # Controlled hypertension, not present on admission  # Sepsis and severe sepsis secondary to likely bacterial pneumonia possibly secondary to aspiration, POA  # Acute kidney injury, akin stage I on top of CKD stage IIIb complicated by anion gap metabolic acidosis, POA, resolved  # Antibiotic associated diarrhea, ruled out for enteric infections, resolved after stopping antibiotics  # Alcohol use disorder with dependence and withdrawal Complicated by multiple falls complicated by multiple falls, POA  # 4 mm subdural hemorrhage along the right tentorial leaflet, without mass effect, brain compression considered and ruled out POA  # Acute superior endplate compression deformities involving C7, T2, and L1, POA   # Left sided andreia-paresis , POA, improving  # Shortness of breath on exertion secondary to mild volume overload, improved with diuresis  # Anxiety complicated by insomnia, POA  # Hypophosphatemia and hypomagnesemia, POA  # Demand ischemia and elevated NT proBNP, POA echocardiogram with normal ejection fraction  # Elevated CPK, POA    Clinically Significant Risk Factors          Follow-ups Needed After Discharge       Unresulted Labs Ordered in the Past 30 Days of this Admission       Date and Time Order Name Status Description    6/1/2024  1:03 PM Blood Culture Peripheral Blood Preliminary     6/1/2024  1:03 PM Blood Culture Hand, Right Preliminary         These results will be followed up by PCP     Discharge Disposition   Discharged to short-term care facility  Condition at discharge: Stable    Hospital Course   68 year old male admitted on 5/26/2024. He has a PMHx of alcohol use c/b R peroneal neuropathy, gastritis, tobacco use,  asthma, stage III CKD, HLD, and HTN who presents to the ED as a transfer from [facility] for neurosurgery evaluation.     # Controlled hypertension, not present on admission  - Continue Metoprolol and hold if SBP <120  - Continue losartan 25 mg daily    # Sepsis and severe sepsis secondary to likely bacterial pneumonia possibly secondary to aspiration, POA  Sepsis criteria include heart rate of 92 and white blood cell count of 14.7.  Patient qualifies for diagnosis of severe sepsis given lactic acid of 2.3.  Inflammatory markers with CRP and procalcitonin are both elevated.  He has high pretest probability given his alcohol use disorder and being found down.  Chest x-ray is abnormal.  The patient completed 3-day course of azithromycin.The differential diagnosis would also include C. difficile antigen enteric infection given 7 episodes of diarrhea with generalized abdominal discomfort within the last 24 hours.Lactic acidosis resolved with intravenous antibiotics and intravenous fluids.C. difficile and enteric panel are negative.  The patient completed a course of antibiotics.  Stopped antibiotics.    # Acute kidney injury, akin stage I on top of CKD stage IIIb complicated by anion gap metabolic acidosis, POA, resolved  This is likely prerenal in etiology.  Ordered fractional secretion of sodium and obtain renal ultrasound without evidence of obstructive physiology.  Kidney function is at baseline.  -Continue sodium bicarb daily that is needed from chronic kidney disease perspective  -Strict intake and output and daily body weight  -Basic metabolic panel in a.m.    # Antibiotic associated diarrhea, ruled out for enteric infections, resolved after stopping antibiotics  2 more days left of antibiotic therapy.  -Continue Imodium on as-needed basis    # Alcohol use disorder with dependence and withdrawal Complicated by multiple falls complicated by multiple falls, POA  Patient reports that he drinks 1 gallon of vodka  "per week. He has been drinking since age 19 and has never \"stopped long enough to have the shakes\" The patient is interested in chemical dependency resources and is willing to discuss further with addiction medicine.  -Continue gabapentin at 100 mg 3 times daily given his kidney function  -Will complete the high thiamine protocol that was started in the emergency department  -The patient agreed to start naltrexone 50 mg for 7 days that can be increased 100 mg daily, appreciative to addiction medicine  -Continue folic acid, multivitamins, and thiamine  -Appreciative to the input of addiction medicine    # 4 mm subdural hemorrhage along the right tentorial leaflet, without mass effect, brain compression considered and ruled out POA  MRI Brain obtained without evidence of acute ischemic infarct.   -Serial neurological evaluation  -Continue Keppra 500 mg twice daily for 7 days based on recommendations of neurosurgery  -PT/OT  -The patient was cleared for heparin subcutaneous for DVT prevention by neurosurgery and trauma team    # Acute superior endplate compression deformities involving C7, T2, and L1, POA   The patient was cleared to be of cervical collar per neurosurgery.  The patient was seen in collaboration with trauma service  -Continue scheduled acetaminophen and robaxin 500 mg at bedtime together with robaxin 250 mg TID PRN and lidocaine patches.   -Continue gabapentin  - I agree with stopping narcotics that was ordered by trauma team    # Left sided andreia-paresis , POA, improving  An MRI of the left brachial plexus was performed which did not reveal abnormality of the plexus; incidentally there was mixed edema of the left acromioclavicular joint and rotator cuff musculature.  MRI showing evidence of redemonstration of mild compression fracture of the superior endplate of C7 and T2 with cervical spondylosis.  MRI with contrast performed under general sedation that was uneventful without any acute " pathology.  -Continue working with physical therapy with plans for discharge to TCU  - May be considered for EMG later on as outpatient.  -Appreciative to follow the recommendations of neurology and neurosurgery    # Shortness of breath on exertion secondary to mild volume overload, improved with diuresis  -Started gentle diuresis with furosemide oral for 3 days to combat volume overload related to volume replacement for acute kidney injury that was managed during this admission    # Anxiety complicated by insomnia, POA  -Started mirtazapine based on evaluation by psychiatry    # Resolved medical problems  # Hypophosphatemia and hypomagnesemia, POA  -Ordered replacement protocol    # Demand ischemia and elevated NT proBNP, POA echocardiogram with normal ejection fraction    # Elevated CPK, POA  -CPK trended down    # Chronic medical problems:     #Asthma  - Continue PRN albuterol inhaler    # Normocytic anemia with wide RDW, POA  -Added on reticulocyte count  -Will monitor closely for bleeding and obtain daily CBC    # Depression complicated by insomnia, POA  -The patient declined escitalopram  -Continue trazodone 25 mg nightly on top of melatonin    Billing  I spent 40 minutes at the bedside discussing about options for treatment of addition to alcohol and discharge planning including options for transition of care unit versus sober house and having further discussions with addiction medicine consultant and case management/social work and the patient and his sister.    Consultations This Hospital Stay   NEUROSURGERY ADULT IP CONSULT  NEUROLOGY STROKE ADULT IP CONSULT  TRAUMA SURGERY IP CONSULT  [facility]  ADDICTION SERVICE ADULT IP CONSULT FOR [place]  NURSING TO CONSULT FOR VASCULAR ACCESS CARE IP CONSULT  PHYSICAL THERAPY ADULT IP CONSULT  OCCUPATIONAL THERAPY ADULT IP CONSULT  CARE MANAGEMENT / SOCIAL WORK IP CONSULT  CHEMICAL DEPENDENCY IP CONSULT  NURSING TO CONSULT FOR VASCULAR ACCESS CARE IP  CONSULT  NURSING TO CONSULT FOR VASCULAR ACCESS CARE IP CONSULT  NEUROLOGY GENERAL ADULT IP CONSULT  NURSING TO CONSULT FOR VASCULAR ACCESS CARE IP CONSULT  PSYCHOLOGY ADULT IP CONSULT  NURSING TO CONSULT FOR VASCULAR ACCESS CARE IP CONSULT  PHYSICAL THERAPY ADULT IP CONSULT  OCCUPATIONAL THERAPY ADULT IP CONSULT  PSYCHIATRY IP CONSULT    Code Status   Full Code    Time Spent on this Encounter   Jamel DESAI MD, personally saw the patient today and spent greater than 30 minutes discharging this patient.       Jamel Frost MD  [facility] MED SURG  [address]  [facility] [place] [address]  Phone: [phone]  ______________________________________________________________________    Physical Exam   Vital Signs: Temp: 98.3  F (36.8  C) Temp src: Oral BP: 112/65 Pulse: 87   Resp: 18 SpO2: 96 % O2 Device: None (Room air)    Weight: 160 lbs 14.97 oz  Constitutional: Awake, alert, cooperative, no apparent distress.  Eyes: Conjunctiva and pupils examined and normal.  HEENT: Moist mucous membranes, normal dentition.  Respiratory: Clear to auscultation bilaterally, no crackles or wheezing.  Cardiovascular: Regular rate and rhythm, normal S1 and S2, and no murmur noted.  GI: Soft, non-distended, non-tender, normal bowel sounds.  Lymph/Hematologic: No anterior cervical or supraclavicular adenopathy.  Skin: No rashes, no cyanosis, no edema.  Musculoskeletal: No joint swelling, erythema or tenderness.  Neurologic: Cranial nerves 2-12 intact, normal strength and sensation.  Psychiatric: Alert, oriented to person, place and time, no obvious anxiety or depression.        Primary Care Physician   Gatito Ware    Discharge Orders      CT Head w/o contrast*     Adult Neurology  Referral      Primary Care - Care Coordination Referral      Primary Care - Care Coordination Referral      General info for SNF    Length of Stay Estimate: Short Term Care: Estimated # of Days <30  Condition at Discharge:  Improving  Level of care:skilled   Rehabilitation Potential: Good  Admission H&P remains valid and up-to-date: Yes  Recent Chemotherapy: N/A  Use Nursing Home Standing Orders: Yes     Follow Up and recommended labs and tests    Follow up with Nursing home physician.  No follow up labs or test are needed.  Follow up with addiction within 2 weeks of discharge     Reason for your hospital stay    You have been admitted to the hospital for     Intake and output    Every shift     Daily weights    Call Provider for weight gain of more than 2 pounds per day or 5 pounds per week.     Activity - Up ad brandon     Full Code     Physical Therapy Adult Consult    Evaluate and treat as clinically indicated.    Reason:  improve deconditioning     Occupational Therapy Adult Consult    Evaluate and treat as clinically indicated.    Reason:  improve deconditioning     EMG    Minimal sensory involvement  [facility] you to coordinate your care as prescribed by your provider. If you don't hear from a representative within 2 business days, please call [phone].  [facility] will call you to coordinate your care as prescribed by your provider. If you don't hear from a representative within 2 business days, please call [phone].     Diet    Follow this diet upon discharge: Orders Placed This Encounter      Regular Diet Adult       Significant Results and Procedures   Most Recent 3 CBC's:  Recent Labs   Lab Test 06/03/24  0506 06/02/24  0540 06/01/24  0727   WBC 10.6 11.4* 12.8*   HGB 8.7* 8.1* 8.4*   * 100 101*    384 378     Most Recent 3 BMP's:  Recent Labs   Lab Test 06/03/24  0506 06/02/24  0540 06/01/24  0727    136 140   POTASSIUM 4.1 4.6 4.2   CHLORIDE 111* 109* 109*   CO2 18* 19* 20*   BUN 12.1 9.8 9.5   CR 1.46* 1.51* 1.56*   ANIONGAP 10 8 11   PINO 8.9 8.7* 8.5*   * 99 118*   ,   Results for orders placed or performed during the hospital encounter of 05/26/24   Head CT  w/o contrast    Narrative    EXAM: CT HEAD W/O CONTRAST, CT LUMBAR SPINE W/O CONTRAST, CT THORACIC SPINE W/O CONTRAST, CT CERVICAL SPINE W/O CONTRAST  LOCATION: [facility]  DATE/TIME: 5/26/2024 11:39 PM CDT    INDICATION: ? fall, SDH, stability scan; trauma, neck and back pain  COMPARISON: MRI brain and CT head 5/26/2024, CT head and cervical spine 10/13/2022, CT chest abdomen pelvis 10/13/2022.  TECHNIQUE:   1) Routine CT Head without IV contrast. Multiplanar reformats. Dose reduction techniques were used.   2) Routine CT Cervical Spine without IV contrast. Multiplanar reformats. Dose reduction techniques were used.   3) Routine CT Thoracic Spine without IV contrast. Multiplanar reformats. Dose reduction techniques were used.   4) Routine CT Lumbar Spine without IV contrast. Multiplanar reformats. Dose reduction techniques were used.     FINDINGS:   HEAD CT:   INTRACRANIAL CONTENTS:  Stable thin right convexity subdural hematoma. Interval development of a thin parafalcine subdural hematoma. No mass effect.  No CT evidence of acute infarct. Mild presumed chronic small vessel ischemic changes. Moderate   generalized volume loss. No hydrocephalus.     VISUALIZED ORBITS/SINUSES/MASTOIDS: No intraorbital abnormality. No significant paranasal sinus mucosal disease. No middle ear or mastoid effusion.    BONES/SOFT TISSUES: No acute abnormality.    CERVICAL SPINE CT:  VERTEBRA: Vertebral body heights and alignment are unchanged. No acute compression fracture or posttraumatic subluxation.     CANAL/FORAMINA: Multilevel spondylosis without high grade canal stenosis.    PARASPINAL: No prevertebral edema.    THORACIC SPINE CT:  VERTEBRA: T2 superior endplate deformity with mild height loss, new since 2022. Chronic T3, T4, T8, and T12 compression deformities with mild height loss. No posttraumatic subluxation.     CANAL/FORAMINA: Multilevel spondylosis without high grade canal  stenosis.    PARASPINAL: No acute extraspinal abnormality. Chronic healed rib deformities.    LUMBAR SPINE CT:  VERTEBRA: Acute L1 superior endplate compression fracture with mild height loss. Minimal retrolisthesis of L4 on L5. Unchanged grade 1 anterolisthesis of L5 on S1 due to bilateral L5 spondylolysis. No posttraumatic subluxation.     CANAL/FORAMINA: Multilevel spondylosis without high grade canal stenosis.    PARASPINAL: No acute extraspinal abnormality.       Impression    IMPRESSION:  HEAD CT:  1.  Interval development of a thin parafalcine subdural hematoma.  2.  Stable thin right tentorial subdural hematoma.  3.  No mass effect or midline shift.    CERVICAL SPINE CT:  1.  No acute cervical spine fracture.    THORACIC SPINE CT:  1.  T2 superior endplate fracture with mild height loss, likely acute.  2.  No retropulsion or traumatic subluxation.  3.  Chronic compression deformities, as above.    LUMBAR SPINE CT:  1.  Acute L1 superior endplate compression fracture with mild height loss.  2.  No retropulsion or traumatic subluxation.   Chest XR,  PA & LAT    Narrative    EXAM: XR CHEST 2 VIEWS  LOCATION: [facility]  DATE: 5/26/2024    INDICATION: Falls.  COMPARISON: CT chest, abdomen and pelvis with IV contrast 10/13/2022.      Impression    IMPRESSION: Minor strand of linear atelectasis left lower lung. Both lungs are otherwise clear. No adenopathy or effusion. Normal cardiac size and pulmonary vascularity. Mildly atherosclerotic thoracic aorta. Mild degenerative changes both shoulders and   the spine. Multiple bilateral healed rib fracture deformities, unchanged. Monitoring leads overlying the chest.   XR Pelvis 1/2 Views    Narrative    EXAM: XR PELVIS 1/2 VIEWS  LOCATION: [facility]  DATE: 5/26/2024    INDICATION: Falls, lower back pain  COMPARISON: CT 3/20/2022      Impression    IMPRESSION: Normal joint spaces  and alignment. No fracture.   CT Cervical Spine w/o Contrast    Narrative    EXAM: CT HEAD W/O CONTRAST, CT LUMBAR SPINE W/O CONTRAST, CT THORACIC SPINE W/O CONTRAST, CT CERVICAL SPINE W/O CONTRAST  LOCATION: [facility]  DATE/TIME: 5/26/2024 11:39 PM CDT    INDICATION: ? fall, SDH, stability scan; trauma, neck and back pain  COMPARISON: MRI brain and CT head 5/26/2024, CT head and cervical spine 10/13/2022, CT chest abdomen pelvis 10/13/2022.  TECHNIQUE:   1) Routine CT Head without IV contrast. Multiplanar reformats. Dose reduction techniques were used.   2) Routine CT Cervical Spine without IV contrast. Multiplanar reformats. Dose reduction techniques were used.   3) Routine CT Thoracic Spine without IV contrast. Multiplanar reformats. Dose reduction techniques were used.   4) Routine CT Lumbar Spine without IV contrast. Multiplanar reformats. Dose reduction techniques were used.     FINDINGS:   HEAD CT:   INTRACRANIAL CONTENTS:  Stable thin right convexity subdural hematoma. Interval development of a thin parafalcine subdural hematoma. No mass effect.  No CT evidence of acute infarct. Mild presumed chronic small vessel ischemic changes. Moderate   generalized volume loss. No hydrocephalus.     VISUALIZED ORBITS/SINUSES/MASTOIDS: No intraorbital abnormality. No significant paranasal sinus mucosal disease. No middle ear or mastoid effusion.    BONES/SOFT TISSUES: No acute abnormality.    CERVICAL SPINE CT:  VERTEBRA: Vertebral body heights and alignment are unchanged. No acute compression fracture or posttraumatic subluxation.     CANAL/FORAMINA: Multilevel spondylosis without high grade canal stenosis.    PARASPINAL: No prevertebral edema.    THORACIC SPINE CT:  VERTEBRA: T2 superior endplate deformity with mild height loss, new since 2022. Chronic T3, T4, T8, and T12 compression deformities with mild height loss. No posttraumatic subluxation.     CANAL/FORAMINA:  Multilevel spondylosis without high grade canal stenosis.    PARASPINAL: No acute extraspinal abnormality. Chronic healed rib deformities.    LUMBAR SPINE CT:  VERTEBRA: Acute L1 superior endplate compression fracture with mild height loss. Minimal retrolisthesis of L4 on L5. Unchanged grade 1 anterolisthesis of L5 on S1 due to bilateral L5 spondylolysis. No posttraumatic subluxation.     CANAL/FORAMINA: Multilevel spondylosis without high grade canal stenosis.    PARASPINAL: No acute extraspinal abnormality.       Impression    IMPRESSION:  HEAD CT:  1.  Interval development of a thin parafalcine subdural hematoma.  2.  Stable thin right tentorial subdural hematoma.  3.  No mass effect or midline shift.    CERVICAL SPINE CT:  1.  No acute cervical spine fracture.    THORACIC SPINE CT:  1.  T2 superior endplate fracture with mild height loss, likely acute.  2.  No retropulsion or traumatic subluxation.  3.  Chronic compression deformities, as above.    LUMBAR SPINE CT:  1.  Acute L1 superior endplate compression fracture with mild height loss.  2.  No retropulsion or traumatic subluxation.   CT Lumbar Spine w/o Contrast    Narrative    EXAM: CT HEAD W/O CONTRAST, CT LUMBAR SPINE W/O CONTRAST, CT THORACIC SPINE W/O CONTRAST, CT CERVICAL SPINE W/O CONTRAST  LOCATION: [facility]  DATE/TIME: 5/26/2024 11:39 PM CDT    INDICATION: ? fall, SDH, stability scan; trauma, neck and back pain  COMPARISON: MRI brain and CT head 5/26/2024, CT head and cervical spine 10/13/2022, CT chest abdomen pelvis 10/13/2022.  TECHNIQUE:   1) Routine CT Head without IV contrast. Multiplanar reformats. Dose reduction techniques were used.   2) Routine CT Cervical Spine without IV contrast. Multiplanar reformats. Dose reduction techniques were used.   3) Routine CT Thoracic Spine without IV contrast. Multiplanar reformats. Dose reduction techniques were used.   4) Routine CT Lumbar Spine without IV  contrast. Multiplanar reformats. Dose reduction techniques were used.     FINDINGS:   HEAD CT:   INTRACRANIAL CONTENTS:  Stable thin right convexity subdural hematoma. Interval development of a thin parafalcine subdural hematoma. No mass effect.  No CT evidence of acute infarct. Mild presumed chronic small vessel ischemic changes. Moderate   generalized volume loss. No hydrocephalus.     VISUALIZED ORBITS/SINUSES/MASTOIDS: No intraorbital abnormality. No significant paranasal sinus mucosal disease. No middle ear or mastoid effusion.    BONES/SOFT TISSUES: No acute abnormality.    CERVICAL SPINE CT:  VERTEBRA: Vertebral body heights and alignment are unchanged. No acute compression fracture or posttraumatic subluxation.     CANAL/FORAMINA: Multilevel spondylosis without high grade canal stenosis.    PARASPINAL: No prevertebral edema.    THORACIC SPINE CT:  VERTEBRA: T2 superior endplate deformity with mild height loss, new since 2022. Chronic T3, T4, T8, and T12 compression deformities with mild height loss. No posttraumatic subluxation.     CANAL/FORAMINA: Multilevel spondylosis without high grade canal stenosis.    PARASPINAL: No acute extraspinal abnormality. Chronic healed rib deformities.    LUMBAR SPINE CT:  VERTEBRA: Acute L1 superior endplate compression fracture with mild height loss. Minimal retrolisthesis of L4 on L5. Unchanged grade 1 anterolisthesis of L5 on S1 due to bilateral L5 spondylolysis. No posttraumatic subluxation.     CANAL/FORAMINA: Multilevel spondylosis without high grade canal stenosis.    PARASPINAL: No acute extraspinal abnormality.       Impression    IMPRESSION:  HEAD CT:  1.  Interval development of a thin parafalcine subdural hematoma.  2.  Stable thin right tentorial subdural hematoma.  3.  No mass effect or midline shift.    CERVICAL SPINE CT:  1.  No acute cervical spine fracture.    THORACIC SPINE CT:  1.  T2 superior endplate fracture with mild height loss, likely acute.  2.  No  retropulsion or traumatic subluxation.  3.  Chronic compression deformities, as above.    LUMBAR SPINE CT:  1.  Acute L1 superior endplate compression fracture with mild height loss.  2.  No retropulsion or traumatic subluxation.   CT Thoracic Spine w/o Contrast    Narrative    EXAM: CT HEAD W/O CONTRAST, CT LUMBAR SPINE W/O CONTRAST, CT THORACIC SPINE W/O CONTRAST, CT CERVICAL SPINE W/O CONTRAST  LOCATION: [facility]  DATE/TIME: 5/26/2024 11:39 PM CDT    INDICATION: ? fall, SDH, stability scan; trauma, neck and back pain  COMPARISON: MRI brain and CT head 5/26/2024, CT head and cervical spine 10/13/2022, CT chest abdomen pelvis 10/13/2022.  TECHNIQUE:   1) Routine CT Head without IV contrast. Multiplanar reformats. Dose reduction techniques were used.   2) Routine CT Cervical Spine without IV contrast. Multiplanar reformats. Dose reduction techniques were used.   3) Routine CT Thoracic Spine without IV contrast. Multiplanar reformats. Dose reduction techniques were used.   4) Routine CT Lumbar Spine without IV contrast. Multiplanar reformats. Dose reduction techniques were used.     FINDINGS:   HEAD CT:   INTRACRANIAL CONTENTS:  Stable thin right convexity subdural hematoma. Interval development of a thin parafalcine subdural hematoma. No mass effect.  No CT evidence of acute infarct. Mild presumed chronic small vessel ischemic changes. Moderate   generalized volume loss. No hydrocephalus.     VISUALIZED ORBITS/SINUSES/MASTOIDS: No intraorbital abnormality. No significant paranasal sinus mucosal disease. No middle ear or mastoid effusion.    BONES/SOFT TISSUES: No acute abnormality.    CERVICAL SPINE CT:  VERTEBRA: Vertebral body heights and alignment are unchanged. No acute compression fracture or posttraumatic subluxation.     CANAL/FORAMINA: Multilevel spondylosis without high grade canal stenosis.    PARASPINAL: No prevertebral edema.    THORACIC SPINE  CT:  VERTEBRA: T2 superior endplate deformity with mild height loss, new since 2022. Chronic T3, T4, T8, and T12 compression deformities with mild height loss. No posttraumatic subluxation.     CANAL/FORAMINA: Multilevel spondylosis without high grade canal stenosis.    PARASPINAL: No acute extraspinal abnormality. Chronic healed rib deformities.    LUMBAR SPINE CT:  VERTEBRA: Acute L1 superior endplate compression fracture with mild height loss. Minimal retrolisthesis of L4 on L5. Unchanged grade 1 anterolisthesis of L5 on S1 due to bilateral L5 spondylolysis. No posttraumatic subluxation.     CANAL/FORAMINA: Multilevel spondylosis without high grade canal stenosis.    PARASPINAL: No acute extraspinal abnormality.       Impression    IMPRESSION:  HEAD CT:  1.  Interval development of a thin parafalcine subdural hematoma.  2.  Stable thin right tentorial subdural hematoma.  3.  No mass effect or midline shift.    CERVICAL SPINE CT:  1.  No acute cervical spine fracture.    THORACIC SPINE CT:  1.  T2 superior endplate fracture with mild height loss, likely acute.  2.  No retropulsion or traumatic subluxation.  3.  Chronic compression deformities, as above.    LUMBAR SPINE CT:  1.  Acute L1 superior endplate compression fracture with mild height loss.  2.  No retropulsion or traumatic subluxation.   Cervical spine MRI w/o contrast    Narrative    EXAM: MR CERVICAL SPINE W/O CONTRAST  LOCATION: [facility]  DATE: 5/27/2024    INDICATION: Left arm and leg weakness, hyperreflexia, ETOH use disorder, falls.  COMPARISON: CT cervical spine dated 5/26/2024.  TECHNIQUE: MRI Cervical Spine without IV contrast.    FINDINGS:     Patient motion degrades evaluation.    There is an acute appearing superior endplate compression deformity involving the T2 vertebral body, incompletely imaged.    There is subtle superior endplate height loss noted involving the C7 vertebral body measuring  approximately 10%. There is subtle marrow edema noted along the superior endplate of C7. The remaining vertebral body heights are maintained. No suspicious   marrow edema noted elsewhere within the cervical spine. Trace grade I retrolisthesis is present at C3 on C4. Additionally, trace grade I retrolisthesis is also present at C5-C6. Mildly decreased mineralization is noted. No definite abnormal spinal cord   signal identified. Question punctate focus of increased signal within the central spinal cord located at C6-C7 (image 32 series 7), possibly reflecting prominence of the central canal. No extraspinal abnormality.    Craniovertebral junction and C1-C2: Normal.    C2-C3: Disc desiccation. Mild disc height loss. Bilateral facet arthropathy. Small midline disc protrusion with effacement of ventral CSF. Minimal to mild central spinal canal stenosis. No right neural foraminal stenosis. Mild left neural foraminal   stenosis.     C3-C4: Disc desiccation. Disc height loss. Bilateral facet arthropathy, left greater than right. Concentric disc osteophyte complex/bulge with a superimposed midline disc protrusion. Mild central spinal canal stenosis. Mild right neural foraminal   stenosis. Severe left neural foraminal stenosis.     C4-C5: Disc desiccation. Mild disc height loss. Bilateral facet arthropathy, right greater than left. Posterior disc osteophyte complex/bulge. Mild central spinal canal stenosis. Left-sided ligamentum flavum thickening. Mild to moderate right neural   foraminal stenosis. Moderate to severe left neural foraminal stenosis.     C5-C6: Disc desiccation. Disc height loss. Bilateral facet arthropathy. Concentric disc osteophyte complex/bulge, eccentric to the right lateral recess/right neural foramen. Ligamentum flavum thickening. Severe left neural foraminal stenosis.   Marked/severe right neural foraminal stenosis. Moderate right lateral recess stenosis. Mild to moderate central spinal canal stenosis,  more pronounced on the right.     C6-C7: Disc desiccation. Mild disc height loss. Bilateral facet arthropathy. Concentric disc osteophyte complex/bulge with superimposed midline disc protrusion. Severe central spinal canal stenosis. Ligamentum flavum thickening. Severe right neural   foraminal stenosis. Moderate to severe left neural foraminal stenosis.     C7-T1: Disc desiccation. Disc height loss. Bilateral facet arthropathy. No significant neural foraminal stenosis. Small right of midline posterior disc protrusion. No significant canal stenosis.      Impression    IMPRESSION:  1.  Acute superior endplate compression deformities involving C7 and T2, as above.  2.  Multilevel degenerative changes of the cervical spine, as above.  3.  At C6-C7 there is severe central spinal canal stenosis with severe right and moderate to severe left neural foraminal stenosis.  4.  At C5-C6 there is marked/severe right neural foraminal stenosis, severe left neural foraminal stenosis, moderate right lateral recess stenosis and mild to moderate central spinal canal stenosis (more pronounced on the right).  5.  At C4-C5 there is mild central spinal canal stenosis with mild to moderate right and moderate to severe left neural foraminal stenosis.  6.  At C3-C4 there is mild central spinal canal stenosis with mild right and severe left neural foraminal stenosis.     MR Thoracic Spine w/o Contrast    Narrative    EXAM: MR THORACIC SPINE W/O CONTRAST  LOCATION: [facility]  DATE: 5/27/2024    INDICATION: Left arm leg weakness, hyperreflexia.  COMPARISON: CT dated 5/26/2024.  TECHNIQUE: Routine Thoracic Spine MRI without IV contrast.    FINDINGS:     Patient motion degrades evaluation.    Decreased osseous mineralization. There is an acute superior endplate compression fracture involving the T2 vertebral body with mild height loss measuring approximately 15%. There are chronic superior endplate  compression deformities involving the T3,   T4, T8 and T12 vertebral bodies. There is an acute nondisplaced fracture identified involving the left anterior and lateral inferior endplate of T3. No dorsal osseous retropulsion. No acute malalignment. There is multilevel degenerative disc desiccation   and disc height loss noted throughout the thoracic spine. Scattered small disc bulges/disc osteophyte complexes noted. At T3-T4 there is small left lateral disc osteophyte complex/bulge. No significant canal stenosis is present. At this level there is   mild left lateral recess stenosis. At T8-T9 there is a midline disc protrusion superimposed upon a concentric disc osteophyte complex. At this level there is mild to moderate central spinal canal stenosis. At T11-T12 there is a concentric disc osteophyte   complex/bulge. At this level there is mild central spinal canal stenosis. Multilevel mid to lower thoracic spine facet arthrosis is noted. On the right there is mild neural foraminal stenosis at T9-T10 and T10-T11. On the left there is mild neural   foraminal stenosis at T8-T9, T9-T10 and T10-T11.    Scattered degenerative Schmorl's nodes are noted at T8 superiorly and at T10-T11/T12.    There is an incompletely imaged acute superior endplate compression fracture involving L1.    No abnormal spinal cord signal.    Scattered osseous hemangiomas are noted involving the thoracic spine.       Impression    IMPRESSION:  1.  Acute T2 superior endplate compression fracture.  2.  Acute nondisplaced T3 inferior endplate fracture.  3.  Acute L1 superior endplate compression fracture.  4.  Multilevel degenerative changes, as above.  5.  Decreased osseous mineralization.   CT Head w/o Contrast    Narrative    EXAM: CT HEAD W/O CONTRAST  LOCATION: [facility]  DATE: 5/27/2024    INDICATION: Interval development of new SDH, follow up for stability  COMPARISON: 5/26/2024  TECHNIQUE: Routine  CT Head without IV contrast. Multiplanar reformats. Dose reduction techniques were used.    FINDINGS:  INTRACRANIAL CONTENTS: Thin parafalcine subdural hematoma has decreased in conspicuity. Stable thin subdural hematoma along the right tentorial leaflet. No CT evidence of acute infarct. Mild presumed chronic small vessel ischemic changes. Mild to   moderate generalized volume loss. No hydrocephalus.     VISUALIZED ORBITS/SINUSES/MASTOIDS: No intraorbital abnormality. No paranasal sinus mucosal disease. No middle ear or mastoid effusion.    BONES/SOFT TISSUES: No acute abnormality.      Impression    IMPRESSION:  1.  Thin parafalcine subdural hematoma has decreased in conspicuity.  2.  Stable thin subdural hematoma along the right tentorial leaflet.  3.  No superimposed acute findings.     MR Brachial Plexus Left wo & w Contrast    Narrative    MR BRACHIAL PLEXUS LEFT W/O & W CONTRAST 5/27/2024 3:11 PM    History:  left shoulder weakness.  ICD-10:    Comparison: Same-day MRI of the cervical and thoracic spine, CT  cervical spine 5/26/2024, chest x-ray 5/26/2024    Contrast Dose: 10mL Gadavist    Technique: Axial and sagittal STIR, sagittal and coronal T1-weighted,  3-D STIR images of the left brachial plexus were obtained without  intravenous contrast. After intravenous gadolinium administration, fat  saturated sagittal, coronal and axial T1-weighted images were  obtained.    Contrast: 10 cc Gadavist IV    Findings:   Mildly motion degraded examination. No mass, hemorrhage, or adenopathy  involving the brachial plexus. The roots appear normal along their  course. No pseudomeningocele. No mass or lesion is noted in the  adjacent lung apices or axilla.    Post-intravenous contrast images also demonstrate no abnormal  enhancement of the brachial plexus.    See separately performed MRI of the cervical and thoracic spine for  detailed findings including mild C7, T2, and T3 endplate edema  attributed to compression  deformities. Multilevel cervical  degenerative changes, most pronounced at C5-6 and C6-7. Moderate  spinal canal stenosis at C6-7. Multilevel neural foraminal stenosis.    There is STIR hyperintense signal about the left AC joint and  posterior rotator cuff musculature (series 2 image 17, 19 for  example).      Impression    Impression:    1. No abnormality of the brachial plexus identified.  2. Mild edema of the left acromioclavicular joint and rotator cuff  musculature, presumably degenerative, though not optimally evaluated  on this exam.  3. Multilevel cervical degenerative changes, most pronounced at C5-6  and C6-7.    I have personally reviewed the examination and initial interpretation  and I agree with the findings.    COLBY SIM MD         SYSTEM ID:  [id number]   XR Humerus Port Left G/E 2 Views    Narrative    EXAM: XR HUMERUS PORT LEFT G/E 2 VIEWS, XR SHOULDER LEFT PORT G/E 2 VIEWS  LOCATION: [facility]  DATE: 5/27/2024    INDICATION: Weakness, of unclear etiology.    COMPARISON: None.      Impression    IMPRESSION: Bones are markedly demineralized. There is no evidence of an acute displaced left humerus or shoulder fracture. Mild AC joint arthrosis. No significant glenohumeral joint space narrowing.     Mildly displaced, approximately 7th through 9th left posterior lateral rib fractures are age indeterminant, favor subacute to chronic.   XR Shoulder Left Port G/E 2 Views    Narrative    EXAM: XR HUMERUS PORT LEFT G/E 2 VIEWS, XR SHOULDER LEFT PORT G/E 2 VIEWS  LOCATION: [facility]  DATE: 5/27/2024    INDICATION: Weakness, of unclear etiology.    COMPARISON: None.      Impression    IMPRESSION: Bones are markedly demineralized. There is no evidence of an acute displaced left humerus or shoulder fracture. Mild AC joint arthrosis. No significant glenohumeral joint space narrowing.     Mildly displaced,  approximately 7th through 9th left posterior lateral rib fractures are age indeterminant, favor subacute to chronic.   CT Head w/o Contrast    Narrative    EXAM: CT CERVICAL SPINE W/O CONTRAST, CT HEAD W/O CONTRAST  LOCATION: [facility]  DATE: 5/28/2024    INDICATION: unwitnessed fall; Neck pain; Neurologic deficit, non traumatic; None of the following: Babinski clonus, balance gait abnormality, Chandler's sign, hyperreflexia, or UE weakness  COMPARISON: May 27, 2024 MR cervical spine. May 27, 2024 CT head.  TECHNIQUE:   1) Routine CT Head without IV contrast. Multiplanar reformats. Dose reduction techniques were used.  2) Routine CT Cervical Spine without IV contrast. Multiplanar reformats. Dose reduction techniques were used.    FINDINGS:   HEAD CT:   INTRACRANIAL CONTENTS: Stable size of the small subdural hemorrhage layering along the posterior falx/right tentorial leaflet. No new or enlarging hemorrhage. Unchanged advanced global cerebral volume loss. Stable ventricles. No CT evidence for acute   ischemia.    VISUALIZED ORBITS/SINUSES/MASTOIDS: Prior bilateral cataract surgery. Visualized portions of the orbits are otherwise unremarkable. No significant paranasal sinus mucosal disease. No middle ear or mastoid effusion.    BONES/SOFT TISSUES: No acute abnormality.    CERVICAL SPINE CT:   VERTEBRA: Normal alignment. Redemonstration of minimal C7 and T2 mild acute compression fractures. Chronic T3 superior endplate compression fracture.. No acute compression fracture or posttraumatic subluxation.     CANAL/FORAMINA: Multilevel spondylosis without high grade canal stenosis. Severe C5-C6 disc degeneration. Uncovertebral ridging with facet arthrosis results in mild left C2-C3, mild left C3-C4, moderate to severe bilateral C5-C6, and mild bilateral C6-C7   neural foraminal stenosis.    PARASPINAL: No prevertebral edema. Calcified atherosclerosis of the carotid bulbs.       Impression    IMPRESSION:  HEAD CT:  1.  Stable thin subdural hemorrhage along the posterior falx/right tentorial leaflet. No new or enlarging hemorrhage.    CERVICAL SPINE CT:  1.  Redemonstration of minimal C7 and T2 acute compression fractures.   CT Cervical Spine w/o Contrast    Narrative    EXAM: CT CERVICAL SPINE W/O CONTRAST, CT HEAD W/O CONTRAST  LOCATION: [facility]  DATE: 5/28/2024    INDICATION: unwitnessed fall; Neck pain; Neurologic deficit, non traumatic; None of the following: Babinski clonus, balance gait abnormality, Chandler's sign, hyperreflexia, or UE weakness  COMPARISON: May 27, 2024 MR cervical spine. May 27, 2024 CT head.  TECHNIQUE:   1) Routine CT Head without IV contrast. Multiplanar reformats. Dose reduction techniques were used.  2) Routine CT Cervical Spine without IV contrast. Multiplanar reformats. Dose reduction techniques were used.    FINDINGS:   HEAD CT:   INTRACRANIAL CONTENTS: Stable size of the small subdural hemorrhage layering along the posterior falx/right tentorial leaflet. No new or enlarging hemorrhage. Unchanged advanced global cerebral volume loss. Stable ventricles. No CT evidence for acute   ischemia.    VISUALIZED ORBITS/SINUSES/MASTOIDS: Prior bilateral cataract surgery. Visualized portions of the orbits are otherwise unremarkable. No significant paranasal sinus mucosal disease. No middle ear or mastoid effusion.    BONES/SOFT TISSUES: No acute abnormality.    CERVICAL SPINE CT:   VERTEBRA: Normal alignment. Redemonstration of minimal C7 and T2 mild acute compression fractures. Chronic T3 superior endplate compression fracture.. No acute compression fracture or posttraumatic subluxation.     CANAL/FORAMINA: Multilevel spondylosis without high grade canal stenosis. Severe C5-C6 disc degeneration. Uncovertebral ridging with facet arthrosis results in mild left C2-C3, mild left C3-C4, moderate to severe bilateral C5-C6, and  mild bilateral C6-C7   neural foraminal stenosis.    PARASPINAL: No prevertebral edema. Calcified atherosclerosis of the carotid bulbs.      Impression    IMPRESSION:  HEAD CT:  1.  Stable thin subdural hemorrhage along the posterior falx/right tentorial leaflet. No new or enlarging hemorrhage.    CERVICAL SPINE CT:  1.  Redemonstration of minimal C7 and T2 acute compression fractures.   US Renal Complete Non-Vascular    Narrative    EXAMINATION: US RENAL COMPLETE NON-VASCULAR, 5/28/2024 1:27 PM     COMPARISON: 7/28/2023.    HISTORY: acute kidney injury, need to rule out obstuctive phyiology    TECHNIQUE: The kidneys and bladder were scanned in the standard  fashion with specialized ultrasound transducer(s) using both gray  scale and limited color/spectral Doppler techniques.    FINDINGS:    Right kidney: Measures 9.3 cm in length. Parenchyma is of normal  thickness and echogenicity. No focal mass. No hydronephrosis.    Left kidney: Suboptimal visualization of the left kidney. Measures 9.7  cm in length. Parenchyma is of normal thickness and echogenicity. No  hydronephrosis.     Bladder: Unremarkable.      Impression    IMPRESSION:  Unremarkable renal ultrasound. No hydronephrosis.    LORY BURGOS MD         SYSTEM ID:  [id number]   XR Chest Port 1 View    Narrative    EXAM: XR CHEST PORT 1 VIEW  5/28/2024 1:49 PM      HISTORY: sepsis criteria, recent fall, alcohol use disorder, high risk  for pneumonia    COMPARISON: 5/26/2024    FINDINGS: Single view of the chest. Trachea is midline. Cardiac  silhouette is within normal limits. Atherosclerotic calcifications of  the aortic arch. Streaky perihilar and left greater than right basilar  opacities. No focal consolidation. No pleural effusion or  pneumothorax.      Impression    IMPRESSION: Streaky perihilar and left greater than right bibasilar  opacities, favor atelectasis.    I have personally reviewed the examination and initial interpretation  and I agree with the  findings.    NOE VASQUEZ MD         SYSTEM ID:  [id number]   MR Cervical Spine w/o Contrast    Narrative    EXAM: MR CERVICAL SPINE W/O CONTRAST  5/28/2024 7:20 PM     HISTORY: left arm weakness, per recommendations of neurology       COMPARISON: Cervical spine MR 5/27/2024    TECHNIQUE: Sagittal T1-weighted, sagittal STIR, sagittal T2-weighted,  and axial and sagittal gradient echo. Axial T2-weighted images and  sagittal diffusion weighted images were not obtained due to patient  inability to hold still during the examination.    FINDINGS:  Redemonstration of mild compression deformities the superior end  plates of the C7 and T2 vertebral bodies. Stable cervical vertebral  alignment.    Based only on the sagittal images, there are disc bulges at C3-4,  C4-5, C5-6, and C6-7, most pronounced at C6-7 where there is moderate  spinal canal stenosis and contact with the ventral spinal cord. The  neural foramen are not well evaluated due to noncompletion of the  axial T2-weighted images and significantly motion degraded axial GRE  images. No definite abnormal spinal canal signal on significantly  motion degraded images.      Impression    IMPRESSION:  1. Motion degraded and incomplete MR of the cervical spine.  2. Redemonstration of mild compression fractures of the superior  endplates of C7 and T2. Stable alignment.  3. Cervical spondylosis with multilevel disc bulges most pronounced at  C6-7 with moderate spinal canal stenosis similar to prior.    I have personally reviewed the examination and initial interpretation  and I agree with the findings.    VANE BRAN MD         SYSTEM ID:  [id number]   MR Cervical Spine w/o & w Contrast    Narrative    MR CERVICAL SPINE W/O & W CONTRAST 5/30/2024 12:22 PM    Provided History: paresis of left upper and lower extremity    Comparison: Cervical spine MRI 5/28/2024, 5/27/2024: Thoracic spine  MRI 5/27/2024    Technique: Sagittal T1-weighted, sagittal T2-weighted,  sagittal  diffusion weighted, axial T2-weighted, and axial T2* gradient echo  images of the cervical spine were obtained without intravenous  contrast. Following intravenous administration of gadolinium, axial  and sagittal T1-weighted images with fat saturation were also  obtained.    Contrast: 7 mL gadavist    Findings:  There is multilevel disc height narrowing greatest at C5-6.  There is  normal signal within and normal contour of the cervical spinal cord.  There is retrolisthesis at C3-4.  Stable mild compression fractures of  C7, T2 and T3. There is also a chronic appearing compression fracture  of T4. Tiny amount of edema associated with the left posterior  superior corner of C6. There is associated enhancement with these  compression fractures. No definitive evidence of ligamentous injury.  Hemangioma in the T1 vertebral body.     The findings on a level by level basis are as follows:    C2-3:  Left greater than right uncinate hypertrophy and mild disc  bulging. There is mild left neural foraminal stenosis. Minimal spinal  canal stenosis    C3-4:  Disc bulge with small superimposed central protrusion and left  greater than right uncinate hypertrophy and facet arthropathy. There  is moderate to severe left and mild right neural foraminal stenosis.  Minimal to mild spinal canal stenosis.    C4-5:  Disc bulge and small superimposed central protrusion. Uncinate  hypertrophy and facet arthropathy. There is moderate right and mild  left neural foraminal stenosis. No spinal canal stenosis    C5-6:  Disc bulge and right greater than left uncinate hypertrophy and  bilateral facet arthropathy. Bilateral severe neural foraminal  stenosis. No spinal canal stenosis    C6-7:  Disc bulge with superimposed central protrusion, hypertrophy  and mild facet arthropathy. There is mild-to-moderate right-sided and  mild left neural foraminal stenosis. Mild spinal canal stenosis    C7-T1:  No spinal canal or neural foraminal  narrowing.     No abnormality of the paraspinous soft tissues. Mild cerebellar volume  loss. Asymmetric appearance of the oral tongue, poor visualization due  to saturation band. No abnormality visualized on recent neck MRI.      Impression    Impression:   1. No significant change in acute/subacute mild compression fractures  of C7, T2, T3. Small amount of focal edema in the left posterior  superior corner of C6 could also represent a tiny acute fracture.  2. Multilevel cervical spondylosis greatest at C5-C6 where there is  severe right and moderate left neural foraminal stenosis. There are  additional multilevel high grade neuroforaminal stenoses as described.  No high-grade spinal canal stenosis at any level.  3. No abnormal cord signal.    I have personally reviewed the examination and initial interpretation  and I agree with the findings.    ALANA KEITH MD         SYSTEM ID:  [id number]   Echo Complete     Value    LVEF  60-65%    Narrative    [id number]  [id number]  [id number]  [id number]^NIKI^ANN-MARIE^ADDI     [facility],[place]  Echocardiography [facility]  [address] [place] [id number]     Name: DEIDRA YBARRA  MRN: [id number]  : 1955  Study Date: 2024 09:12 AM  Age: 68 yrs  Gender: Male  Patient Location: [facility]  Reason For Study: SOB  Ordering Physician: ANN-MARIE PRINCE  Referring Physician: SID DHILLON  Performed By: Tanner Michael RDCS     BSA: 1.9 m2  Height: 70 in  Weight: 160 lb  HR: 90  BP: 110/63 mmHg  ______________________________________________________________________________  Procedure  Complete Portable Echo Adult.  ______________________________________________________________________________  Interpretation Summary  Global and regional left ventricular function is normal with an EF of 60-65%.  Left ventricular diastolic function is normal.  Right ventricular function, chamber size, wall motion, and thickness are  normal.  Pulmonary artery  systolic pressure is normal.  The inferior vena cava is normal.  No pericardial effusion is present.  There is no prior study for direct comparison.  ______________________________________________________________________________  Left Ventricle  Global and regional left ventricular function is normal with an EF of 60-65%.  Left ventricular wall thickness is normal. Left ventricular size is normal.  Left ventricular diastolic function is normal. No regional wall motion  abnormalities are seen.     Right Ventricle  Right ventricular function, chamber size, wall motion, and thickness are  normal.     Atria  Both atria appear normal.     Mitral Valve  The mitral valve is normal. Trace mitral insufficiency is present.     Aortic Valve  Aortic valve is normal in structure and function.     Tricuspid Valve  The tricuspid valve is normal. Trace tricuspid insufficiency is present. The  right ventricular systolic pressure is approximated at 18.5 mmHg plus the  right atrial pressure. Pulmonary artery systolic pressure is normal.     Pulmonic Valve  The pulmonic valve is normal.     Vessels  The aorta root is normal. The thoracic aorta is normal. The pulmonary artery  cannot be assessed. The inferior vena cava is normal.     Pericardium  No pericardial effusion is present.     Compared to Previous Study  There is no prior study for direct comparison.  ______________________________________________________________________________  MMode/2D Measurements & Calculations  IVSd: 1.0 cm     LVIDd: 4.8 cm  LVIDs: 3.4 cm  LVPWd: 1.0 cm  FS: 28.0 %  LV mass(C)d: 171.3 grams  LV mass(C)dI: 90.3 grams/m2  Ao root diam: 3.5 cm  asc Aorta Diam: 3.6 cm  LVOT diam: 2.5 cm  LVOT area: 4.9 cm2     EF(MOD-bp): 54.8 %  Ao root diam index Ht(cm/m): 2.0  Ao root diam index BSA (cm/m2): 1.8  Asc Ao diam index BSA (cm/m2): 1.9  Asc Ao diam index Ht(cm/m): 2.0  LA Volume (BP): 33.2 ml     LA Volume Index (BP): 17.5 ml/m2  RV Base: 3.6 cm  RWT:  0.42  TAPSE: 2.6 cm     Doppler Measurements & Calculations  MV E max dre: 56.8 cm/sec  MV A max dre: 86.8 cm/sec  MV E/A: 0.65  MV dec slope: 271.0 cm/sec2  MV dec time: 0.21 sec  PA acc time: 0.07 sec  TR max dre: 215.0 cm/sec  TR max P.5 mmHg  Lateral E/e': 4.9  RV S Dre: 25.9 cm/sec     ______________________________________________________________________________  Report approved by: Caron Key 2024 11:32 AM             Discharge Medications   Discharge Medication List as of 6/3/2024  1:15 PM        START taking these medications    Details   folic acid (FOLVITE) 1 MG tablet Take 1 tablet (1 mg) by mouth daily, Transitional      furosemide (LASIX) 40 MG tablet Take 1 tablet (40 mg) by mouth daily for 2 days, TransitionalTow days only      gabapentin (NEURONTIN) 100 MG capsule Take 2 capsules (200 mg) by mouth 3 times daily, Transitional      Lidocaine (LIDOCARE) 4 % Patch Place 2 patches onto the skin every 24 hours To prevent lidocaine toxicity, patient should be patch free for 12 hrs daily.Transitional      !! loperamide (IMODIUM) 2 MG capsule Take 1 capsule (2 mg) by mouth 4 times daily as needed for diarrhea, Transitional      !! loperamide (IMODIUM) 2 MG capsule Take 2 capsules (4 mg) by mouth daily, Transitional      melatonin 3 MG tablet Take 1 tablet (3 mg) by mouth at bedtime, Transitional      methocarbamol (ROBAXIN) 500 MG tablet Take 0.5 tablets (250 mg) by mouth 3 times daily as needed for muscle spasms, Transitional      mirtazapine (REMERON) 15 MG tablet Take 1 tablet (15 mg) by mouth at bedtime, Transitional      multivitamin w/minerals (THERA-VIT-M) tablet Take 1 tablet by mouth daily, Transitional      naltrexone (DEPADE/REVIA) 50 MG tablet Take 1 tablet (50 mg) by mouth daily, TransitionalFor 7 days then 100 mg      sodium bicarbonate 650 MG tablet Take 1 tablet (650 mg) by mouth daily, Transitional      thiamine (B-1) 100 MG tablet Take 1 tablet (100 mg) by mouth daily,  Transitional      traZODone (DESYREL) 50 MG tablet Take 0.5 tablets (25 mg) by mouth at bedtime, Transitional       !! - Potential duplicate medications found. Please discuss with provider.        CONTINUE these medications which have NOT CHANGED    Details   albuterol (PROAIR HFA/PROVENTIL HFA/VENTOLIN HFA) 108 (90 Base) MCG/ACT inhaler INHALE 2 PUFFS INTO THE LUNGS EVERY 4 HOURS AS NEEDED FOR SHORTNESS OF BREATH, WHEEZING OR COUGH SEE PROVIDER BEFORE OTHER REFILLS ARE GIVEN., Disp-8.5 g, R-4, E-PrescribePharmacy may dispense brand covered by insurance (Proair, or proventil or ventolin  or generic albuterol inhaler)      losartan (COZAAR) 25 MG tablet TAKE 1 TABLET BY MOUTH EVERY MORNING, Disp-90 tablet, R-3, E-Prescribe      metoprolol succinate ER (TOPROL XL) 25 MG 24 hr tablet TAKE 1 TABLET BY MOUTH EVERY MORNING, Disp-90 tablet, R-2, E-Prescribe      order for DME Equipment being ordered: Digital home blood pressure monitor kitDisp-1 each, R-0, Local Print           Allergies   No Known Allergies

## 2024-06-04 NOTE — PROGRESS NOTES
Clinic Care Coordination Contact  Care Coordination Transition Communication    Clinical Data: x    [facility]  Hospitalist Discharge Summary       Date of Admission:  5/26/2024  Date of Discharge:  6/3/2024  1:58 PM  Discharging Provider: Jamel Frost MD  Discharge Service: Hospitalist Service, GOLD TEAM 9        Discharge Diagnoses  # Controlled hypertension, not present on admission  # Sepsis and severe sepsis secondary to likely bacterial pneumonia possibly secondary to aspiration, POA  # Acute kidney injury, akin stage I on top of CKD stage IIIb complicated by anion gap metabolic acidosis, POA, resolved  # Antibiotic associated diarrhea, ruled out for enteric infections, resolved after stopping antibiotics  # Alcohol use disorder with dependence and withdrawal Complicated by multiple falls complicated by multiple falls, POA  # 4 mm subdural hemorrhage along the right tentorial leaflet, without mass effect, brain compression considered and ruled out POA  # Acute superior endplate compression deformities involving C7, T2, and L1, POA   # Left sided andreia-paresis , POA, improving  # Shortness of breath on exertion secondary to mild volume overload, improved with diuresis  # Anxiety complicated by insomnia, POA  # Hypophosphatemia and hypomagnesemia, POA  # Demand ischemia and elevated NT proBNP, POA echocardiogram with normal ejection fraction  # Elevated CPK, POA        Assessment: Patient has transitioned to TCU/ARU for short term rehabilitation:    Facility Name:   [facility] at [address] [address]  Liaison: Erenidra Lira  P: [phone]  F: [phone]  [email or web address]     BIRGIT Florez  [address]   Office: [phone]  [email or web address]  Transition Communication:  Notified facility of Primary Care- Care Coordination support via Epic fax.    Plan: Care Coordinator will await notification  from facility staff informing of patient's discharge plans/needs. Care Coordinator will review chart and outreach to facility staff every 4 weeks and as needed.     [facility]   Teagan Santana RN, Care Coordinator   [facility]'s   E-mail [email or web address]   [phone]

## 2024-06-06 LAB
BACTERIA BLD CULT: NO GROWTH
BACTERIA BLD CULT: NO GROWTH

## 2024-06-14 ENCOUNTER — OFFICE VISIT (OUTPATIENT)
Dept: NEUROSURGERY | Facility: CLINIC | Age: 69
End: 2024-06-14
Payer: COMMERCIAL

## 2024-06-14 ENCOUNTER — ANCILLARY PROCEDURE (OUTPATIENT)
Dept: CT IMAGING | Facility: CLINIC | Age: 69
End: 2024-06-14
Payer: COMMERCIAL

## 2024-06-14 VITALS
SYSTOLIC BLOOD PRESSURE: 114 MMHG | OXYGEN SATURATION: 90 % | RESPIRATION RATE: 16 BRPM | DIASTOLIC BLOOD PRESSURE: 79 MMHG | HEART RATE: 102 BPM

## 2024-06-14 DIAGNOSIS — I62.00 SUBDURAL HEMORRHAGE (H): Primary | ICD-10-CM

## 2024-06-14 DIAGNOSIS — I62.00 SUBDURAL HEMORRHAGE (H): ICD-10-CM

## 2024-06-14 PROCEDURE — 70450 CT HEAD/BRAIN W/O DYE: CPT | Performed by: RADIOLOGY

## 2024-06-14 PROCEDURE — 99213 OFFICE O/P EST LOW 20 MIN: CPT | Performed by: PHYSICIAN ASSISTANT

## 2024-06-14 ASSESSMENT — PAIN SCALES - GENERAL: PAINLEVEL: NO PAIN (0)

## 2024-06-14 NOTE — PROGRESS NOTES
[facility]  Department of Neurosurgery  Center for Skull Base and Pituitary Surgery    Name: Luigi Vieyra  MRN: [id number]  Age: 68 year old  : 1955  2024      Chief Complaint:   Subdural hematoma, hospital follow up    History of Present Illness:   Luigi Vieyra is a 68 year old male with a history of alcohol use disorder, peroneal neuropathy, gastritis, stage 3 chronic kidney disease, hypertension, and hyperlipidemia who is seen today for hospital follow up. He presented to [facility] as a transfer from [facility] for evaluation of a 4mm right tentorial subdural hematoma and left hemibody weakness. He was managed nonoperatively by Neurosurgery with improvement in his weakness prior to discharge. Today he presents in a wheelchair from an assisted facility, unaccompanied. He has no new complaints. He does not understand why he's here today. We discussed his small right sided subdural and need for today's follow up. He just completed a head CT this morning.    Review of Systems:   Pertinent items are noted in HPI or as in patient entered ROS below, remainder of complete ROS is negative.     Physical Exam:   /79 (BP Location: Right arm, Patient Position: Sitting)   Pulse 102   Resp 16   SpO2 90%   General: No acute distress.    Eyes: Conjunctivae are normal.  MSK: Moves all extremities.  No obvious deformity.  Neuro: The patient is fully oriented. Speech is normal. Facial nerve function is normal, rated as a [place]ackmann 1.  Psych: Normal mood and affect. Behavior is normal. Short.    Imaging:  We reviewed the head CT done today which shows resolution of the right tentorial subdural hematoma.      Assessment:  Right tentorial subdural hematoma, hospital follow up    Plan:  We reviewed his head CT findings, reassuring with resolution of the subdural collection. He needs Neurology follow up, they have tried to reach him at home without success because  he's currently at a facility. I sent paperwork for the facility to contact Neurology and set up that appointment for him today.   He can return to see Neurosurgery as needed.       Keily Ugarte PA-C  Department of Neurosurgery

## 2024-06-18 ENCOUNTER — PATIENT OUTREACH (OUTPATIENT)
Dept: CARE COORDINATION | Facility: CLINIC | Age: 69
End: 2024-06-18
Payer: COMMERCIAL

## 2024-06-18 ASSESSMENT — ACTIVITIES OF DAILY LIVING (ADL): DEPENDENT_IADLS:: INDEPENDENT;TRANSPORTATION

## 2024-06-18 NOTE — PROGRESS NOTES
Clinic Care Coordination Contact    Situation: Patient chart reviewed by care coordinator.    Background: [facility]  Hospitalist Discharge Summary       Date of Admission:  5/26/2024  Date of Discharge:  6/3/2024  1:58 PM  Discharging Provider: Jamel Frost MD  Discharge Service: Hospitalist Service, GOLD TEAM 9        Discharge Diagnoses  # Controlled hypertension, not present on admission  # Sepsis and severe sepsis secondary to likely bacterial pneumonia possibly secondary to aspiration, POA  # Acute kidney injury, akin stage I on top of CKD stage IIIb complicated by anion gap metabolic acidosis, POA, resolved  # Antibiotic associated diarrhea, ruled out for enteric infections, resolved after stopping antibiotics  # Alcohol use disorder with dependence and withdrawal Complicated by multiple falls complicated by multiple falls, POA  # 4 mm subdural hemorrhage along the right tentorial leaflet, without mass effect, brain compression considered and ruled out POA  # Acute superior endplate compression deformities involving C7, T2, and L1, POA   # Left sided andreia-paresis , POA, improving  # Shortness of breath on exertion secondary to mild volume overload, improved with diuresis  # Anxiety complicated by insomnia, POA  # Hypophosphatemia and hypomagnesemia, POA  # Demand ischemia and elevated NT proBNP, POA echocardiogram with normal ejection fraction  # Elevated CPK, POA   Assessment: Patient has transitioned to TCU/ARU for short term rehabilitation:     Facility Name:   [facility] at [address] [address]  Liaison: Erendira Lira  P: [phone]  F: [phone]  [email or web address]     BIRGIT Florez  [address]   Office: [phone]  [email or web address]    Assessment: Left a message on liaison VM wondering if the patient is still in TCU    Plan/Recommendations: Patient continues to reside at the TCU   CC  RN will follow up when the patient is discharged     [facility]   Teagan Santana RN, Care Coordinator   [facility]'s   E-mail [email or web address]   [phone]

## 2024-06-27 NOTE — PROGRESS NOTES
Clinic Care Coordination RN       Patient continues to be at the [facility]  Plan : CC RN will follow up when the patient is discharge from TCU     [facility]   Teagan Santana RN, Care Coordinator   [facility]'s   E-mail [email or web address]   [phone]

## 2024-06-28 ENCOUNTER — TRANSFERRED RECORDS (OUTPATIENT)
Dept: HEALTH INFORMATION MANAGEMENT | Facility: CLINIC | Age: 69
End: 2024-06-28
Payer: COMMERCIAL

## 2024-07-02 ENCOUNTER — TRANSCRIBE ORDERS (OUTPATIENT)
Dept: OTHER | Age: 69
End: 2024-07-02

## 2024-07-02 DIAGNOSIS — R11.10 EMESIS: Primary | ICD-10-CM

## 2024-07-05 ENCOUNTER — DOCUMENTATION ONLY (OUTPATIENT)
Dept: GASTROENTEROLOGY | Facility: CLINIC | Age: 69
End: 2024-07-05
Payer: COMMERCIAL

## 2024-07-05 NOTE — PROGRESS NOTES
Requesting previous gastroenterology records for patient:    Who requested the records: Rafat WHITLOCK  Date requested:07/05/24  Why records were requested:request outside 3 years      Where were records requested from:   Clinic Info: [facility]  [address], [place] [address]     Phone: [phone]   [facility] HIM Direct Fax: [phone]     Colleen Raygoza

## 2024-07-08 ENCOUNTER — TRANSFERRED RECORDS (OUTPATIENT)
Dept: HEALTH INFORMATION MANAGEMENT | Facility: CLINIC | Age: 69
End: 2024-07-08
Payer: COMMERCIAL

## 2024-07-19 ENCOUNTER — PATIENT OUTREACH (OUTPATIENT)
Dept: CARE COORDINATION | Facility: CLINIC | Age: 69
End: 2024-07-19
Payer: COMMERCIAL

## 2024-07-19 ASSESSMENT — ACTIVITIES OF DAILY LIVING (ADL): DEPENDENT_IADLS:: INDEPENDENT;TRANSPORTATION

## 2024-07-19 NOTE — PROGRESS NOTES
Clinic Care Coordination Contact    Situation: Patient chart reviewed by care coordinator.    Background:   [facility]  Hospitalist Discharge Summary       Date of Admission:  5/26/2024  Date of Discharge:  6/3/2024  1:58 PM  Discharging Provider: Jamel Frost MD  Discharge Service: Hospitalist Service, GOLD TEAM 9        Discharge Diagnoses  # Controlled hypertension, not present on admission  # Sepsis and severe sepsis secondary to likely bacterial pneumonia possibly secondary to aspiration, POA  # Acute kidney injury, akin stage I on top of CKD stage IIIb complicated by anion gap metabolic acidosis, POA, resolved  # Antibiotic associated diarrhea, ruled out for enteric infections, resolved after stopping antibiotics  # Alcohol use disorder with dependence and withdrawal Complicated by multiple falls complicated by multiple falls, POA  # 4 mm subdural hemorrhage along the right tentorial leaflet, without mass effect, brain compression considered and ruled out POA  # Acute superior endplate compression deformities involving C7, T2, and L1, POA   # Left sided andreia-paresis , POA, improving  # Shortness of breath on exertion secondary to mild volume overload, improved with diuresis  # Anxiety complicated by insomnia, POA  # Hypophosphatemia and hypomagnesemia, POA  # Demand ischemia and elevated NT proBNP, POA echocardiogram with normal ejection fraction  # Elevated CPK, POA    Assessment: CC RN spoke to the [facility] and reports he is doing fine but no discharge date at this time   Writer contact given and agrees to call when the patient is discharged     Plan/Recommendations: CC RN will follow up when the patient is discharged from TCU     [facility]   Teagan Santana RN, Care Coordinator   [facility]'s   E-mail [email or web address]   [phone]

## 2024-07-22 ENCOUNTER — PATIENT OUTREACH (OUTPATIENT)
Dept: CARE COORDINATION | Facility: CLINIC | Age: 69
End: 2024-07-22
Payer: COMMERCIAL

## 2024-07-22 NOTE — PROGRESS NOTES
Clinic Care Coordination Contact  Care Team Conversations    CC RN spoke to U ROMMEL Jones [phone] and he reports the patients daughter is working on relocating patient to [place] and is looking for an  AL facility  Robert will call writer back when there is a discharge plan       [facility]   Teagan Santana RN, Care Coordinator   [facility]'s   E-mail [email or web address]   [phone]

## 2024-07-23 NOTE — TELEPHONE ENCOUNTER
REFERRAL INFORMATION:  Referring Provider:  Kelly Ludwig APRN CNP   Referring Clinic:  [facility]   Reason for Visit/Diagnosis: Emesis      FUTURE VISIT INFORMATION:  Appointment Date:   Appointment Time:      NOTES STATUS DETAILS   OFFICE NOTE from Referring Provider N/A [facility]:   6/5/2024 OV with ADDI Ludwig   OFFICE NOTE from Other Specialist Internal 4/14/2022 OV with RYLAND Ware    [facility]:   6/28/2024 OV with ADDI Silveira   HOSPITAL DISCHARGE SUMMARY/  ED VISITS Internal 10/13/2022 [place] [facility] with MICHELE Rey   OPERATIVE REPORT N/A    MEDICATION LIST Internal         ENDOSCOPY  Internal 10/14/2022, 3/21/2022   COLONOSCOPY N/A    IMAGING (CT, MRI, EGD, MRCP, Small Bowel Follow Through/SBT, MR/CT Enterography) Internal 7/28/2023 US ABD   10/13/2022 CT CHEST ABD PEL   3/20/2022 CT CHEST ABD PEL      Records Requested  07/23/24    Facility  [facility]   Fax: [phone]   Outcome Request sent for records     ** Records have been received, scanned into chart.        Records Requested  07/23/24    Facility  [facility]  Fax: [phone]   Outcome Request sent for records    **[facility].

## 2024-09-03 ENCOUNTER — PATIENT OUTREACH (OUTPATIENT)
Dept: CARE COORDINATION | Facility: CLINIC | Age: 69
End: 2024-09-03
Payer: COMMERCIAL

## 2024-09-03 NOTE — PROGRESS NOTES
Clinic Care Coordination Contact  Care Team Conversations     Call to [facility] .[phone].   Pt discharged from there on Aug 13th and moved to [facility] in [place] [facility] , near [place]    Plan: Closing to Care Coordination       BIRGIT Churchill / for Teagan Santana RN  [facility] Primary Care   Care Coordination  [facility]  9/3/2024 10:35 AM

## 2024-09-25 ENCOUNTER — PRE VISIT (OUTPATIENT)
Dept: GASTROENTEROLOGY | Facility: CLINIC | Age: 69
End: 2024-09-25

## 2024-10-28 ENCOUNTER — TELEPHONE (OUTPATIENT)
Dept: FAMILY MEDICINE | Facility: CLINIC | Age: 69
End: 2024-10-28
Payer: COMMERCIAL

## 2024-10-29 NOTE — TELEPHONE ENCOUNTER
"See provider's message, pt has a hospital follow up visit scheduled tomorrow 10/30/24 for \"follow up. 10/22/24, [facility],\". Writer called pt to follow up as requested by provider; received an automated message saying that there's \"calling restrictions that have prevented the completion of your call\". Unable to leave a message, and pt doesn't currently have MyChart. Routing to provider as ADRIAN.    Emily Armijo RN  [facility]  "

## 2024-10-29 NOTE — CONFIDENTIAL NOTE
If patient is otherwise stable post hospitalization - would be more beneficial to schedule with PCP, Dr. Ware - has opening next week for post hospital follow up     Please call and get update from patient.  Dianne Aguilar, DNP

## (undated) DEVICE — TUBING ENDOGATOR + H2O PORT CO

## (undated) DEVICE — NDL SCLEROTHERAPY 25GA CARR-LOCK  00711811

## (undated) DEVICE — SYR 03ML LL W/O NDL 309657

## (undated) DEVICE — BITE BLOCK SCOPE DISP 20 X 27 000429

## (undated) DEVICE — CATH SUCTION 14FR W/O CTRL DYND41962

## (undated) DEVICE — CANNULA NASAL COMFORT SOFT 25FT 0537

## (undated) DEVICE — SUCTION CANISTER 1000ML 65651-510

## (undated) DEVICE — CONNECTOR ONE-LINK INJECTION SITE LF 7N8399

## (undated) DEVICE — ENDO FORCEP ENDOJAW BIOPSY 3.7MMX230CM FB-222U

## (undated) DEVICE — TUBING SUCTION MEDI-VAC 1/4"X20' N620A - HE

## (undated) DEVICE — PLATE GROUNDING ADULT W/CORD 9165L

## (undated) DEVICE — KIT CONNECTOR FOR OLYMPUS ENDOSCOPES DEFENDO 100310

## (undated) DEVICE — SWABCAP DISINFECTANT GREEN CFF10-250

## (undated) DEVICE — KIT IV START DYNDV1431

## (undated) DEVICE — CLIP HEMOSTATIC ASSURANCE W11 MM 00711882

## (undated) DEVICE — SOL WATER IRRIG 500ML BOTTLE 2F7113

## (undated) DEVICE — KIT SCOPE CLEANING ENDOSCOPY BX00719705

## (undated) RX ORDER — EPHEDRINE SULFATE 50 MG/ML
INJECTION, SOLUTION INTRAMUSCULAR; INTRAVENOUS; SUBCUTANEOUS
Status: DISPENSED
Start: 2022-03-21

## (undated) RX ORDER — SODIUM CHLORIDE, SODIUM LACTATE, POTASSIUM CHLORIDE, CALCIUM CHLORIDE 600; 310; 30; 20 MG/100ML; MG/100ML; MG/100ML; MG/100ML
INJECTION, SOLUTION INTRAVENOUS
Status: DISPENSED
Start: 2024-05-30

## (undated) RX ORDER — ONDANSETRON 2 MG/ML
INJECTION INTRAMUSCULAR; INTRAVENOUS
Status: DISPENSED
Start: 2024-05-30

## (undated) RX ORDER — ACETAMINOPHEN 325 MG/1
TABLET ORAL
Status: DISPENSED
Start: 2024-05-30

## (undated) RX ORDER — FENTANYL CITRATE 50 UG/ML
INJECTION, SOLUTION INTRAMUSCULAR; INTRAVENOUS
Status: DISPENSED
Start: 2024-05-30

## (undated) RX ORDER — PROPOFOL 10 MG/ML
INJECTION, EMULSION INTRAVENOUS
Status: DISPENSED
Start: 2024-05-30